# Patient Record
Sex: FEMALE | Race: OTHER
[De-identification: names, ages, dates, MRNs, and addresses within clinical notes are randomized per-mention and may not be internally consistent; named-entity substitution may affect disease eponyms.]

---

## 2023-06-06 PROBLEM — Z00.00 ENCOUNTER FOR PREVENTIVE HEALTH EXAMINATION: Status: ACTIVE | Noted: 2023-06-06

## 2023-06-07 ENCOUNTER — APPOINTMENT (OUTPATIENT)
Dept: ORTHOPEDIC SURGERY | Facility: CLINIC | Age: 63
End: 2023-06-07

## 2023-07-17 ENCOUNTER — INPATIENT (INPATIENT)
Facility: HOSPITAL | Age: 63
LOS: 14 days | Discharge: NURSING, REHAB OR LONG-TERM CARE | End: 2023-08-01
Attending: SURGERY | Admitting: SURGERY
Payer: MEDICAID

## 2023-07-17 ENCOUNTER — TRANSCRIPTION ENCOUNTER (OUTPATIENT)
Age: 63
End: 2023-07-17

## 2023-07-17 VITALS
TEMPERATURE: 98 F | OXYGEN SATURATION: 100 % | DIASTOLIC BLOOD PRESSURE: 111 MMHG | RESPIRATION RATE: 16 BRPM | HEART RATE: 64 BPM | SYSTOLIC BLOOD PRESSURE: 131 MMHG

## 2023-07-17 LAB
ALBUMIN SERPL ELPH-MCNC: 4.5 G/DL — SIGNIFICANT CHANGE UP (ref 3.3–5)
ALP SERPL-CCNC: 86 U/L — SIGNIFICANT CHANGE UP (ref 40–120)
ALT FLD-CCNC: 11 U/L — SIGNIFICANT CHANGE UP (ref 4–33)
ANION GAP SERPL CALC-SCNC: 35 MMOL/L — HIGH (ref 7–14)
ANISOCYTOSIS BLD QL: SLIGHT — SIGNIFICANT CHANGE UP
APTT BLD: 29.2 SEC — SIGNIFICANT CHANGE UP (ref 27–36.3)
AST SERPL-CCNC: 23 U/L — SIGNIFICANT CHANGE UP (ref 4–32)
BASE EXCESS BLDV CALC-SCNC: -17.4 MMOL/L — LOW (ref -2–3)
BASOPHILS # BLD AUTO: 0 K/UL — SIGNIFICANT CHANGE UP (ref 0–0.2)
BASOPHILS NFR BLD AUTO: 0 % — SIGNIFICANT CHANGE UP (ref 0–2)
BILIRUB SERPL-MCNC: <0.2 MG/DL — SIGNIFICANT CHANGE UP (ref 0.2–1.2)
BLD GP AB SCN SERPL QL: NEGATIVE — SIGNIFICANT CHANGE UP
BLOOD GAS VENOUS COMPREHENSIVE RESULT: SIGNIFICANT CHANGE UP
BUN SERPL-MCNC: 51 MG/DL — HIGH (ref 7–23)
CALCIUM SERPL-MCNC: 8.7 MG/DL — SIGNIFICANT CHANGE UP (ref 8.4–10.5)
CHLORIDE BLDV-SCNC: 92 MMOL/L — LOW (ref 96–108)
CHLORIDE SERPL-SCNC: 88 MMOL/L — LOW (ref 98–107)
CO2 BLDV-SCNC: 13 MMOL/L — LOW (ref 22–26)
CO2 SERPL-SCNC: 11 MMOL/L — LOW (ref 22–31)
CREAT SERPL-MCNC: 6.49 MG/DL — HIGH (ref 0.5–1.3)
EGFR: 7 ML/MIN/1.73M2 — LOW
EOSINOPHIL # BLD AUTO: 0 K/UL — SIGNIFICANT CHANGE UP (ref 0–0.5)
EOSINOPHIL NFR BLD AUTO: 0 % — SIGNIFICANT CHANGE UP (ref 0–6)
GAS PNL BLDV: 128 MMOL/L — LOW (ref 136–145)
GLUCOSE BLDV-MCNC: 118 MG/DL — HIGH (ref 70–99)
GLUCOSE SERPL-MCNC: 113 MG/DL — HIGH (ref 70–99)
HCO3 BLDV-SCNC: 12 MMOL/L — LOW (ref 22–29)
HCT VFR BLD CALC: 43.1 % — SIGNIFICANT CHANGE UP (ref 34.5–45)
HCT VFR BLDA CALC: 44 % — SIGNIFICANT CHANGE UP (ref 34.5–46.5)
HGB BLD CALC-MCNC: 14.8 G/DL — SIGNIFICANT CHANGE UP (ref 11.7–16.1)
HGB BLD-MCNC: 14.4 G/DL — SIGNIFICANT CHANGE UP (ref 11.5–15.5)
IANC: 16.36 K/UL — HIGH (ref 1.8–7.4)
INR BLD: 1 RATIO — SIGNIFICANT CHANGE UP (ref 0.88–1.16)
LACTATE BLDV-MCNC: 2.2 MMOL/L — HIGH (ref 0.5–2)
LYMPHOCYTES # BLD AUTO: 0.8 K/UL — LOW (ref 1–3.3)
LYMPHOCYTES # BLD AUTO: 4.3 % — LOW (ref 13–44)
MACROCYTES BLD QL: SLIGHT — SIGNIFICANT CHANGE UP
MCHC RBC-ENTMCNC: 33.4 GM/DL — SIGNIFICANT CHANGE UP (ref 32–36)
MCHC RBC-ENTMCNC: 34.8 PG — HIGH (ref 27–34)
MCV RBC AUTO: 104.1 FL — HIGH (ref 80–100)
METAMYELOCYTES # FLD: 0.9 % — SIGNIFICANT CHANGE UP (ref 0–1)
MONOCYTES # BLD AUTO: 0.65 K/UL — SIGNIFICANT CHANGE UP (ref 0–0.9)
MONOCYTES NFR BLD AUTO: 3.5 % — SIGNIFICANT CHANGE UP (ref 2–14)
NEUTROPHILS # BLD AUTO: 17.05 K/UL — HIGH (ref 1.8–7.4)
NEUTROPHILS NFR BLD AUTO: 81.7 % — HIGH (ref 43–77)
NEUTS BAND # BLD: 9.6 % — HIGH (ref 0–6)
OVALOCYTES BLD QL SMEAR: SLIGHT — SIGNIFICANT CHANGE UP
PCO2 BLDV: 39 MMHG — SIGNIFICANT CHANGE UP (ref 39–52)
PH BLDV: 7.09 — LOW (ref 7.32–7.43)
PLAT MORPH BLD: NORMAL — SIGNIFICANT CHANGE UP
PLATELET # BLD AUTO: 224 K/UL — SIGNIFICANT CHANGE UP (ref 150–400)
PLATELET COUNT - ESTIMATE: NORMAL — SIGNIFICANT CHANGE UP
PO2 BLDV: 21 MMHG — LOW (ref 25–45)
POIKILOCYTOSIS BLD QL AUTO: SLIGHT — SIGNIFICANT CHANGE UP
POLYCHROMASIA BLD QL SMEAR: SLIGHT — SIGNIFICANT CHANGE UP
POTASSIUM BLDV-SCNC: 3.4 MMOL/L — LOW (ref 3.5–5.1)
POTASSIUM SERPL-MCNC: 3.5 MMOL/L — SIGNIFICANT CHANGE UP (ref 3.5–5.3)
POTASSIUM SERPL-SCNC: 3.5 MMOL/L — SIGNIFICANT CHANGE UP (ref 3.5–5.3)
PROT SERPL-MCNC: 8.4 G/DL — HIGH (ref 6–8.3)
PROTHROM AB SERPL-ACNC: 11.6 SEC — SIGNIFICANT CHANGE UP (ref 10.5–13.4)
RBC # BLD: 4.14 M/UL — SIGNIFICANT CHANGE UP (ref 3.8–5.2)
RBC # FLD: 12.7 % — SIGNIFICANT CHANGE UP (ref 10.3–14.5)
RBC BLD AUTO: ABNORMAL
RH IG SCN BLD-IMP: POSITIVE — SIGNIFICANT CHANGE UP
SAO2 % BLDV: 26.5 % — LOW (ref 67–88)
SODIUM SERPL-SCNC: 134 MMOL/L — LOW (ref 135–145)
WBC # BLD: 18.67 K/UL — HIGH (ref 3.8–10.5)
WBC # FLD AUTO: 18.67 K/UL — HIGH (ref 3.8–10.5)

## 2023-07-17 PROCEDURE — 99285 EMERGENCY DEPT VISIT HI MDM: CPT

## 2023-07-17 RX ORDER — ACETAMINOPHEN 500 MG
650 TABLET ORAL EVERY 6 HOURS
Refills: 0 | Status: COMPLETED | OUTPATIENT
Start: 2023-07-17 | End: 2023-07-17

## 2023-07-17 RX ORDER — SODIUM CHLORIDE 9 MG/ML
500 INJECTION INTRAMUSCULAR; INTRAVENOUS; SUBCUTANEOUS ONCE
Refills: 0 | Status: COMPLETED | OUTPATIENT
Start: 2023-07-17 | End: 2023-07-17

## 2023-07-17 RX ADMIN — Medication 260 MILLIGRAM(S): at 22:18

## 2023-07-17 RX ADMIN — SODIUM CHLORIDE 500 MILLILITER(S): 9 INJECTION INTRAMUSCULAR; INTRAVENOUS; SUBCUTANEOUS at 22:18

## 2023-07-17 NOTE — ED ADULT NURSE NOTE - NSFALLHARMRISKINTERV_ED_ALL_ED
Initial (On Arrival) Assistance OOB with selected safe patient handling equipment if applicable/Assistance with ambulation/Communicate risk of Fall with Harm to all staff, patient, and family/Provide visual cue: red socks, yellow wristband, yellow gown, etc/Reinforce activity limits and safety measures with patient and family/Bed in lowest position, wheels locked, appropriate side rails in place/Call bell, personal items and telephone in reach/Instruct patient to call for assistance before getting out of bed/chair/stretcher/Non-slip footwear applied when patient is off stretcher/[place] to call system/Physically safe environment - no spills, clutter or unnecessary equipment/Purposeful Proactive Rounding/Room/bathroom lighting operational, light cord in reach Assistance OOB with selected safe patient handling equipment if applicable/Assistance with ambulation/Communicate risk of Fall with Harm to all staff, patient, and family/Provide visual cue: red socks, yellow wristband, yellow gown, etc/Reinforce activity limits and safety measures with patient and family/Bed in lowest position, wheels locked, appropriate side rails in place/Call bell, personal items and telephone in reach/Instruct patient to call for assistance before getting out of bed/chair/stretcher/Non-slip footwear applied when patient is off stretcher/[place] to call system/Physically safe environment - no spills, clutter or unnecessary equipment/Purposeful Proactive Rounding/Room/bathroom lighting operational, light cord in reach Assistance OOB with selected safe patient handling equipment if applicable/Assistance with ambulation/Communicate risk of Fall with Harm to all staff, patient, and family/Provide visual cue: red socks, yellow wristband, yellow gown, etc/Reinforce activity limits and safety measures with patient and family/Bed in lowest position, wheels locked, appropriate side rails in place/Call bell, personal items and telephone in reach/Instruct patient to call for assistance before getting out of bed/chair/stretcher/Non-slip footwear applied when patient is off stretcher/[place] to call system/Physically safe environment - no spills, clutter or unnecessary equipment/Purposeful Proactive Rounding/Room/bathroom lighting operational, light cord in reach

## 2023-07-17 NOTE — ED ADULT TRIAGE NOTE - CHIEF COMPLAINT QUOTE
Pt with hx of cardiac stents, HTN, on Plavix, c/o 3 days of pain to right calf. Denies swelling. Denies chest pain, denies SOB

## 2023-07-17 NOTE — ED ADULT NURSE NOTE - OBJECTIVE STATEMENT
62 y/o F arrives to E.D. rm 6 c/o right calf pain x3 days. PMHx: cardiac stents (plavix). Pt a&ox4, appears fatigued, ambulatory, neg SOB, denies CP, neg N/V/D. No swelling noted to LE. SB on tele. Providers at bedside. Awaiting orders. Frequent rounding in place. 64 y/o F arrives to E.D. rm 6 c/o right calf pain x3 days. PMHx: cardiac stents (plavix). Pt a&ox4, appears fatigued, ambulatory, neg SOB, denies CP, neg N/V/D. No swelling noted to LE. SB on tele. Providers at bedside. Awaiting orders. Frequent rounding in place.

## 2023-07-17 NOTE — ED PROVIDER NOTE - PHYSICAL EXAMINATION
Physical Exam:  Gen: uncomfortable appearing  Head: NCAT  HEENT: Scleral icterus   Lung: Bilateral rales  CV: RRR, no murmurs, rubs or gallops  Abd: Soft, NT, ND, no guarding, rigidity, rebound tenderness  MSK: Pain when squeezing the right calf, pain with passive dorsiflexion of right calf, no palpable DP on right, faint palpable DP on left  Neuro: Residual effects from stroke including memory deficits and slower gait  Skin: Cold lower extremities, no visible rashes, no leg edema  Psych: appropriate affect and mood    Doppler:  No DP pulse on right foot  Left DP pulse present

## 2023-07-17 NOTE — ED PROVIDER NOTE - OBJECTIVE STATEMENT
The patient is a 64 yo female with a PMH of PAD with multiple stent placement in right calf, stroke, and COPD who presents with right sided calf pain. The pain began gradually one week ago and has since gotten worse. She has been taking Plavix as prescribed and was prescribed another anticoagulant but has not started that medication. She took 800 mg ibuprofen once in the morning and another in the afternoon with no relief.     She also describes having melena 2 days ago that has since resolved. She now reports hematochezia. She also reports one episode of vomiting with no hematemesis. She denies a history of GI bleed.     She denies chest pain, shortness of breath, dizziness, abdominal pain, or focal weakness. The patient is a 62 yo female with a PMH of PAD with multiple stent placement in right calf, stroke, and COPD who presents with right sided calf pain. The pain began gradually one week ago and has since gotten worse. She has been taking Plavix as prescribed and was prescribed another anticoagulant but has not started that medication. She took 800 mg ibuprofen once in the morning and another in the afternoon with no relief.     She also describes having melena 2 days ago that has since resolved. She now reports hematochezia. She also reports one episode of vomiting with no hematemesis. She denies a history of GI bleed.     She denies chest pain, shortness of breath, dizziness, abdominal pain, or focal weakness. The patient is a 62 yo female with a PMH of PAD with multiple stent placement in right calf, stroke, and COPD who presents with right sided calf pain. The pain began gradually one week ago and has since gotten worse. She has been taking Plavix as prescribed and was prescribed another anticoagulant but has not started that medication. She took 800 mg ibuprofen once in the morning and another in the afternoon with no relief.     She also describes having melena 2 days ago that has since resolved. She now reports hematochezia. She also reports one episode of vomiting with no hematemesis. She denies a history of GI bleed.     She denies chest pain, shortness of breath, dizziness, fever, chills, abdominal pain, or focal weakness. The patient is a 64 yo female with a PMH of PAD with multiple stent placement in right calf, stroke, and COPD who presents with right sided calf pain. The pain began gradually one week ago and has since gotten worse. She has been taking Plavix as prescribed and was prescribed another anticoagulant but has not started that medication. She took 800 mg ibuprofen once in the morning and another in the afternoon with no relief.     She also describes having melena 2 days ago that has since resolved. She now reports hematochezia. She also reports one episode of vomiting with no hematemesis. She denies a history of GI bleed.     She denies chest pain, shortness of breath, dizziness, fever, chills, abdominal pain, or focal weakness.

## 2023-07-17 NOTE — ED PROVIDER NOTE - CLINICAL SUMMARY MEDICAL DECISION MAKING FREE TEXT BOX
The patient is a known vasculopath with multiple comorbidities who presents to the ED with severe right calf pain. High suspicion for arterial clotting due to patient history of PAD and right sided stents in right calf. Moderate suspicion of DVT, but more likely to be arterial due to The patient is a known vasculopath with multiple comorbidities who presents to the ED with severe right calf pain. High suspicion for arterial clotting due to patient history of PAD and stents in right calf. Moderate suspicion of DVT, due to positive Angel but more likely caused by arterial ischemic pain. Ordered CBC, CMP, pt/ptt/inr, and completed doppler by bedside.     Surgery was consulted awaiting recs. The patient is a known vasculopath with multiple comorbidities who presents to the ED with severe right calf pain. High suspicion for arterial clotting due to no DP pulse on doppler, patient history of PAD and stents in right calf. Moderate suspicion of DVT, due to positive Angel but more likely caused by arterial ischemic pain. Ordered CBC, CMP, pt/ptt/inr, VBG, EKG, and completed doppler by bedside.     Surgery was consulted awaiting recs.

## 2023-07-17 NOTE — ED PROVIDER NOTE - ATTENDING CONTRIBUTION TO CARE
63-year-old female with past medical history of PAD with multiple stents to right lower extremity (on Plavix), stroke, CAD, COPD, hypertension, presenting for complaints of acute lower extremity pain started for the past week and has been getting worse.  Denies associated numbness, tingling, leg swelling.  Patient also endorses melena, now with bloody stools.  Denies chest pain, palpitations, shortness of breath, dizziness.  Patient took ibuprofen today with minimal improvement.  On exam patient is well-appearing, no acute distress, heart regular rate and rhythm, lungs clear to auscultation bilaterally, no palpable pulse to right lower extremity, unable to obtain pulses with Dopplers on right lower extremity, Pap will pulse in left lower extremity.  Vascular consulted for concern for acute ischemia to right lower extremity.  Of note called into the room for episode of hypotension patient, repeat blood pressure showed MAP of 77 systolic 90s.  Patient states that she typically has blood pressure that goes" up and down" and that they have had a hard time controlling it.  Given history of recent GI bleed consider acute blood loss anemia, will obtain type and screen and monitor, transfuse if Hb <7. TBA

## 2023-07-18 ENCOUNTER — TRANSCRIPTION ENCOUNTER (OUTPATIENT)
Age: 63
End: 2023-07-18

## 2023-07-18 DIAGNOSIS — M79.606 PAIN IN LEG, UNSPECIFIED: ICD-10-CM

## 2023-07-18 DIAGNOSIS — E87.20 ACIDOSIS, UNSPECIFIED: ICD-10-CM

## 2023-07-18 DIAGNOSIS — N17.9 ACUTE KIDNEY FAILURE, UNSPECIFIED: ICD-10-CM

## 2023-07-18 LAB
ANION GAP SERPL CALC-SCNC: 21 MMOL/L — HIGH (ref 7–14)
ANION GAP SERPL CALC-SCNC: 24 MMOL/L — HIGH (ref 7–14)
ANION GAP SERPL CALC-SCNC: 29 MMOL/L — HIGH (ref 7–14)
APPEARANCE UR: CLEAR — SIGNIFICANT CHANGE UP
APTT BLD: 105.9 SEC — HIGH (ref 27–36.3)
APTT BLD: 97.2 SEC — HIGH (ref 27–36.3)
APTT BLD: >200 SEC — CRITICAL HIGH (ref 27–36.3)
BACTERIA # UR AUTO: NEGATIVE /HPF — SIGNIFICANT CHANGE UP
BASE EXCESS BLDV CALC-SCNC: -18.2 MMOL/L — LOW (ref -2–3)
BILIRUB UR-MCNC: NEGATIVE — SIGNIFICANT CHANGE UP
BLOOD GAS ARTERIAL - LYTES,HGB,ICA,LACT RESULT: SIGNIFICANT CHANGE UP
BLOOD GAS ARTERIAL COMPREHENSIVE RESULT: SIGNIFICANT CHANGE UP
BLOOD GAS VENOUS COMPREHENSIVE RESULT: SIGNIFICANT CHANGE UP
BUN SERPL-MCNC: 50 MG/DL — HIGH (ref 7–23)
BUN SERPL-MCNC: 54 MG/DL — HIGH (ref 7–23)
BUN SERPL-MCNC: 55 MG/DL — HIGH (ref 7–23)
C3 SERPL-MCNC: 125 MG/DL — SIGNIFICANT CHANGE UP (ref 90–180)
C4 SERPL-MCNC: 38 MG/DL — SIGNIFICANT CHANGE UP (ref 10–40)
CALCIUM SERPL-MCNC: 7.3 MG/DL — LOW (ref 8.4–10.5)
CALCIUM SERPL-MCNC: 7.4 MG/DL — LOW (ref 8.4–10.5)
CALCIUM SERPL-MCNC: 7.5 MG/DL — LOW (ref 8.4–10.5)
CAST: 2 /LPF — SIGNIFICANT CHANGE UP (ref 0–4)
CHLORIDE BLDV-SCNC: 95 MMOL/L — LOW (ref 96–108)
CHLORIDE SERPL-SCNC: 91 MMOL/L — LOW (ref 98–107)
CHLORIDE SERPL-SCNC: 96 MMOL/L — LOW (ref 98–107)
CHLORIDE SERPL-SCNC: 99 MMOL/L — SIGNIFICANT CHANGE UP (ref 98–107)
CO2 BLDV-SCNC: 11.2 MMOL/L — LOW (ref 22–26)
CO2 SERPL-SCNC: 12 MMOL/L — LOW (ref 22–31)
CO2 SERPL-SCNC: 13 MMOL/L — LOW (ref 22–31)
CO2 SERPL-SCNC: 15 MMOL/L — LOW (ref 22–31)
COLOR SPEC: YELLOW — SIGNIFICANT CHANGE UP
CORTIS F PM SERPL-MCNC: 25.9 UG/DL — HIGH (ref 2.7–10.5)
CREAT SERPL-MCNC: 2.73 MG/DL — HIGH (ref 0.5–1.3)
CREAT SERPL-MCNC: 3.68 MG/DL — HIGH (ref 0.5–1.3)
CREAT SERPL-MCNC: 4.48 MG/DL — HIGH (ref 0.5–1.3)
DIFF PNL FLD: ABNORMAL
EGFR: 11 ML/MIN/1.73M2 — LOW
EGFR: 13 ML/MIN/1.73M2 — LOW
EGFR: 19 ML/MIN/1.73M2 — LOW
FIBRINOGEN PPP-MCNC: 479 MG/DL — HIGH (ref 200–465)
FIBRINOGEN PPP-MCNC: 502 MG/DL — HIGH (ref 200–465)
FIBRINOGEN PPP-MCNC: 541 MG/DL — HIGH (ref 200–465)
GAS PNL BLDV: 127 MMOL/L — LOW (ref 136–145)
GLUCOSE BLDV-MCNC: 122 MG/DL — HIGH (ref 70–99)
GLUCOSE SERPL-MCNC: 122 MG/DL — HIGH (ref 70–99)
GLUCOSE SERPL-MCNC: 141 MG/DL — HIGH (ref 70–99)
GLUCOSE SERPL-MCNC: 157 MG/DL — HIGH (ref 70–99)
GLUCOSE UR QL: NEGATIVE MG/DL — SIGNIFICANT CHANGE UP
HCO3 BLDV-SCNC: 10 MMOL/L — CRITICAL LOW (ref 22–29)
HCT VFR BLD CALC: 27.1 % — LOW (ref 34.5–45)
HCT VFR BLD CALC: 31.5 % — LOW (ref 34.5–45)
HCT VFR BLD CALC: 33.3 % — LOW (ref 34.5–45)
HCT VFR BLDA CALC: 41 % — SIGNIFICANT CHANGE UP (ref 34.5–46.5)
HGB BLD CALC-MCNC: 13.6 G/DL — SIGNIFICANT CHANGE UP (ref 11.7–16.1)
HGB BLD-MCNC: 10.9 G/DL — LOW (ref 11.5–15.5)
HGB BLD-MCNC: 11.7 G/DL — SIGNIFICANT CHANGE UP (ref 11.5–15.5)
HGB BLD-MCNC: 9.5 G/DL — LOW (ref 11.5–15.5)
HIV 1+2 AB+HIV1 P24 AG SERPL QL IA: SIGNIFICANT CHANGE UP
INR BLD: 1.2 RATIO — HIGH (ref 0.88–1.16)
INR BLD: 1.26 RATIO — HIGH (ref 0.88–1.16)
KETONES UR-MCNC: NEGATIVE MG/DL — SIGNIFICANT CHANGE UP
LACTATE BLDV-MCNC: 2.1 MMOL/L — HIGH (ref 0.5–2)
LEUKOCYTE ESTERASE UR-ACNC: ABNORMAL
MAGNESIUM SERPL-MCNC: 1.7 MG/DL — SIGNIFICANT CHANGE UP (ref 1.6–2.6)
MAGNESIUM SERPL-MCNC: 2 MG/DL — SIGNIFICANT CHANGE UP (ref 1.6–2.6)
MCHC RBC-ENTMCNC: 34.2 PG — HIGH (ref 27–34)
MCHC RBC-ENTMCNC: 34.5 PG — HIGH (ref 27–34)
MCHC RBC-ENTMCNC: 34.6 GM/DL — SIGNIFICANT CHANGE UP (ref 32–36)
MCHC RBC-ENTMCNC: 35.1 GM/DL — SIGNIFICANT CHANGE UP (ref 32–36)
MCHC RBC-ENTMCNC: 35.1 PG — HIGH (ref 27–34)
MCV RBC AUTO: 100 FL — SIGNIFICANT CHANGE UP (ref 80–100)
MCV RBC AUTO: 98.2 FL — SIGNIFICANT CHANGE UP (ref 80–100)
MCV RBC AUTO: 98.7 FL — SIGNIFICANT CHANGE UP (ref 80–100)
NITRITE UR-MCNC: NEGATIVE — SIGNIFICANT CHANGE UP
NRBC # BLD: 0 /100 WBCS — SIGNIFICANT CHANGE UP (ref 0–0)
NRBC # FLD: 0 K/UL — SIGNIFICANT CHANGE UP (ref 0–0)
OSMOLALITY UR: 342 MOSM/KG — SIGNIFICANT CHANGE UP (ref 50–1200)
PCO2 BLDV: 32 MMHG — LOW (ref 39–52)
PH BLDV: 7.11 — LOW (ref 7.32–7.43)
PH UR: 6.5 — SIGNIFICANT CHANGE UP (ref 5–8)
PHOSPHATE SERPL-MCNC: 2.6 MG/DL — SIGNIFICANT CHANGE UP (ref 2.5–4.5)
PHOSPHATE SERPL-MCNC: 3.5 MG/DL — SIGNIFICANT CHANGE UP (ref 2.5–4.5)
PLATELET # BLD AUTO: 164 K/UL — SIGNIFICANT CHANGE UP (ref 150–400)
PLATELET # BLD AUTO: 179 K/UL — SIGNIFICANT CHANGE UP (ref 150–400)
PLATELET # BLD AUTO: 189 K/UL — SIGNIFICANT CHANGE UP (ref 150–400)
PO2 BLDV: 48 MMHG — HIGH (ref 25–45)
POTASSIUM BLDV-SCNC: 3.6 MMOL/L — SIGNIFICANT CHANGE UP (ref 3.5–5.1)
POTASSIUM SERPL-MCNC: 2.8 MMOL/L — CRITICAL LOW (ref 3.5–5.3)
POTASSIUM SERPL-MCNC: 3.1 MMOL/L — LOW (ref 3.5–5.3)
POTASSIUM SERPL-MCNC: 3.3 MMOL/L — LOW (ref 3.5–5.3)
POTASSIUM SERPL-SCNC: 2.8 MMOL/L — CRITICAL LOW (ref 3.5–5.3)
POTASSIUM SERPL-SCNC: 3.1 MMOL/L — LOW (ref 3.5–5.3)
POTASSIUM SERPL-SCNC: 3.3 MMOL/L — LOW (ref 3.5–5.3)
POTASSIUM UR-SCNC: 24 MMOL/L — SIGNIFICANT CHANGE UP
PROT ?TM UR-MCNC: 29 MG/DL — SIGNIFICANT CHANGE UP
PROT UR-MCNC: 100 MG/DL
PROTHROM AB SERPL-ACNC: 14 SEC — HIGH (ref 10.5–13.4)
PROTHROM AB SERPL-ACNC: 14.7 SEC — HIGH (ref 10.5–13.4)
RBC # BLD: 2.71 M/UL — LOW (ref 3.8–5.2)
RBC # BLD: 3.19 M/UL — LOW (ref 3.8–5.2)
RBC # BLD: 3.39 M/UL — LOW (ref 3.8–5.2)
RBC # FLD: 12.4 % — SIGNIFICANT CHANGE UP (ref 10.3–14.5)
RBC # FLD: 12.5 % — SIGNIFICANT CHANGE UP (ref 10.3–14.5)
RBC # FLD: 12.7 % — SIGNIFICANT CHANGE UP (ref 10.3–14.5)
RBC CASTS # UR COMP ASSIST: 104 /HPF — HIGH (ref 0–4)
REVIEW: SIGNIFICANT CHANGE UP
RH IG SCN BLD-IMP: POSITIVE — SIGNIFICANT CHANGE UP
SAO2 % BLDV: 75.8 % — SIGNIFICANT CHANGE UP (ref 67–88)
SODIUM SERPL-SCNC: 132 MMOL/L — LOW (ref 135–145)
SODIUM SERPL-SCNC: 133 MMOL/L — LOW (ref 135–145)
SODIUM SERPL-SCNC: 135 MMOL/L — SIGNIFICANT CHANGE UP (ref 135–145)
SODIUM UR-SCNC: 97 MMOL/L — SIGNIFICANT CHANGE UP
SP GR SPEC: 1.02 — SIGNIFICANT CHANGE UP (ref 1–1.03)
SQUAMOUS # UR AUTO: 2 /HPF — SIGNIFICANT CHANGE UP (ref 0–5)
UROBILINOGEN FLD QL: 0.2 MG/DL — SIGNIFICANT CHANGE UP (ref 0.2–1)
UUN UR-MCNC: 243.5 MG/DL — SIGNIFICANT CHANGE UP
WBC # BLD: 12.54 K/UL — HIGH (ref 3.8–10.5)
WBC # BLD: 14.6 K/UL — HIGH (ref 3.8–10.5)
WBC # BLD: 15.32 K/UL — HIGH (ref 3.8–10.5)
WBC # FLD AUTO: 12.54 K/UL — HIGH (ref 3.8–10.5)
WBC # FLD AUTO: 14.6 K/UL — HIGH (ref 3.8–10.5)
WBC # FLD AUTO: 15.32 K/UL — HIGH (ref 3.8–10.5)
WBC UR QL: 2 /HPF — SIGNIFICANT CHANGE UP (ref 0–5)

## 2023-07-18 PROCEDURE — 76604 US EXAM CHEST: CPT | Mod: 26

## 2023-07-18 PROCEDURE — 71045 X-RAY EXAM CHEST 1 VIEW: CPT | Mod: 26

## 2023-07-18 PROCEDURE — 99222 1ST HOSP IP/OBS MODERATE 55: CPT

## 2023-07-18 PROCEDURE — 86334 IMMUNOFIX E-PHORESIS SERUM: CPT | Mod: 26

## 2023-07-18 PROCEDURE — 76937 US GUIDE VASCULAR ACCESS: CPT | Mod: 26

## 2023-07-18 PROCEDURE — 37211 THROMBOLYTIC ART THERAPY: CPT

## 2023-07-18 PROCEDURE — 75710 ARTERY X-RAYS ARM/LEG: CPT | Mod: 26,59

## 2023-07-18 PROCEDURE — 86335 IMMUNFIX E-PHORSIS/URINE/CSF: CPT | Mod: 26

## 2023-07-18 PROCEDURE — 37220: CPT

## 2023-07-18 PROCEDURE — 93306 TTE W/DOPPLER COMPLETE: CPT | Mod: 26

## 2023-07-18 DEVICE — SHEATH INTRODUCER TERUMO PINNACLE PERIPHERAL 5FR X 10CM X 0.035" MINI WIRE: Type: IMPLANTABLE DEVICE | Site: RIGHT LOWER EXTREMITY. | Status: FUNCTIONAL

## 2023-07-18 DEVICE — CATH ANGIO GLIDECATH ANGLE 4FR X 100CM: Type: IMPLANTABLE DEVICE | Site: RIGHT LOWER EXTREMITY. | Status: FUNCTIONAL

## 2023-07-18 DEVICE — IMPLANTABLE DEVICE: Type: IMPLANTABLE DEVICE | Site: RIGHT LOWER EXTREMITY. | Status: FUNCTIONAL

## 2023-07-18 DEVICE — INTRODUCER CHK FLO 6FR X 45CM: Type: IMPLANTABLE DEVICE | Site: RIGHT LOWER EXTREMITY. | Status: FUNCTIONAL

## 2023-07-18 DEVICE — GWIRE CHOICE PT .014X300CM XSUPPORT: Type: IMPLANTABLE DEVICE | Site: RIGHT LOWER EXTREMITY. | Status: FUNCTIONAL

## 2023-07-18 DEVICE — BLLN MUSTANG 7X40MMX135CM: Type: IMPLANTABLE DEVICE | Site: RIGHT LOWER EXTREMITY. | Status: FUNCTIONAL

## 2023-07-18 DEVICE — CATH ANGIO GLIDECATH ANGLE 4FR X 65CM: Type: IMPLANTABLE DEVICE | Site: RIGHT LOWER EXTREMITY. | Status: FUNCTIONAL

## 2023-07-18 DEVICE — INTRODUCER SET MICROPUNCTURE ACCESS 21G X 7CM: Type: IMPLANTABLE DEVICE | Site: RIGHT LOWER EXTREMITY. | Status: FUNCTIONAL

## 2023-07-18 DEVICE — GLDWIRE ADVANTAGE .035X180 CM: Type: IMPLANTABLE DEVICE | Site: RIGHT LOWER EXTREMITY. | Status: FUNCTIONAL

## 2023-07-18 DEVICE — GWIRE ANGL .035X180 STD: Type: IMPLANTABLE DEVICE | Site: RIGHT LOWER EXTREMITY. | Status: FUNCTIONAL

## 2023-07-18 RX ORDER — HEPARIN SODIUM 5000 [USP'U]/ML
4500 INJECTION INTRAVENOUS; SUBCUTANEOUS ONCE
Refills: 0 | Status: COMPLETED | OUTPATIENT
Start: 2023-07-18 | End: 2023-07-18

## 2023-07-18 RX ORDER — POTASSIUM CHLORIDE 20 MEQ
10 PACKET (EA) ORAL
Refills: 0 | Status: COMPLETED | OUTPATIENT
Start: 2023-07-18 | End: 2023-07-19

## 2023-07-18 RX ORDER — SODIUM CHLORIDE 9 MG/ML
1000 INJECTION, SOLUTION INTRAVENOUS
Refills: 0 | Status: DISCONTINUED | OUTPATIENT
Start: 2023-07-18 | End: 2023-07-20

## 2023-07-18 RX ORDER — MEROPENEM 1 G/30ML
500 INJECTION INTRAVENOUS EVERY 24 HOURS
Refills: 0 | Status: DISCONTINUED | OUTPATIENT
Start: 2023-07-18 | End: 2023-07-20

## 2023-07-18 RX ORDER — HEPARIN SODIUM 5000 [USP'U]/ML
2000 INJECTION INTRAVENOUS; SUBCUTANEOUS EVERY 6 HOURS
Refills: 0 | Status: DISCONTINUED | OUTPATIENT
Start: 2023-07-18 | End: 2023-07-18

## 2023-07-18 RX ORDER — ACETAMINOPHEN 500 MG
1000 TABLET ORAL EVERY 6 HOURS
Refills: 0 | Status: COMPLETED | OUTPATIENT
Start: 2023-07-18 | End: 2023-07-19

## 2023-07-18 RX ORDER — SODIUM CHLORIDE 9 MG/ML
1000 INJECTION, SOLUTION INTRAVENOUS ONCE
Refills: 0 | Status: COMPLETED | OUTPATIENT
Start: 2023-07-18 | End: 2023-07-18

## 2023-07-18 RX ORDER — ALTEPLASE 100 MG
4 KIT INTRAVENOUS ONCE
Refills: 0 | Status: DISCONTINUED | OUTPATIENT
Start: 2023-07-18 | End: 2023-07-18

## 2023-07-18 RX ORDER — ALTEPLASE 100 MG
1 KIT INTRAVENOUS
Qty: 25 | Refills: 0 | Status: DISCONTINUED | OUTPATIENT
Start: 2023-07-18 | End: 2023-07-19

## 2023-07-18 RX ORDER — OXYCODONE HYDROCHLORIDE 5 MG/1
5 TABLET ORAL EVERY 4 HOURS
Refills: 0 | Status: DISCONTINUED | OUTPATIENT
Start: 2023-07-18 | End: 2023-07-25

## 2023-07-18 RX ORDER — HEPARIN SODIUM 5000 [USP'U]/ML
4500 INJECTION INTRAVENOUS; SUBCUTANEOUS EVERY 6 HOURS
Refills: 0 | Status: DISCONTINUED | OUTPATIENT
Start: 2023-07-18 | End: 2023-07-18

## 2023-07-18 RX ORDER — SODIUM BICARBONATE 1 MEQ/ML
0.32 SYRINGE (ML) INTRAVENOUS
Qty: 150 | Refills: 0 | Status: DISCONTINUED | OUTPATIENT
Start: 2023-07-18 | End: 2023-07-18

## 2023-07-18 RX ORDER — ATORVASTATIN CALCIUM 80 MG/1
40 TABLET, FILM COATED ORAL AT BEDTIME
Refills: 0 | Status: DISCONTINUED | OUTPATIENT
Start: 2023-07-18 | End: 2023-08-01

## 2023-07-18 RX ORDER — NOREPINEPHRINE BITARTRATE/D5W 8 MG/250ML
0.09 PLASTIC BAG, INJECTION (ML) INTRAVENOUS
Qty: 8 | Refills: 0 | Status: DISCONTINUED | OUTPATIENT
Start: 2023-07-18 | End: 2023-07-18

## 2023-07-18 RX ORDER — HEPARIN SODIUM 5000 [USP'U]/ML
600 INJECTION INTRAVENOUS; SUBCUTANEOUS
Qty: 25000 | Refills: 0 | Status: DISCONTINUED | OUTPATIENT
Start: 2023-07-18 | End: 2023-07-19

## 2023-07-18 RX ORDER — ALBUMIN HUMAN 25 %
250 VIAL (ML) INTRAVENOUS ONCE
Refills: 0 | Status: COMPLETED | OUTPATIENT
Start: 2023-07-18 | End: 2023-07-18

## 2023-07-18 RX ORDER — POTASSIUM CHLORIDE 20 MEQ
20 PACKET (EA) ORAL
Refills: 0 | Status: COMPLETED | OUTPATIENT
Start: 2023-07-18 | End: 2023-07-18

## 2023-07-18 RX ORDER — NOREPINEPHRINE BITARTRATE/D5W 8 MG/250ML
0.05 PLASTIC BAG, INJECTION (ML) INTRAVENOUS
Qty: 8 | Refills: 0 | Status: DISCONTINUED | OUTPATIENT
Start: 2023-07-18 | End: 2023-07-21

## 2023-07-18 RX ORDER — ACETAMINOPHEN 500 MG
1000 TABLET ORAL EVERY 6 HOURS
Refills: 0 | Status: DISCONTINUED | OUTPATIENT
Start: 2023-07-18 | End: 2023-07-18

## 2023-07-18 RX ORDER — BUDESONIDE AND FORMOTEROL FUMARATE DIHYDRATE 160; 4.5 UG/1; UG/1
2 AEROSOL RESPIRATORY (INHALATION)
Refills: 0 | Status: DISCONTINUED | OUTPATIENT
Start: 2023-07-18 | End: 2023-08-01

## 2023-07-18 RX ORDER — HEPARIN SODIUM 5000 [USP'U]/ML
INJECTION INTRAVENOUS; SUBCUTANEOUS
Qty: 25000 | Refills: 0 | Status: DISCONTINUED | OUTPATIENT
Start: 2023-07-18 | End: 2023-07-18

## 2023-07-18 RX ORDER — OXYCODONE HYDROCHLORIDE 5 MG/1
10 TABLET ORAL EVERY 4 HOURS
Refills: 0 | Status: DISCONTINUED | OUTPATIENT
Start: 2023-07-18 | End: 2023-07-25

## 2023-07-18 RX ADMIN — Medication 5.63 MICROGRAM(S)/KG/MIN: at 13:33

## 2023-07-18 RX ADMIN — SODIUM CHLORIDE 100 MILLILITER(S): 9 INJECTION, SOLUTION INTRAVENOUS at 19:26

## 2023-07-18 RX ADMIN — HEPARIN SODIUM 7 UNIT(S)/HR: 5000 INJECTION INTRAVENOUS; SUBCUTANEOUS at 10:34

## 2023-07-18 RX ADMIN — Medication 5.63 MICROGRAM(S)/KG/MIN: at 10:34

## 2023-07-18 RX ADMIN — Medication 5.63 MICROGRAM(S)/KG/MIN: at 17:43

## 2023-07-18 RX ADMIN — Medication 50 MILLIEQUIVALENT(S): at 09:36

## 2023-07-18 RX ADMIN — Medication 50 MILLIEQUIVALENT(S): at 13:32

## 2023-07-18 RX ADMIN — MEROPENEM 100 MILLIGRAM(S): 1 INJECTION INTRAVENOUS at 10:37

## 2023-07-18 RX ADMIN — SODIUM CHLORIDE 100 MILLILITER(S): 9 INJECTION, SOLUTION INTRAVENOUS at 17:42

## 2023-07-18 RX ADMIN — SODIUM CHLORIDE 1000 MILLILITER(S): 9 INJECTION, SOLUTION INTRAVENOUS at 16:41

## 2023-07-18 RX ADMIN — Medication 50 MILLIEQUIVALENT(S): at 12:06

## 2023-07-18 RX ADMIN — Medication 1000 MILLIGRAM(S): at 14:00

## 2023-07-18 RX ADMIN — Medication 100 MILLIEQUIVALENT(S): at 22:33

## 2023-07-18 RX ADMIN — Medication 100 MILLIEQUIVALENT(S): at 23:23

## 2023-07-18 RX ADMIN — BUDESONIDE AND FORMOTEROL FUMARATE DIHYDRATE 2 PUFF(S): 160; 4.5 AEROSOL RESPIRATORY (INHALATION) at 22:59

## 2023-07-18 RX ADMIN — ATORVASTATIN CALCIUM 40 MILLIGRAM(S): 80 TABLET, FILM COATED ORAL at 23:35

## 2023-07-18 RX ADMIN — Medication 5.63 MICROGRAM(S)/KG/MIN: at 19:26

## 2023-07-18 RX ADMIN — Medication 150 MEQ/KG/HR: at 10:33

## 2023-07-18 RX ADMIN — HEPARIN SODIUM 1100 UNIT(S)/HR: 5000 INJECTION INTRAVENOUS; SUBCUTANEOUS at 01:24

## 2023-07-18 RX ADMIN — HEPARIN SODIUM 6 UNIT(S)/HR: 5000 INJECTION INTRAVENOUS; SUBCUTANEOUS at 22:08

## 2023-07-18 RX ADMIN — Medication 125 MILLILITER(S): at 17:41

## 2023-07-18 RX ADMIN — OXYCODONE HYDROCHLORIDE 10 MILLIGRAM(S): 5 TABLET ORAL at 23:23

## 2023-07-18 RX ADMIN — HEPARIN SODIUM 4500 UNIT(S): 5000 INJECTION INTRAVENOUS; SUBCUTANEOUS at 01:26

## 2023-07-18 RX ADMIN — Medication 1000 MILLIGRAM(S): at 18:30

## 2023-07-18 RX ADMIN — Medication 400 MILLIGRAM(S): at 13:32

## 2023-07-18 RX ADMIN — ALTEPLASE 10 MG/HR: KIT at 17:43

## 2023-07-18 RX ADMIN — ALTEPLASE 10 MG/HR: KIT at 10:35

## 2023-07-18 RX ADMIN — SODIUM CHLORIDE 1000 MILLILITER(S): 9 INJECTION, SOLUTION INTRAVENOUS at 15:40

## 2023-07-18 RX ADMIN — Medication 400 MILLIGRAM(S): at 17:43

## 2023-07-18 RX ADMIN — Medication 150 MEQ/KG/HR: at 00:46

## 2023-07-18 NOTE — CONSULT NOTE ADULT - PROBLEM SELECTOR RECOMMENDATION 9
Pt with JACKI in the setting of recent NSAID use, hypotension v other etiology. Exact duration of JACKI however unknown.     Send UA, urine electrolytes, spot urine TP/CR. Send serological work-up for secondary causes of renal failure including BENNIE, P-ANCA, C-ANCA, Anti-GBM ab, HBsAg, Hep C antibody, HIV, Parvovirus, C3, C4, serum and urine immunofixation. Recommend Ayoub catheter placement. Agree with IVFs (IV bicarb, 150meq in D5W). Will need to consider HD if renal failure continues to worsen. Discussed with the patient that she may require RRT/HD, risks and benefits associated with RRT/HD explained at length. HD consent obtained and kept in patients chart. Monitor labs and urine output. Avoid NSAIDs, ACEI/ARBS, RCA and nephrotoxins. Dose medications as per eGFR. Pt with JACKI in the setting of recent NSAID use, hypotension v other etiology. Exact duration of JACKI however unknown.     Send UA, urine electrolytes, spot urine TP/CR. Send serological work-up for secondary causes of renal failure including BENNIE, P-ANCA, C-ANCA, Anti-GBM ab, HBsAg, Hep C antibody, HIV, Parvovirus, C3, C4, serum and urine immunofixation.   Recommend Ayoub catheter placement.   Recommend Renal US.   Agree with IVFs (IV bicarb, 150meq in D5W).   Will need to consider HD if renal failure continues to worsen.   Discussed with the patient that she may require RRT/HD, risks and benefits associated with RRT/HD explained at length. HD consent obtained and kept in patients chart.   Monitor labs and urine output. Avoid NSAIDs, ACEI/ARBS, RCA and nephrotoxins. Dose medications as per eGFR.

## 2023-07-18 NOTE — H&P ADULT - ASSESSMENT
63F PMH CAD s/p stents, PAD s/p b/l stents (Dr. Hart,  [facility]), CVA, COPD presenting with acute limb ischemia of RLE.    Admit to Dr. Lawrence  Heparin gtt  Appreciate Nephro input, will start bicarb gtt  Emergent OR for RLE angio and revascularization    Patient seen and examined with Jorge PGY4 and Dr. Brunson Vascular Fellow.

## 2023-07-18 NOTE — H&P ADULT - NSHPPHYSICALEXAM_GEN_ALL_CORE
Vital Signs Last 24 Hrs  T(C): 36.4 (17 Jul 2023 18:17), Max: 36.4 (17 Jul 2023 18:17)  T(F): 97.5 (17 Jul 2023 18:17), Max: 97.5 (17 Jul 2023 18:17)  HR: 55 (17 Jul 2023 20:28) (55 - 64)  BP: 91/49 (17 Jul 2023 20:28) (78/50 - 131/111)  BP(mean): --  RR: 16 (17 Jul 2023 18:17) (16 - 16)  SpO2: 100% (17 Jul 2023 18:17) (100% - 100%)    Parameters below as of 17 Jul 2023 18:17  Patient On (Oxygen Delivery Method): room air        General: well developed, well nourished, NAD  Neuro: alert and oriented, no focal deficits, moves all extremities spontaneously  HEENT: NCAT, EOMI, anicteric, mucosa moist  Respiratory: airway patent, respirations unlabored  CVS: regular rate and rhythm  Abdomen: soft, nontender, nondistended  Extremities: no edema, right leg/foot cooler than left, RLE sensation diminished, R weakness relative to L  Vascular: palpable femoral pulses b/l but R femoral pulse diminished compared to L     RLE: no palpable popliteal/DP/PT pulses, no doppler signals present     LLE: palpable popliteal pulse, DP/PT not palpable, DP/PT doppler signals present  Skin: warm, dry, appropriate color

## 2023-07-18 NOTE — PATIENT PROFILE ADULT - FUNCTIONAL ASSESSMENT - BASIC MOBILITY 6.
2-calculated by average/Not able to assess (calculate score using [facility] averaging method)  2-calculated by average/Not able to assess (calculate score using [facility] averaging method)  2-calculated by average/Not able to assess (calculate score using [facility] averaging method)

## 2023-07-18 NOTE — PATIENT PROFILE ADULT - INTERNATIONAL TRAVEL
2021       RE: Duane D Backes  : 1981   MRN: [id number]      Dear Colleague,    Thank you for referring your patient, Duane D Backes, to the [facility]PILE[facility] at [facility]. Please see a copy of my visit note below.    Duane is a 39 year old who is being evaluated via a billable video visit.      How would you like to obtain your AVS? Fax [phone]  If the video visit is dropped, the invitation should be resent by: Text to cell phone: [phone] (same)  Will anyone else be joining your video visit? Yes: Raul. How would they like to receive their invitation? Other e-mail: no      Video Start Time: 11:04 AM  Video-Visit Details    Type of service:  Video Visit    Video End Time:11:22 AM    Originating Location (pt. Location): Home    Distant Location (provider location):  [facility]     Platform used for Video Visit: [employer]         [facility] History and Physical       Patient:  Duane D Backes  :  1981   Age:  39 year old   Today's Office Visit:  2021    Referring Provider:    Wilner Carter MD  [address] [place], [place] [address]    Epilepsy history copied forward:  Duane D Backes is 39 year old right handed with history of cognitive disability and epilepsy referred to us by Dr. Carter for high depakote levels. Per medical records from  he had a generalized tonic-clonic convulsion at [place], EEG showed rare left temporal epileptiform discharges. Per medical records he moved to current shelter in .  He is has been treated for epilepsy with topiramate (low levels) and depakote with high concentration for many years. His most recent depakote 2020 at 144 and  2020 at 172.  1st depakote level in 2015 was 136. He has always had persistently high depakote. In  2020 he had two seizure in one day. Per Brain he was sitting on couch, whole body was rigidity with eye lid twitching for 1 minute.  There was no obvious shaking.  In the last 3 years he has lost 40 pounds.    Seizure type 1: Generalized tonic-clonic convulsion. Last one 2020. Prior to this .     Interval History: spoke to brain on phone. Duane's last seizure 2020.  On her last visit we reduced Depakote and increase topiramate.  Unfortunately he had Covid in the end of 2020 and was not able to complete lab testing.  He has not had any breakthrough seizures.  There have been no significant side effects to these medication changes including rash, mood changes, behavioral outburst (hits, throws, and may get mad). He gets 14 (8 oz) cups per day.   Current antiepileptic drug:   Depakote 1000 mg twice a day  Topiramate  200 mg twice a day  Medication Notes:   AED Medication Compliance:  compliant most of the time  Using a pill box:  Yes  Past AEDs notes:   Topiramate: We increase topiramate to 200 mg twice a day in winter 2020.    Depakote was decreased from 1500 mg twice a day to 1000 mg twice a day and  due to blood concentrations near 170.  Psycho-Social History: he moved to current group home . He grew up with parents, after high school he was institutionalized (may be due to behavioral) and then transferred to group home . No drinking, no drugs, and no smoking.     Previous Evaluations for Epilepsy:   EE: Left temporal epileptiform discharges   MRI brain 2009: Large multilobulated mass lesion centered in the right   cerebellopontine angle cistern with associated mass-effect, most   compatible with epidermoid tumor, not significantly changed in size   from the previous study.     Review of Systems:  Lethargy / Tiredness:  No  Nausea / Vomiting:  No  Double Vision:  No  Sleepiness:  No  Depression:  No  Slowed Cognitive Function:  yes  Memory Problems:  No  Poor Balance:  No  Dizziness:  No  Appetite Changes:  Increased   Blurred Vision:  No  Sleep Changes:  No  Behavioral Changes:  No  Skin:  negative  Respiratory: No shortness of breath, dyspnea on exertion, cough, or hemoptysis  Cardiovascular: negative  Have you experienced a traumatic fall related to your events: No  Are these falls related to your seizures: No      Exam:    There were no vitals taken for this visit.     Wt Readings from Last 5 Encounters:   12/23/19 198 lb (89.8 kg)   12/16/19 198 lb 6.4 oz (90 kg)   08/27/19 202 lb 6.4 oz (91.8 kg)   07/10/19 208 lb (94.3 kg)   03/19/19 215 lb (97.5 kg)     Cognitive delay, 1-2 words, face symmetric, extra ocular movements in tact, hard to have him follow task and assess upper extremities strength.      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  Prior EEGs have shown rare left temporal sharp waves (opposite side of tumor).  Based on seizure description in 2006 and EEG region of focal left temporal cortical irritability I suspect he may have a focal epilepsy.  Patient has been managed on Depakote and topiramate for several years with no seizures.  In June 2020 he had 2 possible generalized tonic-clonic seizures in the setting of supratherapeutic divalproex levels.  His most recent divalproex level in September 2020 was 130, prior to that 144 and 172.  He tolerated this change with no significant seizure deterioration or side effects.  I encouraged Raul to obtain labs when it is safe to do so.  I will follow-up with him in 2 months.    If he has seizure deterioration we may introduce a sodium channel blocker, in 2006 Dilantin was very efficacious for him.  If a sodium channel blockers considered I recommend we use oxcarbazepine or Vimpat.  Phenytoin will have interactions with divalproex.  Certainly when we make medication changes he is at risk of breakthrough seizures, behavioral  changes, and appetite changes.  Additionally side effects of kidney stones, appetite changes, and mood changes with increasing Topamax have been reviewed.     Plan :   Continue topiramate 200 mg am and 200 mg pm and depakote 1000 mg twice a day   We may consider lowering depakote 500 mg am and 1000 mg pm since his levels are still high at 130, certainly he can have more mood issues and seizures.   Follow up  With Felix 4-6 months   Encouraged hydration     If he has breakthrough seizure we can consider a Na+ blocking agent (phenytoin  Worked for him in the past). We may consider oxcarbazepine or vimpat.      Lauren Washington MD   Epilepsy Staff      No

## 2023-07-18 NOTE — H&P ADULT - ATTENDING COMMENTS
acute on chronic right lower extremity ischemia  poor historian  severe PAD  pt in ARF  will perform right leg angiogram possible thormbolysis  all risks including but not limited to bleeding, stroke, renal failure, resp failure, limb loss, digit loss, life threatening complications and death was discussed with the patient and her daughter . They verbalized understanding and wish ti\o proceed.

## 2023-07-18 NOTE — CONSULT NOTE ADULT - PROBLEM SELECTOR RECOMMENDATION 2
Pt with high anion gap metabolic acidosis in setting of acute renal failure. SCO2 of 11, pH of 7.11. Lactate of 2.1. Recommend IV bicarbonate as above. Per primary team, plan to take her urgent to the OR for revascularization of her RLE. Continue to evaluate for other causes of her lactic acidosis (likely Type A with notable hypotension in the ED).    Assessment and plan discussed with primary team and attending on call.

## 2023-07-18 NOTE — CONSULT NOTE ADULT - ATTENDING COMMENTS
I agree with the detailed interval history, physical, and plan, which I have reviewed and edited where appropriate'; also agree with notes/assessment with my team on service.  I have personally examined the patient.  I was physically present for the key portions of the evaluation and management (E/M) service provided.  I reviewed all the pertinent data.  The patient is a critical care patient with life threatening hemodynamic and metabolic instability in SICU.  The SICU team has a constant risk benefit analyzes discussion and coordinating care with the primary team and all consultants.   The patient is in SICU with the chief complaint and diagnosis mentioned in the note.   The plan will be specified in the note.  63F female s/p RLE angiogram with lysis catheter placement with new JACKI and metabolic acidosis.  SICU consult for post op care.   EXAM  NEUROLOGY  Exam: alert, oriented x 3,  RESPIRATORY  Exam: Lungs clear   CARDIOVASCULAR  Exam: Regular rate   GI/NUTRITION  Exam: Abdomen soft, Non-tender  VASCULAR  Exam: Motor and sensation function intact b/l  No palpable pulses in RLE  LLE with good PT and perineal signal   PLAN  NEUROLOGIC   -Tylenol prn  RESPIRATORY   - Symbicort   CARDIOVASCULAR   - Maintain MAP >65  - Continue Levo, wean as tolerated  - Q1 hour neurovascular checks  - TPA   -Heparin  GASTROINTESTINAL   - Diet: Regular diet  /RENAL   - LR @100/hr  HEMATOLOGIC  - Therapeutic heparin  -TPA   INFECTIOUS DISEASE  - Meropenem   ENDOCRINE  - Monitor glucose    DISPO: SICU
JACKI  Metabolic acidosis    Will continue IVF and bicarb  Repeat labs consistent with low K of 2.8, will need repletion  No urgent indications for RRT but will monitor labs and Is/Os

## 2023-07-18 NOTE — CHART NOTE - NSCHARTNOTEFT_GEN_A_CORE
Post Operative Note  Patient: BRE HODGE 63y (1960) Female   MRN: [id number]  Location: [facility] 09  Visit: 07-18-23 Inpatient  Date: 07-18-23 @ 11:19    Procedure: S/P RLE angiogram with lysis catheter placement     Subjective: Patient seen and examined post operatively. Reports pain as controlled. Denies nausea, vomiting, fever, chills, chest pain, SOB, cough.      Objective:  Vitals: T(F): 97 (07-18-23 @ 08:15), Max: 97.5 (07-17-23 @ 18:17)  HR: 55 (07-18-23 @ 10:15)  BP: 82/69 (07-18-23 @ 10:00) (68/41 - 133/110)  RR: 13 (07-18-23 @ 10:15)  SpO2: 100% (07-18-23 @ 10:00)  Vent Settings:     In:   07-18-23 @ 07:01  -  07-18-23 @ 11:19  --------------------------------------------------------  IN: 364 mL      IV Fluids: lactated ringers Bolus 1000 milliLiter(s) IV Bolus once  lactated ringers. 1000 milliLiter(s) (100 mL/Hr) IV Continuous <Continuous>  potassium chloride  20 mEq/100 mL IVPB 20 milliEquivalent(s) IV Intermittent every 2 hours  sodium bicarbonate  Infusion 0.321 mEq/kG/Hr (150 mL/Hr) IV Continuous <Continuous>      Out:   07-18-23 @ 07:01  -  07-18-23 @ 11:19  --------------------------------------------------------  OUT: 210 mL      EBL:     Voided Urine:   07-18-23 @ 07:01  -  07-18-23 @ 11:19  --------------------------------------------------------  OUT: 210 mL        Physical Examination:  General: NAD, resting comfortably in bed  HEENT: Normocephalic atraumatic  Respiratory: Nonlabored respirations, normal CW expansion.  Cardio: S1S2, regular rate and rhythm.  Vascular: L groin soft, L doppler peroneal and PT, no signals on RLE    Assessment:  63 year old female with PMH CAD s/p stents, PAD s/p stents, CVA, COPD presents s/p emergent RLE angiogram and lysis catheter placement for acute on chronic limb ischemia with evidence of motor/sensory deficits    Plan  - Possible RTOR tomorrow 7/18 for repeat RLE angiogram  - Diet: regular  - Meropenem  - Pain control  - Wean Levo and sodium bicarb gtt as tolerated  - Q6 labs, Q1 neurovascular checks  - Therapeutic hep gtt, monitor PTT  - Alteplase gtt 10 mL/hr through main catheter  - Appreciate SICU care  - Appreciate nephro recs and input, HD/RRT consented if warranted    Vascular surgery [id number] Post Operative Note  Patient: BRE HODGE 63y (1960) Female   MRN: [id number]  Location: [facility] 09  Visit: 07-18-23 Inpatient  Date: 07-18-23 @ 11:19    Procedure: S/P RLE angiogram with lysis catheter placement     Subjective: Patient seen and examined post operatively. Reports pain as controlled. Denies nausea, vomiting, fever, chills, chest pain, SOB, cough.      Objective:  Vitals: T(F): 97 (07-18-23 @ 08:15), Max: 97.5 (07-17-23 @ 18:17)  HR: 55 (07-18-23 @ 10:15)  BP: 82/69 (07-18-23 @ 10:00) (68/41 - 133/110)  RR: 13 (07-18-23 @ 10:15)  SpO2: 100% (07-18-23 @ 10:00)  Vent Settings:     In:   07-18-23 @ 07:01  -  07-18-23 @ 11:19  --------------------------------------------------------  IN: 364 mL      IV Fluids: lactated ringers Bolus 1000 milliLiter(s) IV Bolus once  lactated ringers. 1000 milliLiter(s) (100 mL/Hr) IV Continuous <Continuous>  potassium chloride  20 mEq/100 mL IVPB 20 milliEquivalent(s) IV Intermittent every 2 hours  sodium bicarbonate  Infusion 0.321 mEq/kG/Hr (150 mL/Hr) IV Continuous <Continuous>      Out:   07-18-23 @ 07:01  -  07-18-23 @ 11:19  --------------------------------------------------------  OUT: 210 mL      EBL:     Voided Urine:   07-18-23 @ 07:01  -  07-18-23 @ 11:19  --------------------------------------------------------  OUT: 210 mL        Physical Examination:  General: NAD, resting comfortably in bed  HEENT: Normocephalic atraumatic  Respiratory: Nonlabored respirations, normal CW expansion.  Cardio: S1S2, regular rate and rhythm.  Vascular: L groin soft, L doppler peroneal and PT, no signals on RLE    Assessment:  63 year old female with PMH CAD s/p stents, PAD s/p stents, CVA, COPD presents s/p emergent RLE angiogram and lysis catheter placement for acute on chronic limb ischemia with evidence of motor/sensory deficits    Plan  - Possible RTOR tomorrow 7/18 for repeat RLE angiogram  - Diet: regular  - Meropenem  - Pain control  - Wean Levo and sodium bicarb gtt as tolerated  - Q6 labs, Q1 neurovascular checks  - Therapeutic hep gtt, monitor PTT  - Alteplase gtt 10 mL/hr through main catheter  - Appreciate SICU care  - Appreciate nephro recs and input, HD/RRT consented if warranted    Vascular surgery [id number] Post Operative Note  Patient: BRE HODGE 63y (1960) Female   MRN: [id number]  Location: [facility] 09  Visit: 07-18-23 Inpatient  Date: 07-18-23 @ 11:19    Procedure: S/P RLE angiogram with lysis catheter placement     Subjective: Patient seen and examined post operatively. Reports pain as controlled. Denies nausea, vomiting, fever, chills, chest pain, SOB, cough.      Objective:  Vitals: T(F): 97 (07-18-23 @ 08:15), Max: 97.5 (07-17-23 @ 18:17)  HR: 55 (07-18-23 @ 10:15)  BP: 82/69 (07-18-23 @ 10:00) (68/41 - 133/110)  RR: 13 (07-18-23 @ 10:15)  SpO2: 100% (07-18-23 @ 10:00)  Vent Settings:     In:   07-18-23 @ 07:01  -  07-18-23 @ 11:19  --------------------------------------------------------  IN: 364 mL      IV Fluids: lactated ringers Bolus 1000 milliLiter(s) IV Bolus once  lactated ringers. 1000 milliLiter(s) (100 mL/Hr) IV Continuous <Continuous>  potassium chloride  20 mEq/100 mL IVPB 20 milliEquivalent(s) IV Intermittent every 2 hours  sodium bicarbonate  Infusion 0.321 mEq/kG/Hr (150 mL/Hr) IV Continuous <Continuous>      Out:   07-18-23 @ 07:01  -  07-18-23 @ 11:19  --------------------------------------------------------  OUT: 210 mL      EBL:     Voided Urine:   07-18-23 @ 07:01  -  07-18-23 @ 11:19  --------------------------------------------------------  OUT: 210 mL        Physical Examination:  General: NAD, resting comfortably in bed  HEENT: Normocephalic atraumatic  Respiratory: Nonlabored respirations, normal CW expansion.  Cardio: S1S2, regular rate and rhythm.  Vascular: L groin soft, L doppler peroneal and PT, no signals on RLE    Assessment:  63 year old female with PMH CAD s/p stents, PAD s/p stents, CVA, COPD presents s/p emergent RLE angiogram and lysis catheter placement for acute on chronic limb ischemia with evidence of motor/sensory deficits    Plan  - Possible RTOR tomorrow 7/18 for repeat RLE angiogram  - Diet: regular  - Meropenem  - Pain control  - Wean Levo and sodium bicarb gtt as tolerated  - Q6 labs, Q1 neurovascular checks  - Therapeutic hep gtt, monitor PTT  - Alteplase gtt 10 mL/hr through main catheter  - Appreciate SICU care  - Appreciate nephro recs and input, HD/RRT consented if warranted    Vascular surgery [id number]

## 2023-07-18 NOTE — CONSULT NOTE ADULT - SUBJECTIVE AND OBJECTIVE BOX
[facility] DIVISION OF KIDNEY DISEASES AND HYPERTENSION -- [phone]  -- INITIAL CONSULT NOTE  --------------------------------------------------------------------------------  HPI: 62 yo F with significant vascular history, HLD, HTN who presented to [facility] due to worsening RLE pain. SCr on admission of 6.49. Nephrology consulted for acute renal failure.    No prior labs for review per [facility]/HIMAGALIE. Pt denied hx of renal failure in the past.    Pt seen and examined at bedside. She complains of significant RLE pain. She states she has been taking multiple doses of Ibuprofen for the past few days since her pain started. She is also on Losartan at home. She denied any changes in urination or blood in her urine. She denied n/v/shortness of breath, fevers, chills, diarrhea.     PAST HISTORY  --------------------------------------------------------------------------------  PAST MEDICAL & SURGICAL HISTORY:  Stroke  PAD (peripheral artery disease)  Hypertension    FAMILY HISTORY:  No pertinent family history in first degree relatives    PAST SOCIAL HISTORY: Denied illicit drugs    ALLERGIES & MEDICATIONS  --------------------------------------------------------------------------------  Allergies  No Known Allergies    Intolerances    Standing Inpatient Medications  lactated ringers Bolus 1000 milliLiter(s) IV Bolus once  sodium bicarbonate  Infusion 0.321 mEq/kG/Hr IV Continuous <Continuous>    PRN Inpatient Medications    REVIEW OF SYSTEMS  --------------------------------------------------------------------------------  Gen: No fevers/chills  Head/Eyes/Ears: No HA  Respiratory: No dyspnea, cough  CV: No chest pain  GI: No abdominal pain, diarrhea  : No dysuria, hematuria  MSK: per HPI   Skin: No rashes  Heme: No easy bruising or bleeding    All other systems were reviewed and are negative, except as noted.    VITALS/PHYSICAL EXAM  --------------------------------------------------------------------------------  T(C): 36.4 (07-17-23 @ 18:17), Max: 36.4 (07-17-23 @ 18:17)  HR: 55 (07-17-23 @ 20:28) (55 - 64)  BP: 91/49 (07-17-23 @ 20:28) (78/50 - 131/111)  RR: 16 (07-17-23 @ 18:17) (16 - 16)  SpO2: 100% (07-17-23 @ 18:17) (100% - 100%)  Wt(kg): --    Weight (kg): 60 (07-18-23 @ 00:34)    Physical Exam:  	Gen: Sitting up in bed, in some distress due to RLE pain.   	HEENT: Anicteric  	Pulm: CTA B/L  	CV: S1S2+  	Abd: Soft, +BS    	Ext: No LE edema B/L. Cool to touch, unable to palpate pedal pulses   	Neuro: Awake  	Skin: Warm and dry    LABS/STUDIES  --------------------------------------------------------------------------------              14.4   18.67 >-----------<  224      [07-17-23 @ 22:30]              43.1     134  |  88  |  51  ----------------------------<  113      [07-17-23 @ 22:40]  3.5   |  11  |  6.49        Ca     8.7     [07-17-23 @ 22:40]    TPro  8.4  /  Alb  4.5  /  TBili  <0.2  /  DBili  x   /  AST  23  /  ALT  11  /  AlkPhos  86  [07-17-23 @ 22:40]    PT/INR: PT 11.6 , INR 1.00       [07-17-23 @ 22:30]  PTT: 29.2       [07-17-23 @ 22:30]    Creatinine Trend:  SCr 6.49 [07-17 @ 22:40] [facility] DIVISION OF KIDNEY DISEASES AND HYPERTENSION -- [phone]  -- INITIAL CONSULT NOTE  --------------------------------------------------------------------------------  HPI: 64 yo F with significant vascular history, HLD, HTN who presented to [facility] due to worsening RLE pain. SCr on admission of 6.49. Nephrology consulted for acute renal failure.    No prior labs for review per [facility]/HIMAGALIE. Pt denied hx of renal failure in the past.    Pt seen and examined at bedside. She complains of significant RLE pain. She states she has been taking multiple doses of Ibuprofen for the past few days since her pain started. She is also on Losartan at home. She denied any changes in urination or blood in her urine. She denied n/v/shortness of breath, fevers, chills, diarrhea.     PAST HISTORY  --------------------------------------------------------------------------------  PAST MEDICAL & SURGICAL HISTORY:  Stroke  PAD (peripheral artery disease)  Hypertension    FAMILY HISTORY:  No pertinent family history in first degree relatives    PAST SOCIAL HISTORY: Denied illicit drugs    ALLERGIES & MEDICATIONS  --------------------------------------------------------------------------------  Allergies  No Known Allergies    Intolerances    Standing Inpatient Medications  lactated ringers Bolus 1000 milliLiter(s) IV Bolus once  sodium bicarbonate  Infusion 0.321 mEq/kG/Hr IV Continuous <Continuous>    PRN Inpatient Medications    REVIEW OF SYSTEMS  --------------------------------------------------------------------------------  Gen: No fevers/chills  Head/Eyes/Ears: No HA  Respiratory: No dyspnea, cough  CV: No chest pain  GI: No abdominal pain, diarrhea  : No dysuria, hematuria  MSK: per HPI   Skin: No rashes  Heme: No easy bruising or bleeding    All other systems were reviewed and are negative, except as noted.    VITALS/PHYSICAL EXAM  --------------------------------------------------------------------------------  T(C): 36.4 (07-17-23 @ 18:17), Max: 36.4 (07-17-23 @ 18:17)  HR: 55 (07-17-23 @ 20:28) (55 - 64)  BP: 91/49 (07-17-23 @ 20:28) (78/50 - 131/111)  RR: 16 (07-17-23 @ 18:17) (16 - 16)  SpO2: 100% (07-17-23 @ 18:17) (100% - 100%)  Wt(kg): --    Weight (kg): 60 (07-18-23 @ 00:34)    Physical Exam:  	Gen: Sitting up in bed, in some distress due to RLE pain.   	HEENT: Anicteric  	Pulm: CTA B/L  	CV: S1S2+  	Abd: Soft, +BS    	Ext: No LE edema B/L. Cool to touch, unable to palpate pedal pulses   	Neuro: Awake  	Skin: Warm and dry    LABS/STUDIES  --------------------------------------------------------------------------------              14.4   18.67 >-----------<  224      [07-17-23 @ 22:30]              43.1     134  |  88  |  51  ----------------------------<  113      [07-17-23 @ 22:40]  3.5   |  11  |  6.49        Ca     8.7     [07-17-23 @ 22:40]    TPro  8.4  /  Alb  4.5  /  TBili  <0.2  /  DBili  x   /  AST  23  /  ALT  11  /  AlkPhos  86  [07-17-23 @ 22:40]    PT/INR: PT 11.6 , INR 1.00       [07-17-23 @ 22:30]  PTT: 29.2       [07-17-23 @ 22:30]    Creatinine Trend:  SCr 6.49 [07-17 @ 22:40] [facility] DIVISION OF KIDNEY DISEASES AND HYPERTENSION -- [phone]  -- INITIAL CONSULT NOTE  --------------------------------------------------------------------------------  HPI: 62 yo F with significant vascular history, HLD, HTN who presented to [facility] due to worsening RLE pain. SCr on admission of 6.49. Nephrology consulted for acute renal failure.    No prior labs for review per [facility]/HIMAGALIE. Pt denied hx of renal failure in the past.    Pt seen and examined at bedside. She complains of significant RLE pain. She states she has been taking multiple doses of Ibuprofen for the past few days since her pain started. She is also on Losartan at home. She denied any changes in urination or blood in her urine. She denied n/v/shortness of breath, fevers, chills, diarrhea.     PAST HISTORY  --------------------------------------------------------------------------------  PAST MEDICAL & SURGICAL HISTORY:  Stroke  PAD (peripheral artery disease)  Hypertension    FAMILY HISTORY:  No pertinent family history in first degree relatives    PAST SOCIAL HISTORY: Denied illicit drugs    ALLERGIES & MEDICATIONS  --------------------------------------------------------------------------------  Allergies  No Known Allergies    Intolerances    Standing Inpatient Medications  lactated ringers Bolus 1000 milliLiter(s) IV Bolus once  sodium bicarbonate  Infusion 0.321 mEq/kG/Hr IV Continuous <Continuous>    PRN Inpatient Medications    REVIEW OF SYSTEMS  --------------------------------------------------------------------------------  Gen: No fevers/chills  Head/Eyes/Ears: No HA  Respiratory: No dyspnea, cough  CV: No chest pain  GI: No abdominal pain, diarrhea  : No dysuria, hematuria  MSK: per HPI   Skin: No rashes  Heme: No easy bruising or bleeding    All other systems were reviewed and are negative, except as noted.    VITALS/PHYSICAL EXAM  --------------------------------------------------------------------------------  T(C): 36.4 (07-17-23 @ 18:17), Max: 36.4 (07-17-23 @ 18:17)  HR: 55 (07-17-23 @ 20:28) (55 - 64)  BP: 91/49 (07-17-23 @ 20:28) (78/50 - 131/111)  RR: 16 (07-17-23 @ 18:17) (16 - 16)  SpO2: 100% (07-17-23 @ 18:17) (100% - 100%)  Wt(kg): --    Weight (kg): 60 (07-18-23 @ 00:34)    Physical Exam:  	Gen: Sitting up in bed, in some distress due to RLE pain.   	HEENT: Anicteric  	Pulm: CTA B/L  	CV: S1S2+  	Abd: Soft, +BS    	Ext: No LE edema B/L. Cool to touch, unable to palpate pedal pulses   	Neuro: Awake  	Skin: Warm and dry    LABS/STUDIES  --------------------------------------------------------------------------------              14.4   18.67 >-----------<  224      [07-17-23 @ 22:30]              43.1     134  |  88  |  51  ----------------------------<  113      [07-17-23 @ 22:40]  3.5   |  11  |  6.49        Ca     8.7     [07-17-23 @ 22:40]    TPro  8.4  /  Alb  4.5  /  TBili  <0.2  /  DBili  x   /  AST  23  /  ALT  11  /  AlkPhos  86  [07-17-23 @ 22:40]    PT/INR: PT 11.6 , INR 1.00       [07-17-23 @ 22:30]  PTT: 29.2       [07-17-23 @ 22:30]    Creatinine Trend:  SCr 6.49 [07-17 @ 22:40]

## 2023-07-18 NOTE — CONSULT NOTE ADULT - SUBJECTIVE AND OBJECTIVE BOX
=====================================================                                                             SICU Consultation Note                                                             =====================================================  Surgery Information  ***  Case Duration:      EBL:       IV Fluids:       Blood Products:         Complications:        HISTORY  63F female w/ PMHx smoker, CAD s/p stents, CVA, COPD, PAD w/ prev LLE poss RLE stenting (Dr. Hart[facility]). Presents with 1 week hx of worsening RLE pain - abruptly increasing over last 3 days. Worse in calf and foot. Also has felt some subjective tingling of the foot. VSS. Labs showing JACKI with Cr at 6 and HCO3 at 10. No known CKD per pt and daughter. 2+ palp L fem, R fem 1+, L pop 2+. dopplerable LLE pedal signals. RLE pop and foot no palp pulses or signals. Motor R foot 4/5 with diminished sensation compared to left. Unable to obtain CTA due to JACKI. Nephro consulted -hydrated and started on bicarb infusion. Recent hx of melena but none in the past few weeks. Started on therapeutic hep gtt. Acute on chronic limb ischemia with evidence of motor and sensory deficits. Patient taken emergently to OR for ***. SICU consulted for hemodynamic monitoring and postop management.      Allergies: No Known Allergies    PAST MEDICAL & SURGICAL HISTORY:  Stroke      PAD (peripheral artery disease)      Hypertension        FAMILY HISTORY:  No pertinent family history in first degree relatives        ALLERGIES  No Known Allergies      SOCIAL HISTORY:      HOME MEDICATIONS:   alteplase    Infusion 1 mG/Hr IntraCatheter.. <Continuous>  heparin  Infusion 600 Unit(s)/Hr IV Continuous <Continuous>  lactated ringers Bolus 1000 milliLiter(s) IV Bolus once  sodium bicarbonate  Infusion 0.321 mEq/kG/Hr IV Continuous <Continuous>      CURRENT MEDICATIONS:   --------------------------------------------------------------------------------------  Neurologic Medications    Respiratory Medications    Cardiovascular Medications    Gastrointestinal Medications  lactated ringers Bolus 1000 milliLiter(s) IV Bolus once  sodium bicarbonate  Infusion 0.321 mEq/kG/Hr IV Continuous <Continuous>    Genitourinary Medications    Hematologic/Oncologic Medications  alteplase    Infusion 1 mG/Hr IntraCatheter.. <Continuous>  heparin  Infusion 600 Unit(s)/Hr IV Continuous <Continuous>    Antimicrobial/Immunologic Medications    Endocrine/Metabolic Medications    Topical/Other Medications    --------------------------------------------------------------------------------------    VITAL SIGNS, INS/OUTS (last 24 hours):    T(C): 36.4 (07-17-23 @ 18:17), Max: 36.4 (07-17-23 @ 18:17)  HR: 55 (07-17-23 @ 20:28) (55 - 64)  BP: 91/49 (07-17-23 @ 20:28) (78/50 - 131/111)  ABP: --  ABP(mean): --  RR: 16 (07-17-23 @ 18:17) (16 - 16)  SpO2: 100% (07-17-23 @ 18:17) (100% - 100%)  --------------------------------------------------------------------------------------  ((Insert SICU Vitals / Is+Os here)) ***    --------------------------------------------------------------------------------------    EXAM  NEUROLOGY  RASS:   	GCS:    Exam: Normal, NAD, alert, oriented x 3, no focal deficits.     HEENT  Exam: Normocephalic, atraumatic.  EOMI     RESPIRATORY  Exam: Lungs clear to auscultation, Normal expansion/effort.    Mechanical Ventilation:     CARDIOVASCULAR  Exam: S1, S2.  Regular rate and rhythm.     GI/NUTRITION  Exam: Abdomen soft, Non-tender, Non-distended.  Gastrostomy / Jejunostomy / Nasogastric tube in place.  Colostomy / Ileostomy.    Wound:    Current Diet:  NPO    VASCULAR  Exam: Extremities warm, pink, well-perfused.     MUSCULOSKELETAL  Exam: All extremities moving spontaneously without limitations.      SKIN:  Exam: Good skin turgor, no skin breakdown.      METABOLIC/FLUIDS/ELECTROLYTES  lactated ringers Bolus 1000 milliLiter(s) IV Bolus once  sodium bicarbonate  Infusion 0.321 mEq/kG/Hr IV Continuous <Continuous>      HEMATOLOGIC  [x] DVT Prophylaxis: alteplase    Infusion 1 mG/Hr IntraCatheter.. <Continuous>  heparin  Infusion 600 Unit(s)/Hr IV Continuous <Continuous>    Transfusions:	[] PRBC	[] Platelets		[] FFP	[] Cryoprecipitate    INFECTIOUS DISEASE  Antimicrobials/Immunologic Medications:    Day #  	of    ***    Tubes/Lines/Drains  ***  [x] Peripheral IV  [] Central Venous Line     	[] R	[] L	[] IJ	[] Fem	[] SC	Date Placed:   [] Arterial Line		[] R	[] L	[] Fem	[] Rad	[] Ax	Date Placed:   [] PICC:         	[] Midline		[] Mediport  [x] Urinary Catheter		Date Placed:  7/18    LABS  --------------------------------------------------------------------------------------  ((Insert SICU Labs here))***    --------------------------------------------------------------------------------------                                                              =====================================================                                                             SICU Consultation Note                                                             =====================================================  Surgery Information  ***  Case Duration:      EBL:       IV Fluids:       Blood Products:         Complications:        HISTORY  63F female w/ PMHx smoker, CAD s/p stents, CVA, COPD, PAD w/ prev LLE poss RLE stenting (Dr. Hart[facility]). Presents with 1 week hx of worsening RLE pain - abruptly increasing over last 3 days. Worse in calf and foot. Also has felt some subjective tingling of the foot. VSS. Labs showing JACKI with Cr at 6 and HCO3 at 10. No known CKD per pt and daughter. 2+ palp L fem, R fem 1+, L pop 2+. dopplerable LLE pedal signals. RLE pop and foot no palp pulses or signals. Motor R foot 4/5 with diminished sensation compared to left. Unable to obtain CTA due to JACKI. Nephro consulted -hydrated and started on bicarb infusion. Recent hx of melena but none in the past few weeks. Started on therapeutic hep gtt. Acute on chronic limb ischemia with evidence of motor and sensory deficits. Patient taken emergently to OR for RLE angiogram with lysis catheter placement on 7/18. SICU consulted for hemodynamic monitoring and postop management.      Allergies: No Known Allergies    PAST MEDICAL & SURGICAL HISTORY:  Stroke      PAD (peripheral artery disease)      Hypertension        FAMILY HISTORY:  No pertinent family history in first degree relatives        ALLERGIES  No Known Allergies      SOCIAL HISTORY:      HOME MEDICATIONS:   alteplase    Infusion 1 mG/Hr IntraCatheter.. <Continuous>  heparin  Infusion 600 Unit(s)/Hr IV Continuous <Continuous>  lactated ringers Bolus 1000 milliLiter(s) IV Bolus once  sodium bicarbonate  Infusion 0.321 mEq/kG/Hr IV Continuous <Continuous>      CURRENT MEDICATIONS:   --------------------------------------------------------------------------------------  Neurologic Medications    Respiratory Medications    Cardiovascular Medications    Gastrointestinal Medications  lactated ringers Bolus 1000 milliLiter(s) IV Bolus once  sodium bicarbonate  Infusion 0.321 mEq/kG/Hr IV Continuous <Continuous>    Genitourinary Medications    Hematologic/Oncologic Medications  alteplase    Infusion 1 mG/Hr IntraCatheter.. <Continuous>  heparin  Infusion 600 Unit(s)/Hr IV Continuous <Continuous>    Antimicrobial/Immunologic Medications    Endocrine/Metabolic Medications    Topical/Other Medications    --------------------------------------------------------------------------------------    VITAL SIGNS, INS/OUTS (last 24 hours):    T(C): 36.4 (07-17-23 @ 18:17), Max: 36.4 (07-17-23 @ 18:17)  HR: 55 (07-17-23 @ 20:28) (55 - 64)  BP: 91/49 (07-17-23 @ 20:28) (78/50 - 131/111)  ABP: --  ABP(mean): --  RR: 16 (07-17-23 @ 18:17) (16 - 16)  SpO2: 100% (07-17-23 @ 18:17) (100% - 100%)  --------------------------------------------------------------------------------------  ((Insert SICU Vitals / Is+Os here)) ***    --------------------------------------------------------------------------------------    EXAM  NEUROLOGY  RASS:   	GCS:    Exam: Normal, NAD, alert, oriented x 3, no focal deficits.     HEENT  Exam: Normocephalic, atraumatic.  EOMI     RESPIRATORY  Exam: Lungs clear to auscultation, Normal expansion/effort.    Mechanical Ventilation:     CARDIOVASCULAR  Exam: S1, S2.  Regular rate and rhythm.     GI/NUTRITION  Exam: Abdomen soft, Non-tender, Non-distended.  Gastrostomy / Jejunostomy / Nasogastric tube in place.  Colostomy / Ileostomy.    Wound:    Current Diet:  NPO    VASCULAR  Exam: Motor and sensation function intact b/l  No palpable pulses in RLE  LLE with good PT and perineal signal     MUSCULOSKELETAL  Exam: All extremities moving spontaneously without limitations.      SKIN:  Exam: Good skin turgor, no skin breakdown.      METABOLIC/FLUIDS/ELECTROLYTES  lactated ringers Bolus 1000 milliLiter(s) IV Bolus once  sodium bicarbonate  Infusion 0.321 mEq/kG/Hr IV Continuous <Continuous>      HEMATOLOGIC  [x] DVT Prophylaxis: alteplase    Infusion 1 mG/Hr IntraCatheter.. <Continuous>  heparin  Infusion 600 Unit(s)/Hr IV Continuous <Continuous>    Transfusions:	[] PRBC	[] Platelets		[] FFP	[] Cryoprecipitate    INFECTIOUS DISEASE  Antimicrobials/Immunologic Medications:    Day #  	of    ***    Tubes/Lines/Drains  ***  [x] Peripheral IV  [] Central Venous Line     	[] R	[] L	[] IJ	[] Fem	[] SC	Date Placed:   [] Arterial Line		[] R	[] L	[] Fem	[] Rad	[] Ax	Date Placed:   [] PICC:         	[] Midline		[] Mediport  [x] Urinary Catheter		Date Placed:  7/18    LABS  --------------------------------------------------------------------------------------  ((Insert SICU Labs here))***    --------------------------------------------------------------------------------------                                                              =====================================================                                                             SICU Consultation Note                                                             =====================================================  Surgery Information  ***  Case Duration:      EBL:       IV Fluids:       Blood Products:         Complications:        HISTORY  63F female w/ PMHx CAD s/p stents, CVA, COPD, PAD w/ prev LLE poss RLE stenting (Dr. Hart[facility]), 20 pack/year smoking history, and HTN. Presents with 1 week hx of worsening RLE pain - abruptly increasing over last 3 days. Worse in calf and foot. Also has felt some subjective tingling of the foot. VSS. Labs showing JACKI with Cr at 6 and HCO3 at 10. No known CKD per pt and daughter. 2+ palp L fem, R fem 1+, L pop 2+. dopplerable LLE pedal signals. RLE pop and foot no palp pulses or signals. Motor R foot 4/5 with diminished sensation compared to left. Unable to obtain CTA due to JACKI. Nephro consulted -hydrated and started on bicarb infusion. Recent hx of melena but none in the past few weeks. Started on therapeutic hep gtt. Acute on chronic limb ischemia with evidence of motor and sensory deficits. Patient taken emergently to OR for RLE angiogram with lysis catheter placement on 7/18. SICU consulted for hemodynamic monitoring and postop management.      Allergies: No Known Allergies    PAST MEDICAL & SURGICAL HISTORY:  Stroke      PAD (peripheral artery disease)      Hypertension        FAMILY HISTORY:  No pertinent family history in first degree relatives        ALLERGIES  No Known Allergies      SOCIAL HISTORY:      HOME MEDICATIONS:   alteplase    Infusion 1 mG/Hr IntraCatheter.. <Continuous>  heparin  Infusion 600 Unit(s)/Hr IV Continuous <Continuous>  lactated ringers Bolus 1000 milliLiter(s) IV Bolus once  sodium bicarbonate  Infusion 0.321 mEq/kG/Hr IV Continuous <Continuous>      CURRENT MEDICATIONS:   --------------------------------------------------------------------------------------  Neurologic Medications    Respiratory Medications    Cardiovascular Medications    Gastrointestinal Medications  lactated ringers Bolus 1000 milliLiter(s) IV Bolus once  sodium bicarbonate  Infusion 0.321 mEq/kG/Hr IV Continuous <Continuous>    Genitourinary Medications    Hematologic/Oncologic Medications  alteplase    Infusion 1 mG/Hr IntraCatheter.. <Continuous>  heparin  Infusion 600 Unit(s)/Hr IV Continuous <Continuous>    Antimicrobial/Immunologic Medications    Endocrine/Metabolic Medications    Topical/Other Medications    --------------------------------------------------------------------------------------    VITAL SIGNS, INS/OUTS (last 24 hours):    T(C): 36.4 (07-17-23 @ 18:17), Max: 36.4 (07-17-23 @ 18:17)  HR: 55 (07-17-23 @ 20:28) (55 - 64)  BP: 91/49 (07-17-23 @ 20:28) (78/50 - 131/111)  ABP: --  ABP(mean): --  RR: 16 (07-17-23 @ 18:17) (16 - 16)  SpO2: 100% (07-17-23 @ 18:17) (100% - 100%)  --------------------------------------------------------------------------------------  ((Insert SICU Vitals / Is+Os here)) ***    --------------------------------------------------------------------------------------    EXAM  NEUROLOGY  RASS:   	GCS:    Exam: Normal, NAD, alert, oriented x 3, no focal deficits.     HEENT  Exam: Normocephalic, atraumatic.  EOMI     RESPIRATORY  Exam: Lungs clear to auscultation, Normal expansion/effort.    Mechanical Ventilation:     CARDIOVASCULAR  Exam: S1, S2.  Regular rate and rhythm.     GI/NUTRITION  Exam: Abdomen soft, Non-tender, Non-distended.  Gastrostomy / Jejunostomy / Nasogastric tube in place.  Colostomy / Ileostomy.    Wound:    Current Diet:  NPO    VASCULAR  Exam: Motor and sensation function intact b/l  No palpable pulses in RLE  LLE with good PT and perineal signal     MUSCULOSKELETAL  Exam: All extremities moving spontaneously without limitations.      SKIN:  Exam: Good skin turgor, no skin breakdown.      METABOLIC/FLUIDS/ELECTROLYTES  lactated ringers Bolus 1000 milliLiter(s) IV Bolus once  sodium bicarbonate  Infusion 0.321 mEq/kG/Hr IV Continuous <Continuous>      HEMATOLOGIC  [x] DVT Prophylaxis: alteplase    Infusion 1 mG/Hr IntraCatheter.. <Continuous>  heparin  Infusion 600 Unit(s)/Hr IV Continuous <Continuous>    Transfusions:	[] PRBC	[] Platelets		[] FFP	[] Cryoprecipitate    INFECTIOUS DISEASE  Antimicrobials/Immunologic Medications:    Day #  	of    ***    Tubes/Lines/Drains  ***  [x] Peripheral IV  [] Central Venous Line     	[] R	[] L	[] IJ	[] Fem	[] SC	Date Placed:   [] Arterial Line		[] R	[] L	[] Fem	[] Rad	[] Ax	Date Placed:   [] PICC:         	[] Midline		[] Mediport  [x] Urinary Catheter		Date Placed:  7/18    LABS  --------------------------------------------------------------------------------------  ((Insert SICU Labs here))***    --------------------------------------------------------------------------------------                                                              =====================================================                                                             SICU Consultation Note                                                             =====================================================  Surgery Information  ***  Case Duration:      EBL:       IV Fluids:       Blood Products:         Complications:        HISTORY  63F female w/ PMHx CAD s/p stents, CVA, COPD, PAD w/ prev LLE poss RLE stenting (Dr. Hart,  [facility]), 20 pack/year smoking history, and HTN. Presents with 1 week hx of worsening RLE pain - abruptly increasing over last 3 days. Worse in calf and foot. Also has felt some subjective tingling of the foot. VSS. Labs showing JACKI with Cr at 6 and HCO3 at 10. No known CKD per pt and daughter. 2+ palp L fem, R fem 1+, L pop 2+. dopplerable LLE pedal signals. RLE pop and foot no palp pulses or signals. Motor R foot 4/5 with diminished sensation compared to left. Unable to obtain CTA due to JACKI. Nephro consulted -hydrated and started on bicarb infusion. Recent hx of melena but none in the past few weeks. Started on therapeutic hep gtt. Acute on chronic limb ischemia with evidence of motor and sensory deficits. Patient taken emergently to OR for RLE angiogram with lysis catheter placement on 7/18. SICU consulted for hemodynamic monitoring and postop management.      Allergies: No Known Allergies    PAST MEDICAL & SURGICAL HISTORY:  Stroke      PAD (peripheral artery disease)      Hypertension      CAD      Tobacco use (20 pack/year history)      EtOH use (20 beers / day)        FAMILY HISTORY:  No pertinent family history in first degree relatives        ALLERGIES  No Known Allergies      SOCIAL HISTORY:      HOME MEDICATIONS:   alteplase    Infusion 1 mG/Hr IntraCatheter.. <Continuous>  heparin  Infusion 600 Unit(s)/Hr IV Continuous <Continuous>  lactated ringers Bolus 1000 milliLiter(s) IV Bolus once  sodium bicarbonate  Infusion 0.321 mEq/kG/Hr IV Continuous <Continuous>      CURRENT MEDICATIONS:   --------------------------------------------------------------------------------------  Neurologic Medications    Respiratory Medications    Cardiovascular Medications    Gastrointestinal Medications  lactated ringers Bolus 1000 milliLiter(s) IV Bolus once  sodium bicarbonate  Infusion 0.321 mEq/kG/Hr IV Continuous <Continuous>    Genitourinary Medications    Hematologic/Oncologic Medications  alteplase    Infusion 1 mG/Hr IntraCatheter.. <Continuous>  heparin  Infusion 600 Unit(s)/Hr IV Continuous <Continuous>    Antimicrobial/Immunologic Medications    Endocrine/Metabolic Medications    Topical/Other Medications    --------------------------------------------------------------------------------------    VITAL SIGNS, INS/OUTS (last 24 hours):    T(C): 36.4 (07-17-23 @ 18:17), Max: 36.4 (07-17-23 @ 18:17)  HR: 55 (07-17-23 @ 20:28) (55 - 64)  BP: 91/49 (07-17-23 @ 20:28) (78/50 - 131/111)  ABP: --  ABP(mean): --  RR: 16 (07-17-23 @ 18:17) (16 - 16)  SpO2: 100% (07-17-23 @ 18:17) (100% - 100%)  --------------------------------------------------------------------------------------  ((Insert SICU Vitals / Is+Os here)) ***    --------------------------------------------------------------------------------------    EXAM  NEUROLOGY  RASS:   	GCS:    Exam: Normal, NAD, alert, oriented x 3, no focal deficits.     HEENT  Exam: Normocephalic, atraumatic.  EOMI     RESPIRATORY  Exam: Lungs clear to auscultation, Normal expansion/effort.    Mechanical Ventilation:     CARDIOVASCULAR  Exam: S1, S2.  Regular rate and rhythm.     GI/NUTRITION  Exam: Abdomen soft, Non-tender, Non-distended.  Gastrostomy / Jejunostomy / Nasogastric tube in place.  Colostomy / Ileostomy.    Wound:    Current Diet:  NPO    VASCULAR  Exam: Motor and sensation function intact b/l  No palpable pulses in RLE  LLE with good PT and perineal signal     MUSCULOSKELETAL  Exam: All extremities moving spontaneously without limitations.      SKIN:  Exam: Good skin turgor, no skin breakdown.      METABOLIC/FLUIDS/ELECTROLYTES  lactated ringers Bolus 1000 milliLiter(s) IV Bolus once  sodium bicarbonate  Infusion 0.321 mEq/kG/Hr IV Continuous <Continuous>      HEMATOLOGIC  [x] DVT Prophylaxis: alteplase    Infusion 1 mG/Hr IntraCatheter.. <Continuous>  heparin  Infusion 600 Unit(s)/Hr IV Continuous <Continuous>    Transfusions:	[] PRBC	[] Platelets		[] FFP	[] Cryoprecipitate    INFECTIOUS DISEASE  Antimicrobials/Immunologic Medications:    Day #  	of    ***    Tubes/Lines/Drains  ***  [x] Peripheral IV  [] Central Venous Line     	[] R	[] L	[] IJ	[] Fem	[] SC	Date Placed:   [] Arterial Line		[] R	[] L	[] Fem	[] Rad	[] Ax	Date Placed:   [] PICC:         	[] Midline		[] Mediport  [x] Urinary Catheter		Date Placed:  7/18    LABS  --------------------------------------------------------------------------------------  ((Insert RG Labs here))***    --------------------------------------------------------------------------------------

## 2023-07-18 NOTE — PATIENT PROFILE ADULT - BILL PAYMENT
Facet Steroid Injection Operative Report    Indications: This is a [de-identified] y.o. male who presents with LBP. He was positive for LS DJD. The patient was admitted for surgery as conservative measures have failed. Date of Surgery: 9/24/2019    Preoperative Diagnosis: LS DJD    Postoperative Diagnosis: LS DJD    Surgeon(s) and Role:     * Kelvin Schmidt MD - Primary     Procedure:  Procedure(s):  BILATERAL L3-4, L4-5, L5-S1 FACET INJECTION    Procedure in Detail:  After appropriate informed consent was obtained, the patient was taken to the operating suite and placed in the prone position on the operating table on appropriate padding. The LS region was prepped and draped in the usual sterile fashion. Intraoperative fluoroscopy was used to localize the LS spine. The skin was infiltrated with 2% lidocaine. An 22-g needle was advanced into the L3-4, L4-5, L5-S1 facets under fluoroscopic guidance. Next, 4ml of 0.5% marcaine and 80mg of Depo-Medrol were injected. The needle was removed from the patient. The patient was then turned back into the supine position on the stretcher and was taken to the Recovery Room in stable condition.     Estimated Blood Loss:  none     Specimens: None       Drains: None          Complications:  None    Signed By: Raza Hammond MD                        September 24, 2019 no

## 2023-07-18 NOTE — H&P ADULT - NSHPLABSRESULTS_GEN_ALL_CORE
14.4   18.67 )-----------( 224      ( 17 Jul 2023 22:30 )             43.1       07-17    134<L>  |  88<L>  |  51<H>  ----------------------------<  113<H>  3.5   |  11<L>  |  6.49<H>    Ca    8.7      17 Jul 2023 22:40    TPro  8.4<H>  /  Alb  4.5  /  TBili  <0.2  /  DBili  x   /  AST  23  /  ALT  11  /  AlkPhos  86  07-17              Urinalysis Basic - ( 17 Jul 2023 22:40 )    Color: x / Appearance: x / SG: x / pH: x  Gluc: 113 mg/dL / Ketone: x  / Bili: x / Urobili: x   Blood: x / Protein: x / Nitrite: x   Leuk Esterase: x / RBC: x / WBC x   Sq Epi: x / Non Sq Epi: x / Bacteria: x        PT/INR - ( 17 Jul 2023 22:30 )   PT: 11.6 sec;   INR: 1.00 ratio         PTT - ( 17 Jul 2023 22:30 )  PTT:29.2 sec    Lactate Trend    CAPILLARY BLOOD GLUCOSE

## 2023-07-18 NOTE — PATIENT PROFILE ADULT - FUNCTIONAL ASSESSMENT - BASIC MOBILITY 3.
Follow up to recheck weight and feedings.  Bottle Neosure 2 oz every 2-4 hrs on demand   2 = A lot of assistance

## 2023-07-18 NOTE — PATIENT PROFILE ADULT - FALL HARM RISK - HARM RISK INTERVENTIONS
Assistance with ambulation/Assistance OOB with selected safe patient handling equipment/Communicate Risk of Fall with Harm to all staff/Monitor gait and stability/Reinforce activity limits and safety measures with patient and family/Sit up slowly, dangle for a short time, stand at bedside before walking/Tailored Fall Risk Interventions/Use of alarms - bed, chair and/or voice tab/Visual Cue: Yellow wristband and red socks/Bed in lowest position, wheels locked, appropriate side rails in place/Call bell, personal items and telephone in reach/Instruct patient to call for assistance before getting out of bed or chair/Non-slip footwear when patient is out of bed/[place] to call system/Physically safe environment - no spills, clutter or unnecessary equipment/Purposeful Proactive Rounding/Room/bathroom lighting operational, light cord in reach Assistance with ambulation/Assistance OOB with selected safe patient handling equipment/Communicate Risk of Fall with Harm to all staff/Monitor gait and stability/Reinforce activity limits and safety measures with patient and family/Sit up slowly, dangle for a short time, stand at bedside before walking/Tailored Fall Risk Interventions/Use of alarms - bed, chair and/or voice tab/Visual Cue: Yellow wristband and red socks/Bed in lowest position, wheels locked, appropriate side rails in place/Call bell, personal items and telephone in reach/Instruct patient to call for assistance before getting out of bed or chair/Non-slip footwear when patient is out of bed/[place] to call system/Physically safe environment - no spills, clutter or unnecessary equipment/Purposeful Proactive Rounding/Room/bathroom lighting operational, light cord in reach Assistance with ambulation/Assistance OOB with selected safe patient handling equipment/Communicate Risk of Fall with Harm to all staff/Monitor gait and stability/Reinforce activity limits and safety measures with patient and family/Sit up slowly, dangle for a short time, stand at bedside before walking/Tailored Fall Risk Interventions/Use of alarms - bed, chair and/or voice tab/Visual Cue: Yellow wristband and red socks/Bed in lowest position, wheels locked, appropriate side rails in place/Call bell, personal items and telephone in reach/Instruct patient to call for assistance before getting out of bed or chair/Non-slip footwear when patient is out of bed/[place] to call system/Physically safe environment - no spills, clutter or unnecessary equipment/Purposeful Proactive Rounding/Room/bathroom lighting operational, light cord in reach

## 2023-07-18 NOTE — CONSULT NOTE ADULT - ASSESSMENT
ASSESSMENT:  63F female w/ PMHx smoker, CAD s/p stents, CVA, COPD, PAD w/ prev LLE poss RLE stenting (Dr. [facility]) presents with acute RLE ischemia. Patient s/p     NEUROLOGIC   - Pain control:   -     RESPIRATORY   - Monitor SpO2 goal >92%  - Incentive spirometry     CARDIOVASCULAR   - Monitor hemodynamics   -     GASTROINTESTINAL   - Diet:   -     /RENAL   - IV fluids: LR @ 125cc/hr  - Strict I/Os  - Monitor BMP qd  - Maintain ha catheter, strict Is/Os  - Monitor electrolytes, replete PRN    HEMATOLOGIC  - Monitor H/H   - DVT ppx: Lovenox/SQH & SCD's    INFECTIOUS DISEASE  - Monitor fever / WBC    ENDOCRINE  - Monitor gluc    LINES  - IJ CVC (  /  )  - A line (  /  )  - Ha (7/18)  - PIV     DISPO: SICU   ASSESSMENT:  63F female w/ PMHx smoker, CAD s/p stents, CVA, COPD, PAD w/ prev LLE poss RLE stenting (Dr. Hart[facility]) presents with acute RLE ischemia. Patient s/p RLE angiogram with lysis catheter placement on 7/18. Course c/b new JACKI (Cr 6.49 on admission) and metabolic acidosis.     NEUROLOGIC   - Pain control with Tylenol prn    RESPIRATORY   - Monitor SpO2 goal >92%    CARDIOVASCULAR   - Maintain MAP >65  - Continue Levo, wean as tolerated  - Q1 hour neurovascular checks  - TPA @10 and therapeutic Heparin ggt through lysis catheter  - Monitor CBC / CMP / Coags / fibrinogen Q6  - RTOR with vascular tomorrow (7/19)      GASTROINTESTINAL   - Diet: Regular diet with renal restrictions  - NPO at midnight    /RENAL   - JACKI with SHARIFA, nephrology consulted  - F/u repeat ABG, consider re-starting bicarb ggt  - No urgent indications for HD at this time  - LR @100/hr  - Maintain ha catheter, strict Is/Os  - Monitor electrolytes, replete PRN    HEMATOLOGIC  - Monitor H/H   - Therapeutic heparin, TPA @10 through lysis catheter     INFECTIOUS DISEASE  - Meropenem started today (7/18 - )  - Monitor fever / WBC    ENDOCRINE  - Monitor gluc    LINES  - Rt IJ CVC (  7/18  )  - A line ( 7 /18  )  - Ha (7/18)  - PIV     DISPO: SICU   ASSESSMENT:  63F female w/ PMHx CAD s/p stents, CVA, COPD, PAD w/ prev LLE poss RLE stenting (Dr. Hart[facility]), 20 pack/year smoking history, and HTN, presents with acute RLE ischemia. Patient s/p RLE angiogram with lysis catheter placement on 7/18. Course c/b new JACKI (Cr 6.49 on admission) and metabolic acidosis.     NEUROLOGIC   - Pain control with Tylenol prn    RESPIRATORY   - Monitor SpO2 goal >92%    CARDIOVASCULAR   - Maintain MAP >65  - Continue Levo, wean as tolerated  - Q1 hour neurovascular checks  - TPA @10 and therapeutic Heparin ggt through lysis catheter  - Monitor CBC / CMP / Coags / fibrinogen Q6  - RTOR with vascular tomorrow (7/19)      GASTROINTESTINAL   - Diet: Regular diet with renal restrictions  - NPO at midnight    /RENAL   - JACKI with SHARIFA, nephrology consulted  - F/u repeat ABG, consider re-starting bicarb ggt  - No urgent indications for HD at this time  - LR @100/hr  - Maintain ha catheter, strict Is/Os  - Monitor electrolytes, replete PRN    HEMATOLOGIC  - Monitor H/H   - Therapeutic heparin, TPA @10 through lysis catheter     INFECTIOUS DISEASE  - Meropenem started today (7/18 - )  - Monitor fever / WBC    ENDOCRINE  - Monitor gluc    LINES  - Rt IJ CVC (  7/18  )  - A line ( 7 /18  )  - Ha (7/18)  - PIV     DISPO: SICU   ASSESSMENT:  63F female w/ PMHx CAD s/p stents, CVA, COPD, PAD w/ prev LLE poss RLE stenting (Dr. Hart[facility]), 20 pack/year smoking history, EtOH use, and HTN, presents with acute RLE ischemia. Patient s/p RLE angiogram with lysis catheter placement on 7/18. Course c/b new JACKI (Cr 6.49 on admission) and metabolic acidosis.     NEUROLOGIC   - Pain control with Tylenol prn    RESPIRATORY   - Monitor SpO2 goal >92%  - Continue home Symbicort     CARDIOVASCULAR   - Maintain MAP >65  - Continue Levo, wean as tolerated  - Q1 hour neurovascular checks  - TPA @10 and therapeutic Heparin ggt through lysis catheter  - Monitor CBC / CMP / Coags / fibrinogen Q6  - RTOR with vascular tomorrow (7/19)  - Continue home Atorvastatin  - Hold home ASA/Plavix for now  - Hold home antihypertensives iso hypotension      GASTROINTESTINAL   - Diet: Regular diet with renal restrictions  - NPO at midnight    /RENAL   - JACKI with HAGMA, nephrology consulted  - F/u repeat ABG, consider re-starting bicarb ggt  - No urgent indications for HD at this time  - LR @100/hr  - Maintain ha catheter, strict Is/Os  - Monitor electrolytes, replete PRN    HEMATOLOGIC  - Monitor H/H   - Therapeutic heparin, TPA @10 through lysis catheter     INFECTIOUS DISEASE  - Meropenem started today (7/18 - )  - Monitor fever / WBC    ENDOCRINE  - Monitor gluc    LINES  - Rt IJ CVC (  7/18  )  - A line ( 7 /18  )  - Ha (7/18)  - PIV     DISPO: SICU   ASSESSMENT:  63F female w/ PMHx CAD s/p stents, CVA, COPD, PAD w/ prev LLE poss RLE stenting (Dr. Hart[facility]), 20 pack/year smoking history, EtOH use, and HTN presents with acute RLE ischemia. Patient s/p RLE angiogram with lysis catheter placement on 7/18. Course c/b new JACKI (Cr 6.49 on admission) and metabolic acidosis.     NEUROLOGIC   - Pain control with Tylenol prn    RESPIRATORY   - Monitor SpO2 goal >92%  - Continue home Symbicort     CARDIOVASCULAR   - Maintain MAP >65  - Continue Levo, wean as tolerated  - Q1 hour neurovascular checks  - TPA @10 and therapeutic Heparin ggt through lysis catheter  - Monitor CBC / CMP / Coags / fibrinogen Q6  - RTOR with vascular tomorrow (7/19)  - Continue home Atorvastatin  - Hold home ASA/Plavix for now  - Hold home antihypertensives iso hypotension      GASTROINTESTINAL   - Diet: Regular diet with renal restrictions  - NPO at midnight    /RENAL   - JACKI with HAGMA, nephrology consulted  - F/u repeat ABG, consider re-starting bicarb ggt  - No urgent indications for HD at this time  - LR @100/hr  - Maintain ha catheter, strict Is/Os  - Monitor electrolytes, replete PRN    HEMATOLOGIC  - Monitor H/H   - Therapeutic heparin, TPA @10 through lysis catheter     INFECTIOUS DISEASE  - Meropenem started today (7/18 - )  - Monitor fever / WBC    ENDOCRINE  - Monitor gluc    LINES  - Rt IJ CVC (  7/18  )  - A line ( 7 /18  )  - Ha (7/18)  - PIV     DISPO: SICU   ASSESSMENT:  63F female w/ PMHx CAD s/p stents, CVA, COPD, PAD w/ prev LLE poss RLE stenting (Dr. Hart[facility]), 20 pack/year smoking history, EtOH use, and HTN presents with acute RLE ischemia. Patient s/p RLE angiogram with lysis catheter placement on 7/18. Course c/b new JACKI (Cr 6.49 on admission) and metabolic acidosis.     PLAN  NEUROLOGIC   - Pain control: APAP and oxy PRN  - Flat for as long as pt can tolerate    RESPIRATORY   - Monitor SpO2 goal >92%  - Continue home Symbicort     CARDIOVASCULAR   - Maintain MAP >65  - Continue Levo, wean as tolerated  - Q1 hour neurovascular checks  - TPA @10 and therapeutic Heparin ggt through lysis catheter  - Monitor CBC / CMP / Coags / fibrinogen Q6  - RTOR with vascular tomorrow (7/19)  - Continue home Atorvastatin  - Hold home ASA/Plavix for now  - Hold home antihypertensives iso hypotension    GASTROINTESTINAL   - Diet: Regular diet with renal restrictions  - NPO at midnight    /RENAL   - JACKI with SHARIFA, nephrology consulted  - F/u repeat ABG, consider re-starting bicarb ggt  - No urgent indications for HD at this time  - LR @100/hr  - Maintain ha catheter, strict Is/Os  - Monitor electrolytes, replete PRN    HEMATOLOGIC  - Monitor H/H   - Therapeutic heparin, TPA @10 through lysis catheter     INFECTIOUS DISEASE  - Meropenem started today (7/18 - )  - Monitor fever / WBC    ENDOCRINE  - Monitor gluc    LINES  - Rt IJ CVC (  7/18  )  - A line ( 7 /18  )  - Ha (7/18)  - PIV     DISPO: SICU   ASSESSMENT:  63F female w/ PMHx CAD s/p stents, CVA, COPD, PAD w/ prev LLE poss RLE stenting (Dr. Hart[facility]), 20 pack/year smoking history, EtOH use, and HTN presents with acute RLE ischemia. Patient s/p RLE angiogram with lysis catheter placement on 7/18. Course c/b new JACKI (Cr 6.49 on admission) and metabolic acidosis.     PLAN  NEUROLOGIC   - Pain control: APAP and oxy PRN    RESPIRATORY   - Monitor SpO2 goal >92%  - Continue home Symbicort     CARDIOVASCULAR   - Maintain MAP >65  - Continue Levo, wean as tolerated  - Q1 hour neurovascular checks  - Flat for as long as pt can tolerate  - TPA @10 and therapeutic Heparin ggt through lysis catheter  - Monitor CBC / CMP / Coags / fibrinogen Q6  - RTOR with vascular tomorrow (7/19)  - Continue home Atorvastatin  - Hold home ASA/Plavix for now  - Hold home antihypertensives iso hypotension    GASTROINTESTINAL   - Diet: Regular diet with renal restrictions  - NPO at midnight    /RENAL   - JACKI with HAGMA, nephrology consulted  - F/u repeat ABG, consider re-starting bicarb ggt  - No urgent indications for HD at this time  - LR @100/hr  - Maintain ha catheter, strict Is/Os  - Monitor electrolytes, replete PRN    HEMATOLOGIC  - Monitor H/H   - Therapeutic heparin, TPA @10 through lysis catheter     INFECTIOUS DISEASE  - Meropenem started today (7/18 - )  - Monitor fever / WBC    ENDOCRINE  - Monitor gluc    LINES  - Rt IJ CVC (  7/18  )  - A line ( 7 /18  )  - Ha (7/18)  - PIV     DISPO: SICU

## 2023-07-18 NOTE — H&P ADULT - NSTOBACCOFDAPPROVEMED_GEN_NCS
Continuity of Care Document (CCD)

                             Created on: 10/20/2021



Solis Julian

External Reference #: [id number]

: 1994

Sex: Male



Demographics





                          Address                   [facility] [address] [place] [address]

 

                          Home Phone                [phone]

 

                          Preferred Language        Unknown

 

                          Marital Status            Unknown

 

                          Scientology Affiliation     Unknown

 

                          Race                      Unknown

 

                          Ethnic Group              Unknown





Author





                          Author                    Solis WEN M.D.

 

                          Organization              Unknown

 

                          Address                   [address] [place] [address]



 

                          Phone                     [phone]







Problems





                                        Description

 

                                        No Information Available







Social History





                Type            Date            Description     Comments

 

                Birth Sex                       Unknown          







Allergies and adverse reactions





             Active Allergies Criticality  Reaction | Severity Comments     Date

 

             Penicillin   Unable to assess criticality                          

 11/15/2019







Medications





                                        Description

 

                                        No Active Medications







Immunizations





                                        Description

 

                                        No Information Available







Vital Signs





                Date            Vital           Result          Comment

 

                02/10/2020  8:45am Weight          154.00 lb        

 

                2020  8:44am BP Systolic     115 mmHg         

 

                    BP Diastolic        60 mmHg              

 

                    Heart Rate          95 /min              

 

                    Respiratory Rate    18 /min              

 

                    Body Temperature    97.8 F             

 

                    Weight              145.00 lb            







Results





                                        Description

 

                                        No Information Available







Procedures





                                        Description

 

                                        No Information Available







Medical Devices





                                        Description

 

                                        No Information Available







Encounters





                                        Description

 

                                        No Information Available







Assessments





                                        Description

 

                                        No Information Available







Plan of Treatment





No Information Available



Functional Status





                                        Description

 

                                        No Information Available







Mental Status





                                        Description

 

                                        No Information Available







Referrals





                                        Description

 

                                        No Information Available
Summarization Of Episode

                             Created on: 10/31/2021



JUWAN VILLELA

External Reference #: [id number]

: 1994

Sex: Undifferentiated



Demographics





                          Address                   [address] [place]  [address]

 

                          Home Phone                [phone]

 

                          Preferred Language        English

 

                          Marital Status            Unknown

 

                          Cheondoism Affiliation     Unknown

 

                          Race                      Unknown

 

                          Ethnic Group              YES





Author





                          Author                    [facility]

 

                          Organization              [facility]

 

                          Address                   Unknown

 

                          Phone                     Unavailable







Support





                Name            Relationship    Address         Phone

 

                UE              Next Of Kin     Unknown         Unavailable

 

                    CN [employer]    Next Of Kin         UNKNOWN

[place]  [address]                    [phone]

 

                    ARTURO HARO    Next Of Kin         -

                          -, -  -                   [phone]

 

                Brenda Haro   Next Of Kin     Unknown         Unavailable

 

                    NURSE, DUTY ON      Next Of Kin         [address] [facility] [address]

[place]  [address]                   Unavailable

 

                    Danilo FNP,  Radha Next Of Kin         [address] [place]  [address]                    [phone]







Care Team Providers





                    Care Team Member Name Role                Phone

 

                    Danilo, A Radha FNP Unavailable         Unavailable

 

                    Danilo, A Radha FNP Unavailable         Unavailable

 

                    Danilo, A Radha FNP Unavailable         Unavailable

 

                    Danilo, A Radha FNP Unavailable         Unavailable

 

                    Danilo, A Radha FNP Unavailable         Unavailable

 

                    Danilo, A Radha FNP Unavailable         Unavailable

 

                    Danilo, A Radha FNP Unavailable         Unavailable

 

                    Danilo, A Radha FNP Unavailable         Unavailable

 

                    Danilo, A Radha FNP Unavailable         Unavailable

 

                    Danilo, A Radha FNP Unavailable         Unavailable

 

                    Danilo, A Radha FNP Unavailable         Unavailable

 

                    Danilo, A Radha FNP Unavailable         Unavailable

 

                    Danilo, A Radha FNP Unavailable         Unavailable

 

                    Danilo, A Radha FNP Unavailable         Unavailable

 

                    Danilo, A Radha FNP Unavailable         Unavailable

 

                    Danilo, A Radha FNP Unavailable         Unavailable

 

                    Danilo, A Radha FNP Unavailable         Unavailable

 

                    Danilo, A Radha FNP Unavailable         Unavailable

 

                    Danilo, A Radha FNP Unavailable         Unavailable

 

                    Danilo, A Radha FNP Unavailable         Unavailable

 

                    Danilo, A Radha FNP Unavailable         Unavailable

 

                    Danilo, A Radha FNP Unavailable         Unavailable

 

                    Danilo, A Radha FNP Unavailable         Unavailable

 

                    Danilo, A Radha FNP Unavailable         Unavailable

 

                    Danilo, A Radha FNP Unavailable         Unavailable

 

                    Danilo, A Radha FNP Unavailable         Unavailable

 

                    Danilo, A Radha FNP Unavailable         Unavailable

 

                    Danilo, A Radha FNP Unavailable         Unavailable

 

                    Danilo, A Radha FNP Unavailable         Unavailable

 

                    Danilo, A Radha FNP Unavailable         Unavailable

 

                    Danilo, A Radha FNP Unavailable         Unavailable

 

                    ANGELICA JAMES MD    Unavailable         Unavailable

 

                    ANGELICA JAMES MD    Unavailable         Unavailable

 

                    ANGELICA JAMES MD    Unavailable         Unavailable

 

                    ANGELICA JAMES MD    Unavailable         Unavailable

 

                    ANGELICA JAMES MD    Unavailable         Unavailable

 

                    ANGELICA JAMES MD    Unavailable         Unavailable

 

                    ANGELICA JAMES MD    Unavailable         Unavailable

 

                    Jose James MD Unavailable         Unavailable

 

                    Locke, E Sammie       Unavailable         Unavailable

 

                    Locke, E Sammie       Unavailable         Unavailable

 

                    Locke, E Sammie       Unavailable         Unavailable

 

                    Locke, E Sammie       Unavailable         Unavailable

 

                    Locke, E Sammie       Unavailable         Unavailable

 

                    Locke, E Sammie       Unavailable         Unavailable

 

                    Locke, E Sammie       Unavailable         Unavailable

 

                    Locke, E Sammie       Unavailable         Unavailable

 

                    Locke, E Sammie       Unavailable         Unavailable

 

                    Locke, E Sammie       Unavailable         Unavailable

 

                    Locke, E Sammie       Unavailable         Unavailable

 

                    Locke, E Sammie       Unavailable         Unavailable

 

                    Locke, E Sammie       Unavailable         Unavailable

 

                    Locke, E Sammie       Unavailable         Unavailable

 

                    Locke, E Sammie       Unavailable         Unavailable

 

                    Locke, E Sammie       Unavailable         Unavailable

 

                    Locke, E Sammie       Unavailable         Unavailable

 

                    KANDI Hickey   Unavailable         Unavailable

 

                    TROY Rodriguez MD Unavailable         Unavailable

 

                    TROY Rodriguez MD Unavailable         Unavailable

 

                    TROY Rodriguez MD Unavailable         Unavailable

 

                    TROY Rodriguez MD Unavailable         Unavailable

 

                    TROY Rodriguez MD Unavailable         Unavailable

 

                    TROY Rodriguez MD Unavailable         Unavailable

 

                    TROY Rodriguez MD Unavailable         Unavailable

 

                    TROY Rodriguez MD Unavailable         Unavailable

 

                    TROY Rodriguez MD Unavailable         Unavailable

 

                    TROY Rodriguez MD Unavailable         Unavailable

 

                    TROY Rodriguez MD Unavailable         Unavailable

 

                    TROY Rodriguez MD Unavailable         Unavailable

 

                    TROY Rodriguez MD Unavailable         Unavailable

 

                    TROY Rodriguez MD Unavailable         Unavailable

 

                    TROY Rodriguez MD Unavailable         Unavailable

 

                    TROY Rodriguez MD Unavailable         Unavailable

 

                    TROY Rodriguez MD Unavailable         Unavailable

 

                    TROY Rodriguez MD Unavailable         Unavailable

 

                    TROY Rodriguez MD Unavailable         Unavailable

 

                    TROY Rodriguez MD Unavailable         Unavailable

 

                    TROY Rodriguez MD Unavailable         Unavailable

 

                    TROY Rodriguez MD Unavailable         Unavailable

 

                    TROY Rodriguez MD Unavailable         Unavailable

 

                    TROY Rodriguez MD Unavailable         Unavailable

 

                    TROY Rodriguez MD Unavailable         Unavailable

 

                    TROY Rodriguez MD Unavailable         Unavailable

 

                    TROY Rodriguez MD Unavailable         Unavailable

 

                    TROY Rodriguez MD Unavailable         Unavailable

 

                    TROY Rodriguez MD Unavailable         Unavailable

 

                    TROY Rodriguez MD Unavailable         Unavailable

 

                    TROY Rodriguez MD Unavailable         Unavailable

 

                    TROY Rodriguez MD Unavailable         Unavailable

 

                    TROY Rodriguez MD Unavailable         Unavailable

 

                    TROY Rodriguez MD Unavailable         Unavailable

 

                    TROY Rodriguez MD Unavailable         Unavailable

 

                    TROY Rodriguez MD Unavailable         Unavailable

 

                    TROY Rodriguez MD Unavailable         Unavailable

 

                    TROY Rodriguez MD Unavailable         Unavailable

 

                    TROY Rodriguez MD Unavailable         Unavailable

 

                    TROY Rodriguez MD Unavailable         Unavailable

 

                    TROY Rodriguez MD Unavailable         Unavailable

 

                    TROY Rodriguez MD Unavailable         Unavailable

 

                    TROY Rodriguez MD Unavailable         Unavailable

 

                    TROY Rodriguez MD Unavailable         Unavailable

 

                    TROY Rodriguez MD Unavailable         Unavailable

 

                    TROY Rodriguez MD Unavailable         Unavailable

 

                    TROY Rodriguez MD Unavailable         Unavailable

 

                    TROY Rodriguez MD Unavailable         Unavailable

 

                    TROY Rodriguez MD Unavailable         Unavailable

 

                    TROY Rodriguez MD Unavailable         Unavailable

 

                    TROY Rodriguez MD Unavailable         Unavailable

 

                    TROY Rodriguez MD Unavailable         Unavailable

 

                    TROY Rodriguez MD Unavailable         Unavailable

 

                    TROY Rodriguez MD Unavailable         Unavailable

 

                    TROY Rodriguez MD Unavailable         Unavailable

 

                    TROY Rodriguez MD Unavailable         Unavailable

 

                    TROY Rodriguez MD Unavailable         Unavailable

 

                    TROY Rodriguez MD Unavailable         Unavailable

 

                    TROY Rodriguez MD Unavailable         Unavailable

 

                    TROY Rodriguez MD Unavailable         Unavailable

 

                    TROY Rodriguez MD Unavailable         Unavailable

 

                    TROY Rodriguez MD Unavailable         Unavailable

 

                    TROY Rodriguez MD Unavailable         Unavailable

 

                    TROY Rodriguez MD Unavailable         Unavailable

 

                    TROY Rodriguez MD Unavailable         Unavailable

 

                    TROY Rodriguez MD Unavailable         Unavailable

 

                    TROY Rodriguez MD Unavailable         Unavailable

 

                    TROY Rodriguez MD Unavailable         Unavailable

 

                    Michael, O Samah MD Unavailable         Unavailable

 

                    Michael, O Samah MD Unavailable         Unavailable

 

                    Michael, O Samah MD Unavailable         Unavailable

 

                    Michael, O Samah MD Unavailable         Unavailable

 

                    Michael, O Samah MD Unavailable         Unavailable

 

                    Michael, O Samah MD Unavailable         Unavailable

 

                    Michael, O Samah MD Unavailable         Unavailable

 

                    Michael, O Samah MD Unavailable         Unavailable

 

                    Michael, O Samah MD Unavailable         Unavailable

 

                    Michael, O Samah MD Unavailable         Unavailable

 

                    Michael, O Samah MD Unavailable         Unavailable



                                  



Re-disclosure Warning

          The records that you are about to access may contain information from 
federally-assisted alcohol or drug abuse programs. If such information is 
present, then the following federally mandated warning applies: This information
has been disclosed to you from records protected by federal confidentiality 
rules (42 CFR part 2). The federal rules prohibit you from making any further 
disclosure of this information unless further disclosure is expressly permitted 
by the written consent of the person to whom it pertains or as otherwise 
permitted by 42 CFR part 2. A general authorization for the release of medical 
or other information is NOT sufficient for this purpose. The Federal rules 
restrict any use of the information to criminally investigate or prosecute any 
alcohol or drug abuse patient.The records that you are about to access may 
contain highly sensitive health information, the redisclosure of which is 
protected by Article 27-F of the [facility] Public Health law. If you 
continue you may have access to information: Regarding HIV / AIDS; Provided by 
facilities licensed or operated by the [facility] Office of Mental Health; 
or Provided by the [facility] Office for People With Developmental 
Disabilities. If such information is present, then the following [place] 
mandated warning applies: This information has been disclosed to you from 
confidential records which are protected by state law. State law prohibits you 
from making any further disclosure of this information without the specific 
written consent of the person to whom it pertains, or as otherwise permitted by 
law. Any unauthorized further disclosure in violation of state law may result in
a fine or intermediate sentence or both. A general authorization for the release of 
medical or other information is NOT sufficient authorization for further disc
losure.                                                                         
    



Allergies and Adverse Reactions

          



           Type       Description Substance  Reaction   Status     Data Source(s

)

 

           Drug allergy Drug allergy Penicillins                       [facility]

 

           Allergy to substance Allergy to substance Penicillin antibiotic produ

ct                       

[facility] ([facility])



                                                                                
                 



Encounters

          



           Encounter  Providers  Location   Date       Indications Data Source(s

)

 

           Outpatient                       2021 12:00:00 AM EDT consult  

  [facility]

 

                                        consult 

 

                Unlisted evaluation and management service Performer: Gian tovar                 2021

09:57:00 PM EDT - 2021 05:10:00 PM EDT                           [facility] 

([facility])

 

                Unlisted evaluation and management service Performer: Gian tovar                 2021

08:36:00 PM EDT                                     [facility] ([facility])

 

                Outpatient      Attender: Scout Rodriguez MD Main office - [facility] 07/15/2021 

09:30:00 AM EDT                                     [facility] ([facility], )

 

                                        PIO Vargas[address]: [address]

[address], [place] [address], Ph. [phone]                  [facility]: [facility]       2021 12:00:00 AM EDT                     [place] ([facility])

 

                                        PIO [address]argas[address]: [address]

[address], [place] [address], Ph. [phone]                  Attender: Radha Carrasco [facility]       2021 12:00:00 AM EDT                     [place] ([facility])

 

                                        PIO [address]argas[address]: [address]

[address], [place] [address], Ph. [phone]                  Attender: Radha Carrasco [facility]       2021 12:00:00 AM EDT                     [place] ([facility])

 

                Unlisted evaluation and management service                      

           2021 04:30:00 PM EST - 

2021 10:53:00 PM EST                           [facility] ([facility])

 

           Outpatient Attender: Sammie Markham   2020 02:40:00 PM EST

            [facility] 

([facility])

 

                          Inpatient                 Attender: Angelica Zuniga

nder: ANGELICA PENALOZAdmitter: ANGELICA JAMES MD                  [facility]  2020 03:42:00 PM EST - 2021 

10:45:00 AM EST 

PSYCHOACTIVE SUBSTANCE DEPENDENCE       [facility]

 

                                        PSYCHOACTIVE SUBSTANCE DEPENDENCE 

 

                                        Patient discharged. 

 

                Unlisted evaluation and management service Performer: Gian tovar                 2020

08:54:00 PM EST - 2020 07:20:00 PM EST                           [facility] 

([facility]



                                                                                
                                                                                
                          



Medications

          



          Medication Brand Name Start Date Product Form Dose      Route     Admi

nistrative 

Instructions Pharmacy Instructions Status     Indications Reaction   Description

 Data 

Source(s)

 

                          Buprenorphine 12 MG / Naloxone 3 MG Oral Strip Bupreno

rphine 

Hydrochloride/Naloxone Hydrochloride 2020 12:00:00 AM EST                 

          SUBLINGUAL    

                      active                                      [facility] ([facility])

 

                    Clonidine Hydrochloride 0.1 MG Oral Tablet Clonidine HCL    

   2020 12:00:00 AM 

EST                                       active                      [facility] ([facility])

 

                    Hydroxyzine Hydrochloride 25 MG Oral Tablet Hydroxyzine HCL 

    2020 12:00:00 

AM EST                                    active                      [facility] ([facility])

 

                                        Buprenorphine 2 MG / Naloxone 0.5 MG Ora

l Strip buprenorphine 2 mg-naloxone 0.5 

mg sublingual film PLACE ONE FILM UNDER THE TONGUE TWICE A DAY  MAXIMUM DAILY 
DOSE   2 FILMS                          buprenorphine 2 mg-naloxone 0.5 mg subli

ngual film PLACE ONE FILM

UNDER THE TONGUE TWICE A DAY  MAXIMUM DAILY DOSE   2 FILMS                      

                                       completed

                                                buprenorphine 2 MG / naloxone 0.

5 MG Sublingual Film [place] ([facility])

 

                                        Nicotine 4 MG Chewing Gum nicotine (eladio

crilex) 4 mg gum CHEW 1 PIECE OF GUM 

EVERY HOUR AS NEEDED FOR NICOTINE CRAVINGS nicotine (polacrilex) 4 mg gum CHEW 1

PIECE OF GUM EVERY HOUR AS NEEDED FOR NICOTINE CRAVINGS                         

                        completed         

                          nicotine 4 MG Chewing Gum [facility] ([facility])

 

                                        Buprenorphine 2 MG / Naloxone 0.5 MG Ora

l Strip buprenorphine 2 mg-naloxone 0.5 

mg sublingual film PLACE ONE FILM UNDER THE TONGUE TWICE A DAY  MAXIMUM DAILY 
DOSE   2 FILMS                          buprenorphine 2 mg-naloxone 0.5 mg subli

ngual film PLACE ONE FILM

UNDER THE TONGUE TWICE A DAY  MAXIMUM DAILY DOSE   2 FILMS                      

                                       completed

                                                buprenorphine 2 MG / naloxone 0.

5 MG Sublingual Film [place] ([facility])

 

                                        quetiapine 50 MG Oral Tablet quetiapine 

50 mg tablet TAKE ONE TABLET BY MOUTH AT

BEDTIME quetiapine 50 mg tablet TAKE ONE TABLET BY MOUTH AT BEDTIME             

                                    

completed                                       quetiapine 50 MG Oral Tablet ATH

TOYIN ([facility])

 

                                        Ibuprofen 800 MG Oral Tablet ibuprofen 8

00 mg tablet TAKE ONE TABLET BY MOUTH 

EVERY 6 HOURS AS NEEDED FOR PAIN        ibuprofen 800 mg tablet TAKE ONE TABLET 

BY 

MOUTH EVERY 6 HOURS AS NEEDED FOR PAIN                                          

 completed               ibuprofen 800 

MG Oral Tablet                          JOHNNIE ([facility])

 

                                        Levofloxacin 250 MG Oral Tablet levoflox

acin 250 mg tablet TAKE ONE TABLET BY 

MOUTH DAILY levofloxacin 250 mg tablet TAKE ONE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  

 

          baso %    0.7 %     0.0-1.0             Baso %    JOHNNIE ([facility])  

 

           lymph #    1.3 10     1.5-5.0    Below low normal Lymph #    JOHNNIE (

[facility])                           

 

           nucleated red blood cell % 0.0 %      0-0                   Nucleated

 Red Blood Cell % JOHNNIE ([facility])            

 

           neutrophils # 5.5 10     1.5-8.5               Neutrophils # JOHNNIE (

[facility])                                 

 

          mono #    0.7 10    0.0-0.8             Braxton #    JOHNNIE ([facility])  

 

          eos #     0.1 10    0.0-0.5             Eos #     JOHNNIE ([facility])  

 

          baso #    0.1 10    0.0-0.2             Baso #    JOHNNIE ([facility])  









                    ID                  Date                Data Source

 

                    [id number] 2020 05:21:00 PM EST [facility]









          Name      Value     Range     Interpretation Code Description Data [facility]

rce(s) Supporting 

Document(s)

 

             HIV 1/2 Ab p24 Ag Screen              Nonreactive  Normal (applies 

to non-numeric results)  

                          [facility]    









                    ID                  Date                Data Source

 

                    [id number] 2020 10:54:00 AM [facility]      Value     Range     Interpretation Code Description Data [facility]

rce(s) Supporting 

Document(s)

 

           Hep C Ab-T Test            Nonreactive Normal (applies to non-numeric

 results)            [facility]                         









                    ID                  Date                Data Source

 

                    [id number] 2020 10:54:00 AM [facility]      Value     Range     Interpretation Code Description Data [facility]

rce(s) Supporting 

Document(s)

 

           Hep Bs Ag Result T-Test            Nonreactive Normal (applies to non

-numeric results)            

[facility]                  









                    ID                  Date                Data Source

 

                    [id number] 2020 10:54:00 AM [facility]      Value     Range     Interpretation Code Description Data [facility]

rce(s) Supporting 

Document(s)

 

           HAVM                  Nonreactive Normal (applies to non-numeric resu

lts)            [facility]                                 









                    ID                  Date                Data Source

 

                    [id number] 2020 10:54:00 AM [facility]      Value     Range     Interpretation Code Description Data [facility]

rce(s) Supporting 

Document(s)

 

          Vitamin D,Total (25OH)           30.0-100.0 Below low normal          

 [facility]  

 

                                        Reference Range:  <10 ng/mL:    Deficien

t  10-30 ng/mL:  Insufficient   

ng/mL: Sufficient  >100 ng/mL:   Toxicity possible 









                    ID                  Date                Data Source

 

                    [id number] 2020 10:54:00 AM [facility]      Value     Range     Interpretation Code Description Data [facility]

rce(s) Supporting 

Document(s)

 

           Syphilis Serology            Nonreactive Normal (applies to non-numer

ic results)            [facility]                        









                    ID                  Date                Data Source

 

                    [id number] 2020 10:01:00 AM [facility]      Value     Range     Interpretation Code Description Data [facility]

rce(s) Supporting 

Document(s)

 

           Sodium     139 mmol/L 137-145    Normal (applies to non-numeric resul

ts)            [facility]                         

 

           Potassium             3.5-5.1    Normal (applies to non-numeric resul

ts)            [facility]                                 

 

           Chloride   106 mmol/L      Normal (applies to non-numeric resul

ts)            [facility]                         

 

           Carbon Dioxide CO2            22.0-33.0  Normal (applies to non-numer

ic results)            [facility]                         

 

           Anion Gap             4.0-11.0   Normal (applies to non-numeric resul

ts)            [facility]                                 

 

           BUN        17 mg/dL   9-20       Normal (applies to non-numeric resul

ts)            [facility]                                 

 

           Creatinine            0.80-1.50  Normal (applies to non-numeric resul

ts)            [facility]                                 

 

           GFR        88 mL/min  >60        Normal (applies to non-numeric resul

ts)            [facility]                                 

 

                                        Result based on MDRD formula. 

 

          Glucose Level 110 mg/dL 74-99     Above high normal           [facility]  

 

                                        The reference range is only applicable w

hen fasting. 

 

           Calcium-Uncorrected            8.4-10.2   Normal (applies to non-nume

nikki results)            [facility]                         

 

           Corrected Calcium            8.4-10.2   Normal (applies to non-numeri

c results)            [facility]                         

 

           Bilirubin,Total            0.2-1.3    Normal (applies to non-numeric 

results)            [facility]                         

 

           Bilirubin,Direct            0.0-0.3    Normal (applies to non-numeric

 results)            [facility]                         

 

          SGOT(AST) 79 U/L    17-59     Above high normal           [facility]  

 

           SGPT(ALT)  51 U/L     21-72      Normal (applies to non-numeric resul

ts)            [facility]                                 

 

           Alkaline Phosphatase 71 U/L          Normal (applies to non-num

brigette results)            

[facility]                  

 

                                        Pregnancy can increase Alkaline Phosp le

vels up to 2 times the normal adult 

value.      Normal values for children and adolescents are 2 to 3 times the 
normal adult value. 

 

          CPK       695 U/L       Above high normal           [facility]  

 

           Total Protein            6.3-8.2    Normal (applies to non-numeric re

sults)            [facility]                                

 

           Albumin               3.5-5.0    Normal (applies to non-numeric resul

ts)            [facility]                                 

 

                Thyroid Stimulate Hormone TSH                 0.358-3.740     No

rmal (applies to non-numeric 

results)                                [facility]  









                    ID                  Date                Data Source

 

                    [id number] 2020 10:01:00 AM EST [facility]









          Name      Value     Range     Interpretation Code Description Data [facility]

rce(s) Supporting 

Document(s)

 

           Magnesium             1.80-2.40  Normal (applies to non-numeric resul

ts)            [facility]                                 









                    ID                  Date                Data Source

 

                    [id number] 2020 10:01:00 AM EST [facility]









          Name      Value     Range     Interpretation Code Description Data [facility]

rce(s) Supporting 

Document(s)

 

           C-Reactive Protein,Wide Range            <3.00      Above high normal

            [facility]                                 









                    ID                  Date                Data Source

 

                    [id number] 2020 09:31:00 AM EST [facility]









          Name      Value     Range     Interpretation Code Description Data [facility]

rce(s) Supporting 

Document(s)

 

           White Blood Count            4.8-10.8   Normal (applies to non-numeri

c results)            [facility]                         

 

           Red Blood Count            4.35-6.08  Normal (applies to non-numeric 

results)            [facility]                         

 

           Hemoglobin            13.0-17.5  Normal (applies to non-numeric resul

ts)            [facility]                                 

 

           Hematocrit            37.7-51.0  Normal (applies to non-numeric resul

ts)            [facility]                                 

 

           Mean Corpuscular Volume            80-94      Normal (applies to non-

numeric results)            [facility]                        

 

             Mean Corpuscular Hemoglobin              27.0-33.0    Normal (appli

es to non-numeric results) 

                          [facility]    

 

           Mean Corpuscular HGB Conc            32.0-36.0  Normal (applies to no

n-numeric results)            

[facility]                  

 

             Red Cell Distribution Width              11.5-14.5    Normal (appli

es to non-numeric results) 

                          [facility]    

 

           Platelet Count 207 X10 3/uL 130-450    Normal (applies to non-numeric

 results)            

[facility]                  

 

           Mean Platelet Volume            9.6-13.1   Normal (applies to non-num

brigette results)            [facility]                        

 

           Imm Grans% (AUTO) 0 %        0-2        Normal (applies to non-numeri

c results)            [facility]                         

 

           Neutrophils % (AUTO) 52 %       40-75      Normal (applies to non-num

brigette results)            [facility]                        

 

           Lymphocytes % (AUTO) 29 %       21-46      Normal (applies to non-num

brigette results)            [facility]                        

 

           Monocytes % (AUTO) 11 %       5-12       Normal (applies to non-numer

ic results)            [facility]                         

 

          Eosinophils % (AUTO) 7 %       1-5       Above high normal           [facility]  

 

           Basophils % (AUTO) 1 %        0-1        Normal (applies to non-numer

ic results)            [facility]                         

 

           Imm Grans# (AUTO)            0.0-0.5    Normal (applies to non-numeri

c results)            [facility]                         

 

           Neutrophils # (AUTO)            1.5-8.1    Normal (applies to non-num

brigette results)            [facility]                         

 

           Lymphocytes # (AUTO)            1.0-3.1    Normal (applies to non-num

brigette results)            [facility]                         

 

           Monocytes # (AUTO)            0.2-1.3    Normal (applies to non-numer

ic results)            [facility]                         

 

           Eosinophils# (AUTO)            0.0-0.5    Normal (applies to non-nume

nikki results)            [facility]                         

 

           Basophils # (AUTO)            0.0-0.1    Normal (applies to non-numer

ic results)            [facility]                         









                    ID                  Date                Data Source

 

                    [id number] 2020 02:28:00 PM EST [facility]









          Name      Value     Range     Interpretation Code Description Data [facility]

rce(s) Supporting 

Document(s)

 

             Cannabinoids Confirm,Ur result              .            Very abnor

mal (applies to non-numeric units  

                          [facility]    

 

                                        Carboxy THC GC/MS Conf      92          

       ng/mL Cutoff=10        01  

Performed at:  [address]  [id number]  
: Araceli Reyes MD, Phone:  [phone] 









                    ID                  Date                Data Source

 

                    [id number] 2020 09:06:00 PM EST [facility]









          Name      Value     Range     Interpretation Code Description Data [facility]

rce(s) Supporting 

Document(s)

 

           Opiate Screen,Urine            Negative   Normal (applies to non-nume

nikki results)            [facility]                         

 

          Amphetamine Screen,Urine           Negative  Velazquez                  [facility]  

 

                Benzodiazepines Scrn,Ur result                 Negative        N

ormal (applies to non-numeric 

results)                                [facility]  

 

           Cocaine Screen,Urine            Negative   Normal (applies to non-num

brigette results)            [facility]                        

 

           Methadone Screen,Urine            Negative   Normal (applies to non-n

umeric results)            

[facility]                  

 

          Cannabinoid Screen, Ur           Negative  Ha                  [facility]  

 

                                        Therapeutic Drug Ranges for Emergency an

d Rehabilitation                        

      Threshold Levels (ng/mL)  Cocaine                           300  Opiates  
                         300  Cannabinoids                       50  
Barbiturates                      200  Benzodiazepine                    200  
Methadone                         300  Amphetamines                     1000    
All positive findings are presumptive and unconfirmed.    Confirmation of 
positive results are performed only at request of provider.  Unconfirmed results
 must not be used for non-medical purposes (i.e. pre-employment and legal 
purposes) 









                    ID                  Date                Data Source

 

                    [id number]   2020 06:32:00 PM EST [facility]









          Name      Value     Range     Interpretation Code Description Data [facility]

rce(s) Supporting 

Document(s)

 

                                        Respiratory specimen severe acute respir

atory syndrome coronavirus 2 

(SARS-CoV-2) RNA                                             [facility]      

 

                                        This lab was ordered by [facility]

oj and reported by [facility]. 









                    ID                  Date                Data Source

 

                    [id number] 2020 06:18:00 PM EST [facility]









          Name      Value     Range     Interpretation Code Description Data [facility]

rce(s) Supporting 

Document(s)

 

           SARS-CoV-2 RNA            Negative   Normal (applies to non-numeric r

esults)            [facility]                         

 

                                        Negative results should be treated as pr

esumptive and, if inconsistent with 

clinical signs and symptoms or necessary for patient management, should be 
tested with different authorized or cleared molecular tests. Negative results do
 not preclude SARS-CoV-2 infection and should not be used as the sole basis for 
patient management decisions. Negative results should be considered in the 
context of a patients recent exposures, history and the presence of clinical 
signs and symptoms consistent with COVID-19.     This test has not been FDA 
cleared or approved; this test has been authorized by FDA under an Emergency Use
 Authorization for use by laboratories certified under the Clinical Laboratory 
Improvement Amendments of 1988 (CLIA), 42 U.S.C. 263a, to perform moderate 
complexity/high complexity tests and at the Point of Care (POC), i.e., in 
patient care settings operating under a CLIA Certificate of Waiver, Certificate 
of Compliance, or Certificate of Accreditation.    Factsheets for healthcare 
providers:  https://www.fda.gov/media/394560/download  Factsheets for patients: 
 https://www.fda.gov/media/908239/download    The ID NOW Instrument is a rapid 
molecular in vitro diagnostic test utilizing an isothermal nucleic acid 
amplification technology intended for the qualitative detection of nucleic acid 
from the SARS-CoV-2 viral RNA.    THIS IS A STATE REPORTABLE COMMUNICABLE 
DISEASE.    Manual entry verified  by Cherie Hood 20 1817 









                    ID                  Date                Data Source

 

                    [id number] 2020 11:55:00 PM EST [facility]









          Name      Value     Range     Interpretation Code Description Data [facility]

rce(s) Supporting 

Document(s)

 

           Color,Urine            Yellow     Normal (applies to non-numeric resu

lts)            [facility]                                 

 

           Clarity,Urine            Clear      Normal (applies to non-numeric re

sults)            [facility]                                 

 

           Specific Gravity,Urine            1.001-1.030 Normal (applies to non-

numeric results)            

[facility]                  

 

           PH,Urine              5.0-8.0    Normal (applies to non-numeric resul

ts)            [facility]                                 

 

           Protein,Urine            Negative   Normal (applies to non-numeric re

sults)            [facility]                         

 

           Glucose,Urine (UA)            Negative   Normal (applies to non-numer

ic results)            [facility]                         

 

          Ketones,Urine           Negative  Ha                  [facility]

ospital  

 

           Blood,Urine            Negative   Normal (applies to non-numeric resu

lts)            [facility]                                 

 

           Bilirubin,Urine            Negative   Normal (applies to non-numeric 

results)            [facility]                         

 

           Urobilinogen,Urine            Norm 0.2-1 Normal (applies to non-numer

ic results)            [facility]                        

 

           Leukocyte Esterase,Urine            Negative   Normal (applies to non

-numeric results)            

[facility]                  

 

           Nitrite,Urine            Negative   Normal (applies to non-numeric re

sults)            [facility]                         







                                        Procedure

 

                                          



                                                                                
                                                                                
                                                                                
                                                                    



Social History

          No Information                                                        
                                



Vital Signs

          



                    ID                  Date                Data Source

 

                    UNK                                      









           Name       Value      Range      Interpretation Code Description Data

 Source(s)

 

           Systolic blood pressure 104.0 MM[HG]                       104.0 MM[H

G] NETSMART ([facility])

 

           Diastolic blood pressure 61.0 MM[HG]                       61.0 MM[HG

] NETSMART ([facility])

 

           Heart rate 94.0 /MIN                        94.0 /MIN  NETSMART ([facility])

 

           Respiratory rate 16.0 /MIN                        16.0 /MIN  NETSMART

 ([facility])

 

           Body temperature 97.7 [DEGF]                       97.7 [DEGF] NETSMA

RT ([facility])

 

           Systolic blood pressure 117.0 MM[HG]                       117.0 MM[H

G] NETSMART ([facility])

 

           Body temperature 36.5 LACEY                         36.5 LACEY   NETSMART

 ([facility])

 

           Diastolic blood pressure 62.0 MM[HG]                       62.0 MM[HG

] NETSMART ([facility])

 

           Heart rate 64.0 /MIN                        64.0 /MIN  [facility]SM[facility] ([facility])

 

           Body temperature 97.8 [DEGF]                       97.8 [DEGF] NETSMA

RT ([facility])

 

           Body temperature 36.6 LACEY                         36.6 LACEY   NETSMART

 ([facility])

 

           Heart rate 90.0 /MIN                        90.0 /MIN  [facility]SM[facility] ([facility])

 

           Respiratory rate 16.0 /MIN                        16.0 /MIN  NETSMART

 ([facility])

 

           Systolic blood pressure 110.0 MM[HG]                       110.0 MM[H

G] NETSMART ([facility])

 

           Diastolic blood pressure 66.0 MM[HG]                       66.0 MM[HG

] NETSMART ([facility])

 

           Body temperature 36.3 LACEY                         36.3 LACEY   NETSMART

 ([facility])

 

           Heart rate 64.0 /MIN                        64.0 /MIN  [facility] ([facility]

 

           Body temperature 97.3 [DEGF]                       97.3 [DEGF] NETSMA

RT ([facility])

 

           Systolic blood pressure 118.0 MM[HG]                       118.0 MM[H

G] NETSMART ([facility])

 

           Respiratory rate 16.0 /MIN                        16.0 /MIN  NETSMART

 ([facility])

 

           Diastolic blood pressure 69.0 MM[HG]                       69.0 MM[HG

] NETSMART ([facility])

 

           Heart rate 86.0 /MIN                        86.0 /MIN  [facility] ([facility]

 

           Systolic blood pressure 110.0 MM[HG]                       110.0 MM[H

G] NETSMART ([facility])

 

           Diastolic blood pressure 69.0 MM[HG]                       69.0 MM[HG

] NETSMART ([facility])

 

           Respiratory rate 17.0 /MIN                        17.0 /MIN  NETSMART

 ([facility])

 

           Respiratory rate 16.0 /MIN                        16.0 /MIN  NETSMART

 ([facility])

 

           Systolic blood pressure 120.0 MM[HG]                       120.0 MM[H

G] NETSMART ([facility])

 

           Diastolic blood pressure 64.0 MM[HG]                       64.0 MM[HG

] NETSMART ([facility])

 

           Heart rate 71.0 /MIN                        71.0 /MIN  [facility] ([facility]

 

           Body temperature 97.5 [DEGF]                       97.5 [DEGF] NETSMA

RT ([facility])

 

           Body temperature 36.4 LACEY                         36.4 LACEY   NETSMART

 ([facility])

 

           Body temperature 37.1 LACEY                         37.1 LACEY   NETSMART

 ([facility])

 

           Heart rate 74.0 /MIN                        74.0 /MIN  [facility] ([facility]

 

           Body temperature 98.7 [DEGF]                       98.7 [DEGF] NETSMA

RT ([facility])

 

           Body weight 63.6 KG                          63.6 KG    [facility] ([facility]

 

           Diastolic blood pressure 71.0 MM[HG]                       71.0 MM[HG

] NETSMART ([facility])

 

           Body mass index (BMI) [Ratio] 19.0                             19.0  

     NETSMART ([facility])

 

           Body height 182.9 cm                         182.9 cm   [facility] ([facility]

 

           Systolic blood pressure 131.0 MM[HG]                       131.0 MM[H

G] NETSMART ([facility])

 

           Respiratory rate 16.0 /MIN                        16.0 /MIN  NETSMART

 ([facility])

 

           Oxygen saturation in Arterial blood by Pulse oximetry 100.0 %        

                  100.0 %    

NETSMART ([facility])

 

           Respiratory rate 12 /min                          12 /min    [facility] (

[facility])

 

           Ideal body weight 178 [lb_av]                       178 [facility]] [facility]

 ([facility], 

)

 

           Body height 72 [in_i]                        72 [in_i]  [facility] ([facility])

 

                                        6'0" 

 

           Body weight 145.00 [lb_av]                       145.00 [lb_av] [facility]

T ([facility], 

)

 

           Body mass index (BMI) [Ratio] 19.7 kg/m2                       19.7 k

g/m2 [facility] ([facility])

 

           Body height 72 [in_i]                        72 [in_i]  [facility] ([facility])

 

           Diastolic blood pressure 62 mm[Hg]                        62 mm[Hg]  

[facility] ([facility])

 

           Body height 72 [in_i]                        72 [in_i]  [facility] ([facility])

 

           Body mass index (BMI) [Ratio] 19.4 kg/m2                       19.4 k

g/m2 [facility] ([facility])

 

           Systolic blood pressure 98 mm[Hg]                        98 mm[Hg]  A

TEDDY ([facility])

 

           Body weight 2290 [oz_av]                       2290 [oz_av] [facility] ([facility])

 

           Diastolic blood pressure 62 mm[Hg]                        62 mm[Hg]  

[facility] ([facility])

 

           Body height 72 [in_i]                        72 [in_i]  [facility] ([facility])

 

           Body mass index (BMI) [Ratio] 19.4 kg/m2                       19.4 k

g/m2 [facility] ([facility])

 

           Systolic blood pressure 98 mm[Hg]                        98 mm[Hg]  A

TEDDY ([facility])

 

           Body weight 2290 [oz_av]                       2290 [oz_av] [facility] ([facility])

 

           Heart rate 72.0 /MIN                        72.0 /MIN  [facility] ([facility]

 

           Body temperature 36.4 LACEY                         36.4 LACEY   NETSMART

 ([facility])

 

           Respiratory rate 14.0 /MIN                        14.0 /MIN  NETSMART

 ([facility])

 

           Systolic blood pressure 122.0 MM[HG]                       122.0 MM[H

G] NETSMART ([facility])

 

           Diastolic blood pressure 68.0 MM[HG]                       68.0 MM[HG

] NETSMART ([facility])

 

           Body temperature 97.6 [DEGF]                       97.6 [DEGF] NETSMA

RT ([facility])

 

           Body temperature 36.6 LACEY                         36.6 LACEY   NETSMART

 ([facility])

 

           Heart rate 92.0 /MIN                        92.0 /MIN  [facility] ([facility]

 

           Respiratory rate 17.0 /MIN                        17.0 /MIN  NETSMART

 ([facility])

 

           Systolic blood pressure 114.0 MM[HG]                       114.0 MM[H

G] NETSMART ([facility])

 

           Diastolic blood pressure 74.0 MM[HG]                       74.0 MM[HG

] NETSMART ([facility])

 

           Body temperature 97.8 [DEGF]                       97.8 [DEGF] NETSMA

RT ([facility])

 

           Body temperature 98.2 [DEGF]                       98.2 [DEGF] NETSMA

RT ([facility])

 

           Body temperature 36.8 LACEY                         36.8 LACEY   NETSMART

 ([facility])

 

           Respiratory rate 16.0 /MIN                        16.0 /MIN  NETSMART

 ([facility])

 

           Systolic blood pressure 119.0 MM[HG]                       119.0 MM[H

G] NETSMART ([facility])

 

           Diastolic blood pressure 68.0 MM[HG]                       68.0 MM[HG

] NETSMART ([facility])

 

           Heart rate 79.0 /MIN                        79.0 /MIN  [facility] ([facility]

 

           Body temperature 97.5 [DEGF]                       97.5 [DEGF] NETSMA

RT ([facility])

 

           Body temperature 36.4 LACEY                         36.4 LACEY   NETSMART

 ([facility])

 

           Heart rate 64.0 /MIN                        64.0 /MIN  [facility] ([facility]

 

           Respiratory rate 16.0 /MIN                        16.0 /MIN  NETSMART

 ([facility])

 

           Systolic blood pressure 107.0 MM[HG]                       107.0 MM[H

G] NETSMART ([facility])

 

           Diastolic blood pressure 60.0 MM[HG]                       60.0 MM[HG

] NETSMART ([facility])

 

           Systolic blood pressure 113.0 MM[HG]                       113.0 MM[H

G] NETSMART ([facility])

 

           Respiratory rate 16.0 /MIN                        16.0 /MIN  NETSMART

 ([facility])

 

           Diastolic blood pressure 63.0 MM[HG]                       63.0 MM[HG

] NETSMART ([facility])

 

           Heart rate 53.0 /MIN                        53.0 /MIN  [facility] ([facility]

 

           Body temperature 37.1 LACEY                         37.1 LACEY   NETSMART

 ([facility])

 

           Heart rate 74.0 /MIN                        74.0 /MIN  [facility] ([facility]

 

           Body temperature 98.7 [DEGF]                       98.7 [DEGF] NETSMA

RT ([facility])

 

           Systolic blood pressure 103.0 MM[HG]                       103.0 MM[H

G] NETSMART ([facility])

 

           Diastolic blood pressure 62.0 MM[HG]                       62.0 MM[HG

] NETSMART ([facility])

 

           Respiratory rate 17.0 /MIN                        17.0 /MIN  NETSMART

 ([facility])

 

           Body temperature 98.7 [DEGF]                       98.7 [DEGF] NETSMA

RT ([facility])

 

           Heart rate 74.0 /MIN                        74.0 /MIN  [facility] ([facility]

 

           Systolic blood pressure 103.0 MM[HG]                       103.0 MM[H

G] NETSMART ([facility])

 

           Body temperature 37.1 LACEY                         37.1 LACEY   NETSMART

 ([facility])

 

           Respiratory rate 17.0 /MIN                        17.0 /MIN  NETSMART

 ([facility])

 

           Diastolic blood pressure 62.0 MM[HG]                       62.0 MM[HG

] NETSMART ([facility])

 

           Heart rate 73.0 /MIN                        73.0 /MIN  [facility] ([facility]

 

           Respiratory rate 16.0 /MIN                        16.0 /MIN  NETSMART

 ([facility])

 

           Systolic blood pressure 107.0 MM[HG]                       107.0 MM[H

G] NETSMART ([facility])

 

           Body temperature 97.7 [DEGF]                       97.7 [DEGF] NETSMA

RT ([facility])

 

           Diastolic blood pressure 61.0 MM[HG]                       61.0 MM[HG

] NETSMART ([facility])

 

           Body temperature 36.5 LACEY                         36.5 LACEY   NETSMART

 ([facility])

 

           Heart rate 16.0 /MIN                        16.0 /MIN  [facility] ([facility]

 

           Body temperature 98.7 [DEGF]                       98.7 [DEGF] NETSMA

RT ([facility])

 

           Respiratory rate 16.0 /MIN                        16.0 /MIN  NETSMART

 ([facility])

 

           Systolic blood pressure 136.0 MM[HG]                       136.0 MM[H

G] NETSMART ([facility])

 

           Diastolic blood pressure 76.0 MM[HG]                       76.0 MM[HG

] NETSMART ([facility])

 

           Body temperature 37.1 LACEY                         37.1 LACEY   NETSMART

 ([facility])

 

           Respiratory rate 16.0 /MIN                        16.0 /MIN  NETSMART

 ([facility])

 

           Systolic blood pressure 112.0 MM[HG]                       112.0 MM[H

G] NETSMART ([facility])

 

           Body temperature 97.8 [DEGF]                       97.8 [DEGF] NETSMA

RT ([facility])

 

           Body temperature 36.6 LACEY                         36.6 LACEY   NETSMART

 ([facility])

 

           Heart rate 82.0 /MIN                        82.0 /MIN  [facility] ([facility]

 

           Diastolic blood pressure 70.0 MM[HG]                       70.0 MM[HG

] NETSMART ([facility])

 

           Body temperature 36.8 LACEY                         36.8 LACEY   NETSMART

 ([facility])

 

           Heart rate 87.0 /MIN                        87.0 /MIN  [facility] ([facility]

 

           Respiratory rate 18.0 /MIN                        18.0 /MIN  NETSMART

 ([facility])

 

           Systolic blood pressure 115.0 MM[HG]                       115.0 MM[H

G] NETSMART ([facility])

 

           Diastolic blood pressure 75.0 MM[HG]                       75.0 MM[HG

] NETSMART ([facility])

 

           Body temperature 98.2 [DEGF]                       98.2 [DEGF] NETSMA

RT ([facility])

 

           Heart rate 91.0 /MIN                        91.0 /MIN  [facility] ([facility]

 

           Body temperature 36.6 LACEY                         36.6 LACEY   NETSMART

 ([facility])

 

           Respiratory rate 16.0 /MIN                        16.0 /MIN  NETSMART

 ([facility])

 

           Systolic blood pressure 107.0 MM[HG]                       107.0 MM[H

G] NETSMART ([facility])

 

           Body temperature 97.8 [DEGF]                       97.8 [DEGF] NETSMA

RT ([facility])

 

           Diastolic blood pressure 62.0 MM[HG]                       62.0 MM[HG

] NETSMART ([facility])

 

           Oxygen saturation in Arterial blood by Pulse oximetry 99.0 %         

                  99.0 %     NETSMART

 ([facility])









                    ID                  Date                Data Source

 

                    [id number]           2021 09:35:00 AM EST [facility]









           Name       Value      Range      Interpretation Code Description Data

 Source(s)

 

           Weight Measurement Method 1                                1         

 [facility]

 

           Weight (Calculated Kilograms) 59.42                            59.42 

     [facility]

 

           Weight     20966       [facility]

 

           Temperature Source 7                                7          [facility]

 

           Temperature 97.6                             97.6       [facility]

 

           Respiratory Effort 1                                1          [facility]

 

           Respiratory Rate 16                               16         [facility]

 

           Pulse Assessment Method 4                                4          [facility]

 

           Pulse Rate 66                               66         [facility]

 

           Height (Calculated Centimeters) 180.34                           180.

34     [facility]

 

           Height     71                               71         [facility]

 

           Blood Pressure 130/55                           130/55     [facility]

 

           Body Mass Index (BMI) 18.2                             18.2       [facility]

 

           Weight Measurement Method 1                                1         

 [facility]

 

           Weight (Calculated Kilograms) 59.42                            59.42 

     [facility]

 

           Weight     2096       [facility]

 

           Temperature Source 7                                7          [facility]

 

           Temperature 97.6                             97.6       [facility]

 

           Respiratory Effort 1                                1          [facility]

 

           Respiratory Rate 16                               16         [facility]

 

           Pulse Assessment Method 4                                4          [facility]

 

           Pulse Rate 66                               66         [facility]

 

           Height (Calculated Centimeters) 180.34                           180.

34     [facility]

 

           Height     71                               71         [facility]

 

           Blood Pressure 130/55                           130/55     [facility]

 

           Body Mass Index (BMI) 18.2                             18.2       [facility]

 

           Weight Measurement Method 1                                1         

 [facility]

 

           Weight (Calculated Kilograms) 59.42                            59.42 

     [facility]

 

           Weight     2096       [facility]

 

           Temperature Source 7                                7          [facility]

 

           Temperature 97.6                             97.6       [facility]

 

           Respiratory Effort 1                                1          [facility]

 

           Respiratory Rate 16                               16         [facility]

 

           Pulse Assessment Method 4                                4          [facility]

 

           Pulse Rate 66                               66         [facility]

 

           Height (Calculated Centimeters) 180.34                           180.

34     [facility]

 

           Height     71                               71         [facility]

 

           Blood Pressure 130/55                           130/55     [facility]

 

           Body Mass Index (BMI) 18.2                             18.2       [facility]

 

           Weight Measurement Method 1                                1         

 [facility]

 

           Weight (Calculated Kilograms) 59.42                            59.42 

     [facility]

 

           Weight     2096       [facility]

 

           Temperature Source 7                                7          [facility]

 

           Temperature 96.7                             96.7       [facility]

 

           Respiratory Effort 1                                1          [facility]

 

           Respiratory Rate 16                               16         [facility]

 

           Pulse Assessment Method 4                                4          [facility]

 

           Pulse Rate 49                               49         [facility]

 

           Height (Calculated Centimeters) 180.34                           180.

34     [facility]

 

           Height     71                               71         [facility]

 

           Blood Pressure 96/56                            96/56      [facility]

 

           Body Mass Index (BMI) 18.2                             18.2       [facility]

 

           Weight Measurement Method 1                                1         

 [facility]

 

           Weight (Calculated Kilograms) 59.42                            59.42 

     [facility]

 

           Weight     2096       [facility]

 

           Temperature Source 7                                7          [facility]

 

           Temperature 96.7                             96.7       [facility]

 

           Respiratory Effort 1                                1          [facility]

 

           Respiratory Rate 15                               15         [facility]

 

           Pulse Assessment Method 3                                3          C

[facility]

 

           Pulse Rate 72                               72         [facility]

 

           Height (Calculated Centimeters) 180.34                           180.

34     [facility]

 

           Height     71                               71         [facility]

 

           Blood Pressure 110/68                           110/68     [facility]

 

           Body Mass Index (BMI) 18.2                             18.2       [facility]

 

           Weight Measurement Method 1                                1         

 [facility]

 

           Weight (Calculated Kilograms) 59.42                            59.42 

     [facility]

 

           Weight     2096                             2096       [facility]

 

           Temperature Source 7                                7          [facility]

 

           Temperature 98.0                             98.0       [facility]

 

           Respiratory Effort 1                                1          [facility]

 

           Respiratory Rate 16                               16         [facility]

 

           Pulse Assessment Method 4                                4          C

[facility]

 

           Pulse Rate 76                               76         [facility]

 

           Height (Calculated Centimeters) 180.34                           180.

34     [facility]

 

           Height     71                               71         [facility]

 

           Blood Pressure 128/60                           128/60     [facility]

 

           Body Mass Index (BMI) 18.2                             18.2       [facility]



                                                                                
                            



Patient Treatment Plan of Care

          



             Planned Activity Planned Date Details      Description  Data Source

(s)

 

             Tab-A-Violeta 400 mcg tablet TAKE ONE TABLET BY MOUTH EVERY DAY       

                                 [facility] ([facility])

 

             Sulfamethoxazole 800 MG / Trimethoprim 160 MG Oral Tablet          

                              [facility] ([facility])

 

             quetiapine 50 MG Oral Tablet                                       

 [facility] ([facility])

 

             Nicotine 4 MG Chewing Gum                                        AT

[facility] ([facility])

 

                                        Narcan 4 mg/actuation nasal spray SPRAY 

2 SPRAYS  4MG  IN EACH NOSTRIL AS 

DIRECTED FOR SUSPECTED OPOID OVERDOSE AND CALL 911                              

                   [facility]IE ([facility])

 

             methylprednisolone 4 mg tablets in a dose pack USE AS DIRECTED     

                                   [facility] 

([facility])

 

             Levofloxacin 250 MG Oral Tablet                                    

    [facility] ([facility])

 

             Ibuprofen 800 MG Oral Tablet                                       

 [facility] ([facility])

 

             Hydroxyzine Hydrochloride 25 MG Oral Tablet                        

                [facility] ([facility])

 

             Doxycycline Monohydrate 100 MG Oral Capsule                        

                [facility] ([facility])

 

             Clotrimazole 10 MG/ML Topical Cream                                

        [facility] ([facility])

 

             Cholecalciferol 1000 UNT Oral Tablet                               

         [facility] ([facility])

 

             Cephalexin 500 MG Oral Capsule                                     

   [facility]IE ([facility])

 

             buspirone hydrochloride 5 MG Oral Tablet                           

             [facility]IE ([facility])

 

             Buprenorphine 8 MG / Naloxone 2 MG Oral Strip                      

                  [facility]IE ([facility])

 

             Buprenorphine 4 MG / Naloxone 1 MG Oral Strip                      

                  [facility] ([facility])

 

             Buprenorphine 2 MG / Naloxone 0.5 MG Oral Strip                    

                    [facility]IE ([facility])

 

             Tab-A-Violeta 400 mcg tablet TAKE ONE TABLET BY MOUTH EVERY DAY       

                                 [facility]IE ([facility])

 

             quetiapine 50 MG Oral Tablet                                       

 [facility]IE ([facility])

 

             Nicotine 4 MG Chewing Gum                                        AT

[facility] ([facility])

 

                                        Narcan 4 mg/actuation nasal spray SPRAY 

2 SPRAYS  4MG  IN EACH NOSTRIL AS 

DIRECTED FOR SUSPECTED OPOID OVERDOSE AND CALL 911                              

                   [place] ([facility])

 

             methylprednisolone 4 mg tablets in a dose pack USE AS DIRECTED     

                                   [place] 

([facility])

 

             Ibuprofen 800 MG Oral Tablet                                       

 [facility] ([facility])

 

             Hydroxyzine Hydrochloride 25 MG Oral Tablet                        

                [place] ([facility])

 

             Doxycycline Monohydrate 100 MG Oral Capsule                        

                [facility] ([facility])

 

             Clotrimazole 10 MG/ML Topical Cream                                

        [facility] ([facility])

 

             Cholecalciferol 1000 UNT Oral Tablet                               

         [place] ([facility])

 

             Cephalexin 500 MG Oral Capsule                                     

   [facility] ([facility])

 

             buspirone hydrochloride 5 MG Oral Tablet                           

             [facility] ([facility])

 

             Buprenorphine 8 MG / Naloxone 2 MG Oral Strip                      

                  [facility] ([facility])

 

             Buprenorphine 4 MG / Naloxone 1 MG Oral Strip                      

                  [facility]NN[facility] ([facility])

 

             Buprenorphine 2 MG / Naloxone 0.5 MG Oral Strip                    

                    [place] ([facility])
Summary of Patient Chart

                             Created on: 10/06/2021



JUWAN DURÁN

External Reference #: [id number]

: 1994

Sex: Male



Demographics





                          Address                   [address] [place] [address]  [address]

 

                          Home Phone                [phone]

 

                          Preferred Language        English

 

                          Marital Status            Single

 

                          Confucianism Affiliation     Unknown

 

                          Race                      Unknown

 

                          Ethnic Group               or 





Author





                          JUWAN Gimenez

nerated

 

                          Organization              [facility]

 

                          Address                   Unknown

 

                          Phone                     Unavailable







Care Team Providers





                    Care Team Member Name Role                Phone

 

                    Jennifer Guzmán     Practitioner        [phone]

 

                    Hallie Holden    Admphys             [phone]

 

                    Sayra Fuller  Attphys             [phone]

 

                    Gian Hickey      Admphys             [phone]

 

                    Betzy Roth AttendingPractitioner1 [phone]



                           



Functional Status

     No Results                                  



Allergies

           



                Name            Onset Date          Reaction          Severity  

      

 

                                    PCN (penicillin) (Allergy)                  

           Wed Dec 16 

07:00:00 EST 2020                                 Hives                 



                                                 



Encounters

     



                Program Name          Primary Diagnosis          Admission Date/

Time          

Discharge Date/Time        

 

                              [facility]                   

                     Fri Dec 

13 10:40:00 EST 2019                              Wed Ryan 01 15:41:00 EST 2020  

      

 

                                    [facility]               

     Opioid dependence, 

uncomplicated                       Sat Aug 28 16:36:00 EDT                 

   

 

                                    [facility]                    Heroin use dis

order, severe, dependence 

                                    Sat Aug 28 17:57:00 EDT 2021                

    Tue Aug 31 

13:10:00 EDT         

 

                                    [facility]               

     Opioid dependence, 

uncomplicated                       Wed Dec 16 15:54:00 EST 2020                

   

Sun Dec 20 14:20:00 EST 2020        

 

                              [place] [facility] Waiting                                

         10:30:00 

EST 2021 17:53:00 EST   

      



                                                                



Immunizations

     No Known Immunizations                                  



Lab Results

     No Known Laboratory Results                                            



Medications

     



                Medication          Directions          Start Date          End 

Date        

 

                                    Suboxone 0.0  NOEL          Place one (1) noel

m under the tongue daily  

                          Wed Sep 01 00:00:00 EDT 2021          Sat Sep 11 00:00

:00 EDT        



 

                                    SUBOXONE (BUPRENORPHINE-NALOXONE)  8MG-2MG F

ILM          1 Film Daily 

SUBLINGUAL                Mon Aug 30 08:30:00 EDT 2021          Tue Aug 31 13:42

:00 

EDT         

 

                                                  VENTOLIN HFA (ALBUTEROL SULFAT

E) 0.09 MG/1 ACTUATION SUSPENSION       

                          2 Puff Q4HPRN: Every 4 Hours As Needed INHALATION     

     Mon Aug 30 08:00:00

EDT 2021                                Tue Aug 31 13:42:00 EDT         

 

                                    SUBOXONE (BUPRENORPHINE-NALOXONE)  8MG-2MG F

ILM          1 Film Daily 

SUBLINGUAL                Mon Aug 30 10:37:00 EDT 2021          Fri Sep 03 10:36

:00 

EDT         

 

                                    SUBOXONE (BUPRENORPHINE-NALOXONE)  8MG-2MG F

ILM          1 Film Stat 

SUBLINGUAL ([facility])          Sun Aug 29 10:00:00 EDT 2021          Sun Aug 29 

10:03:00 EDT         

 

                                    SUBOXONE (BUPRENORPHINE-NALOXONE)  4MG-1MG F

ILM          1 Film Once 

SUBLINGUAL ([facility])          Sat Aug 28 21:00:00 EDT 2021          Sat Aug 28 

20:25:00 EDT         

 

                                    SUBOXONE (BUPRENORPHINE-NALOXONE)  4MG-1MG F

ILM          1 Film Stat 

SUBLINGUAL ([facility])          Sat Aug 28 18:52:00 EDT 2021          Sat Aug 28 

18:57:00 EDT         

 

                                    ACETAMINOPHEN 500 MG CAPSULE          2 caps

ule Q6HPRN: Every 6 Hours 

As Needed ORAL (MDD 6 Tabs)          Sat Aug 28 18:47:00 EDT 2021          Tue 

Aug 31 13:42:00 EDT         

 

                                    DIPHENHYDRAMINE HCL 25 MG CAPSULE          1

 capsule TIDPRN: Three 

Times A Day As Needed ORAL          Sat Aug 28 18:47:00 EDT 2021          Tue 

Aug 31 13:42:00 EDT         

 

                                    DOCUSATE SODIUM 100 MG CAPSULE, LIQUID FILLE

D          1 capsule Daily

As Needed ORAL            Sat Aug 28 18:47:00 EDT 2021          Tue Aug 31 

13:42:00 EDT         

 

                                    HYDROXYZINE HCL 25 MG TABLET          1 tabl

et TIDPRN: Three Times A 

Day As Needed ORAL (Do not give within two (2) hours of Diphenhydramine or 
within one (1) hour of clonidine administration)          Sat Aug 28 18:47:00 

EDT 2021                                Tue Aug 31 13:42:00 EDT         

 

                                    FIBER- MG TABLET          2 tablet WI

N: As Needed ORAL (MDD 4 

Tabs)                     Sat Aug 28 18:47:00 EDT 2021          Tue Aug 31 13:42

:00 EDT 

        

 

                                    IBUPROFEN 200 MG TABLET          3 tablet TI

DPRN: Three Times A Day As

Needed ORAL               Sat Aug 28 18:47:00 EDT 2021          Tue Aug 31 13:42

:00 

EDT         

 

                                    MELATONIN 5 MG CAPSULE          1 capsule HS

PRN: At Bedtime As Needed 

ORAL (MDD 5mg)            Sat Aug 28 18:47:00 EDT 2021          Tue Aug 31 

13:42:00 EDT         

 

                              MULTIVITAMIN   TABLET          1 tablet Daily ORAL

          Sat Aug 28

18:47:00 EDT 2021                       Tue Aug 31 13:42:00 EDT         

 

                                    CALCIUM CARBONATE 500 MG TABLET, CHEWABLE   

       3 tablet TIDPRN: 

Three Times A Day As Needed ORAL          Sat Aug 28 18:47:00 EDT 2021          

Tue Aug 31 13:42:00 EDT         

 

                                    ONDANSETRON 4 MG TABLET          1 tablet TI

DPRN: Three Times A Day As

Needed ORAL               Sat Aug 28 18:47:00 EDT 2021          Tue Aug 31 13:42

:00 

EDT         

 

                                    CLONIDINE HCL 0.1 MG TABLET          1 table

t TIDPRN: Three Times A 

Day As Needed ORAL (SBP >110 HR>60Do not give within one (1) hour of hydroxyzine
administration)           Sat Aug 28 18:47:00 EDT 2021          Tue Aug 31 

13:42:00 EDT         

 

                                    NICOTINE POLACRILEX 2 MG LOZENGE/MELISSA     

     3 Each TID: Three 

Times a Day ORAL          Sat Aug 28 18:48:00 EDT 2021          Tue Aug 31 

13:42:00 EDT         

 

                                    NICODERM CQ (NICOTINE) 21 MG/24 HR PATCH, EX

TENDED RELEASE          1 

patch Daily As Needed TRANSDERMAL (Remove at HS. MDD 1 Patch)          Sat Aug 

28 18:47:00 EDT 2021                    Mon Aug 30 18:46:00 EDT         

 

                                    NARCAN (NALOXONE HCL) 4 MG/0.1 ML SPRAY     

     1 spray(s)  As 

Directed NASAL            Sat Aug 28 18:47:00 EDT 2021          Sun Aug 29 

18:46:00 EDT         

 

                                                  PROAIR HFA (ALBUTEROL SULFATE)

 0.09 MG/1 ACTUATION SUSPENSION         

                          2 Puff TIDPRN: Three Times A Day As Needed INHALATION 

         Sat Dec 19 

08:00:00 EST 2020                       Sun Dec 20 14:43:00 EST 2020        

 

                                    NICOTINE 14 MG/24 HR PATCH, EXTENDED RELEASE

          1 Patch Daily 

TRANSDERMAL               Fri Dec 18 06:00:00 EST 2020          Sun Dec 20 14:43

:00 

EST 2020        

 

                                                  EUCERIN DAILY PROTECTION (LOTI

ON, MULTI INGREDIENT)  

2%-7.5%-4.5%-2.4%-4.8% LOTION           1 Application TIDPRN: Three Times A Day 

As Needed TOPICAL APPLICATION          Fri Dec 18 07:42:00 EST 2020          Sun

Dec 20 14:43:00 EST 2020        

 

                                                  PROAIR HFA (ALBUTEROL SULFATE)

 0.09 MG/1 ACTUATION SUSPENSION         

                          2 Puff TIDPRN: Three Times A Day As Needed INHALATION 

         Fri Dec 18 

06:00:00 EST 2020                       Sat Dec 19 10:01:00 EST 2020        

 

                                    XOPENEX (LEVALBUTEROL HYDROCHLORIDE) 1.25 MG

/3 ML SOLUTION          1 

mL Stat INHALATION          Thu Dec 17 23:39:00 EST 2020          Thu Ryan 14 

23:38:00 EST 2021        

 

                                                  BACTRIM DS (SULFAMETHOXAZOLE-T

RIMETHOPRIM)  800MG-160MG TABLET        

                    1 Tablet BID: Twice a Day ORAL          Thu Dec 17 06:00:00 

EST 2020          

Sun Dec 20 14:43:00 EST 2020        

 

                                    SUBOXONE (BUPRENORPHINE-NALOXONE)  8MG-2MG F

ILM          1 Film Daily 

SUBLINGUAL                Fri Dec 18 06:00:00 EST 2020          Sun Dec 20 14:43

:00 

EST 2020        

 

                                    NARCAN (NALOXONE HCL) 4 MG/0.1 ML SPRAY     

     1 spray(s)  Once 

NASAL                     Thu Dec 17 06:00:00 EST 2020          Sun Dec 20 14:43

:00 EST 

2020        

 

                                    SUBOXONE (BUPRENORPHINE-NALOXONE)  4MG-1MG F

ILM          1 Film Stat 

SUBLINGUAL (Take from [facility])          Thu Dec 17 06:00:00 EST 2020          Thu 

Dec 17 12:21:00 EST 2020        

 

                                    SUBOXONE (BUPRENORPHINE-NALOXONE)  4MG-1MG F

ILM          1 Film Daily 

SUBLINGUAL                Thu Dec 17 06:00:00 EST 2020          Fri Dec 18 05:59

:00 

EST 2020        

 

                                    NARCAN (NALOXONE HCL) 4 MG/0.1 ML SPRAY     

     1 spray(s)  As 

Directed NASAL            Wed Dec 16 17:02:00 EST 2020          Sun Dec 20 

14:43:00 EST 2020        

 

                                    ACETAMINOPHEN 500 MG CAPSULE          2 caps

ule Q4-6HPRN: Every 4-6 

Hours As Needed ORAL (MDD 6 Tabs)          Wed Dec 16 17:01:00 EST 2020         

                                        Sun Dec 20 14:43:00 EST 2020        

 

                                                  BENADRYL ALLERGY (DIPHENHYDRAM

INE HYDROCHLORIDE) 25 MG TABLET         

                          1 tablet TIDPRN: Three Times A Day As Needed ORAL     

     Wed Dec 16 17:01:00 

EST 2020                                Sun Dec 20 14:43:00 EST 2020        

 

                                    CATAPRES (CLONIDINE HYDROCHLORIDE) 0.1 MG TA

BLET          1 tablet 

TIDPRN: Three Times A Day As Needed ORAL (Systolic BP >110, HR>60)          Wed 

Dec 16 17:01:00 EST 2020                Sun Dec 20 14:43:00 EST 2020        

 

                                    COLACE (DOCUSATE SODIUM) 100 MG CAPSULE, LIQ

UID FILLED          1 

capsule Daily As Needed ORAL          Wed Dec 16 17:01:00 EST 2020          Sun 

Dec 20 14:43:00 EST 2020        

 

                                    HYDROXYZINE HCL 25 MG TABLET          1 tabl

et TIDPRN: Three Times A 

Day As Needed ORAL (Do not give within 2 hours of Diphenhydramine)          Wed 

Dec 16 17:01:00 EST 2020                Sun Dec 20 14:43:00 EST 2020        

 

                                    FIBER- MG TABLET          2 tablet WI

N: As Needed ORAL (MDD 4 

Tabs)                     Wed Dec 16 17:01:00 EST 2020          Sun Dec 20 14:43

:00 EST 

2020        

 

                                    IBUPROFEN 200 MG TABLET          3 tablet Da

eldon As Needed ORAL (MDD 

3200mg)                   Wed Dec 16 17:01:00 EST 2020          Sun Dec 20 14:43

:00 EST 

2020        

 

                                    MELATONIN 5 MG CAPSULE          1 capsule HS

PRN: At Bedtime As Needed 

ORAL (MDD 5mg)            Wed Dec 16 17:01:00 EST 2020          Sun Dec 20 

14:43:00 EST 2020        

 

                              MULTIVITAMIN   TABLET          1 tablet Daily ORAL

          Wed Dec 16

17:01:00 EST 2020                       Sun Dec 20 14:43:00 EST 2020        

 

                                    TUMS (CALCIUM CARBONATE) 500 MG TABLET, CHEW

ABLE          3 tablet 

PRN: As Needed ORAL          Wed Dec 16 17:01:00 EST 2020          Sun Dec 20 

14:43:00 EST 2020        

 

                                    ZOFRAN (ONDANSETRON HYDROCHLORIDE) 4 MG TABL

ET          1 tablet 

TIDPRN: Three Times A Day As Needed ORAL          Wed Dec 16 17:01:00 EST 2020  

                                        Sun Dec 20 14:43:00 EST 2020        



                                                                                
                                                                                
                                                                                
      



Problems

     No Known Problems                                            



Procedures

     No Known Procedures                                  



Social History

     



                    Social History Observation          Description          Abrahan

e        

 

                              Smoking Status                    Current Every Da

y Smoker          

Fri Dec 13 07::00 EST 2019        

 

                              Smoking Status                    Current Every Da

y Smoker          

Fri Dec 13 07:00:00 EST 2019        

 

                              Smoking Status                    Current Every Da

y Smoker          

Fri Dec 13 07:00:00 EST 2019        

 

                              Smoking Status                    Current Every Da

y Smoker          

Fri Dec 13 07:00:00 EST         

 

                              Smoking Status                    Current Every Da

y Smoker          

Fri Dec 13 07:00:00 EST         

 

                              Smoking Status                    Current Every Da

y Smoker          

Fri Dec 13 07:00:00 EST         

 

                              Smoking Status                    Current Every Da

y Smoker          

Fri Dec 13 07:00:00 EST         

 

                              Smoking Status                    Current Every Da

y Smoker          

Fri Dec 13 07:00:00 EST         

 

                              Smoking Status                    Current Every Da

y Smoker          

Fri Dec 13 07:00:00 EST         

 

                              Smoking Status                    Current Every Da

y Smoker          

Fri Dec 13 07:00:00 EST         

 

                              Smoking Status                    Current Every Da

y Smoker          

Fri Dec 13 07:00:00 EST         

 

                              Smoking Status                    Current Every Da

y Smoker          

Fri Dec 13 07:00:00 EST         

 

                              Smoking Status                    Current Every Da

y Smoker          

Fri Dec 13 07:00:00 EST 2019        

 

                              Smoking Status                    Current Every Da

y Smoker          

Fri Dec 13 07:00:00 EST 2019        

 

                    Birth Sex           Male                Sat [birth year] 07:00:00 

EST         



                                                                                
                            



Vital Signs

     



                    Vital Sign          Measurement          Date        

 

                              Heart Rate          94 /MIN             Tue Aug 31

 13:17:00 EDT      

  

 

                              Systolic          104 MM[HG]          Tue Aug 31 1

3:17:00 EDT     

    

 

                              Diastolic          61 MM[HG]           Tue Aug 31 

13:17:00 EDT     

    

 

                              Temperature          97.7 [DEGF]          Tue Aug 

31 05:43:00 EDT 

        

 

                              Temperature          36.5 LACEY            Tue Aug 3

1 05:43:00 EDT    

     

 

                              Heart Rate          64 /MIN             Tue Aug 31

 05:43:00 EDT      

   

 

                              Respiration          16 /MIN             Tue Aug 3

1 05:43:00 EDT     

    

 

                              Systolic          117 MM[HG]          Tue Aug 31 0

5:43:00 EDT     

    

 

                              Diastolic          62 MM[HG]           Tue Aug 31 

05:43:00 EDT     

    

 

                              BP Position          1 Position          Tue Aug 3

1 05:43:00 EDT  

       

 

                              Temperature          97.8 [DEGF]          Mon Aug 

30 19:01:00 EDT 

        

 

                              Temperature          36.6 LACEY            Mon Aug 3

0 19:01:00 EDT    

     

 

                              Heart Rate          90 /MIN             Mon Aug 30

 19:01:00 EDT      

   

 

                              Respiration          16 /MIN             Mon Aug 3

0 19:01:00 EDT     

    

 

                              Systolic          110 MM[HG]          Mon Aug 30 1

9:01:00 EDT     

    

 

                              Diastolic          66 MM[HG]           Mon Aug 30 

19:01:00 EDT     

    

 

                              Temperature          97.3 [DEGF]          Mon Aug 

30 05:31:00 EDT 

        

 

                              Temperature          36.3 LACEY            Mon Aug 3

0 05:31:00 EDT    

     

 

                              Heart Rate          64 /MIN             Mon Aug 30

 05:31:00 EDT      

   

 

                              Respiration          16 /MIN             Mon Aug 3

0 05:31:00 EDT     

    

 

                              Systolic          118 MM[HG]          Mon Aug 30 0

5:31:00 EDT     

    

 

                              Diastolic          69 MM[HG]           Mon Aug 30 

05:31:00 EDT     

    

 

                              BP Position          2 Position          Mon Aug 3

0 05:31:00 EDT  

       

 

                              Heart Rate          86 /MIN             Sun Aug 29

 20:15:00 EDT      

   

 

                              Respiration          17 /MIN             Sun Aug 2

9 20:15:00 EDT     

    

 

                              Systolic          110 MM[HG]          Sun Aug 29 2

0:15:00 EDT     

    

 

                              Diastolic          69 MM[HG]           Sun Aug 29 

20:15:00 EDT     

    

 

                              BP Position          3 Position          Sun Aug 2

9 20:15:00 EDT  

       

 

                              Temperature          97.5 [DEGF]          Sun Aug 

29 06:16:00 EDT 

        

 

                              Temperature          36.4 LACEY            Sun Aug 2

9 06:16:00 EDT    

     

 

                              Heart Rate          71 /MIN             Sun Aug 29

 06:16:00 EDT      

   

 

                              Respiration          16 /MIN             Sun Aug 2

9 06:16:00 EDT     

    

 

                              Systolic          120 MM[HG]          Sun Aug 29 0

6:16:00 EDT     

    

 

                              Diastolic          64 MM[HG]           Sun Aug 29 

06:16:00 EDT     

    

 

                              BP Position          2 Position          Sun Aug 2

9 06:16:00 EDT  

       

 

                              Temperature          98.7 [DEGF]          Sat Aug 

28 20:06:00 EDT 

        

 

                              Temperature          37.1 LACEY            Sat Aug 2

8 20:06:00 EDT    

     

 

                              Heart Rate          74 /MIN             Sat Aug 28

 20:06:00 EDT      

   

 

                              Respiration          16 /MIN             Sat Aug 2

8 20:06:00 EDT     

    

 

                              SpO2          100 %               Sat Aug 28 20:06

:00 EDT         

 

                              Systolic          131 MM[HG]          Sat Aug 28 2

0:06:00 EDT     

    

 

                              Diastolic          71 MM[HG]           Sat Aug 28 

20:06:00 EDT     

    

 

                              BP Position          2 Position          Sat Aug 2

8 20:06:00 EDT  

       

 

                              Height          6 0.0 ft            Sat Aug 28 20:

06:00 EDT         

 

                              Height          72 in               Sat Aug 28 20:

06:00 ED2021        

 

                              Height          182.9 cm            Sat Aug 28 20:

06:00 ED2021        

 

                              Weight Lbs          140 lbs             Sat Aug 28

 20:06:00 ED2021     

   

 

                              Weight Kgs          63.6 KG             Sat Aug 28

 20:06:00 ED2021     

   

 

                              BMI          19                  Sat Aug 28 20:06:

00 ED2021        

 

                              Temperature          97.6 [DEGF]          Sun Dec 

20 10:00:00 EST 2020

        

 

                              Temperature          36.4 LACEY            Sun Dec 2

0 10:00:00 EST 2020   

     

 

                              Heart Rate          72 /MIN             Sun Dec 20

 10:00:00 EST 2020     

   

 

                              Respiration          14 /MIN             Sun Dec 2

0 10:00:00 EST 2020    

    

 

                              Systolic          122 MM[HG]          Sun Dec 20 1

0:00:00 EST 2020    

    

 

                              Diastolic          68 MM[HG]           Sun Dec 20 

10:00:00 EST 2020    

    

 

                              BP Position          3 Position          Sun Dec 2

0 10:00:00 EST 2020 

       

 

                              Temperature          97.8 [DEGF]          Sat Dec 

19 18:19:00 EST 2020

        

 

                              Temperature          36.6 LACEY            Sat Dec 1

9 18:19:00 EST 2020   

     

 

                              Heart Rate          92 /MIN             Sat Dec 19

 18:19:00 EST 2020     

   

 

                              Respiration          17 /MIN             Sat Dec 1

9 18:19:00 EST 2020    

    

 

                              Systolic          114 MM[HG]          Sat Dec 19 1

8:19:00 EST 2020    

    

 

                              Diastolic          74 MM[HG]           Sat Dec 19 

18:19:00 EST 2020    

    

 

                              Temperature          98.2 [DEGF]          Sat Dec 

19 11:35:00 EST 2020

        

 

                              Temperature          36.8 LACEY            Sat Dec 1

9 11:35:00 EST 2020   

     

 

                              Heart Rate          79 /MIN             Sat Dec 19

 11:35:00 EST 2020     

   

 

                              Respiration          16 /MIN             Sat Dec 1

9 11:35:00 EST 2020    

    

 

                              Systolic          119 MM[HG]          Sat Dec 19 1

1:35:00 EST 2020    

    

 

                              Diastolic          68 MM[HG]           Sat Dec 19 

11:35:00 EST 2020    

    

 

                              BP Position          3 Position          Sat Dec 1

9 11:35:00 EST 2020 

       

 

                              Temperature          97.5 [DEGF]          Sat Dec 

19 05:37:00 EST 2020

        

 

                              Temperature          36.4 LACEY            Sat Dec 1

9 05:37:00 EST 2020   

     

 

                              Heart Rate          64 /MIN             Sat Dec 19

 05:37:00 EST 2020     

   

 

                              Respiration          16 /MIN             Sat Dec 1

9 05:37:00 EST 2020    

    

 

                              Systolic          107 MM[HG]          Sat Dec 19 0

5:37:00 EST 2020    

    

 

                              Diastolic          60 MM[HG]           Sat Dec 19 

05:37:00 EST 2020    

    

 

                              BP Position          2 Position          Sat Dec 1

9 05:37:00 EST 2020 

       

 

                              Heart Rate          53 /MIN             Fri Dec 18

 18:19:00 EST 2020     

   

 

                              Respiration          16 /MIN             Fri Dec 1

8 18:19:00 EST 2020    

    

 

                              Systolic          113 MM[HG]          Fri Dec 18 1

8:19:00 EST 2020    

    

 

                              Diastolic          63 MM[HG]           Fri Dec 18 

18:19:00 EST 2020    

    

 

                              BP Position          3 Position          Fri Dec 1

8 18:19:00 EST 2020 

       

 

                              Temperature          98.7 [DEGF]          Fri Dec 

18 14:07:00 EST 2020

        

 

                              Temperature          37.1 LACEY            Fri Dec 1

8 14:07:00 EST 2020   

     

 

                              Heart Rate          74 /MIN             Fri Dec 18

 14:07:00 EST 2020     

   

 

                              Respiration          17 /MIN             Fri Dec 1

8 14:07:00 EST 2020    

    

 

                              Systolic          103 MM[HG]          Fri Dec 18 1

4:07:00 EST 2020    

    

 

                              Diastolic          62 MM[HG]           Fri Dec 18 

14:07:00 EST 2020    

    

 

                              Temperature          98.7 [DEGF]          Fri Dec 

18 13:56:00 EST 2020

        

 

                              Temperature          37.1 LACEY            Fri Dec 1

8 13:56:00 EST 2020   

     

 

                              Heart Rate          74 /MIN             Fri Dec 18

 13:56:00 EST 2020     

   

 

                              Respiration          17 /MIN             Fri Dec 1

8 13:56:00 EST 2020    

    

 

                              Systolic          103 MM[HG]          Fri Dec 18 1

3:56:00 EST 2020    

    

 

                              Diastolic          62 MM[HG]           Fri Dec 18 

13:56:00 EST 2020    

    

 

                              BP Position          3 Position          Fri Dec 1

8 13:56:00 EST 2020 

       

 

                              Temperature          97.7 [DEGF]          Fri Dec 

18 06:02:00 EST 2020

        

 

                              Temperature          36.5 LACEY            Fri Dec 1

8 06:02:00 EST 2020   

     

 

                              Heart Rate          73 /MIN             Fri Dec 18

 06:02:00 EST 2020     

   

 

                              Respiration          16 /MIN             Fri Dec 1

8 06:02:00 EST 2020    

    

 

                              Systolic          107 MM[HG]          Fri Dec 18 0

6:02:00 EST 2020    

    

 

                              Diastolic          61 MM[HG]           Fri Dec 18 

06:02:00 EST 2020    

    

 

                              BP Position          2 Position          Fri Dec 1

8 06:02:00 EST 2020 

       

 

                              Temperature          98.7 [DEGF]          u Dec 

17 21:48:00 EST 2020

        

 

                              Temperature          37.1 ALCEY            u Dec 1

7 21:48:00 EST 2020   

     

 

                              Heart Rate          16 /MIN             u Dec 17

 21:48:00 EST 2020     

   

 

                              Respiration          16 /MIN             u Dec 1

7 21:48:00 EST 2020    

    

 

                              Systolic          136 MM[HG]          u Dec 17 2

1:48:00 EST 2020    

    

 

                              Diastolic          76 MM[HG]           Thu Dec 17 

21:48:00 EST 2020    

    

 

                              BP Position          3 Position          Thu Dec 1

7 21:48:00 EST 2020 

       

 

                              Temperature          97.8 [DEGF]          u Dec 

17 07:21:00 EST 2020

        

 

                              Temperature          36.6 LACEY            Thu Dec 1

7 07:21:00 EST 2020   

     

 

                              Heart Rate          82 /MIN             u Dec 17

 07:21:00 EST 2020     

   

 

                              Respiration          16 /MIN             u Dec 1

7 07:21:00 EST 2020    

    

 

                              Systolic          112 MM[HG]          Thu Dec 17 0

7:21:00 EST 2020    

    

 

                              Diastolic          70 MM[HG]           Thu Dec 17 

07:21:00 EST 2020    

    

 

                              BP Position          3 Position          Thu Dec 1

7 07:21:00 EST 2020 

       

 

                              Temperature          98.2 [DEGF]          Wed Dec 

16 19:12:00 EST 2020

        

 

                              Temperature          36.8 LACEY            Wed Dec 1

6 19:12:00 EST 2020   

     

 

                              Heart Rate          87 /MIN             Wed Dec 16

 19:12:00 EST 2020     

   

 

                              Respiration          18 /MIN             Wed Dec 1

6 19:12:00 EST 2020    

    

 

                              Systolic          115 MM[HG]          Wed Dec 16 1

9:12:00 EST 2020    

    

 

                              Diastolic          75 MM[HG]           Wed Dec 16 

19:12:00 EST 2020    

    

 

                              Temperature          97.8 [DEGF]          Wed Dec 

16 17:27:00 EST 2020

        

 

                              Temperature          36.6 LACEY            Wed Dec 1

6 17:27:00 EST 2020   

     

 

                              Heart Rate          91 /MIN             Wed Dec 16

 17:27:00 EST 2020     

   

 

                              Respiration          16 /MIN             Wed Dec 1

6 17:27:00 EST 2020    

    

 

                              SpO2          99 %                Wed Dec 16 17:27

:00 EST 2020        

 

                              Systolic          107 MM[HG]          Wed Dec 16 1

7:27:00 EST 2020    

    

 

                              Diastolic          62 MM[HG]           Wed Dec 16 

17:27:00 EST 2020    

    

 

                              BP Position          2 Position          Wed Dec 1

6 17:27:00 EST 2020
Summary of Patient Chart

                             Created on: 2021



JUWAN DURÁN

External Reference #: [id number]

: [id number]

Sex: Male



Demographics





                          Address                   [address] [place] [address]  [id number]

 

                          Home Phone                [phone]

 

                          Preferred Language        English

 

                          Marital Status            Single

 

                          Taoism Affiliation     Unknown

 

                          Race                      Unknown

 

                          Ethnic Group               or 





Author





                          JUWAN Gimenez

nerated

 

                          Organization              [facility]

 

                          Address                   Unknown

 

                          Phone                     Unavailable







Care Team Providers





                    Care Team Member Name Role                Phone

 

                    Jennifer Guzmán     Practitioner        [phone]

 

                    Hallie Holden    Admphys             [phone]

 

                    Sayra Fuller  Attphys             [phone]

 

                    Gian Hickey      Admphys             [phone]

 

                    Betzy Roth AttendingPractitioner1 [phone]



                           



Functional Status

     No Results                                  



Allergies

           



                Name            Onset Date          Reaction          Severity  

      

 

                                    PCN (penicillin) (Allergy)                  

           Wed Dec 16 

07:00:00 EST 2020                                 Hives                 



                                                 



Encounters

     



                Program Name          Primary Diagnosis          Admission Date/

Time          

Discharge Date/Time        

 

                                    [facility]               

     Opioid dependence, 

uncomplicated                       Sat Aug 28 16:36:00 EDT                 

   

 

                                    [facility]                    Heroin use dis

order, severe, dependence 

                                    Sat Aug 28 17:57:00 EDT 2021                

    Tue Aug 31 

13:10:00 EDT         

 

                              [facility]                                

         10:30:00 

EST 2021 17:53:00 EST   

      

 

                              [facility]                   

                     Fri Dec 

13 10:40:00 EST 2019                              Wed Ryan 01 15:41:00 EST 2020  

      

 

                                    [facility]               

     Opioid dependence, 

uncomplicated                       Wed Dec 16 15:54:00 EST 2020                

   

Sun Dec 20 14:20:00 EST 2020        



                                                                



Immunizations

     No Known Immunizations                                  



Lab Results

     No Known Laboratory Results                                            



Medications

     



                Medication          Directions          Start Date          End 

Date        

 

                                    Suboxone 0.0  NOEL          Place one (1) noel

m under the tongue daily  

                          Wed Sep 01 00:00:00 EDT 2021          Sat Sep 11 00:00

:00 EDT        



 

                                    SUBOXONE (BUPRENORPHINE-NALOXONE)  8MG-2MG F

ILM          1 Film Daily 

SUBLINGUAL                Mon Aug 30 08:30:00 EDT 2021          Tue Aug 31 13:42

:00 

EDT         

 

                                                  VENTOLIN HFA (ALBUTEROL SULFAT

E) 0.09 MG/1 ACTUATION SUSPENSION       

                          2 Puff Q4HPRN: Every 4 Hours As Needed INHALATION     

     Mon Aug 30 08:00:00

EDT 2021                                Tue Aug 31 13:42:00 EDT         

 

                                    SUBOXONE (BUPRENORPHINE-NALOXONE)  8MG-2MG F

ILM          1 Film Daily 

SUBLINGUAL                Mon Aug 30 10:37:00 EDT 2021          Fri Sep 03 10:36

:00 

EDT         

 

                                    SUBOXONE (BUPRENORPHINE-NALOXONE)  8MG-2MG F

ILM          1 Film Stat 

SUBLINGUAL ([facility])          Sun Aug 29 10:00:00 EDT 2021          Sun Aug 29 

10:03:00 EDT         

 

                                    SUBOXONE (BUPRENORPHINE-NALOXONE)  4MG-1MG F

ILM          1 Film Once 

SUBLINGUAL (E[facility])          Sat Aug 28 21:00:00 EDT 2021          Sat Aug 28 

20:25:00 EDT         

 

                                    SUBOXONE (BUPRENORPHINE-NALOXONE)  4MG-1MG F

ILM          1 Film Stat 

SUBLINGUAL (EKIT)          Sat Aug 28 18:52:00 EDT 2021          Sat Aug 28 

18:57:00 EDT         

 

                                    NICODERM CQ (NICOTINE) 21 MG/24 HR PATCH, EX

TENDED RELEASE          1 

patch Daily As Needed TRANSDERMAL (Remove at HS. MDD 1 Patch)          Sat Aug 

28 18:47:00 EDT 2021                    Mon Aug 30 18:46:00 EDT         

 

                                    NARCAN (NALOXONE HCL) 4 MG/0.1 ML SPRAY     

     1 spray(s)  As 

Directed NASAL            Sat Aug 28 18:47:00 EDT 2021          Sun Aug 29 

18:46:00 EDT         

 

                                    ACETAMINOPHEN 500 MG CAPSULE          2 caps

ule Q6HPRN: Every 6 Hours 

As Needed ORAL (MDD 6 Tabs)          Sat Aug 28 18:47:00 EDT 2021          Tue 

Aug 31 13:42:00 EDT         

 

                                    DIPHENHYDRAMINE HCL 25 MG CAPSULE          1

 capsule TIDPRN: Three 

Times A Day As Needed ORAL          Sat Aug 28 18:47:00 EDT 2021          Tue 

Aug 31 13:42:00 EDT         

 

                                    DOCUSATE SODIUM 100 MG CAPSULE, LIQUID FILLE

D          1 capsule Daily

As Needed ORAL            Sat Aug 28 18:47:00 EDT 2021          Tue Aug 31 

13:42:00 EDT         

 

                                    HYDROXYZINE HCL 25 MG TABLET          1 tabl

et TIDPRN: Three Times A 

Day As Needed ORAL (Do not give within two (2) hours of Diphenhydramine or 
within one (1) hour of clonidine administration)          Sat Aug 28 18:47:00 

EDT 2021                                Tue Aug 31 13:42:00 EDT         

 

                                    FIBER- MG TABLET          2 tablet ME

N: As Needed ORAL (MDD 4 

Tabs)                     Sat Aug 28 18:47:00 EDT 2021          Tue Aug 31 13:42

:00 EDT 

        

 

                                    IBUPROFEN 200 MG TABLET          3 tablet TI

DPRN: Three Times A Day As

Needed ORAL               Sat Aug 28 18:47:00 EDT 2021          Tue Aug 31 13:42

:00 

EDT         

 

                                    MELATONIN 5 MG CAPSULE          1 capsule HS

PRN: At Bedtime As Needed 

ORAL (MDD 5mg)            Sat Aug 28 18:47:00 EDT 2021          Tue Aug 31 

13:42:00 EDT         

 

                              MULTIVITAMIN   TABLET          1 tablet Daily ORAL

          Sat Aug 28

18:47:00 EDT 2021                       Tue Aug 31 13:42:00 EDT         

 

                                    CALCIUM CARBONATE 500 MG TABLET, CHEWABLE   

       3 tablet TIDPRN: 

Three Times A Day As Needed ORAL          Sat Aug 28 18:47:00 EDT 2021          

Tue Aug 31 13:42:00 EDT         

 

                                    ONDANSETRON 4 MG TABLET          1 tablet TI

DPRN: Three Times A Day As

Needed ORAL               Sat Aug 28 18:47:00 EDT 2021          Tue Aug 31 13:42

:00 

EDT         

 

                                    CLONIDINE HCL 0.1 MG TABLET          1 table

t TIDPRN: Three Times A 

Day As Needed ORAL (SBP >110 HR>60Do not give within one (1) hour of hydroxyzine
administration)           Sat Aug 28 18:47:00 EDT 2021          Tue Aug 31 

13:42:00 EDT         

 

                                    NICOTINE POLACRILEX 2 MG LOZENGE/MELISSA     

     3 Each TID: Three 

Times a Day ORAL          Sat Aug 28 18:48:00 EDT 2021          Tue Aug 31 

13:42:00 EDT         

 

                                                  PROAIR HFA (ALBUTEROL SULFATE)

 0.09 MG/1 ACTUATION SUSPENSION         

                          2 Puff TIDPRN: Three Times A Day As Needed INHALATION 

         Sat Dec 19 

08:00:00 EST 2020                       Sun Dec 20 14:43:00 EST 2020        

 

                                    NICOTINE 14 MG/24 HR PATCH, EXTENDED RELEASE

          1 Patch Daily 

TRANSDERMAL               Fri Dec 18 06:00:00 EST 2020          Sun Dec 20 14:43

:00 

EST 2020        

 

                                                  EUCERIN DAILY PROTECTION (LOTI

ON, MULTI INGREDIENT)  

2%-7.5%-4.5%-2.4%-4.8% LOTION           1 Application TIDPRN: Three Times A Day 

As Needed TOPICAL APPLICATION          Fri Dec 18 07:42:00 EST 2020          Sun

Dec 20 14:43:00 EST 2020        

 

                                                  PROAIR HFA (ALBUTEROL SULFATE)

 0.09 MG/1 ACTUATION SUSPENSION         

                          2 Puff TIDPRN: Three Times A Day As Needed INHALATION 

         Fri Dec 18 

06:00:00 EST 2020                       Sat Dec 19 10:01:00 EST 2020        

 

                                    XOPENEX (LEVALBUTEROL HYDROCHLORIDE) 1.25 MG

/3 ML SOLUTION          1 

mL Stat INHALATION          Thu Dec 17 23:39:00 EST 2020          Thu Ryan 14 

23:38:00 EST 2021        

 

                                                  BACTRIM DS (SULFAMETHOXAZOLE-T

RIMETHOPRIM)  800MG-160MG TABLET        

                    1 Tablet BID: Twice a Day ORAL          Thu Dec 17 06:00:00 

EST 2020          

Sun Dec 20 14:43:00 EST 2020        

 

                                    SUBOXONE (BUPRENORPHINE-NALOXONE)  8MG-2MG F

ILM          1 Film Daily 

SUBLINGUAL                Fri Dec 18 06:00:00 EST 2020          Sun Dec 20 14:43

:00 

EST 2020        

 

                                    NARCAN (NALOXONE HCL) 4 MG/0.1 ML SPRAY     

     1 spray(s)  Once 

NASAL                     Thu Dec 17 06:00:00 EST 2020          Sun Dec 20 14:43

:00 EST 

2020        

 

                                    SUBOXONE (BUPRENORPHINE-NALOXONE)  4MG-1MG F

ILM          1 Film Stat 

SUBLINGUAL (Take from [facility])          Thu Dec 17 06:00:00 EST 2020          Thu 

Dec 17 12:21:00 EST 2020        

 

                                    SUBOXONE (BUPRENORPHINE-NALOXONE)  4MG-1MG F

ILM          1 Film Daily 

SUBLINGUAL                Thu Dec 17 06:00:00 EST 2020          Fri Dec 18 05:59

:00 

EST 2020        

 

                                    NARCAN (NALOXONE HCL) 4 MG/0.1 ML SPRAY     

     1 spray(s)  As 

Directed NASAL            Wed Dec 16 17:02:00 EST 2020          Sun Dec 20 

14:43:00 EST 2020        

 

                                    ACETAMINOPHEN 500 MG CAPSULE          2 caps

ule Q4-6HPRN: Every 4-6 

Hours As Needed ORAL (MDD 6 Tabs)          Wed Dec 16 17:01:00 EST 2020         

                                        Sun Dec 20 14:43:00 EST 2020        

 

                                                  BENADRYL ALLERGY (DIPHENHYDRAM

INE HYDROCHLORIDE) 25 MG TABLET         

                          1 tablet TIDPRN: Three Times A Day As Needed ORAL     

     Wed Dec 16 17:01:00 

EST 2020                                Sun Dec 20 14:43:00 EST 2020        

 

                                    CATAPRES (CLONIDINE HYDROCHLORIDE) 0.1 MG TA

BLET          1 tablet 

TIDPRN: Three Times A Day As Needed ORAL (Systolic BP >110, HR>60)          Wed 

Dec 16 17:01:00 EST 2020                Sun Dec 20 14:43:00 EST 2020        

 

                                    COLACE (DOCUSATE SODIUM) 100 MG CAPSULE, LIQ

UID FILLED          1 

capsule Daily As Needed ORAL          Wed Dec 16 17:01:00 EST 2020          Sun 

Dec 20 14:43:00 EST 2020        

 

                                    HYDROXYZINE HCL 25 MG TABLET          1 tabl

et TIDPRN: Three Times A 

Day As Needed ORAL (Do not give within 2 hours of Diphenhydramine)          Wed 

Dec 16 17:01:00 EST 2020                Sun Dec 20 14:43:00 EST 2020        

 

                                    FIBER- MG TABLET          2 tablet ME

N: As Needed ORAL (MDD 4 

Tabs)                     Wed Dec 16 17:01:00 EST 2020          Sun Dec 20 14:43

:00 EST 

2020        

 

                                    IBUPROFEN 200 MG TABLET          3 tablet Da

eldon As Needed ORAL (MDD 

3200mg)                   Wed Dec 16 17:01:00 EST 2020          Sun Dec 20 14:43

:00 EST 

2020        

 

                                    MELATONIN 5 MG CAPSULE          1 capsule HS

PRN: At Bedtime As Needed 

ORAL (MDD 5mg)            Wed Dec 16 17:01:00 EST 2020          Sun Dec 20 

14:43:00 EST 2020        

 

                              MULTIVITAMIN   TABLET          1 tablet Daily ORAL

          Wed Dec 16

17:01:00 EST 2020                       Sun Dec 20 14:43:00 EST 2020        

 

                                    TUMS (CALCIUM CARBONATE) 500 MG TABLET, CHEW

ABLE          3 tablet 

PRN: As Needed ORAL          Wed Dec 16 17:01:00 EST 2020          Sun Dec 20 

14:43:00 EST 2020        

 

                                    ZOFRAN (ONDANSETRON HYDROCHLORIDE) 4 MG TABL

ET          1 tablet 

TIDPRN: Three Times A Day As Needed ORAL          Wed Dec 16 17:01:00 EST 2020  

                                        Sun Dec 20 14:43:00 EST 2020        



                                                                                
                                                                                
                                                                                
      



Problems

     No Known Problems                                            



Procedures

     No Known Procedures                                  



Social History

     



                    Social History Observation          Description          Abrahan

e        

 

                              Smoking Status                    Current Every Da

y Smoker          

Fri Dec 13 07::00 EST 2019        

 

                              Smoking Status                    Current Every Da

y Smoker          

Fri Dec 13 07:00:00 EST 2019        

 

                              Smoking Status                    Current Every Da

y Smoker          

Fri Dec 13 07:00:00 EST 2019        

 

                              Smoking Status                    Current Every Da

y Smoker          

Fri Dec 13 07:00:00 EST         

 

                              Smoking Status                    Current Every Da

y Smoker          

Fri Dec 13 07:00:00 EST         

 

                              Smoking Status                    Current Every Da

y Smoker          

Fri Dec 13 07:00:00 EST         

 

                              Smoking Status                    Current Every Da

y Smoker          

Fri Dec 13 07:00:00 EST         

 

                              Smoking Status                    Current Every Da

y Smoker          

Fri Dec 13 07:00:00 EST         

 

                              Smoking Status                    Current Every Da

y Smoker          

Fri Dec 13 07:00:00 EST         

 

                              Smoking Status                    Current Every Da

y Smoker          

Fri Dec 13 07:00:00 EST         

 

                              Smoking Status                    Current Every Da

y Smoker          

Fri Dec 13 07:00:00 EST         

 

                              Smoking Status                    Current Every Da

y Smoker          

Fri Dec 13 07:00:00 EST         

 

                              Smoking Status                    Current Every Da

y Smoker          

Fri Dec 13 07:00:00 EST         

 

                    Birth Sex           Male                Sat Nov 11 07:00:00 

EST         



                                                                                
                       



Vital Signs

     



                    Vital Sign          Measurement          Date        

 

                              Heart Rate          94 /MIN             Tue Aug 31

 13:17:00 EDT      

  

 

                              Systolic          104 MM[HG]          Tue Aug 31 1

3:17:00 EDT     

    

 

                              Diastolic          61 MM[HG]           Tue Aug 31 

13:17:00 EDT     

    

 

                              Temperature          97.7 [DEGF]          Tue Aug 

31 05:43:00 EDT 

        

 

                              Temperature          36.5 LACEY            Tue Aug 3

1 05:43:00 EDT    

     

 

                              Heart Rate          64 /MIN             Tue Aug 31

 05:43:00 EDT      

   

 

                              Respiration          16 /MIN             Tue Aug 3

1 05:43:00 EDT     

    

 

                              Systolic          117 MM[HG]          Tue Aug 31 0

5:43:00 EDT     

    

 

                              Diastolic          62 MM[HG]           Tue Aug 31 

05:43:00 EDT     

    

 

                              BP Position          1 Position          Tue Aug 3

1 05:43:00 EDT  

       

 

                              Temperature          97.8 [DEGF]          Mon Aug 

30 19:01:00 EDT 

        

 

                              Temperature          36.6 LACEY            Mon Aug 3

0 19:01:00 EDT    

     

 

                              Heart Rate          90 /MIN             Mon Aug 30

 19:01:00 EDT      

   

 

                              Respiration          16 /MIN             Mon Aug 3

0 19:01:00 EDT     

    

 

                              Systolic          110 MM[HG]          Mon Aug 30 1

9:01:00 EDT     

    

 

                              Diastolic          66 MM[HG]           Mon Aug 30 

19:01:00 EDT     

    

 

                              Temperature          97.3 [DEGF]          Mon Aug 

30 05:31:00 EDT 

        

 

                              Temperature          36.3 LACEY            Mon Aug 3

0 05:31:00 EDT    

     

 

                              Heart Rate          64 /MIN             Mon Aug 30

 05:31:00 EDT      

   

 

                              Respiration          16 /MIN             Mon Aug 3

0 05:31:00 EDT     

    

 

                              Systolic          118 MM[HG]          Mon Aug 30 0

5:31:00 EDT     

    

 

                              Diastolic          69 MM[HG]           Mon Aug 30 

05:31:00 EDT     

    

 

                              BP Position          2 Position          Mon Aug 3

0 05:31:00 EDT  

       

 

                              Heart Rate          86 /MIN             Sun Aug 29

 20:15:00 EDT      

   

 

                              Respiration          17 /MIN             Sun Aug 2

9 20:15:00 EDT     

    

 

                              Systolic          110 MM[HG]          Sun Aug 29 2

0:15:00 EDT     

    

 

                              Diastolic          69 MM[HG]           Sun Aug 29 

20:15:00 EDT     

    

 

                              BP Position          3 Position          Sun Aug 2

9 20:15:00 EDT  

       

 

                              Temperature          97.5 [DEGF]          Sun Aug 

29 06:16:00 EDT 

        

 

                              Temperature          36.4 LACEY            Sun Aug 2

9 06:16:00 EDT    

     

 

                              Heart Rate          71 /MIN             Sun Aug 29

 06:16:00 EDT      

   

 

                              Respiration          16 /MIN             Sun Aug 2

9 06:16:00 EDT     

    

 

                              Systolic          120 MM[HG]          Sun Aug 29 0

6:16:00 EDT     

    

 

                              Diastolic          64 MM[HG]           Sun Aug 29 

06:16:00 EDT     

    

 

                              BP Position          2 Position          Sun Aug 2

9 06:16:00 EDT  

       

 

                              Temperature          98.7 [DEGF]          Sat Aug 

28 20:06:00 EDT 

        

 

                              Temperature          37.1 LACEY            Sat Aug 2

8 20:06:00 EDT    

     

 

                              Heart Rate          74 /MIN             Sat Aug 28

 20:06:00 EDT      

   

 

                              Respiration          16 /MIN             Sat Aug 2

8 20:06:00 EDT     

    

 

                              SpO2          100 %               Sat Aug 28 20:06

:00 EDT         

 

                              Systolic          131 MM[HG]          Sat Aug 28 2

0:06:00 EDT     

    

 

                              Diastolic          71 MM[HG]           Sat Aug 28 

20:06:00 EDT     

    

 

                              BP Position          2 Position          Sat Aug 2

8 20:06:00 EDT  

       

 

                              Height          6 0.0 ft            Sat Aug 28 20:

06:00 EDT         

 

                              Height          72 in               Sat Aug 28 20:

06:00 EDT         

 

                              Height          182.9 cm            Sat Aug 28 20:

06:00 2021        

 

                              Weight Lbs          140 lbs             Sat Aug 28

 20:06:00 2021     

   

 

                              Weight Kgs          63.6 KG             Sat Aug 28

 20:06:00 2021     

   

 

                              BMI          19                  Sat Aug 28 20:06:

00 2021        

 

                              Temperature          97.6 [DEGF]          Sun Dec 

20 10:00:00 EST 2020

        

 

                              Temperature          36.4 LACEY            Sun Dec 2

0 10:00:00 EST 2020   

     

 

                              Heart Rate          72 /MIN             Sun Dec 20

 10:00:00 EST 2020     

   

 

                              Respiration          14 /MIN             Sun Dec 2

0 10:00:00 EST 2020    

    

 

                              Systolic          122 MM[HG]          Sun Dec 20 1

0:00:00 EST 2020    

    

 

                              Diastolic          68 MM[HG]           Sun Dec 20 

10:00:00 EST 2020    

    

 

                              BP Position          3 Position          Sun Dec 2

0 10:00:00 EST 2020 

       

 

                              Temperature          97.8 [DEGF]          Sat Dec 

19 18:19:00 EST 2020

        

 

                              Temperature          36.6 LACEY            Sat Dec 1

9 18:19:00 EST 2020   

     

 

                              Heart Rate          92 /MIN             Sat Dec 19

 18:19:00 EST 2020     

   

 

                              Respiration          17 /MIN             Sat Dec 1

9 18:19:00 EST 2020    

    

 

                              Systolic          114 MM[HG]          Sat Dec 19 1

8:19:00 EST 2020    

    

 

                              Diastolic          74 MM[HG]           Sat Dec 19 

18:19:00 EST 2020    

    

 

                              Temperature          98.2 [DEGF]          Sat Dec 

19 11:35:00 EST 2020

        

 

                              Temperature          36.8 LACEY            Sat Dec 1

9 11:35:00 EST 2020   

     

 

                              Heart Rate          79 /MIN             Sat Dec 19

 11:35:00 EST 2020     

   

 

                              Respiration          16 /MIN             Sat Dec 1

9 11:35:00 EST 2020    

    

 

                              Systolic          119 MM[HG]          Sat Dec 19 1

1:35:00 EST 2020    

    

 

                              Diastolic          68 MM[HG]           Sat Dec 19 

11:35:00 EST 2020    

    

 

                              BP Position          3 Position          Sat Dec 1

9 11:35:00 EST 2020 

       

 

                              Temperature          97.5 [DEGF]          Sat Dec 

19 05:37:00 EST 2020

        

 

                              Temperature          36.4 LACEY            Sat Dec 1

9 05:37:00 EST 2020   

     

 

                              Heart Rate          64 /MIN             Sat Dec 19

 05:37:00 EST 2020     

   

 

                              Respiration          16 /MIN             Sat Dec 1

9 05:37:00 EST 2020    

    

 

                              Systolic          107 MM[HG]          Sat Dec 19 0

5:37:00 EST 2020    

    

 

                              Diastolic          60 MM[HG]           Sat Dec 19 

05:37:00 EST 2020    

    

 

                              BP Position          2 Position          Sat Dec 1

9 05:37:00 EST 2020 

       

 

                              Heart Rate          53 /MIN             Fri Dec 18

 18:19:00 EST 2020     

   

 

                              Respiration          16 /MIN             Fri Dec 1

8 18:19:00 EST 2020    

    

 

                              Systolic          113 MM[HG]          Fri Dec 18 1

8:19:00 EST 2020    

    

 

                              Diastolic          63 MM[HG]           Fri Dec 18 

18:19:00 EST 2020    

    

 

                              BP Position          3 Position          Fri Dec 1

8 18:19:00 EST 2020 

       

 

                              Temperature          98.7 [DEGF]          Fri Dec 

18 14:07:00 EST 2020

        

 

                              Temperature          37.1 LACEY            Fri Dec 1

8 14:07:00 EST 2020   

     

 

                              Heart Rate          74 /MIN             Fri Dec 18

 14:07:00 EST 2020     

   

 

                              Respiration          17 /MIN             Fri Dec 1

8 14:07:00 EST 2020    

    

 

                              Systolic          103 MM[HG]          Fri Dec 18 1

4:07:00 EST 2020    

    

 

                              Diastolic          62 MM[HG]           Fri Dec 18 

14:07:00 EST 2020    

    

 

                              Temperature          98.7 [DEGF]          Fri Dec 

18 13:56:00 EST 2020

        

 

                              Temperature          37.1 LACEY            Fri Dec 1

8 13:56:00 EST 2020   

     

 

                              Heart Rate          74 /MIN             Fri Dec 18

 13:56:00 EST 2020     

   

 

                              Respiration          17 /MIN             Fri Dec 1

8 13:56:00 EST 2020    

    

 

                              Systolic          103 MM[HG]          Fri Dec 18 1

3:56:00 EST 2020    

    

 

                              Diastolic          62 MM[HG]           Fri Dec 18 

13:56:00 EST 2020    

    

 

                              BP Position          3 Position          Fri Dec 1

8 13:56:00 EST 2020 

       

 

                              Temperature          97.7 [DEGF]          Fri Dec 

18 06:02:00 EST 2020

        

 

                              Temperature          36.5 LACEY            Fri Dec 1

8 06:02:00 EST 2020   

     

 

                              Heart Rate          73 /MIN             Fri Dec 18

 06:02:00 EST 2020     

   

 

                              Respiration          16 /MIN             Fri Dec 1

8 06:02:00 EST 2020    

    

 

                              Systolic          107 MM[HG]          Fri Dec 18 0

6:02:00 EST 2020    

    

 

                              Diastolic          61 MM[HG]           Fri Dec 18 

06:02:00 EST 2020    

    

 

                              BP Position          2 Position          Fri Dec 1

8 06:02:00 EST 2020 

       

 

                              Temperature          98.7 [DEGF]          u Dec 

17 21:48:00 EST 2020

        

 

                              Temperature          37.1 LACEY            u Dec 1

7 21:48:00 EST 2020   

     

 

                              Heart Rate          16 /MIN             u Dec 17

 21:48:00 EST 2020     

   

 

                              Respiration          16 /MIN             u Dec 1

7 21:48:00 EST 2020    

    

 

                              Systolic          136 MM[HG]          u Dec 17 2

1:48:00 EST 2020    

    

 

                              Diastolic          76 MM[HG]           u Dec 17 

21:48:00 EST 2020    

    

 

                              BP Position          3 Position          Thu Dec 1

7 21:48:00 EST 2020 

       

 

                              Temperature          97.8 [DEGF]          Thu Dec 

17 07:21:00 EST 2020

        

 

                              Temperature          36.6 LACEY            Thu Dec 1

7 07:21:00 EST 2020   

     

 

                              Heart Rate          82 /MIN             u Dec 17

 07:21:00 EST 2020     

   

 

                              Respiration          16 /MIN             u Dec 1

7 07:21:00 EST 2020    

    

 

                              Systolic          112 MM[HG]          Thu Dec 17 0

7:21:00 EST 2020    

    

 

                              Diastolic          70 MM[HG]           Thu Dec 17 

07:21:00 EST 2020    

    

 

                              BP Position          3 Position          u Dec 1

7 07:21:00 EST 2020 

       

 

                              Temperature          98.2 [DEGF]          Wed Dec 

16 19:12:00 EST 2020

        

 

                              Temperature          36.8 LACEY            Wed Dec 1

6 19:12:00 EST 2020   

     

 

                              Heart Rate          87 /MIN             Wed Dec 16

 19:12:00 EST 2020     

   

 

                              Respiration          18 /MIN             Wed Dec 1

6 19:12:00 EST 2020    

    

 

                              Systolic          115 MM[HG]          Wed Dec 16 1

9:12:00 EST 2020    

    

 

                              Diastolic          75 MM[HG]           Wed Dec 16 

19:12:00 EST 2020    

    

 

                              Temperature          97.8 [DEGF]          Wed Dec 

16 17:27:00 EST 2020

        

 

                              Temperature          36.6 LACEY            Wed Dec 1

6 17:27:00 EST 2020   

     

 

                              Heart Rate          91 /MIN             Wed Dec 16

 17:27:00 EST 2020     

   

 

                              Respiration          16 /MIN             Wed Dec 1

6 17:27:00 EST 2020    

    

 

                              SpO2          99 %                Wed Dec 16 17:27

:00 EST 2020        

 

                              Systolic          107 MM[HG]          Wed Dec 16 1

7:27:00 EST 2020    

    

 

                              Diastolic          62 MM[HG]           Wed Dec 16 

17:27:00 EST 2020    

    

 

                              BP Position          2 Position          Wed Dec 1

6 17:27:00 EST 2020
Summary of Patient Chart

                             Created on: 2021



JUWAN DURÁN

External Reference #: [id number]

: 1994

Sex: Male



Demographics





                          Address                   [address] [place]  [address]

 

                          Home Phone                [phone]

 

                          Preferred Language        English

 

                          Marital Status            Single

 

                          [employer] Affiliation     Unknown

 

                          Race                      

 

                          Ethnic Group               or 





Author





                          JUWAN Macedo

 

                          Organization [facility]

 

                          Address                   [address] [place] [address]  [id number]



 

                          Phone                     [phone]







Care Team Providers





                    Care Team Member Name Role                Phone

 

                    Jennifer Guzmán     Practitioner        [phone]

 

                    Hallie Holden    Admphys             [phone]

 

                    Sayra Fuller  Attphys             [phone]

 

                    Gian Hickey      Admphys             [phone]

 

                    Betzy Roth AttendingPractitioner1 [phone]



                           



Functional Status

     No Results                                  



Allergies

           



                Name            Onset Date          Reaction          Severity  

      

 

                                    PCN (penicillin) (Allergy)                  

           Wed Dec 16 

07:00:00 EST 2020                                 Hives                 



                                                 



Encounters

     



                Program Name          Primary Diagnosis          Admission Date/

Time          

Discharge Date/Time        

 

                              [facility]                   

                     Fri Dec 

13 10:40:00 EST 2019 15:41:00 EST   

      

 

                                    [facility]               

     Opioid dependence, 

uncomplicated                       Wed Dec 16 15:54:00 EST 2020                

   

Sun Dec 20 14:20:00 EST 2020        

 

                                    [facility]               

     Opioid dependence, 

uncomplicated                       Sat Aug 28 16:36:00 EDT                 

   

 

                                    [facility]                    Heroin use dis

order, severe, dependence 

                                    Sat Aug 28 17:57:00 EDT                 

   

 

                              [facility] Waiting                                

         10:30:00 

EST 2021 17:53:00 EST   

      



                                                                



Immunizations

     No Known Immunizations                                  



Lab Results

     No Known Laboratory Results                                            



Medications

     



                Medication          Directions          Start Date          End 

Date        

 

                                    SUBOXONE (BUPRENORPHINE-NALOXONE)  8MG-2MG F

ILM          1 Film Daily 

SUBLINGUAL                Mon Aug 30 10:37:00 EDT 2021          Fri Sep 03 10:36

:00 

EDT         

 

                                    SUBOXONE (BUPRENORPHINE-NALOXONE)  8MG-2MG F

ILM          1 Film Stat 

SUBLINGUAL (EKIT)          Sun Aug 29 10:00:00 EDT 2021          Sun Aug 29 

10:03:00 EDT         

 

                                    SUBOXONE (BUPRENORPHINE-NALOXONE)  4MG-1MG F

ILM          1 Film Once 

SUBLINGUAL (EKIT)          Sat Aug 28 21:00:00 EDT 2021          Sat Aug 28 

20:25:00 EDT         

 

                                    SUBOXONE (BUPRENORPHINE-NALOXONE)  4MG-1MG F

ILM          1 Film Stat 

SUBLINGUAL (EKIT)          Sat Aug 28 18:52:00 EDT 2021          Sat Aug 28 

18:57:00 EDT         

 

                                    NICOTINE POLACRILEX 2 MG LOZENGE/MELISSA     

     3 Each TID: Three 

Times a Day ORAL          Sat Aug 28 18:48:00 EDT           [facility]  

18:47:00 EDT         

 

                                    NICODERM CQ (NICOTINE) 21 MG/24 HR PATCH, EX

TENDED RELEASE          1 

patch Daily As Needed TRANSDERMAL (Remove at HS. MDD 1 Patch)          Sat Aug 

28 18:47:00 EDT                     Mon Aug 30 18:46:00 EDT         

 

                                    CLONIDINE HCL 0.1 MG TABLET          1 table

t TIDPRN: Three Times A 

Day As Needed ORAL (SBP >110 HR>60Do not give within one (1) hour of hydroxyzine
administration)           Sat Aug 28 18:47:00 EDT           [facility]  

18:46:00 EDT         

 

                                    NARCAN (NALOXONE HCL) 4 MG/0.1 ML SPRAY     

     1 spray(s)  As 

Directed NASAL            Sat Aug 28 18:47:00 EDT           [facility] Aug 29 

18:46:00 EDT         

 

                                    ONDANSETRON 4 MG TABLET          1 tablet TI

DPRN: Three Times A Day As

Needed ORAL               Sat Aug 28 18:47:00 EDT           Sat Sep  18:46

:00 

EDT         

 

                                    CALCIUM CARBONATE 500 MG TABLET, CHEWABLE   

       3 tablet TIDPRN: 

Three Times A Day As Needed ORAL          Sat Aug 28 18:47:00 EDT           

Sat Sep  18:46:00 EDT         

 

                              MULTIVITAMIN   TABLET          1 tablet Daily ORAL

          Sat Aug 28

18:47:00 EDT                        Sat Sep 25 18:46:00 EDT         

 

                                    MELATONIN 5 MG CAPSULE          1 capsule HS

PRN: At Bedtime As Needed 

ORAL (MDD 5mg)            Sat Aug 28 18:47:00 EDT           Sat Sep 25 

18:46:00 EDT         

 

                                    IBUPROFEN 200 MG TABLET          3 tablet TI

DPRN: Three Times A Day As

Needed ORAL               Sat Aug 28 18:47:00 EDT           Sat Sep 25 18:46

:00 

EDT         

 

                                    FIBER- MG TABLET          2 tablet OR

N: As Needed ORAL (MDD 4 

Tabs)                     Sat Aug 28 18:47:00 EDT           Sat Sep 46

:00 EDT 

        

 

                                    HYDROXYZINE HCL 25 MG TABLET          1 tabl

et TIDPRN: Three Times A 

Day As Needed ORAL (Do not give within two (2) hours of Diphenhydramine or 
within one (1) hour of clonidine administration)          Sat Aug 28 18:47:00 

EDT                                 Sat Sep :46:00 EDT         

 

                                    DOCUSATE SODIUM 100 MG CAPSULE, LIQUID FILLE

D          1 capsule Daily

As Needed ORAL            Sat Aug 28 18:47:00 EDT           [facility] :46:00 EDT         

 

                                    DIPHENHYDRAMINE HCL 25 MG CAPSULE          1

 capsule TIDPRN: Three 

Times A Day As Needed ORAL          Sat Aug 28 18:47:00 EDT           [facility] :46:00 EDT         

 

                                    ACETAMINOPHEN 500 MG CAPSULE          2 caps

ule Q6HPRN: Every 6 Hours 

As Needed ORAL (MDD 6 Tabs)          Sat Aug 28 18:47:00 EDT           [facility] :46:00 EDT         

 

                                                  PROAIR HFA (ALBUTEROL SULFATE)

 0.09 MG/1 ACTUATION SUSPENSION         

                          2 Puff TIDPRN: Three Times A Day As Needed INHALATION 

         Sat Dec 19 

08:00:00 EST 2020                       Sun Dec 20 14:43:00 EST 2020        

 

                                    NICOTINE 14 MG/24 HR PATCH, EXTENDED RELEASE

          1 Patch Daily 

TRANSDERMAL               Fri Dec 18 06:00:00 EST 2020          Sun Dec 20 14:43

:00 

EST 2020        

 

                                                  EUCERIN DAILY PROTECTION (LOTI

ON, MULTI INGREDIENT)  

2%-7.5%-4.5%-2.4%-4.8% LOTION           1 Application TIDPRN: Three Times A Day 

As Needed TOPICAL APPLICATION          Fri Dec 18 07:42:00 EST 2020          Sun

Dec 20 14:43:00 EST 2020        

 

                                                  PROAIR HFA (ALBUTEROL SULFATE)

 0.09 MG/1 ACTUATION SUSPENSION         

                          2 Puff TIDPRN: Three Times A Day As Needed INHALATION 

         Fri Dec 18 

06:00:00 EST 2020                       Sat Dec 19 10:01:00 EST 2020        

 

                                    XOPENEX (LEVALBUTEROL HYDROCHLORIDE) 1.25 MG

/3 ML SOLUTION          1 

mL Stat INHALATION          Thu Dec 17 23:39:00 EST 2020          Thu Ryan 14 

23:38:00 EST 2021        

 

                                                  BACTRIM DS (SULFAMETHOXAZOLE-T

RIMETHOPRIM)  800MG-160MG TABLET        

                    1 Tablet BID: Twice a Day ORAL          Thu Dec 17 06:00:00 

EST 2020          

Sun Dec 20 14:43:00 EST 2020        

 

                                    SUBOXONE (BUPRENORPHINE-NALOXONE)  8MG-2MG F

ILM          1 Film Daily 

SUBLINGUAL                Fri Dec 18 06:00:00 EST 2020          Sun Dec 20 14:43

:00 

EST 2020        

 

                                    NARCAN (NALOXONE HCL) 4 MG/0.1 ML SPRAY     

     1 spray(s)  Once 

NASAL                     Thu Dec 17 06:00:00 EST 2020          Sun Dec 20 14:43

:00 EST 

2020        

 

                                    SUBOXONE (BUPRENORPHINE-NALOXONE)  4MG-1MG F

ILM          1 Film Stat 

SUBLINGUAL (Take from [facility])          Thu Dec 17 06:00:00 EST 2020          Thu 

Dec 17 12:21:00 EST 2020        

 

                                    SUBOXONE (BUPRENORPHINE-NALOXONE)  4MG-1MG F

ILM          1 Film Daily 

SUBLINGUAL                Thu Dec 17 06:00:00 EST 2020          Fri Dec 18 05:59

:00 

EST 2020        

 

                                    NARCAN (NALOXONE HCL) 4 MG/0.1 ML SPRAY     

     1 spray(s)  As 

Directed NASAL            Wed Dec 16 17:02:00 EST 2020          Sun Dec 20 

14:43:00 EST 2020        

 

                                    ACETAMINOPHEN 500 MG CAPSULE          2 caps

ule Q4-6HPRN: Every 4-6 

Hours As Needed ORAL (MDD 6 Tabs)          Wed Dec 16 17:01:00 EST 2020         

                                        Sun Dec 20 14:43:00 EST 2020        

 

                                                  BENADRYL ALLERGY (DIPHENHYDRAM

INE HYDROCHLORIDE) 25 MG TABLET         

                          1 tablet TIDPRN: Three Times A Day As Needed ORAL     

     Wed Dec 16 17:01:00 

EST 2020                                Sun Dec 20 14:43:00 EST 2020        

 

                                    CATAPRES (CLONIDINE HYDROCHLORIDE) 0.1 MG TA

BLET          1 tablet 

TIDPRN: Three Times A Day As Needed ORAL (Systolic BP >110, HR>60)          Wed 

Dec 16 17::00 EST 2020                Sun Dec 20 14:43:00 EST 2020        

 

                                    COLACE (DOCUSATE SODIUM) 100 MG CAPSULE, LIQ

UID FILLED          1 

capsule Daily As Needed ORAL          Wed Dec 16 17:01:00 EST 2020          Sun 

Dec 20 14:43:00 EST 2020        

 

                                    HYDROXYZINE HCL 25 MG TABLET          1 tabl

et TIDPRN: Three Times A 

Day As Needed ORAL (Do not give within 2 hours of Diphenhydramine)          Wed 

Dec 16 17:01:00 EST 2020                Sun Dec 20 14:43:00 EST 2020        

 

                                    FIBER- MG TABLET          2 tablet OR

N: As Needed ORAL (MDD 4 

Tabs)                     Wed Dec 16 17:01:00 EST 2020          Sun Dec 20 14:43

:00 EST 

2020        

 

                                    IBUPROFEN 200 MG TABLET          3 tablet Da

eldon As Needed ORAL (MDD 

3200mg)                   Wed Dec 16 17::00 EST 2020          Sun Dec 20 14:43

:00 EST 

2020        

 

                                    MELATONIN 5 MG CAPSULE          1 capsule HS

PRN: At Bedtime As Needed 

ORAL (MDD 5mg)            Wed Dec 16 17::00 EST 2020          Sun Dec 20 

14:43:00 EST 2020        

 

                              MULTIVITAMIN   TABLET          1 tablet Daily ORAL

          Wed Dec 16

17:01:00 EST 2020                       Sun Dec 20 14:43:00 EST 2020        

 

                                    TUMS (CALCIUM CARBONATE) 500 MG TABLET, CHEW

ABLE          3 tablet 

PRN: As Needed ORAL          Wed Dec 16 17::00 EST 2020          Sun Dec 20 

14:43:00 EST 2020        

 

                                    ZOFRAN (ONDANSETRON HYDROCHLORIDE) 4 MG TABL

ET          1 tablet 

TIDPRN: Three Times A Day As Needed ORAL          Wed Dec 16 17:01:00 EST 2020  

                                        Sun Dec 20 14:43:00 EST 2020        



                                                                                
                                                                                
                                                                        



Problems

     No Known Problems                                            



Procedures

     No Known Procedures                                  



Social History

     



                    Social History Observation          Description          Abrahan

e        

 

                              Smoking Status                    Current Every Da

y Smoker          

Fri Dec 13 07::00 EST         

 

                              Smoking Status                    Current Every Da

y Smoker          

Fri Dec 13 07::00 EST         

 

                              Smoking Status                    Current Every Da

y Smoker          

Fri Dec 13 07::00 EST 2019        

 

                              Smoking Status                    Current Every Da

y Smoker          

Fri Dec 13 07:00:00 EST 2019        

 

                              Smoking Status                    Current Every Da

y Smoker          

Fri Dec 13 07:00:00 EST 2019        

 

                              Smoking Status                    Current Every Da

y Smoker          

Fri Dec 13 07:00:00 EST 2019        

 

                              Smoking Status                    Current Every Da

y Smoker          

Fri Dec 13 07:00:00 EST 2019        

 

                              Smoking Status                    Current Every Da

y Smoker          

Fri Dec 13 07:00:00 EST 2019        

 

                              Smoking Status                    Current Every Da

y Smoker          

Fri Dec 13 07::00 EST 2019        

 

                              Smoking Status                    Current Every Da

y Smoker          

Fri Dec 13 07:00:00 EST 2019        

 

                    Birth Sex           Male                Sat  07:00:00 

EST         



                                                                                
        



Vital Signs

     



                    Vital Sign          Measurement          Date        

 

                              Temperature          97.3 [DEGF]          Mon Aug 

30 05:31:00 EDT 

        

 

                              Temperature          36.3 LACEY            Mon Aug 3

0 05:31:00 EDT    

     

 

                              Heart Rate          64 /MIN             Mon Aug 30

 05:31:00 EDT      

   

 

                              Respiration          16 /MIN             Mon Aug 3

0 05:31:00 EDT     

    

 

                              Systolic          118 MM[HG]          Mon Aug 30 0

5:31:00 EDT     

    

 

                              Diastolic          69 MM[HG]           Mon Aug 30 

05:31:00 EDT     

    

 

                              BP Position          2 Position          Mon Aug 3

0 05:31:00 EDT  

       

 

                              Heart Rate          86 /MIN             Sun Aug 29

 20:15:00 EDT      

   

 

                              Respiration          17 /MIN             Sun Aug 2

9 20:15:00 EDT     

    

 

                              Systolic          110 MM[HG]          Sun Aug 29 2

0:15:00 EDT     

    

 

                              Diastolic          69 MM[HG]           Sun Aug 29 

20:15:00 EDT     

    

 

                              BP Position          3 Position          Sun Aug 2

9 20:15:00 EDT  

       

 

                              Temperature          97.5 [DEGF]          Sun Aug 

29 06:16:00 EDT 

        

 

                              Temperature          36.4 LACEY            Sun Aug 2

9 06:16:00 EDT    

     

 

                              Heart Rate          71 /MIN             Sun Aug 29

 06:16:00 EDT      

   

 

                              Respiration          16 /MIN             Sun Aug 2

9 06:16:00 EDT     

    

 

                              Systolic          120 MM[HG]          Sun Aug 29 0

6:16:00 EDT     

    

 

                              Diastolic          64 MM[HG]           Sun Aug 29 

06:16:00 EDT     

    

 

                              BP Position          2 Position          Sun Aug 2

9 06:16:00 EDT  

       

 

                              Temperature          98.7 [DEGF]          Sat Aug 

28 20:06:00 EDT 

        

 

                              Temperature          37.1 LACEY            Sat Aug 2

8 20:06:00 EDT    

     

 

                              Heart Rate          74 /MIN             Sat Aug 28

 20:06:00 EDT      

   

 

                              Respiration          16 /MIN             Sat Aug 2

8 20:06:00 EDT     

    

 

                              SpO2          100 %               Sat Aug 28 20:06

:00 EDT         

 

                              Systolic          131 MM[HG]          Sat Aug 28 2

0:06:00 EDT     

    

 

                              Diastolic          71 MM[HG]           Sat Aug 28 

20:06:00 EDT     

    

 

                              BP Position          2 Position          Sat Aug 2

8 20:06:00 EDT  

       

 

                              Height          6 0.0 ft            Sat Aug 28 20:

06:00 EDT         

 

                              Height          72 in               Sat Aug 28 20:

06:00 EDT         

 

                              Height          182.9 cm            Sat Aug 28 20:

06:00 ED2021        

 

                              Weight Lbs          140 lbs             Sat Aug 28

 20:06:00 ED2021     

   

 

                              Weight Kgs          63.6 KG             Sat Aug 28

 20:06:00 2021     

   

 

                              BMI          19                  Sat Aug 28 20:06:

00 ED2021        

 

                              Temperature          97.6 [DEGF]          Sun Dec 

20 10:00:00 EST 2020

        

 

                              Temperature          36.4 LACEY            Sun Dec 2

0 10:00:00 EST 2020   

     

 

                              Heart Rate          72 /MIN             Sun Dec 20

 10:00:00 EST 2020     

   

 

                              Respiration          14 /MIN             Sun Dec 2

0 10:00:00 EST 2020    

    

 

                              Systolic          122 MM[HG]          Sun Dec 20 1

0:00:00 EST 2020    

    

 

                              Diastolic          68 MM[HG]           Sun Dec 20 

10:00:00 EST 2020    

    

 

                              BP Position          3 Position          Sun Dec 2

0 10:00:00 EST 2020 

       

 

                              Temperature          97.8 [DEGF]          Sat Dec 

19 18:19:00 EST 2020

        

 

                              Temperature          36.6 LACEY            Sat Dec 1

9 18:19:00 EST 2020   

     

 

                              Heart Rate          92 /MIN             Sat Dec 19

 18:19:00 EST 2020     

   

 

                              Respiration          17 /MIN             Sat Dec 1

9 18:19:00 EST 2020    

    

 

                              Systolic          114 MM[HG]          Sat Dec 19 1

8:19:00 EST 2020    

    

 

                              Diastolic          74 MM[HG]           Sat Dec 19 

18:19:00 EST 2020    

    

 

                              Temperature          98.2 [DEGF]          Sat Dec 

19 11:35:00 EST 2020

        

 

                              Temperature          36.8 LACEY            Sat Dec 1

9 11:35:00 EST 2020   

     

 

                              Heart Rate          79 /MIN             Sat Dec 19

 11:35:00 EST 2020     

   

 

                              Respiration          16 /MIN             Sat Dec 1

9 11:35:00 EST 2020    

    

 

                              Systolic          119 MM[HG]          Sat Dec 19 1

1:35:00 EST 2020    

    

 

                              Diastolic          68 MM[HG]           Sat Dec 19 

11:35:00 EST 2020    

    

 

                              BP Position          3 Position          Sat Dec 1

9 11:35:00 EST 2020 

       

 

                              Temperature          97.5 [DEGF]          Sat Dec 

19 05:37:00 EST 2020

        

 

                              Temperature          36.4 LACEY            Sat Dec 1

9 05:37:00 EST 2020   

     

 

                              Heart Rate          64 /MIN             Sat Dec 19

 05:37:00 EST 2020     

   

 

                              Respiration          16 /MIN             Sat Dec 1

9 05:37:00 EST 2020    

    

 

                              Systolic          107 MM[HG]          Sat Dec 19 0

5:37:00 EST 2020    

    

 

                              Diastolic          60 MM[HG]           Sat Dec 19 

05:37:00 EST 2020    

    

 

                              BP Position          2 Position          Sat Dec 1

9 05:37:00 EST 2020 

       

 

                              Heart Rate          53 /MIN             Fri Dec 18

 18:19:00 EST 2020     

   

 

                              Respiration          16 /MIN             Fri Dec 1

8 18:19:00 EST 2020    

    

 

                              Systolic          113 MM[HG]          Fri Dec 18 1

8:19:00 EST 2020    

    

 

                              Diastolic          63 MM[HG]           Fri Dec 18 

18:19:00 EST 2020    

    

 

                              BP Position          3 Position          Fri Dec 1

8 18:19:00 EST 2020 

       

 

                              Temperature          98.7 [DEGF]          Fri Dec 

18 14:07:00 EST 2020

        

 

                              Temperature          37.1 LACEY            Fri Dec 1

8 14:07:00 EST 2020   

     

 

                              Heart Rate          74 /MIN             Fri Dec 18

 14:07:00 EST 2020     

   

 

                              Respiration          17 /MIN             Fri Dec 1

8 14:07:00 EST 2020    

    

 

                              Systolic          103 MM[HG]          Fri Dec 18 1

4:07:00 EST 2020    

    

 

                              Diastolic          62 MM[HG]           Fri Dec 18 

14:07:00 EST 2020    

    

 

                              Temperature          98.7 [DEGF]          Fri Dec 

18 13:56:00 EST 2020

        

 

                              Temperature          37.1 LACEY            Fri Dec 1

8 13:56:00 EST 2020   

     

 

                              Heart Rate          74 /MIN             Fri Dec 18

 13:56:00 EST 2020     

   

 

                              Respiration          17 /MIN             Fri Dec 1

8 13:56:00 EST 2020    

    

 

                              Systolic          103 MM[HG]          Fri Dec 18 1

3:56:00 EST 2020    

    

 

                              Diastolic          62 MM[HG]           Fri Dec 18 

13:56:00 EST 2020    

    

 

                              BP Position          3 Position          Fri Dec 1

8 13:56:00 EST 2020 

       

 

                              Temperature          97.7 [DEGF]          Fri Dec 

18 06:02:00 EST 2020

        

 

                              Temperature          36.5 LACEY            Fri Dec 1

8 06:02:00 EST 2020   

     

 

                              Heart Rate          73 /MIN             Fri Dec 18

 06:02:00 EST 2020     

   

 

                              Respiration          16 /MIN             Fri Dec 1

8 06:02:00 EST 2020    

    

 

                              Systolic          107 MM[HG]          Fri Dec 18 0

6:02:00 EST 2020    

    

 

                              Diastolic          61 MM[HG]           Fri Dec 18 

06:02:00 EST 2020    

    

 

                              BP Position          2 Position          Fri Dec 1

8 06:02:00 EST 2020 

       

 

                              Temperature          98.7 [DEGF]          u Dec 

17 21:48:00 EST 2020

        

 

                              Temperature          37.1 LACEY            Thu Dec 1

7 21:48:00 EST 2020   

     

 

                              Heart Rate          16 /MIN             Thu Dec 17

 21:48:00 EST 2020     

   

 

                              Respiration          16 /MIN             Thu Dec 1

7 21:48:00 EST 2020    

    

 

                              Systolic          136 MM[HG]          u Dec 17 2

1:48:00 EST 2020    

    

 

                              Diastolic          76 MM[HG]           u Dec 17 

21:48:00 EST 2020    

    

 

                              BP Position          3 Position          Thu Dec 1

7 21:48:00 EST 2020 

       

 

                              Temperature          97.8 [DEGF]          Thu Dec 

17 07:21:00 EST 2020

        

 

                              Temperature          36.6 LACEY            u Dec 1

7 07:21:00 EST 2020   

     

 

                              Heart Rate          82 /MIN             Thu Dec 17

 07:21:00 EST 2020     

   

 

                              Respiration          16 /MIN             Thu Dec 1

7 07:21:00 EST 2020    

    

 

                              Systolic          112 MM[HG]          u Dec 17 0

7:21:00 EST 2020    

    

 

                              Diastolic          70 MM[HG]           u Dec 17 

07:21:00 EST 2020    

    

 

                              BP Position          3 Position          Thu Dec 1

7 07:21:00 EST 2020 

       

 

                              Temperature          98.2 [DEGF]          Wed Dec 

16 19:12:00 EST 2020

        

 

                              Temperature          36.8 LACEY            Wed Dec 1

6 19:12:00 EST 2020   

     

 

                              Heart Rate          87 /MIN             Wed Dec 16

 19:12:00 EST 2020     

   

 

                              Respiration          18 /MIN             Wed Dec 1

6 19:12:00 EST 2020    

    

 

                              Systolic          115 MM[HG]          Wed Dec 16 1

9:12:00 EST 2020    

    

 

                              Diastolic          75 MM[HG]           Wed Dec 16 

19:12:00 EST 2020    

    

 

                              Temperature          97.8 [DEGF]          Wed Dec 

16 17:27:00 EST 2020

        

 

                              Temperature          36.6 LACEY            Wed Dec 1

6 17:27:00 EST 2020   

     

 

                              Heart Rate          91 /MIN             Wed Dec 16

 17:27:00 EST 2020     

   

 

                              Respiration          16 /MIN             Wed Dec 1

6 17:27:00 EST 2020    

    

 

                              SpO2          99 %                Wed Dec 16 17:27

:00 EST 2020        

 

                              Systolic          107 MM[HG]          Wed Dec 16 1

7:27:00 EST 2020    

    

 

                              Diastolic          62 MM[HG]           Wed Dec 16 

17:27:00 EST 2020    

    

 

                              BP Position          2 Position          Wed Dec 1

6 17:27:00 EST 2020
Summary of Patient Chart

                             Created on: 2021



JUWAN DURÁN

External Reference #: [id number]

: 1994

Sex: Male



Demographics





                          Address                   [address] [place]  [address]

 

                          Home Phone                [phone]

 

                          Preferred Language        English

 

                          Marital Status            Single

 

                          Voodoo Affiliation     Unknown

 

                          Race                      White

 

                          Ethnic Group              Not  or 





Author





                          JUWAN Leach

 

                          [facility] 

 

                          Address                   [address] [place] [address]  [address]



 

                          Phone                     [phone]







Care Team Providers





                    Care Team Member Name Role                Phone

 

                    Carol AnnHallie amin    AdmittingPractitioner1 [phone]

 

                    Cj Rothannah AttendingPractitioner1 [phone]



                           



Functional Status

     No Results                                  



Allergies

           



                Name            Onset Date          Reaction          Severity  

      

 

                                    PCN (penicillin) (Allergy)                  

           Wed Dec 16 

07:00:00 EST 2020                                 Hives                 



                                                 



Encounters

     



                Program Name          Primary Diagnosis          Admission Date/

Time          

Discharge Date/Time        

 

                              [facility]                   

                      11:30:00 EST 2021                              Mon Ryan 11 17:53:00 EST 2021  

      

 

                              [facility]                   

                     Fri Dec 

13 10:40:00 EST 2019                              Wed Ryan 01 15:41:00 EST 2020  

      

 

                                    [facility]               

     Opioid dependence, 

uncomplicated                       Wed Dec 16 15:54:00 EST 2020                

   

Sun Dec 20 14:20:00 EST 2020        



                                                      



Immunizations

     No Known Immunizations                                  



Lab Results

     No Known Laboratory Results                                            



Medications

     



                Medication          Directions          Start Date          End 

Date        

 

                                                  PROAIR HFA (ALBUTEROL SULFATE)

 0.09 MG/1 ACTUATION SUSPENSION         

                          2 Puff TIDPRN: Three Times A Day As Needed INHALATION 

         Sat Dec 19 

08:00:00 EST 2020                       Sun Dec 20 14:43:00 EST 2020        

 

                                    NICOTINE 14 MG/24 HR PATCH, EXTENDED RELEASE

          1 Patch Daily 

TRANSDERMAL               Fri Dec 18 06:00:00 EST 2020          Sun Dec 20 14:43

:00 

EST 2020        

 

                                                  EUCERIN DAILY PROTECTION (LOTI

ON, MULTI INGREDIENT)  

2%-7.5%-4.5%-2.4%-4.8% LOTION           1 Application TIDPRN: Three Times A Day 

As Needed TOPICAL APPLICATION          Fri Dec 18 07:42:00 EST 2020          Sun

Dec 20 14:43:00 EST 2020        

 

                                                  PROAIR HFA (ALBUTEROL SULFATE)

 0.09 MG/1 ACTUATION SUSPENSION         

                          2 Puff TIDPRN: Three Times A Day As Needed INHALATION 

         Fri Dec 18 

06:00:00 EST 2020                       Sat Dec 19 10:01:00 EST 2020        

 

                                    XOPENEX (LEVALBUTEROL HYDROCHLORIDE) 1.25 MG

/3 ML SOLUTION          1 

mL Stat INHALATION          Thu Dec 17 23:39:00 EST 2020          Thu Ryan 14 

23:38:00 EST 2021        

 

                                                  BACTRIM DS (SULFAMETHOXAZOLE-T

RIMETHOPRIM)  800MG-160MG TABLET        

                    1 Tablet BID: Twice a Day ORAL          Thu Dec 17 06:00:00 

EST 2020          

Sun Dec 20 14:43:00 EST 2020        

 

                                    SUBOXONE (BUPRENORPHINE-NALOXONE)  8MG-2MG F

ILM          1 Film Daily 

SUBLINGUAL                Fri Dec 18 06:00:00 EST 2020          Sun Dec 20 14:43

:00 

EST 2020        

 

                                    NARCAN (NALOXONE HCL) 4 MG/0.1 ML SPRAY     

     1 spray(s)  Once 

NASAL                     Thu Dec 17 06:00:00 EST 2020          Sun Dec 20 14:43

:00 EST 

2020        

 

                                    SUBOXONE (BUPRENORPHINE-NALOXONE)  4MG-1MG F

ILM          1 Film Stat 

SUBLINGUAL (Take from [facility])          Thu Dec 17 06:00:00 EST 2020          Thu 

Dec 17 12:21:00 EST 2020        

 

                                    SUBOXONE (BUPRENORPHINE-NALOXONE)  4MG-1MG F

ILM          1 Film Daily 

SUBLINGUAL                Thu Dec 17 06:00:00 EST 2020          Fri Dec 18 05:59

:00 

EST 2020        

 

                                    NARCAN (NALOXONE HCL) 4 MG/0.1 ML SPRAY     

     1 spray(s)  As 

Directed NASAL            Wed Dec 16 17:02:00 EST 2020          Sun Dec 20 

14:43:00 EST 2020        

 

                                    ACETAMINOPHEN 500 MG CAPSULE          2 caps

ule Q4-6HPRN: Every 4-6 

Hours As Needed ORAL (MDD 6 Tabs)          Wed Dec 16 17:01:00 EST 2020         

                                        Sun Dec 20 14:43:00 EST 2020        

 

                                                  BENADRYL ALLERGY (DIPHENHYDRAM

INE HYDROCHLORIDE) 25 MG TABLET         

                          1 tablet TIDPRN: Three Times A Day As Needed ORAL     

     Wed Dec 16 17:01:00 

EST 2020                                Sun Dec 20 14:43:00 EST 2020        

 

                                    CATAPRES (CLONIDINE HYDROCHLORIDE) 0.1 MG TA

BLET          1 tablet 

TIDPRN: Three Times A Day As Needed ORAL (Systolic BP >110, HR>60)          Wed 

Dec 16 17:01:00 EST 2020                Sun Dec 20 14:43:00 EST 2020        

 

                                    COLACE (DOCUSATE SODIUM) 100 MG CAPSULE, LIQ

UID FILLED          1 

capsule Daily As Needed ORAL          Wed Dec 16 17::00 EST 2020          Sun 

Dec 20 14:43:00 EST 2020        

 

                                    HYDROXYZINE HCL 25 MG TABLET          1 tabl

et TIDPRN: Three Times A 

Day As Needed ORAL (Do not give within 2 hours of Diphenhydramine)          Wed 

Dec 16 17:01:00 EST 2020                Sun Dec 20 14:43:00 EST 2020        

 

                                    FIBER- MG TABLET          2 tablet MN

N: As Needed ORAL (MDD 4 

Tabs)                     Wed Dec 16 17::00 EST 2020          Sun Dec 20 14:43

:00 EST 

2020        

 

                                    IBUPROFEN 200 MG TABLET          3 tablet Da

eldon As Needed ORAL (MDD 

3200mg)                   Wed Dec 16 17:01:00 EST 2020          Sun Dec 20 14:43

:00 EST 

2020        

 

                                    MELATONIN 5 MG CAPSULE          1 capsule HS

PRN: At Bedtime As Needed 

ORAL (MDD 5mg)            Wed Dec 16 17:01:00 EST 2020          Sun Dec 20 

14:43:00 EST 2020        

 

                              MULTIVITAMIN   TABLET          1 tablet Daily ORAL

          Wed Dec 16

17:01:00 EST 2020                       Sun Dec 20 14:43:00 EST 2020        

 

                                    TUMS (CALCIUM CARBONATE) 500 MG TABLET, CHEW

ABLE          3 tablet 

PRN: As Needed ORAL          Wed Dec 16 17:01:00 EST 2020          Sun Dec 20 

14:43:00 EST 2020        

 

                                    ZOFRAN (ONDANSETRON HYDROCHLORIDE) 4 MG TABL

ET          1 tablet 

TIDPRN: Three Times A Day As Needed ORAL          Wed Dec 16 17::00 EST 2020  

                                        Sun Dec 20 14:43:00 EST 2020        



                                                                                
                                                               



Problems

     No Known Problems                                            



Procedures

     No Known Procedures                                  



Social History

     



                    Social History Observation          Description          Abrahan

e        

 

                              Smoking Status                    Current Every Da

y Smoker          

Fri Dec 13 07:00:00 EST 2019        

 

                              Smoking Status                    Current Every Da

y Smoker          

Fri Dec 13 07:00:00 EST 2019        

 

                              Smoking Status                    Current Every Da

y Smoker          

Fri Dec 13 07:00:00 EST 2019        

 

                              Smoking Status                    Current Every Da

y Smoker          

Fri Dec 13 07:00:00 EST 2019        

 

                              Smoking Status                    Current Every Da

y Smoker          

Fri Dec 13 07:00:00 EST 2019        

 

                              Smoking Status                    Current Every Da

y Smoker          

Fri Dec 13 07:00:00 EST 2019        

 

                              Smoking Status                    Current Every Da

y Smoker          

Fri Dec 13 07:00:00 EST 2019        

 

                    Birth Sex           Male                Sat  07:00:00 

EST         



                                                                          



Vital Signs

     



                    Vital Sign          Measurement          Date        

 

                              Temperature          97.6 [DEGF]          Sun Dec 

20 10::00 EST 2020

        

 

                              Temperature          36.4 LACEY            Sun Dec 2

0 10:00:00 EST 2020   

     

 

                              Heart Rate          72 /MIN             Sun Dec 20

 10:00:00 EST 2020     

   

 

                              Respiration          14 /MIN             Sun Dec 2

0 10:00:00 EST 2020    

    

 

                              Systolic          122 MM[HG]          Sun Dec 20 1

0:00:00 EST 2020    

    

 

                              Diastolic          68 MM[HG]           Sun Dec 20 

10:00:00 EST 2020    

    

 

                              BP Position          3 Position          Sun Dec 2

0 10:00:00 EST 2020 

       

 

                              Temperature          97.8 [DEGF]          Sat Dec 

19 18:19:00 EST 2020

        

 

                              Temperature          36.6 LACEY            Sat Dec 1

9 18:19:00 EST 2020   

     

 

                              Heart Rate          92 /MIN             Sat Dec 19

 18:19:00 EST 2020     

   

 

                              Respiration          17 /MIN             Sat Dec 1

9 18:19:00 EST 2020    

    

 

                              Systolic          114 MM[HG]          Sat Dec 19 1

8:19:00 EST 2020    

    

 

                              Diastolic          74 MM[HG]           Sat Dec 19 

18:19:00 EST 2020    

    

 

                              Temperature          98.2 [DEGF]          Sat Dec 

19 11:35:00 EST 2020

        

 

                              Temperature          36.8 LACEY            Sat Dec 1

9 11:35:00 EST 2020   

     

 

                              Heart Rate          79 /MIN             Sat Dec 19

 11:35:00 EST 2020     

   

 

                              Respiration          16 /MIN             Sat Dec 1

9 11:35:00 EST 2020    

    

 

                              Systolic          119 MM[HG]          Sat Dec 19 1

1:35:00 EST 2020    

    

 

                              Diastolic          68 MM[HG]           Sat Dec 19 

11:35:00 EST 2020    

    

 

                              BP Position          3 Position          Sat Dec 1

9 11:35:00 EST 2020 

       

 

                              Temperature          97.5 [DEGF]          Sat Dec 

19 05:37:00 EST 2020

        

 

                              Temperature          36.4 LACEY            Sat Dec 1

9 05:37:00 EST 2020   

     

 

                              Heart Rate          64 /MIN             Sat Dec 19

 05:37:00 EST 2020     

   

 

                              Respiration          16 /MIN             Sat Dec 1

9 05:37:00 EST 2020    

    

 

                              Systolic          107 MM[HG]          Sat Dec 19 0

5:37:00 EST 2020    

    

 

                              Diastolic          60 MM[HG]           Sat Dec 19 

05:37:00 EST 2020    

    

 

                              BP Position          2 Position          Sat Dec 1

9 05:37:00 EST 2020 

       

 

                              Heart Rate          53 /MIN             Fri Dec 18

 18:19:00 EST 2020     

   

 

                              Respiration          16 /MIN             Fri Dec 1

8 18:19:00 EST 2020    

    

 

                              Systolic          113 MM[HG]          Fri Dec 18 1

8:19:00 EST 2020    

    

 

                              Diastolic          63 MM[HG]           Fri Dec 18 

18:19:00 EST 2020    

    

 

                              BP Position          3 Position          Fri Dec 1

8 18:19:00 EST 2020 

       

 

                              Temperature          98.7 [DEGF]          Fri Dec 

18 14:07:00 EST 2020

        

 

                              Temperature          37.1 LACEY            Fri Dec 1

8 14:07:00 EST 2020   

     

 

                              Heart Rate          74 /MIN             Fri Dec 18

 14:07:00 EST 2020     

   

 

                              Respiration          17 /MIN             Fri Dec 1

8 14:07:00 EST 2020    

    

 

                              Systolic          103 MM[HG]          Fri Dec 18 1

4:07:00 EST 2020    

    

 

                              Diastolic          62 MM[HG]           Fri Dec 18 

14:07:00 EST 2020    

    

 

                              Temperature          98.7 [DEGF]          Fri Dec 

18 13:56:00 EST 2020

        

 

                              Temperature          37.1 LACEY            Fri Dec 1

8 13:56:00 EST 2020   

     

 

                              Heart Rate          74 /MIN             Fri Dec 18

 13:56:00 EST 2020     

   

 

                              Respiration          17 /MIN             Fri Dec 1

8 13:56:00 EST 2020    

    

 

                              Systolic          103 MM[HG]          Fri Dec 18 1

3:56:00 EST 2020    

    

 

                              Diastolic          62 MM[HG]           Fri Dec 18 

13:56:00 EST 2020    

    

 

                              BP Position          3 Position          Fri Dec 1

8 13:56:00 EST 2020 

       

 

                              Temperature          97.7 [DEGF]          Fri Dec 

18 06:02:00 EST 2020

        

 

                              Temperature          36.5 LACEY            Fri Dec 1

8 06:02:00 EST 2020   

     

 

                              Heart Rate          73 /MIN             Fri Dec 18

 06:02:00 EST 2020     

   

 

                              Respiration          16 /MIN             Fri Dec 1

8 06:02:00 EST 2020    

    

 

                              Systolic          107 MM[HG]          Fri Dec 18 0

6:02:00 EST 2020    

    

 

                              Diastolic          61 MM[HG]           Fri Dec 18 

06:02:00 EST 2020    

    

 

                              BP Position          2 Position          Fri Dec 1

8 06:02:00 EST 2020 

       

 

                              Temperature          98.7 [DEGF]          Thu Dec 

17 21:48:00 EST 2020

        

 

                              Temperature          37.1 LACEY            Thu Dec 1

7 21:48:00 EST 2020   

     

 

                              Heart Rate          16 /MIN             Thu Dec 17

 21:48:00 EST 2020     

   

 

                              Respiration          16 /MIN             Thu Dec 1

7 21:48:00 EST 2020    

    

 

                              Systolic          136 MM[HG]          Thu Dec 17 2

1:48:00 EST 2020    

    

 

                              Diastolic          76 MM[HG]           u Dec 17 

21:48:00 EST 2020    

    

 

                              BP Position          3 Position          Thu Dec 1

7 21:48:00 EST 2020 

       

 

                              Temperature          97.8 [DEGF]          Thu Dec 

17 07:21:00 EST 2020

        

 

                              Temperature          36.6 LACEY            Thu Dec 1

7 07:21:00 EST 2020   

     

 

                              Heart Rate          82 /MIN             Thu Dec 17

 07:21:00 EST 2020     

   

 

                              Respiration          16 /MIN             u Dec 1

7 07:21:00 EST 2020    

    

 

                              Systolic          112 MM[HG]          u Dec 17 0

7:21:00 EST 2020    

    

 

                              Diastolic          70 MM[HG]           Thu Dec 17 

07:21:00 EST 2020    

    

 

                              BP Position          3 Position          Thu Dec 1

7 07:21:00 EST 2020 

       

 

                              Temperature          98.2 [DEGF]          Wed Dec 

16 19:12:00 EST 2020

        

 

                              Temperature          36.8 LACEY            Wed Dec 1

6 19:12:00 EST 2020   

     

 

                              Heart Rate          87 /MIN             Wed Dec 16

 19:12:00 EST 2020     

   

 

                              Respiration          18 /MIN             Wed Dec 1

6 19:12:00 EST 2020    

    

 

                              Systolic          115 MM[HG]          Wed Dec 16 1

9:12:00 EST 2020    

    

 

                              Diastolic          75 MM[HG]           Wed Dec 16 

19:12:00 EST 2020    

    

 

                              Temperature          97.8 [DEGF]          Wed Dec 

16 17:27:00 EST 2020

        

 

                              Temperature          36.6 LACEY            Wed Dec 1

6 17:27:00 EST 2020   

     

 

                              Heart Rate          91 /MIN             Wed Dec 16

 17:27:00 EST 2020     

   

 

                              Respiration          16 /MIN             Wed Dec 1

6 17:27:00 EST 2020    

    

 

                              SpO2          99 %                Wed Dec 16 17:27

:00 EST 2020        

 

                              Systolic          107 MM[HG]          Wed Dec 16 1

7:27:00 EST 2020    

    

 

                              Diastolic          62 MM[HG]           Wed Dec 16 

17:27:00 EST 2020    

    

 

                              BP Position          2 Position          Wed Dec 1

6 17:27:00 EST 2020
Summary of Patient Chart

                             Created on: 2021



JUWAN DURÁN

External Reference #: [id number]

: 1994

Sex: Male



Demographics





                          Address                   [address] [place] [address]  [address]

 

                          Home Phone                [phone]

 

                          Preferred Language        English

 

                          Marital Status            Single

 

                          Jew Affiliation     Unknown

 

                          Race                      

 

                          Ethnic Group               or 





Author





                          JUWAN Mason

 

                          Organization [facility]

 

                          Address                   [address] [place] [address]  [address]



 

                          Phone                     [phone]







Care Team Providers





                    Care Team Member Name Role                Phone

 

                    Jennifer Guzmán     Practitioner        [phone]

 

                    Hallie Holden    AdmittingPractitioner1 [phone]

 

                    Sayra Fuller  Attphys             [phone]

 

                    Gian Hickey      Admphys             [phone]

 

                    Betzy Roth AttendingPractitioner1 [phone]



                           



Functional Status

     No Results                                  



Allergies

           



                Name            Onset Date          Reaction          Severity  

      

 

                                    PCN (penicillin) (Allergy)                  

           Wed Dec 16 

07:00:00 EST                                  Hives                 



                                                 



Encounters

     



                Program Name          Primary Diagnosis          Admission Date/

Time          

Discharge Date/Time        

 

                              [facility]                                

         10:30:00 

EST 2021 17:53:00 EST   

      

 

                                    [facility]               

     Opioid dependence, 

uncomplicated                       Wed Dec 16 15:54:00 EST 2020                

   

Sun Dec 20 14:20:00 EST         

 

                                    [facility]                    Heroin use dis

order, severe, dependence 

                                    Sat Aug 28 17:57:00 EDT                 

   

 

                              [facility]                   

                     Fri Dec 

13 10:40:00 EST 2019 15:41:00 EST   

      

 

                                    [facility]               

     Opioid dependence, 

uncomplicated                       Sat Aug 28 16:36:00 EDT                 

   



                                                                



Immunizations

     No Known Immunizations                                  



Lab Results

     No Known Laboratory Results                                            



Medications

     



                Medication          Directions          Start Date          End 

Date        

 

                                    SUBOXONE (BUPRENORPHINE-NALOXONE)  8MG-2MG F

ILM          1 Film Daily 

SUBLINGUAL                Mon Aug 30 08:30:00 EDT 2021          Fri Sep 03 08:29

:00 

EDT         

 

                                                  VENTOLIN HFA (ALBUTEROL SULFAT

E) 0.09 MG/1 ACTUATION SUSPENSION       

                          2 Puff Q4HPRN: Every 4 Hours As Needed INHALATION     

     Mon Aug 30 08:00:00

EDT 2021                                Mon Sep 27 07:59:00 EDT         

 

                                    SUBOXONE (BUPRENORPHINE-NALOXONE)  8MG-2MG F

ILM          1 Film Daily 

SUBLINGUAL                Mon Aug 30 10:37:00 EDT 2021          Fri Sep 03 10:36

:00 

EDT         

 

                                    SUBOXONE (BUPRENORPHINE-NALOXONE)  8MG-2MG F

ILM          1 Film Stat 

SUBLINGUAL ([facility])          Sun Aug 29 10:00:00 EDT 2021          Sun Aug 29 

10:03:00 EDT         

 

                                    SUBOXONE (BUPRENORPHINE-NALOXONE)  4MG-1MG F

ILM          1 Film Once 

SUBLINGUAL ([facility])          Sat Aug 28 21:00:00 EDT 2021          Sat Aug 28 

20:25:00 EDT         

 

                                    SUBOXONE (BUPRENORPHINE-NALOXONE)  4MG-1MG F

ILM          1 Film Stat 

SUBLINGUAL ([facility])          Sat Aug 28 18:52:00 EDT           Sat Aug 28 

18:57:00 EDT         

 

                                    NICOTINE POLACRILEX 2 MG LOZENGE/MELISSA     

     3 Each TID: Three 

Times a Day ORAL          Sat Aug 28 18:48:00 EDT           Sat Sep 25 

18:47:00 EDT         

 

                                    NICODERM CQ (NICOTINE) 21 MG/24 HR PATCH, EX

TENDED RELEASE          1 

patch Daily As Needed TRANSDERMAL (Remove at HS. MDD 1 Patch)          Sat Aug 

28 18:47:00 EDT 2021                    Mon Aug 30 18:46:00 EDT         

 

                                    CLONIDINE HCL 0.1 MG TABLET          1 table

t TIDPRN: Three Times A 

Day As Needed ORAL (SBP >110 HR>60Do not give within one (1) hour of hydroxyzine
administration)           Sat Aug 28 18:47:00 EDT           Sat Sep 04 

18:46:00 EDT         

 

                                    NARCAN (NALOXONE HCL) 4 MG/0.1 ML SPRAY     

     1 spray(s)  As 

Directed NASAL            Sat Aug 28 18:47:00 EDT           [place] Aug 29 

18:46:00 EDT         

 

                                    ONDANSETRON 4 MG TABLET          1 tablet TI

DPRN: Three Times A Day As

Needed ORAL               Sat Aug 28 18:47:00 EDT           Sat Sep 04 18:46

:00 

EDT         

 

                                    CALCIUM CARBONATE 500 MG TABLET, CHEWABLE   

       3 tablet TIDPRN: 

Three Times A Day As Needed ORAL          Sat Aug 28 18:47:00 EDT           

Sat Sep  18:46:00 EDT         

 

                              MULTIVITAMIN   TABLET          1 tablet Daily ORAL

          Sat Aug 28

18:47:00 EDT                        Sat Sep :46:00 EDT         

 

                                    MELATONIN 5 MG CAPSULE          1 capsule HS

PRN: At Bedtime As Needed 

ORAL (MDD 5mg)            Sat Aug 28 18:47:00 EDT           [place] [id number] 

:46:00 EDT         

 

                                    IBUPROFEN 200 MG TABLET          3 tablet TI

DPRN: Three Times A Day As

Needed ORAL               Sat Aug 28 18:47:00 EDT           Sat Sep 25 :46

:00 

EDT         

 

                                    FIBER- MG TABLET          2 tablet DC

N: As Needed ORAL (MDD 4 

Tabs)                     Sat Aug 28 18:47:00 EDT           Sat Sep 25 :46

:00 EDT 

        

 

                                    HYDROXYZINE HCL 25 MG TABLET          1 tabl

et TIDPRN: Three Times A 

Day As Needed ORAL (Do not give within two (2) hours of Diphenhydramine or 
within one (1) hour of clonidine administration)          Sat Aug 28 18:47:00 

EDT                                 Sat Sep 04 :46:00 EDT         

 

                                    DOCUSATE SODIUM 100 MG CAPSULE, LIQUID FILLE

D          1 capsule Daily

As Needed ORAL            Sat Aug 28 18:47:00 EDT           [place] [id number] 

:46:00 EDT         

 

                                    DIPHENHYDRAMINE HCL 25 MG CAPSULE          1

 capsule TIDPRN: Three 

Times A Day As Needed ORAL          Sat Aug 28 18:47:00 EDT           Sat 

Sep 25 :46:00 EDT         

 

                                    ACETAMINOPHEN 500 MG CAPSULE          2 caps

ule Q6HPRN: Every 6 Hours 

As Needed ORAL (MDD 6 Tabs)          Sat Aug 28 18:47:00 EDT           Sat 

Sep 25 :46:00 EDT         

 

                                                  PROAIR HFA (ALBUTEROL SULFATE)

 0.09 MG/1 ACTUATION SUSPENSION         

                          2 Puff TIDPRN: Three Times A Day As Needed INHALATION 

         Sat Dec 19 

08:00:00 EST 2020                       Sun Dec 20 14:43:00 EST 2020        

 

                                    NICOTINE 14 MG/24 HR PATCH, EXTENDED RELEASE

          1 Patch Daily 

TRANSDERMAL               Fri Dec 18 06:00:00 EST 2020          Sun Dec 20 14:43

:00 

EST 2020        

 

                                                  EUCERIN DAILY PROTECTION (LOTI

ON, MULTI INGREDIENT)  

2%-7.5%-4.5%-2.4%-4.8% LOTION           1 Application TIDPRN: Three Times A Day 

As Needed TOPICAL APPLICATION          Fri Dec 18 07:42:00 EST 2020          Sun

Dec 20 14:43:00 EST 2020        

 

                                                  PROAIR HFA (ALBUTEROL SULFATE)

 0.09 MG/1 ACTUATION SUSPENSION         

                          2 Puff TIDPRN: Three Times A Day As Needed INHALATION 

         Fri Dec 18 

06:00:00 EST 2020                       Sat Dec 19 10:01:00 EST 2020        

 

                                    XOPENEX (LEVALBUTEROL HYDROCHLORIDE) 1.25 MG

/3 ML SOLUTION          1 

mL Stat INHALATION          Thu Dec 17 23:39:00 EST 2020          Thu Ryan 14 

23:38:00 EST 2021        

 

                                                  BACTRIM DS (SULFAMETHOXAZOLE-T

RIMETHOPRIM)  800MG-160MG TABLET        

                    1 Tablet BID: Twice a Day ORAL          Thu Dec 17 06:00:00 

EST 2020          

Sun Dec 20 14:43:00 EST 2020        

 

                                    SUBOXONE (BUPRENORPHINE-NALOXONE)  8MG-2MG F

ILM          1 Film Daily 

SUBLINGUAL                Fri Dec 18 06:00:00 EST 2020          Sun Dec 20 14:43

:00 

EST 2020        

 

                                    NARCAN (NALOXONE HCL) 4 MG/0.1 ML SPRAY     

     1 spray(s)  Once 

NASAL                     Thu Dec 17 06:00:00 EST 2020          Sun Dec 20 14:43

:00 EST 

2020        

 

                                    SUBOXONE (BUPRENORPHINE-NALOXONE)  4MG-1MG F

ILM          1 Film Stat 

SUBLINGUAL (Take from [facility])          Thu Dec 17 06:00:00 EST 2020          Thu 

Dec 17 12:21:00 EST 2020        

 

                                    SUBOXONE (BUPRENORPHINE-NALOXONE)  4MG-1MG F

ILM          1 Film Daily 

SUBLINGUAL                Thu Dec 17 06:00:00 EST 2020          Fri Dec 18 05:59

:00 

EST 2020        

 

                                    NARCAN (NALOXONE HCL) 4 MG/0.1 ML SPRAY     

     1 spray(s)  As 

Directed NASAL            Wed Dec 16 17:02:00 EST 2020          Sun Dec 20 

14:43:00 EST 2020        

 

                                    ACETAMINOPHEN 500 MG CAPSULE          2 caps

ule Q4-6HPRN: Every 4-6 

Hours As Needed ORAL (MDD 6 Tabs)          Wed Dec 16 17::00 EST 2020         

                                        Sun Dec 20 14:43:00 EST 2020        

 

                                                  BENADRYL ALLERGY (DIPHENHYDRAM

INE HYDROCHLORIDE) 25 MG TABLET         

                          1 tablet TIDPRN: Three Times A Day As Needed ORAL     

     Wed Dec 16 17:01:00 

EST 2020                                Sun Dec 20 14:43:00 EST 2020        

 

                                    CATAPRES (CLONIDINE HYDROCHLORIDE) 0.1 MG TA

BLET          1 tablet 

TIDPRN: Three Times A Day As Needed ORAL (Systolic BP >110, HR>60)          Wed 

Dec 16 17::00 EST 2020                Sun Dec 20 14:43:00 EST 2020        

 

                                    COLACE (DOCUSATE SODIUM) 100 MG CAPSULE, LIQ

UID FILLED          1 

capsule Daily As Needed ORAL          Wed Dec 16 17::00 EST 2020          Sun 

Dec 20 14:43:00 EST 2020        

 

                                    HYDROXYZINE HCL 25 MG TABLET          1 tabl

et TIDPRN: Three Times A 

Day As Needed ORAL (Do not give within 2 hours of Diphenhydramine)          Wed 

Dec 16 17::00 EST 2020                Sun Dec 20 14:43:00 EST 2020        

 

                                    FIBER- MG TABLET          2 tablet DC

N: As Needed ORAL (MDD 4 

Tabs)                     Wed Dec 16 17:01:00 EST 2020          Sun Dec 20 14:43

:00 EST 

2020        

 

                                    IBUPROFEN 200 MG TABLET          3 tablet Da

eldon As Needed ORAL (MDD 

3200mg)                   Wed Dec 16 17::00 EST 2020          Sun Dec 20 14:43

:00 EST 

2020        

 

                                    MELATONIN 5 MG CAPSULE          1 capsule HS

PRN: At Bedtime As Needed 

ORAL (MDD 5mg)            Wed Dec 16 17:01:00 EST 2020          Sun Dec 20 

14:43:00 EST 2020        

 

                              MULTIVITAMIN   TABLET          1 tablet Daily ORAL

          Wed Dec 16

17:01:00 EST 2020                       Sun Dec 20 14:43:00 EST 2020        

 

                                    TUMS (CALCIUM CARBONATE) 500 MG TABLET, CHEW

ABLE          3 tablet 

PRN: As Needed ORAL          Wed Dec 16 17::00 EST 2020          Sun Dec 20 

14:43:00 EST 2020        

 

                                    ZOFRAN (ONDANSETRON HYDROCHLORIDE) 4 MG TABL

ET          1 tablet 

TIDPRN: Three Times A Day As Needed ORAL          Wed Dec 16 17:01:00 EST 2020  

                                        Sun Dec 20 14:43:00 EST 2020        



                                                                                
                                                                                
                                                                                
 



Problems

     No Known Problems                                            



Procedures

     No Known Procedures                                  



Social History

     



                    Social History Observation          Description          Abrahan

e        

 

                              Smoking Status                    Current Every Da

y Smoker          

Fri Dec 13 07:00 EST 2019        

 

                              Smoking Status                    Current Every Da

y Smoker          

Fri Dec 13 07:00:00 EST 2019        

 

                              Smoking Status                    Current Every Da

y Smoker          

Fri Dec 13 07:00:00 EST 2019        

 

                              Smoking Status                    Current Every Da

y Smoker          

Fri Dec 13 07:00:00 EST 2019        

 

                              Smoking Status                    Current Every Da

y Smoker          

Fri Dec 13 07:00:00 EST 2019        

 

                              Smoking Status                    Current Every Da

y Smoker          

Fri Dec 13 07:00:00 EST 2019        

 

                              Smoking Status                    Current Every Da

y Smoker          

Fri Dec 13 07:00:00 EST         

 

                              Smoking Status                    Current Every Da

y Smoker          

Fri Dec 13 07:00:00 EST         

 

                              Smoking Status                    Current Every Da

y Smoker          

Fri Dec 13 07:00:00 EST         

 

                              Smoking Status                    Current Every Da

y Smoker          

Fri Dec 13 07:00:00 EST         

 

                              Smoking Status                    Current Every Da

y Smoker          

Fri Dec 13 07:00:00 EST         

 

                    Birth Sex           Male                Sat Nov 11 07:00:00 

EST         



                                                                                
             



Vital Signs

     



                    Vital Sign          Measurement          Date        

 

                              Temperature          97.8 [DEGF]          Mon Aug 

30 19:01:00 EDT 

        

 

                              Temperature          36.6 LACEY            Mon Aug 3

0 19:01:00 EDT    

     

 

                              Heart Rate          90 /MIN             Mon Aug 30

 19:01:00 EDT      

   

 

                              Respiration          16 /MIN             Mon Aug 3

0 19:01:00 EDT     

    

 

                              Systolic          110 MM[HG]          Mon Aug 30 1

9:01:00 EDT     

    

 

                              Diastolic          66 MM[HG]           Mon Aug 30 

19:01:00 EDT     

    

 

                              Temperature          97.3 [DEGF]          Mon Aug 

30 05:31:00 EDT 

        

 

                              Temperature          36.3 LACEY            Mon Aug 3

0 05:31:00 EDT    

     

 

                              Heart Rate          64 /MIN             Mon Aug 30

 05:31:00 EDT      

   

 

                              Respiration          16 /MIN             Mon Aug 3

0 05:31:00 EDT     

    

 

                              Systolic          118 MM[HG]          Mon Aug 30 0

5:31:00 EDT     

    

 

                              Diastolic          69 MM[HG]           Mon Aug 30 

05:31:00 EDT     

    

 

                              BP Position          2 Position          Mon Aug 3

0 05:31:00 EDT  

       

 

                              Heart Rate          86 /MIN             Sun Aug 29

 20:15:00 EDT      

   

 

                              Respiration          17 /MIN             Sun Aug 2

9 20:15:00 EDT     

    

 

                              Systolic          110 MM[HG]          Sun Aug 29 2

0:15:00 EDT     

    

 

                              Diastolic          69 MM[HG]           Sun Aug 29 

20:15:00 EDT     

    

 

                              BP Position          3 Position          Sun Aug 2

9 20:15:00 EDT  

       

 

                              Temperature          97.5 [DEGF]          Sun Aug 

29 06:16:00 EDT 

        

 

                              Temperature          36.4 LACEY            Sun Aug 2

9 06:16:00 EDT    

     

 

                              Heart Rate          71 /MIN             Sun Aug 29

 06:16:00 EDT      

   

 

                              Respiration          16 /MIN             Sun Aug 2

9 06:16:00 EDT     

    

 

                              Systolic          120 MM[HG]          Sun Aug 29 0

6:16:00 EDT     

    

 

                              Diastolic          64 MM[HG]           Sun Aug 29 

06:16:00 EDT     

    

 

                              BP Position          2 Position          Sun Aug 2

9 06:16:00 EDT  

       

 

                              Temperature          98.7 [DEGF]          Sat Aug 

28 20:06:00 EDT 

        

 

                              Temperature          37.1 LACEY            Sat Aug 2

8 20:06:00 EDT    

     

 

                              Heart Rate          74 /MIN             Sat Aug 28

 20:06:00 EDT      

   

 

                              Respiration          16 /MIN             Sat Aug 2

8 20:06:00 EDT     

    

 

                              SpO2          100 %               Sat Aug 28 20:06

:00 EDT         

 

                              Systolic          131 MM[HG]          Sat Aug 28 2

0:06:00 EDT     

    

 

                              Diastolic          71 MM[HG]           Sat Aug 28 

20:06:00 EDT     

    

 

                              BP Position          2 Position          Sat Aug 2

8 20:06:00 EDT  

       

 

                              Height          6 0.0 ft            Sat Aug 28 20:

06:00 EDT         

 

                              Height          72 in               Sat Aug 28 20:

06:00 EDT         

 

                              Height          182.9 cm            Sat Aug 28 20:

06:00 EDT         

 

                              Weight Lbs          140 lbs             Sat Aug 28

 20:06:00 EDT      

   

 

                              Weight Kgs          63.6 KG             Sat Aug 28

 20:06:00 EDT      

   

 

                              BMI          19                  Sat Aug 28 20:06:

00 EDT         

 

                              Temperature          97.6 [DEGF]          Sun Dec 

20 10:00:00 EST 

        

 

                              Temperature          36.4 LACEY            Sun Dec 2

0 10:00:00 EST    

     

 

                              Heart Rate          72 /MIN             Sun Dec 20

 10:00:00 EST      

   

 

                              Respiration          14 /MIN             Sun Dec 2

0 10:00:00 EST     

    

 

                              Systolic          122 MM[HG]          Sun Dec 20 1

0:00:00 EST     

    

 

                              Diastolic          68 MM[HG]           Sun Dec 20 

10:00:00 EST     

    

 

                              BP Position          3 Position          Sun Dec 2

0 10:00:00 EST  

       

 

                              Temperature          97.8 [DEGF]          Sat Dec 

19 18:19:00 EST 

        

 

                              Temperature          36.6 LACEY            Sat Dec 1

9 18:19:00 EST    

     

 

                              Heart Rate          92 /MIN             Sat Dec 19

 18:19:00 EST      

   

 

                              Respiration          17 /MIN             Sat Dec 1

9 18:19:00 EST     

    

 

                              Systolic          114 MM[HG]          Sat Dec 19 1

8:19:00 EST 2020    

    

 

                              Diastolic          74 MM[HG]           Sat Dec 19 

18:19:00 EST 2020    

    

 

                              Temperature          98.2 [DEGF]          Sat Dec 

19 11:35:00 EST 2020

        

 

                              Temperature          36.8 LACEY            Sat Dec 1

9 11:35:00 EST 2020   

     

 

                              Heart Rate          79 /MIN             Sat Dec 19

 11:35:00 EST 2020     

   

 

                              Respiration          16 /MIN             Sat Dec 1

9 11:35:00 EST 2020    

    

 

                              Systolic          119 MM[HG]          Sat Dec 19 1

1:35:00 EST 2020    

    

 

                              Diastolic          68 MM[HG]           Sat Dec 19 

11:35:00 EST 2020    

    

 

                              BP Position          3 Position          Sat Dec 1

9 11:35:00 EST 2020 

       

 

                              Temperature          97.5 [DEGF]          Sat Dec 

19 05:37:00 EST 2020

        

 

                              Temperature          36.4 LACEY            Sat Dec 1

9 05:37:00 EST 2020   

     

 

                              Heart Rate          64 /MIN             Sat Dec 19

 05:37:00 EST 2020     

   

 

                              Respiration          16 /MIN             Sat Dec 1

9 05:37:00 EST 2020    

    

 

                              Systolic          107 MM[HG]          Sat Dec 19 0

5:37:00 EST 2020    

    

 

                              Diastolic          60 MM[HG]           Sat Dec 19 

05:37:00 EST 2020    

    

 

                              BP Position          2 Position          Sat Dec 1

9 05:37:00 EST 2020 

       

 

                              Heart Rate          53 /MIN             Fri Dec 18

 18:19:00 EST 2020     

   

 

                              Respiration          16 /MIN             Fri Dec 1

8 18:19:00 EST 2020    

    

 

                              Systolic          113 MM[HG]          Fri Dec 18 1

8:19:00 EST 2020    

    

 

                              Diastolic          63 MM[HG]           Fri Dec 18 

18:19:00 EST 2020    

    

 

                              BP Position          3 Position          Fri Dec 1

8 18:19:00 EST 2020 

       

 

                              Temperature          98.7 [DEGF]          Fri Dec 

18 14:07:00 EST 2020

        

 

                              Temperature          37.1 LACEY            Fri Dec 1

8 14:07:00 EST 2020   

     

 

                              Heart Rate          74 /MIN             Fri Dec 18

 14:07:00 EST 2020     

   

 

                              Respiration          17 /MIN             Fri Dec 1

8 14:07:00 EST 2020    

    

 

                              Systolic          103 MM[HG]          Fri Dec 18 1

4:07:00 EST 2020    

    

 

                              Diastolic          62 MM[HG]           Fri Dec 18 

14:07:00 EST 2020    

    

 

                              Temperature          98.7 [DEGF]          Fri Dec 

18 13:56:00 EST 2020

        

 

                              Temperature          37.1 LACEY            Fri Dec 1

8 13:56:00 EST 2020   

     

 

                              Heart Rate          74 /MIN             Fri Dec 18

 13:56:00 EST 2020     

   

 

                              Respiration          17 /MIN             Fri Dec 1

8 13:56:00 EST 2020    

    

 

                              Systolic          103 MM[HG]          Fri Dec 18 1

3:56:00 EST 2020    

    

 

                              Diastolic          62 MM[HG]           Fri Dec 18 

13:56:00 EST 2020    

    

 

                              BP Position          3 Position          Fri Dec 1

8 13:56:00 EST 2020 

       

 

                              Temperature          97.7 [DEGF]          Fri Dec 

18 06:02:00 EST 2020

        

 

                              Temperature          36.5 LACEY            Fri Dec 1

8 06:02:00 EST 2020   

     

 

                              Heart Rate          73 /MIN             Fri Dec 18

 06:02:00 EST 2020     

   

 

                              Respiration          16 /MIN             Fri Dec 1

8 06:02:00 EST 2020    

    

 

                              Systolic          107 MM[HG]          Fri Dec 18 0

6:02:00 EST 2020    

    

 

                              Diastolic          61 MM[HG]           Fri Dec 18 

06:02:00 EST 2020    

    

 

                              BP Position          2 Position          Fri Dec 1

8 06:02:00 EST 2020 

       

 

                              Temperature          98.7 [DEGF]          Thu Dec 

17 21:48:00 EST 2020

        

 

                              Temperature          37.1 LACEY            u Dec 1

7 21:48:00 EST 2020   

     

 

                              Heart Rate          16 /MIN             Thu Dec 17

 21:48:00 EST 2020     

   

 

                              Respiration          16 /MIN             Thu Dec 1

7 21:48:00 EST 2020    

    

 

                              Systolic          136 MM[HG]          u Dec 17 2

1:48:00 EST 2020    

    

 

                              Diastolic          76 MM[HG]           u Dec 17 

21:48:00 EST 2020    

    

 

                              BP Position          3 Position          Thu Dec 1

7 21:48:00 EST 2020 

       

 

                              Temperature          97.8 [DEGF]          Thu Dec 

17 07:21:00 EST 2020

        

 

                              Temperature          36.6 LACEY            u Dec 1

7 07:21:00 EST 2020   

     

 

                              Heart Rate          82 /MIN             Thu Dec 17

 07:21:00 EST 2020     

   

 

                              Respiration          16 /MIN             Thu Dec 1

7 07:21:00 EST 2020    

    

 

                              Systolic          112 MM[HG]          u Dec 17 0

7:21:00 EST 2020    

    

 

                              Diastolic          70 MM[HG]           u Dec 17 

07:21:00 EST 2020    

    

 

                              BP Position          3 Position          Thu Dec 1

7 07:21:00 EST 2020 

       

 

                              Temperature          98.2 [DEGF]          Wed Dec 

16 19:12:00 EST 2020

        

 

                              Temperature          36.8 LACEY            Wed Dec 1

6 19:12:00 EST 2020   

     

 

                              Heart Rate          87 /MIN             Wed Dec 16

 19:12:00 EST 2020     

   

 

                              Respiration          18 /MIN             Wed Dec 1

6 19:12:00 EST 2020    

    

 

                              Systolic          115 MM[HG]          Wed Dec 16 1

9:12:00 EST 2020    

    

 

                              Diastolic          75 MM[HG]           Wed Dec 16 

19:12:00 EST 2020    

    

 

                              Temperature          97.8 [DEGF]          Wed Dec 

16 17:27:00 EST 2020

        

 

                              Temperature          36.6 LACEY            Wed Dec 1

6 17:27:00 EST 2020   

     

 

                              Heart Rate          91 /MIN             Wed Dec 16

 17:27:00 EST 2020     

   

 

                              Respiration          16 /MIN             Wed Dec 1

6 17:27:00 EST 2020    

    

 

                              SpO2          99 %                Wed Dec 16 17:27

:00 EST 2020        

 

                              Systolic          107 MM[HG]          Wed Dec 16 1

7:27:00 EST 2020    

    

 

                              Diastolic          62 MM[HG]           Wed Dec 16 

17:27:00 EST 2020    

    

 

                              BP Position          2 Position          Wed Dec 1

6 17:27:00 EST 2020
Summary of Patient Chart

                             Created on: 2021



JUWAN DURÁN

External Reference #: [id number]

: 1994

Sex: Male



Demographics





                          Address                   [address] [place] [address]  [id number]

 

                          Home Phone                [phone]

 

                          Preferred Language        English

 

                          Marital Status            Single

 

                          Advent Affiliation     Unknown

 

                          Race                      

 

                          Ethnic Group               or 





Author





                          Author                    JUWAN Eli

nerated

 

                          Organization              [facility]

 

                          Address                   Unknown

 

                          Phone                     Unavailable







Care Team Providers





                    Care Team Member Name Role                Phone

 

                    Jennifer Guzmán     Practitioner        [phone]

 

                    Hallie Holden    AdmittingPractitioner1 [phone]

 

                    Sayra Fuller  Attphys             [phone]

 

                    Gian Hickey      Admphys             [phone]

 

                    Betzy Roth AttendingPractitioner1 [phone]



                           



Functional Status

     No Results                                  



Allergies

           



                Name            Onset Date          Reaction          Severity  

      

 

                                    PCN (penicillin) (Allergy)                  

           Wed Dec 16 

07:00:00 EST 2020                                 Hives                 



                                                 



Encounters

     



                Program Name          Primary Diagnosis          Admission Date/

Time          

Discharge Date/Time        

 

                                    [facility]               

     Opioid dependence, 

uncomplicated                       Wed Dec 16 15:54:00 EST 2020                

   

Sun Dec 20 14:20:00 EST 2020        

 

                              [place] [facility]                                

         10:30:00 

EST 2021 17:53:00 EST   

      

 

                                    [place] [facility]                    Heroin use dis

order, severe, dependence 

                                    Sat Aug 28 17:57:00 EDT 2021                

    Tue Aug 31 

13:10:00 EDT         

 

                                    [facility]               

     Opioid dependence, 

uncomplicated                       Sat Aug 28 16:36:00 EDT                 

   

 

                              [facility]                   

                     Fri Dec 

13 10:40:00 EST 2019 15:41:00 EST 2020  

      



                                                                



Immunizations

     No Known Immunizations                                  



Lab Results

     No Known Laboratory Results                                            



Medications

     



                Medication          Directions          Start Date          End 

Date        

 

                                    SUBOXONE (BUPRENORPHINE-NALOXONE)  8MG-2MG F

ILM          1 Film Daily 

SUBLINGUAL                Mon Aug 30 08:30:00 EDT 2021          Tue Aug 31 13:42

:00 

EDT         

 

                                                  VENTOLIN HFA (ALBUTEROL SULFAT

E) 0.09 MG/1 ACTUATION SUSPENSION       

                          2 Puff Q4HPRN: Every 4 Hours As Needed INHALATION     

     Mon Aug 30 08:00:00

EDT 2021                                Tue Aug 31 13:42:00 EDT         

 

                                    SUBOXONE (BUPRENORPHINE-NALOXONE)  8MG-2MG F

ILM          1 Film Daily 

SUBLINGUAL                Mon Aug 30 10:37:00 EDT 2021          Fri Sep 03 10:36

:00 

EDT         

 

                                    SUBOXONE (BUPRENORPHINE-NALOXONE)  8MG-2MG F

ILM          1 Film Stat 

SUBLINGUAL ([facility])          Sun Aug 29 10:00:00 EDT 2021          Sun Aug 29 

10:03:00 EDT         

 

                                    SUBOXONE (BUPRENORPHINE-NALOXONE)  4MG-1MG F

ILM          1 Film Once 

SUBLINGUAL ([facility])          Sat Aug 28 21:00:00 EDT 2021          Sat Aug 28 

20:25:00 EDT         

 

                                    SUBOXONE (BUPRENORPHINE-NALOXONE)  4MG-1MG F

ILM          1 Film Stat 

SUBLINGUAL ([facility])          Sat Aug 28 18:52:00 EDT 2021          Sat Aug 28 

18:57:00 EDT         

 

                                    NICODERM CQ (NICOTINE) 21 MG/24 HR PATCH, EX

TENDED RELEASE          1 

patch Daily As Needed TRANSDERMAL (Remove at HS. MDD 1 Patch)          Sat Aug 

28 18:47:00 EDT 2021                    Mon Aug 30 18:46:00 EDT         

 

                                    NARCAN (NALOXONE HCL) 4 MG/0.1 ML SPRAY     

     1 spray(s)  As 

Directed NASAL            Sat Aug 28 18:47:00 EDT 2021          Sun Aug 29 

18:46:00 EDT         

 

                                    ACETAMINOPHEN 500 MG CAPSULE          2 caps

ule Q6HPRN: Every 6 Hours 

As Needed ORAL (MDD 6 Tabs)          Sat Aug 28 18:47:00 EDT 2021          Tue 

Aug 31 13:42:00 EDT         

 

                                    DIPHENHYDRAMINE HCL 25 MG CAPSULE          1

 capsule TIDPRN: Three 

Times A Day As Needed ORAL          Sat Aug 28 18:47:00 EDT 2021          Tue 

Aug 31 13:42:00 EDT         

 

                                    DOCUSATE SODIUM 100 MG CAPSULE, LIQUID FILLE

D          1 capsule Daily

As Needed ORAL            Sat Aug 28 18:47:00 EDT 2021          Tue Aug 31 

13:42:00 EDT         

 

                                    HYDROXYZINE HCL 25 MG TABLET          1 tabl

et TIDPRN: Three Times A 

Day As Needed ORAL (Do not give within two (2) hours of Diphenhydramine or 
within one (1) hour of clonidine administration)          Sat Aug 28 18:47:00 

EDT 2021                                Tue Aug 31 13:42:00 EDT         

 

                                    FIBER- MG TABLET          2 tablet NM

N: As Needed ORAL (MDD 4 

Tabs)                     Sat Aug 28 18:47:00 EDT 2021          Tue Aug 31 13:42

:00 EDT 

        

 

                                    IBUPROFEN 200 MG TABLET          3 tablet TI

DPRN: Three Times A Day As

Needed ORAL               Sat Aug 28 18:47:00 EDT 2021          Tue Aug 31 13:42

:00 

EDT         

 

                                    MELATONIN 5 MG CAPSULE          1 capsule HS

PRN: At Bedtime As Needed 

ORAL (MDD 5mg)            Sat Aug 28 18:47:00 EDT 2021          Tue Aug 31 

13:42:00 EDT         

 

                              MULTIVITAMIN   TABLET          1 tablet Daily ORAL

          Sat Aug 28

18:47:00 EDT 2021                       Tue Aug 31 13:42:00 EDT         

 

                                    CALCIUM CARBONATE 500 MG TABLET, CHEWABLE   

       3 tablet TIDPRN: 

Three Times A Day As Needed ORAL          Sat Aug 28 18:47:00 EDT 2021          

Tue Aug 31 13:42:00 EDT         

 

                                    ONDANSETRON 4 MG TABLET          1 tablet TI

DPRN: Three Times A Day As

Needed ORAL               Sat Aug 28 18:47:00 EDT 2021          Tue Aug 31 13:42

:00 

EDT         

 

                                    CLONIDINE HCL 0.1 MG TABLET          1 table

t TIDPRN: Three Times A 

Day As Needed ORAL (SBP >110 HR>60Do not give within one (1) hour of hydroxyzine
administration)           Sat Aug 28 18:47:00 EDT 2021          Tue Aug 31 

13:42:00 EDT         

 

                                    NICOTINE POLACRILEX 2 MG LOZENGE/MELISSA     

     3 Each TID: Three 

Times a Day ORAL          Sat Aug 28 18:48:00 EDT 2021          Tue Aug 31 

13:42:00 EDT         

 

                                                  PROAIR HFA (ALBUTEROL SULFATE)

 0.09 MG/1 ACTUATION SUSPENSION         

                          2 Puff TIDPRN: Three Times A Day As Needed INHALATION 

         Sat Dec 19 

08:00:00 EST 2020                       Sun Dec 20 14:43:00 EST 2020        

 

                                    NICOTINE 14 MG/24 HR PATCH, EXTENDED RELEASE

          1 Patch Daily 

TRANSDERMAL               Fri Dec 18 06:00:00 EST 2020          Sun Dec 20 14:43

:00 

EST 2020        

 

                                                  EUCERIN DAILY PROTECTION (LOTI

ON, MULTI INGREDIENT)  

2%-7.5%-4.5%-2.4%-4.8% LOTION           1 Application TIDPRN: Three Times A Day 

As Needed TOPICAL APPLICATION          Fri Dec 18 07:42:00 EST 2020          Sun

Dec 20 14:43:00 EST 2020        

 

                                                  PROAIR HFA (ALBUTEROL SULFATE)

 0.09 MG/1 ACTUATION SUSPENSION         

                          2 Puff TIDPRN: Three Times A Day As Needed INHALATION 

         Fri Dec 18 

06:00:00 EST 2020                       Sat Dec 19 10:01:00 EST 2020        

 

                                    XOPENEX (LEVALBUTEROL HYDROCHLORIDE) 1.25 MG

/3 ML SOLUTION          1 

mL Stat INHALATION          Thu Dec 17 23:39:00 EST 2020          Thu Ryan 14 

23:38:00 EST 2021        

 

                                                  BACTRIM DS (SULFAMETHOXAZOLE-T

RIMETHOPRIM)  800MG-160MG TABLET        

                    1 Tablet BID: Twice a Day ORAL          Thu Dec 17 06:00:00 

EST 2020          

Sun Dec 20 14:43:00 EST 2020        

 

                                    SUBOXONE (BUPRENORPHINE-NALOXONE)  8MG-2MG F

ILM          1 Film Daily 

SUBLINGUAL                Fri Dec 18 06:00:00 EST 2020          Sun Dec 20 14:43

:00 

EST 2020        

 

                                    NARCAN (NALOXONE HCL) 4 MG/0.1 ML SPRAY     

     1 spray(s)  Once 

NASAL                     Thu Dec 17 06:00:00 EST 2020          Sun Dec 20 14:43

:00 EST 

2020        

 

                                    SUBOXONE (BUPRENORPHINE-NALOXONE)  4MG-1MG F

ILM          1 Film Stat 

SUBLINGUAL (Take from [facility])          Thu Dec 17 06:00:00 EST 2020          Thu 

Dec 17 12:21:00 EST 2020        

 

                                    SUBOXONE (BUPRENORPHINE-NALOXONE)  4MG-1MG F

ILM          1 Film Daily 

SUBLINGUAL                Thu Dec 17 06:00:00 EST 2020          Fri Dec 18 05:59

:00 

EST 2020        

 

                                    NARCAN (NALOXONE HCL) 4 MG/0.1 ML SPRAY     

     1 spray(s)  As 

Directed NASAL            Wed Dec 16 17:02:00 EST 2020          Sun Dec 20 

14:43:00 EST 2020        

 

                                    ACETAMINOPHEN 500 MG CAPSULE          2 caps

ule Q4-6HPRN: Every 4-6 

Hours As Needed ORAL (MDD 6 Tabs)          Wed Dec 16 17:01:00 EST 2020         

                                        Sun Dec 20 14:43:00 EST 2020        

 

                                                  BENADRYL ALLERGY (DIPHENHYDRAM

INE HYDROCHLORIDE) 25 MG TABLET         

                          1 tablet TIDPRN: Three Times A Day As Needed ORAL     

     Wed Dec 16 17:01:00 

EST 2020                                Sun Dec 20 14:43:00 EST 2020        

 

                                    CATAPRES (CLONIDINE HYDROCHLORIDE) 0.1 MG TA

BLET          1 tablet 

TIDPRN: Three Times A Day As Needed ORAL (Systolic BP >110, HR>60)          Wed 

Dec 16 17::00 EST 2020                Sun Dec 20 14:43:00 EST 2020        

 

                                    COLACE (DOCUSATE SODIUM) 100 MG CAPSULE, LIQ

UID FILLED          1 

capsule Daily As Needed ORAL          Wed Dec 16 17:01:00 EST 2020          Sun 

Dec 20 14:43:00 EST 2020        

 

                                    HYDROXYZINE HCL 25 MG TABLET          1 tabl

et TIDPRN: Three Times A 

Day As Needed ORAL (Do not give within 2 hours of Diphenhydramine)          Wed 

Dec 16 17::00 EST 2020                Sun Dec 20 14:43:00 EST 2020        

 

                                    FIBER- MG TABLET          2 tablet NM

N: As Needed ORAL (MDD 4 

Tabs)                     Wed Dec 16 17:01:00 EST 2020          Sun Dec 20 14:43

:00 EST 

2020        

 

                                    IBUPROFEN 200 MG TABLET          3 tablet Da

eldon As Needed ORAL (MDD 

3200mg)                   Wed Dec 16 17:01:00 EST 2020          Sun Dec 20 14:43

:00 EST 

2020        

 

                                    MELATONIN 5 MG CAPSULE          1 capsule HS

PRN: At Bedtime As Needed 

ORAL (MDD 5mg)            Wed Dec 16 17:01:00 EST 2020          Sun Dec 20 

14:43:00 EST 2020        

 

                              MULTIVITAMIN   TABLET          1 tablet Daily ORAL

          Wed Dec 16

17:01:00 EST 2020                       Sun Dec 20 14:43:00 EST 2020        

 

                                    TUMS (CALCIUM CARBONATE) 500 MG TABLET, CHEW

ABLE          3 tablet 

PRN: As Needed ORAL          Wed Dec 16 17:01:00 EST 2020          Sun Dec 20 

14:43:00 EST 2020        

 

                                    ZOFRAN (ONDANSETRON HYDROCHLORIDE) 4 MG TABL

ET          1 tablet 

TIDPRN: Three Times A Day As Needed ORAL          Wed Dec 16 17:01:00 EST 2020  

                                        Sun Dec 20 14:43:00 EST 2020        



                                                                                
                                                                                
                                                                                
 



Problems

     No Known Problems                                            



Procedures

     No Known Procedures                                  



Social History

     



                    Social History Observation          Description          Abrahan

e        

 

                              Smoking Status                    Current Every Da

y Smoker          

Fri Dec 13 07:00:00 EST 2019        

 

                              Smoking Status                    Current Every Da

y Smoker          

Fri Dec 13 07:00:00 EST 2019        

 

                              Smoking Status                    Current Every Da

y Smoker          

Fri Dec 13 07:00:00 EST 2019        

 

                              Smoking Status                    Current Every Da

y Smoker          

Fri Dec 13 07:00:00 EST 2019        

 

                              Smoking Status                    Current Every Da

y Smoker          

Fri Dec 13 07:00:00 EST 2019        

 

                              Smoking Status                    Current Every Da

y Smoker          

Fri Dec 13 07:00:00 EST         

 

                              Smoking Status                    Current Every Da

y Smoker          

Fri Dec 13 07:00:00 EST         

 

                              Smoking Status                    Current Every Da

y Smoker          

Fri Dec 13 07:00:00 EST         

 

                              Smoking Status                    Current Every Da

y Smoker          

Fri Dec 13 07:00:00 EST         

 

                              Smoking Status                    Current Every Da

y Smoker          

Fri Dec 13 07:00:00 EST         

 

                              Smoking Status                    Current Every Da

y Smoker          

Fri Dec 13 07:00:00 EST         

 

                              Smoking Status                    Current Every Da

y Smoker          

Fri Dec 13 07:00:00 EST         

 

                    Birth Sex           Male                Sat [birth year] 07:00:00 

EST         



                                                                                
                  



Vital Signs

     



                    Vital Sign          Measurement          Date        

 

                              Heart Rate          94 /MIN             Tue Aug 31

 13:17:00 EDT      

  

 

                              Systolic          104 MM[HG]          Tue Aug 31 1

3:17:00 EDT     

    

 

                              Diastolic          61 MM[HG]           Tue Aug 31 

13:17:00 EDT     

    

 

                              Temperature          97.7 [DEGF]          Tue Aug 

31 05:43:00 EDT 

        

 

                              Temperature          36.5 LACEY            Tue Aug 3

1 05:43:00 EDT    

     

 

                              Heart Rate          64 /MIN             Tue Aug 31

 05:43:00 EDT      

   

 

                              Respiration          16 /MIN             Tue Aug 3

1 05:43:00 EDT     

    

 

                              Systolic          117 MM[HG]          Tue Aug 31 0

5:43:00 EDT     

    

 

                              Diastolic          62 MM[HG]           Tue Aug 31 

05:43:00 EDT     

    

 

                              BP Position          1 Position          Tue Aug 3

1 05:43:00 EDT  

       

 

                              Temperature          97.8 [DEGF]          Mon Aug 

30 19:01:00 EDT 

        

 

                              Temperature          36.6 LACEY            Mon Aug 3

0 19:01:00 EDT    

     

 

                              Heart Rate          90 /MIN             Mon Aug 30

 19:01:00 EDT      

   

 

                              Respiration          16 /MIN             Mon Aug 3

0 19:01:00 EDT     

    

 

                              Systolic          110 MM[HG]          Mon Aug 30 1

9:01:00 EDT     

    

 

                              Diastolic          66 MM[HG]           Mon Aug 30 

19:01:00 EDT     

    

 

                              Temperature          97.3 [DEGF]          Mon Aug 

30 05:31:00 EDT 

        

 

                              Temperature          36.3 LACEY            Mon Aug 3

0 05:31:00 EDT    

     

 

                              Heart Rate          64 /MIN             Mon Aug 30

 05:31:00 EDT      

   

 

                              Respiration          16 /MIN             Mon Aug 3

0 05:31:00 EDT     

    

 

                              Systolic          118 MM[HG]          Mon Aug 30 0

5:31:00 EDT     

    

 

                              Diastolic          69 MM[HG]           Mon Aug 30 

05:31:00 EDT     

    

 

                              BP Position          2 Position          Mon Aug 3

0 05:31:00 EDT  

       

 

                              Heart Rate          86 /MIN             Sun Aug 29

 20:15:00 EDT      

   

 

                              Respiration          17 /MIN             Sun Aug 2

9 20:15:00 EDT     

    

 

                              Systolic          110 MM[HG]          Sun Aug 29 2

0:15:00 EDT     

    

 

                              Diastolic          69 MM[HG]           Sun Aug 29 

20:15:00 EDT     

    

 

                              BP Position          3 Position          Sun Aug 2

9 20:15:00 EDT  

       

 

                              Temperature          97.5 [DEGF]          Sun Aug 

29 06:16:00 EDT 

        

 

                              Temperature          36.4 LACEY            Sun Aug 2

9 06:16:00 EDT    

     

 

                              Heart Rate          71 /MIN             Sun Aug 29

 06:16:00 EDT      

   

 

                              Respiration          16 /MIN             Sun Aug 2

9 06:16:00 EDT     

    

 

                              Systolic          120 MM[HG]          Sun Aug 29 0

6:16:00 EDT     

    

 

                              Diastolic          64 MM[HG]           Sun Aug 29 

06:16:00 EDT     

    

 

                              BP Position          2 Position          Sun Aug 2

9 06:16:00 EDT  

       

 

                              Temperature          98.7 [DEGF]          Sat Aug 

28 20:06:00 EDT 

        

 

                              Temperature          37.1 LACEY            Sat Aug 2

8 20:06:00 EDT    

     

 

                              Heart Rate          74 /MIN             Sat Aug 28

 20:06:00 EDT      

   

 

                              Respiration          16 /MIN             Sat Aug 2

8 20:06:00 EDT     

    

 

                              SpO2          100 %               Sat Aug 28 20:06

:00 EDT         

 

                              Systolic          131 MM[HG]          Sat Aug 28 2

0:06:00 EDT     

    

 

                              Diastolic          71 MM[HG]           Sat Aug 28 

20:06:00 EDT     

    

 

                              BP Position          2 Position          Sat Aug 2

8 20:06:00 EDT  

       

 

                              Height          6 0.0 ft            Sat Aug 28 20:

06:00 EDT         

 

                              Height          72 in               Sat Aug 28 20:

06:00 EDT         

 

                              Height          182.9 cm            Sat Aug 28 20:

06:00 EDT         

 

                              Weight Lbs          140 lbs             Sat Aug 28

 20:06:00 EDT      

   

 

                              Weight Kgs          63.6 KG             Sat Aug 28

 20:06:00 EDT      

   

 

                              BMI          19                  Sat Aug  20:06:

00 ED2021        

 

                              Temperature          97.6 [DEGF]          Sun Dec 

20 10:00:00 EST 2020

        

 

                              Temperature          36.4 LACEY            Sun Dec 2

0 10:00:00 EST 2020   

     

 

                              Heart Rate          72 /MIN             Sun Dec 20

 10:00:00 EST 2020     

   

 

                              Respiration          14 /MIN             Sun Dec 2

0 10:00:00 EST 2020    

    

 

                              Systolic          122 MM[HG]          Sun Dec 20 1

0:00:00 EST 2020    

    

 

                              Diastolic          68 MM[HG]           Sun Dec 20 

10:00:00 EST 2020    

    

 

                              BP Position          3 Position          Sun Dec 2

0 10:00:00 EST 2020 

       

 

                              Temperature          97.8 [DEGF]          Sat Dec 

19 18:19:00 EST 2020

        

 

                              Temperature          36.6 LACEY            Sat Dec 1

9 18:19:00 EST 2020   

     

 

                              Heart Rate          92 /MIN             Sat Dec 19

 18:19:00 EST 2020     

   

 

                              Respiration          17 /MIN             Sat Dec 1

9 18:19:00 EST 2020    

    

 

                              Systolic          114 MM[HG]          Sat Dec 19 1

8:19:00 EST 2020    

    

 

                              Diastolic          74 MM[HG]           Sat Dec 19 

18:19:00 EST 2020    

    

 

                              Temperature          98.2 [DEGF]          Sat Dec 

19 11:35:00 EST 2020

        

 

                              Temperature          36.8 LACEY            Sat Dec 1

9 11:35:00 EST 2020   

     

 

                              Heart Rate          79 /MIN             Sat Dec 19

 11:35:00 EST 2020     

   

 

                              Respiration          16 /MIN             Sat Dec 1

9 11:35:00 EST 2020    

    

 

                              Systolic          119 MM[HG]          Sat Dec 19 1

1:35:00 EST 2020    

    

 

                              Diastolic          68 MM[HG]           Sat Dec 19 

11:35:00 EST 2020    

    

 

                              BP Position          3 Position          Sat Dec 1

9 11:35:00 EST 2020 

       

 

                              Temperature          97.5 [DEGF]          Sat Dec 

19 05:37:00 EST 2020

        

 

                              Temperature          36.4 LACEY            Sat Dec 1

9 05:37:00 EST 2020   

     

 

                              Heart Rate          64 /MIN             Sat Dec 19

 05:37:00 EST 2020     

   

 

                              Respiration          16 /MIN             Sat Dec 1

9 05:37:00 EST 2020    

    

 

                              Systolic          107 MM[HG]          Sat Dec 19 0

5:37:00 EST 2020    

    

 

                              Diastolic          60 MM[HG]           Sat Dec 19 

05:37:00 EST 2020    

    

 

                              BP Position          2 Position          Sat Dec 1

9 05:37:00 EST 2020 

       

 

                              Heart Rate          53 /MIN             Fri Dec 18

 18:19:00 EST 2020     

   

 

                              Respiration          16 /MIN             Fri Dec 1

8 18:19:00 EST 2020    

    

 

                              Systolic          113 MM[HG]          Fri Dec 18 1

8:19:00 EST 2020    

    

 

                              Diastolic          63 MM[HG]           Fri Dec 18 

18:19:00 EST 2020    

    

 

                              BP Position          3 Position          Fri Dec 1

8 18:19:00 EST 2020 

       

 

                              Temperature          98.7 [DEGF]          Fri Dec 

18 14:07:00 EST 2020

        

 

                              Temperature          37.1 LACEY            Fri Dec 1

8 14:07:00 EST 2020   

     

 

                              Heart Rate          74 /MIN             Fri Dec 18

 14:07:00 EST 2020     

   

 

                              Respiration          17 /MIN             Fri Dec 1

8 14:07:00 EST 2020    

    

 

                              Systolic          103 MM[HG]          Fri Dec 18 1

4:07:00 EST 2020    

    

 

                              Diastolic          62 MM[HG]           Fri Dec 18 

14:07:00 EST 2020    

    

 

                              Temperature          98.7 [DEGF]          Fri Dec 

18 13:56:00 EST 2020

        

 

                              Temperature          37.1 LACEY            Fri Dec 1

8 13:56:00 EST 2020   

     

 

                              Heart Rate          74 /MIN             Fri Dec 18

 13:56:00 EST 2020     

   

 

                              Respiration          17 /MIN             Fri Dec 1

8 13:56:00 EST 2020    

    

 

                              Systolic          103 MM[HG]          Fri Dec 18 1

3:56:00 EST 2020    

    

 

                              Diastolic          62 MM[HG]           Fri Dec 18 

13:56:00 EST 2020    

    

 

                              BP Position          3 Position          Fri Dec 1

8 13:56:00 EST 2020 

       

 

                              Temperature          97.7 [DEGF]          Fri Dec 

18 06:02:00 EST 2020

        

 

                              Temperature          36.5 LACEY            Fri Dec 1

8 06:02:00 EST 2020   

     

 

                              Heart Rate          73 /MIN             Fri Dec 18

 06:02:00 EST 2020     

   

 

                              Respiration          16 /MIN             Fri Dec 1

8 06:02:00 EST 2020    

    

 

                              Systolic          107 MM[HG]          Fri Dec 18 0

6:02:00 EST 2020    

    

 

                              Diastolic          61 MM[HG]           Fri Dec 18 

06:02:00 EST 2020    

    

 

                              BP Position          2 Position          Fri Dec 1

8 06:02:00 EST 2020 

       

 

                              Temperature          98.7 [DEGF]          Thu Dec 

17 21:48:00 EST 2020

        

 

                              Temperature          37.1 LACEY            Thu Dec 1

7 21:48:00 EST 2020   

     

 

                              Heart Rate          16 /MIN             Thu Dec 17

 21:48:00 EST 2020     

   

 

                              Respiration          16 /MIN             Thu Dec 1

7 21:48:00 EST 2020    

    

 

                              Systolic          136 MM[HG]          Thu Dec 17 2

1:48:00 EST 2020    

    

 

                              Diastolic          76 MM[HG]           Thu Dec 17 

21:48:00 EST 2020    

    

 

                              BP Position          3 Position          Thu Dec 1

7 21:48:00 EST 2020 

       

 

                              Temperature          97.8 [DEGF]          u Dec 

17 07:21:00 EST 2020

        

 

                              Temperature          36.6 LACEY            Thu Dec 1

7 07:21:00 EST 2020   

     

 

                              Heart Rate          82 /MIN             Thu Dec 17

 07:21:00 EST 2020     

   

 

                              Respiration          16 /MIN             Thu Dec 1

7 07:21:00 EST 2020    

    

 

                              Systolic          112 MM[HG]          Thu Dec 17 0

7:21:00 EST 2020    

    

 

                              Diastolic          70 MM[HG]           Thu Dec 17 

07:21:00 EST 2020    

    

 

                              BP Position          3 Position          u Dec 1

7 07:21:00 EST 2020 

       

 

                              Temperature          98.2 [DEGF]          Wed Dec 

16 19:12:00 EST 2020

        

 

                              Temperature          36.8 LACEY            Wed Dec 1

6 19:12:00 EST 2020   

     

 

                              Heart Rate          87 /MIN             Wed Dec 16

 19:12:00 EST 2020     

   

 

                              Respiration          18 /MIN             Wed Dec 1

6 19:12:00 EST 2020    

    

 

                              Systolic          115 MM[HG]          Wed Dec 16 1

9:12:00 EST 2020    

    

 

                              Diastolic          75 MM[HG]           Wed Dec 16 

19:12:00 EST 2020    

    

 

                              Temperature          97.8 [DEGF]          Wed Dec 

16 17:27:00 EST 2020

        

 

                              Temperature          36.6 LACEY            Wed Dec 1

6 17:27:00 EST 2020   

     

 

                              Heart Rate          91 /MIN             Wed Dec 16

 17:27:00 EST 2020     

   

 

                              Respiration          16 /MIN             Wed Dec 1

6 17:27:00 EST 2020    

    

 

                              SpO2          99 %                Wed Dec 16 17:27

:00 EST 2020        

 

                              Systolic          107 MM[HG]          Wed Dec 16 1

7:27:00 EST 2020    

    

 

                              Diastolic          62 MM[HG]           Wed Dec 16 

17:27:00 EST 2020    

    

 

                              BP Position          2 Position          Wed Dec 1

6 17:27:00 EST 2020
Summary of Patient Chart

                             Created on: 2021



JUWAN DURÁN

External Reference #: [id number]

: 1994

Sex: Male



Demographics





                          Address                   [address] [place] [address]

 

                          Home Phone                [phone]

 

                          Preferred Language        English

 

                          Marital Status            Single

 

                          Rastafarian Affiliation     Unknown

 

                          Race                      

 

                          Ethnic Group               or 





Author





                          JUWAN Gimenez

nerated

 

                          Organization              [facility]

 

                          Address                   Unknown

 

                          Phone                     Unavailable







Care Team Providers





                    Care Team Member Name Role                Phone

 

                    Jennifer Guzmán     Practitioner        [phone]

 

                    Hallie Holden    Admphys             [phone]

 

                    Sayra Fuller  Attphys             [phone]

 

                    Gian Hickey      Admphys             [phone]

 

                    Betzy Roth AttendingPractitioner1 [phone]



                           



Functional Status

     No Results                                  



Allergies

           



                Name            Onset Date          Reaction          Severity  

      

 

                                    PCN (penicillin) (Allergy)                  

           Wed Dec 16 

07:00:00 EST                                  Hives                 



                                                 



Encounters

     



                Program Name          Primary Diagnosis          Admission Date/

Time          

Discharge Date/Time        

 

                                    [facility]                    Heroin use dis

order, severe, dependence 

                                    Sat Aug 28 17:57:00 EDT                 

   

 

                                    [facility]               

     Opioid dependence, 

uncomplicated                       Wed Dec 16 15:54:00 EST 2020                

   

Sun Dec 20 14:20:00 EST 2020        

 

                                    [facility]               

     Opioid dependence, 

uncomplicated                       Sat Aug 28 16:36:00 EDT                 

   

 

                              [facility]                   

                     Fri Dec 

13 10:40:00 EST 2019 15:41:00 EST 2020  

      

 

                              [facility]                                

         10:30:00 

EST 2021 17:53:00 EST   

      



                                                                



Immunizations

     No Known Immunizations                                  



Lab Results

     No Known Laboratory Results                                            



Medications

     



                Medication          Directions          Start Date          End 

Date        

 

                                    SUBOXONE (BUPRENORPHINE-NALOXONE)  4MG-1MG F

ILM          1 Film Once 

SUBLINGUAL ([facility])          Sat Aug 28 21:00:00 EDT 2021          Sat Aug 28 

20:25:00 EDT         

 

                                    SUBOXONE (BUPRENORPHINE-NALOXONE)  4MG-1MG F

ILM          1 Film Stat 

SUBLINGUAL (EKIT)          Sat Aug 28 18:52:00 EDT           Sat Aug 28 

18:57:00 EDT         

 

                                    NICOTINE POLACRILEX 2 MG LOZENGE/MELISSA     

     3 Each TID: Three 

Times a Day ORAL          Sat Aug 28 18:48:00 EDT           [place] [address] 

:47:00 EDT         

 

                                    NICODERM CQ (NICOTINE) 21 MG/24 HR PATCH, EX

TENDED RELEASE          1 

patch Daily As Needed TRANSDERMAL (Remove at HS. MDD 1 Patch)          Sat Aug 

28 18:47:00 EDT                     Mon Aug 30 18:46:00 EDT         

 

                                    CLONIDINE HCL 0.1 MG TABLET          1 table

t TIDPRN: Three Times A 

Day As Needed ORAL (SBP >110 HR>60Do not give within one (1) hour of hydroxyzine
administration)           Sat Aug 28 18:47:00 EDT           [place] [id number] 

:46:00 EDT         

 

                                    NARCAN (NALOXONE HCL) 4 MG/0.1 ML SPRAY     

     1 spray(s)  As 

Directed NASAL            Sat Aug 28 18:47:00 EDT           [place] Aug 29 

18:46:00 EDT         

 

                                    ONDANSETRON 4 MG TABLET          1 tablet TI

DPRN: Three Times A Day As

Needed ORAL               Sat Aug 28 18:47:00 EDT           Sat Sep 04 :46

:00 

EDT         

 

                                    CALCIUM CARBONATE 500 MG TABLET, CHEWABLE   

       3 tablet TIDPRN: 

Three Times A Day As Needed ORAL          Sat Aug 28 18:47:00 EDT           

Sat Sep 25 :46:00 EDT         

 

                              MULTIVITAMIN   TABLET          1 tablet Daily ORAL

          Sat Aug 28

18:47:00 EDT                        Sat Sep 25 :46:00 EDT         

 

                                    MELATONIN 5 MG CAPSULE          1 capsule HS

PRN: At Bedtime As Needed 

ORAL (MDD 5mg)            Sat Aug 28 18:47:00 EDT           [place] [address] 

:46:00 EDT         

 

                                    IBUPROFEN 200 MG TABLET          3 tablet TI

DPRN: Three Times A Day As

Needed ORAL               Sat Aug 28 18:47:00 EDT           Sat Sep 25 :46

:00 

EDT         

 

                                    FIBER- MG TABLET          2 tablet AK

N: As Needed ORAL (MDD 4 

Tabs)                     Sat Aug 28 18:47:00 EDT           Sat Sep 25 :46

:00 EDT 

        

 

                                    HYDROXYZINE HCL 25 MG TABLET          1 tabl

et TIDPRN: Three Times A 

Day As Needed ORAL (Do not give within two (2) hours of Diphenhydramine or 
within one (1) hour of clonidine administration)          Sat Aug 28 18:47:00 

EDT                                 Sat Sep 04 18:46:00 EDT         

 

                                    DOCUSATE SODIUM 100 MG CAPSULE, LIQUID FILLE

D          1 capsule Daily

As Needed ORAL            Sat Aug 28 18:47:00 ED2021          Sat Sep :46:00 ED2021        

 

                                    DIPHENHYDRAMINE HCL 25 MG CAPSULE          1

 capsule TIDPRN: Three 

Times A Day As Needed ORAL          Sat Aug 28 18:47:00 ED2021          Sat 

Sep  18:46:00 EDT         

 

                                    ACETAMINOPHEN 500 MG CAPSULE          2 caps

ule Q6HPRN: Every 6 Hours 

As Needed ORAL (MDD 6 Tabs)          Sat Aug 28 18:47:00 EDT           [facility]  18:46:00 EDT         

 

                                                  PROAIR HFA (ALBUTEROL SULFATE)

 0.09 MG/1 ACTUATION SUSPENSION         

                          2 Puff TIDPRN: Three Times A Day As Needed INHALATION 

         Sat Dec 19 

08:00:00 EST 2020                       Sun Dec 20 14:43:00 EST 2020        

 

                                    NICOTINE 14 MG/24 HR PATCH, EXTENDED RELEASE

          1 Patch Daily 

TRANSDERMAL               Fri Dec 18 06:00:00 EST 2020          Sun Dec 20 14:43

:00 

EST 2020        

 

                                                  EUCERIN DAILY PROTECTION (LOTI

ON, MULTI INGREDIENT)  

2%-7.5%-4.5%-2.4%-4.8% LOTION           1 Application TIDPRN: Three Times A Day 

As Needed TOPICAL APPLICATION          Fri Dec 18 07:42:00 EST 2020          Sun

Dec 20 14:43:00 EST 2020        

 

                                                  PROAIR HFA (ALBUTEROL SULFATE)

 0.09 MG/1 ACTUATION SUSPENSION         

                          2 Puff TIDPRN: Three Times A Day As Needed INHALATION 

         Fri Dec 18 

06:00:00 EST 2020                       Sat Dec 19 10:01:00 EST 2020        

 

                                    XOPENEX (LEVALBUTEROL HYDROCHLORIDE) 1.25 MG

/3 ML SOLUTION          1 

mL Stat INHALATION          Thu Dec 17 23:39:00 EST 2020          Thu Ryan 14 

23:38:00 EST 2021        

 

                                                  BACTRIM DS (SULFAMETHOXAZOLE-T

RIMETHOPRIM)  800MG-160MG TABLET        

                    1 Tablet BID: Twice a Day ORAL          Thu Dec 17 06:00:00 

EST 2020          

Sun Dec 20 14:43:00 EST 2020        

 

                                    SUBOXONE (BUPRENORPHINE-NALOXONE)  8MG-2MG F

ILM          1 Film Daily 

SUBLINGUAL                Fri Dec 18 06:00:00 EST 2020          Sun Dec 20 14:43

:00 

EST 2020        

 

                                    NARCAN (NALOXONE HCL) 4 MG/0.1 ML SPRAY     

     1 spray(s)  Once 

NASAL                     Thu Dec 17 06:00:00 EST 2020          Sun Dec 20 14:43

:00 EST 

2020        

 

                                    SUBOXONE (BUPRENORPHINE-NALOXONE)  4MG-1MG F

ILM          1 Film Stat 

SUBLINGUAL (Take from EKIT)          Thu Dec 17 06:00:00 EST 2020          Thu 

Dec 17 12:21:00 EST 2020        

 

                                    SUBOXONE (BUPRENORPHINE-NALOXONE)  4MG-1MG F

ILM          1 Film Daily 

SUBLINGUAL                Thu Dec 17 06:00:00 EST 2020          Fri Dec 18 05:59

:00 

EST 2020        

 

                                    NARCAN (NALOXONE HCL) 4 MG/0.1 ML SPRAY     

     1 spray(s)  As 

Directed NASAL            Wed Dec 16 17:02:00 EST 2020          Sun Dec 20 

14:43:00 EST 2020        

 

                                    ACETAMINOPHEN 500 MG CAPSULE          2 caps

ule Q4-6HPRN: Every 4-6 

Hours As Needed ORAL (MDD 6 Tabs)          Wed Dec 16 17:01:00 EST 2020         

                                        Sun Dec 20 14:43:00 EST 2020        

 

                                                  BENADRYL ALLERGY (DIPHENHYDRAM

INE HYDROCHLORIDE) 25 MG TABLET         

                          1 tablet TIDPRN: Three Times A Day As Needed ORAL     

     Wed Dec 16 17::00 

EST 2020                                Sun Dec 20 14:43:00 EST 2020        

 

                                    CATAPRES (CLONIDINE HYDROCHLORIDE) 0.1 MG TA

BLET          1 tablet 

TIDPRN: Three Times A Day As Needed ORAL (Systolic BP >110, HR>60)          Wed 

Dec 16 17::00 EST 2020                Sun Dec 20 14:43:00 EST 2020        

 

                                    COLACE (DOCUSATE SODIUM) 100 MG CAPSULE, LIQ

UID FILLED          1 

capsule Daily As Needed ORAL          Wed Dec 16 17::00 EST 2020          Sun 

Dec 20 14:43:00 EST 2020        

 

                                    HYDROXYZINE HCL 25 MG TABLET          1 tabl

et TIDPRN: Three Times A 

Day As Needed ORAL (Do not give within 2 hours of Diphenhydramine)          Wed 

Dec 16 17:01:00 EST 2020                Sun Dec 20 14:43:00 EST 2020        

 

                                    FIBER- MG TABLET          2 tablet AK

N: As Needed ORAL (MDD 4 

Tabs)                     Wed Dec 16 17::00 EST 2020          Sun Dec 20 14:43

:00 EST 

2020        

 

                                    IBUPROFEN 200 MG TABLET          3 tablet Da

eldon As Needed ORAL (MDD 

3200mg)                   Wed Dec 16 17:01:00 EST 2020          Sun Dec 20 14:43

:00 EST 

2020        

 

                                    MELATONIN 5 MG CAPSULE          1 capsule HS

PRN: At Bedtime As Needed 

ORAL (MDD 5mg)            Wed Dec 16 17:01:00 EST 2020          Sun Dec 20 

14:43:00 EST 2020        

 

                              MULTIVITAMIN   TABLET          1 tablet Daily ORAL

          Wed Dec 16

17::00 EST 2020                       Sun Dec 20 14:43:00 EST 2020        

 

                                    TUMS (CALCIUM CARBONATE) 500 MG TABLET, CHEW

ABLE          3 tablet 

PRN: As Needed ORAL          Wed Dec 16 17::00 EST 2020          Sun Dec 20 

14:43:00 EST 2020        

 

                                    ZOFRAN (ONDANSETRON HYDROCHLORIDE) 4 MG TABL

ET          1 tablet 

TIDPRN: Three Times A Day As Needed ORAL          Wed Dec 16 17::00 EST 2020  

                                        Sun Dec 20 14:43:00 EST 2020        



                                                                                
                                                                                
                                                              



Problems

     No Known Problems                                            



Procedures

     No Known Procedures                                  



Social History

     



                    Social History Observation          Description          Abrahan

e        

 

                              Smoking Status                    Current Every Da

y Smoker          

Fri Dec 13 07:00:00 EST         

 

                              Smoking Status                    Current Every Da

y Smoker          

Fri Dec 13 07::00 EST         

 

                              Smoking Status                    Current Every Da

y Smoker          

Fri Dec 13 07::00 EST         

 

                              Smoking Status                    Current Every Da

y Smoker          

Fri Dec 13 07::00 EST         

 

                              Smoking Status                    Current Every Da

y Smoker          

Fri Dec 13 07::00 EST         

 

                              Smoking Status                    Current Every Da

y Smoker          

Fri Dec 13 07::00 EST         

 

                              Smoking Status                    Current Every Da

y Smoker          

Fri Dec 13 07::00 EST         

 

                              Smoking Status                    Current Every Da

y Smoker          

Fri Dec 13 07::00 EST         

 

                              Smoking Status                    Current Every Da

y Smoker          

Fri Dec 13 07::00 EST         

 

                              Smoking Status                    Current Every Da

y Smoker          

Fri Dec 13 07:00:00 EST         

 

                    Birth Sex           Male                Sat Nov 11 07:00:00 

EST         



                                                                                
        



Vital Signs

     



                    Vital Sign          Measurement          Date        

 

                              Temperature          98.7 [DEGF]          Sat Aug 

28 20:06:00 EDT 

        

 

                              Temperature          37.1 LACEY            Sat Aug 2

8 20:06:00 EDT    

     

 

                              Heart Rate          74 /MIN             Sat Aug 28

 20:06:00 EDT      

   

 

                              Respiration          16 /MIN             Sat Aug 2

8 20:06:00 EDT     

    

 

                              SpO2          100 %               Sat Aug 28 20:06

:00 EDT         

 

                              Systolic          131 MM[HG]          Sat Aug 28 2

0:06:00 EDT     

    

 

                              Diastolic          71 MM[HG]           Sat Aug 28 

20:06:00 EDT     

    

 

                              BP Position          2 Position          Sat Aug 2

8 20:06:00 ED2021 

       

 

                              Height          6 0.0 ft            Sat Aug 28 20:

06:00 ED2021        

 

                              Height          72 in               Sat Aug 28 20:

06:00 ED2021        

 

                              Height          182.9 cm            Sat Aug 28 20:

06:00 ED2021        

 

                              Weight Lbs          140 lbs             Sat Aug 28

 20:06:00 ED2021     

   

 

                              Weight Kgs          63.6 KG             Sat Aug 28

 20:06:00 ED2021     

   

 

                              BMI          19                  Sat Aug 28 20:06:

00 EDT         

 

                              Temperature          97.6 [DEGF]          Sun Dec 

20 10:00:00 EST 2020

        

 

                              Temperature          36.4 LACEY            Sun Dec 2

0 10:00:00 EST 2020   

     

 

                              Heart Rate          72 /MIN             Sun Dec 20

 10:00:00 EST 2020     

   

 

                              Respiration          14 /MIN             Sun Dec 2

0 10:00:00 EST 2020    

    

 

                              Systolic          122 MM[HG]          Sun Dec 20 1

0:00:00 EST 2020    

    

 

                              Diastolic          68 MM[HG]           Sun Dec 20 

10:00:00 EST 2020    

    

 

                              BP Position          3 Position          Sun Dec 2

0 10:00:00 EST 2020 

       

 

                              Temperature          97.8 [DEGF]          Sat Dec 

19 18:19:00 EST 2020

        

 

                              Temperature          36.6 LACEY            Sat Dec 1

9 18:19:00 EST 2020   

     

 

                              Heart Rate          92 /MIN             Sat Dec 19

 18:19:00 EST 2020     

   

 

                              Respiration          17 /MIN             Sat Dec 1

9 18:19:00 EST 2020    

    

 

                              Systolic          114 MM[HG]          Sat Dec 19 1

8:19:00 EST 2020    

    

 

                              Diastolic          74 MM[HG]           Sat Dec 19 

18:19:00 EST 2020    

    

 

                              Temperature          98.2 [DEGF]          Sat Dec 

19 11:35:00 EST 2020

        

 

                              Temperature          36.8 LACEY            Sat Dec 1

9 11:35:00 EST 2020   

     

 

                              Heart Rate          79 /MIN             Sat Dec 19

 11:35:00 EST 2020     

   

 

                              Respiration          16 /MIN             Sat Dec 1

9 11:35:00 EST 2020    

    

 

                              Systolic          119 MM[HG]          Sat Dec 19 1

1:35:00 EST 2020    

    

 

                              Diastolic          68 MM[HG]           Sat Dec 19 

11:35:00 EST 2020    

    

 

                              BP Position          3 Position          Sat Dec 1

9 11:35:00 EST 2020 

       

 

                              Temperature          97.5 [DEGF]          Sat Dec 

19 05:37:00 EST 2020

        

 

                              Temperature          36.4 LACEY            Sat Dec 1

9 05:37:00 EST 2020   

     

 

                              Heart Rate          64 /MIN             Sat Dec 19

 05:37:00 EST 2020     

   

 

                              Respiration          16 /MIN             Sat Dec 1

9 05:37:00 EST 2020    

    

 

                              Systolic          107 MM[HG]          Sat Dec 19 0

5:37:00 EST 2020    

    

 

                              Diastolic          60 MM[HG]           Sat Dec 19 

05:37:00 EST 2020    

    

 

                              BP Position          2 Position          Sat Dec 1

9 05:37:00 EST 2020 

       

 

                              Heart Rate          53 /MIN             Fri Dec 18

 18:19:00 EST 2020     

   

 

                              Respiration          16 /MIN             Fri Dec 1

8 18:19:00 EST 2020    

    

 

                              Systolic          113 MM[HG]          Fri Dec 18 1

8:19:00 EST 2020    

    

 

                              Diastolic          63 MM[HG]           Fri Dec 18 

18:19:00 EST 2020    

    

 

                              BP Position          3 Position          Fri Dec 1

8 18:19:00 EST 2020 

       

 

                              Temperature          98.7 [DEGF]          Fri Dec 

18 14:07:00 EST 2020

        

 

                              Temperature          37.1 LACEY            Fri Dec 1

8 14:07:00 EST 2020   

     

 

                              Heart Rate          74 /MIN             Fri Dec 18

 14:07:00 EST 2020     

   

 

                              Respiration          17 /MIN             Fri Dec 1

8 14:07:00 EST 2020    

    

 

                              Systolic          103 MM[HG]          Fri Dec 18 1

4:07:00 EST 2020    

    

 

                              Diastolic          62 MM[HG]           Fri Dec 18 

14:07:00 EST 2020    

    

 

                              Temperature          98.7 [DEGF]          Fri Dec 

18 13:56:00 EST 2020

        

 

                              Temperature          37.1 LACEY            Fri Dec 1

8 13:56:00 EST 2020   

     

 

                              Heart Rate          74 /MIN             Fri Dec 18

 13:56:00 EST 2020     

   

 

                              Respiration          17 /MIN             Fri Dec 1

8 13:56:00 EST 2020    

    

 

                              Systolic          103 MM[HG]          Fri Dec 18 1

3:56:00 EST 2020    

    

 

                              Diastolic          62 MM[HG]           Fri Dec 18 

13:56:00 EST 2020    

    

 

                              BP Position          3 Position          Fri Dec 1

8 13:56:00 EST 2020 

       

 

                              Temperature          97.7 [DEGF]          Fri Dec 

18 06:02:00 EST 2020

        

 

                              Temperature          36.5 LACEY            Fri Dec 1

8 06:02:00 EST 2020   

     

 

                              Heart Rate          73 /MIN             Fri Dec 18

 06:02:00 EST 2020     

   

 

                              Respiration          16 /MIN             Fri Dec 1

8 06:02:00 EST 2020    

    

 

                              Systolic          107 MM[HG]          Fri Dec 18 0

6:02:00 EST 2020    

    

 

                              Diastolic          61 MM[HG]           Fri Dec 18 

06:02:00 EST 2020    

    

 

                              BP Position          2 Position          Fri Dec 1

8 06:02:00 EST 2020 

       

 

                              Temperature          98.7 [DEGF]          Thu Dec 

17 21:48:00 EST 2020

        

 

                              Temperature          37.1 LACEY            Thu Dec 1

7 21:48:00 EST 2020   

     

 

                              Heart Rate          16 /MIN             Thu Dec 17

 21:48:00 EST 2020     

   

 

                              Respiration          16 /MIN             u Dec 1

7 21:48:00 EST 2020    

    

 

                              Systolic          136 MM[HG]          u Dec 17 2

1:48:00 EST 2020    

    

 

                              Diastolic          76 MM[HG]           Thu Dec 17 

21:48:00 EST 2020    

    

 

                              BP Position          3 Position          Thu Dec 1

7 21:48:00 EST 2020 

       

 

                              Temperature          97.8 [DEGF]          u Dec 

17 07:21:00 EST 2020

        

 

                              Temperature          36.6 LACEY            Thu Dec 1

7 07:21:00 EST 2020   

     

 

                              Heart Rate          82 /MIN             u Dec 17

 07:21:00 EST 2020     

   

 

                              Respiration          16 /MIN             u Dec 1

7 07:21:00 EST 2020    

    

 

                              Systolic          112 MM[HG]          Thu Dec 17 0

7:21:00 EST 2020    

    

 

                              Diastolic          70 MM[HG]           Thu Dec 17 

07:21:00 EST 2020    

    

 

                              BP Position          3 Position          u Dec 1

7 07:21:00 EST 2020 

       

 

                              Temperature          98.2 [DEGF]          Wed Dec 

16 19:12:00 EST 2020

        

 

                              Temperature          36.8 LACEY            Wed Dec 1

6 19:12:00 EST 2020   

     

 

                              Heart Rate          87 /MIN             Wed Dec 16

 19:12:00 EST 2020     

   

 

                              Respiration          18 /MIN             Wed Dec 1

6 19:12:00 EST 2020    

    

 

                              Systolic          115 MM[HG]          Wed Dec 16 1

9:12:00 EST 2020    

    

 

                              Diastolic          75 MM[HG]           Wed Dec 16 

19:12:00 EST 2020    

    

 

                              Temperature          97.8 [DEGF]          Wed Dec 

16 17:27:00 EST 2020

        

 

                              Temperature          36.6 LACEY            Wed Dec 1

6 17:27:00 EST 2020   

     

 

                              Heart Rate          91 /MIN             Wed Dec 16

 17:27:00 EST 2020     

   

 

                              Respiration          16 /MIN             Wed Dec 1

6 17:27:00 EST 2020    

    

 

                              SpO2          99 %                Wed Dec 16 17:27

:00 EST 2020        

 

                              Systolic          107 MM[HG]          Wed Dec 16 1

7:27:00 EST 2020    

    

 

                              Diastolic          62 MM[HG]           Wed Dec 16 

17:27:00 EST 2020    

    

 

                              BP Position          2 Position          Wed Dec 1

6 17:27:00 EST 2020
Summary of Patient Chart

                             Created on: 2021



JUWAN DURÁN

External Reference #: [id number]

: 1994

Sex: Male



Demographics





                          Address                   [address] [place] [address]  [address]

 

                          Home Phone                [phone]

 

                          Preferred Language        English

 

                          Marital Status            Single

 

                          [employer] Affiliation     Unknown

 

                          Race                      Unknown

 

                          Ethnic Group               or 





Author





                          JUWAN Gimenez

nerated

 

                          Organization              [facility]

 

                          Address                   Unknown

 

                          Phone                     Unavailable







Care Team Providers





                    Care Team Member Name Role                Phone

 

                    Jennifer Guzmán     Practitioner        [phone]

 

                    Hallie Holden    Admphys             [phone]

 

                    Sayra Fuller  Attphys             [phone]

 

                    Gian Hickey      Admphys             [phone]

 

                    Betzy Roth AttendingPractitioner1 [phone]



                           



Functional Status

     No Results                                  



Allergies

           



                Name            Onset Date          Reaction          Severity  

      

 

                                    PCN (penicillin) (Allergy)                  

           Wed Dec 16 

07:00:00 EST 2020                                 Hives                 



                                                 



Encounters

     



                Program Name          Primary Diagnosis          Admission Date/

Time          

Discharge Date/Time        

 

                                    [facility]               

     Opioid dependence, 

uncomplicated                       Wed Dec 16 15:54:00 EST 2020                

   

Sun Dec 20 14:20:00 EST 2020        

 

                                    [place] [facility]                    Heroin use dis

order, severe, dependence 

                                    Sat Aug 28 17:57:00 EDT 2021                

    Tue Aug 31 

13:10:00 EDT         

 

                              [place] [facility] Waiting                                

         10:30:00 

EST 2021 17:53:00 EST   

      

 

                              [place] [facility]                   

                     Fri Dec 

13 10:40:00 EST 2019                              Wed Ryan 01 15:41:00 EST 2020  

      

 

                                    [facility]               

     Opioid dependence, 

uncomplicated                       Sat Aug 28 16:36:00 EDT                 

   



                                                                



Immunizations

     No Known Immunizations                                  



Lab Results

     No Known Laboratory Results                                            



Medications

     



                Medication          Directions          Start Date          End 

Date        

 

                                    SUBOXONE (BUPRENORPHINE-NALOXONE)  8MG-2MG F

ILM          1 Film Daily 

SUBLINGUAL                Mon Aug 30 08:30:00 EDT 2021          Tue Aug 31 13:42

:00 

EDT         

 

                                                  VENTOLIN HFA (ALBUTEROL SULFAT

E) 0.09 MG/1 ACTUATION SUSPENSION       

                          2 Puff Q4HPRN: Every 4 Hours As Needed INHALATION     

     Mon Aug 30 08:00:00

EDT 2021                                Tue Aug 31 13:42:00 EDT         

 

                                    SUBOXONE (BUPRENORPHINE-NALOXONE)  8MG-2MG F

ILM          1 Film Daily 

SUBLINGUAL                Mon Aug 30 10:37:00 EDT 2021          Fri Sep 03 10:36

:00 

EDT         

 

                                    SUBOXONE (BUPRENORPHINE-NALOXONE)  8MG-2MG F

ILM          1 Film Stat 

SUBLINGUAL (EKIT)          Sun Aug 29 10:00:00 EDT 2021          Sun Aug 29 

10:03:00 EDT         

 

                                    SUBOXONE (BUPRENORPHINE-NALOXONE)  4MG-1MG F

ILM          1 Film Once 

SUBLINGUAL (EKIT)          Sat Aug 28 21:00:00 EDT 2021          Sat Aug 28 

20:25:00 EDT         

 

                                    SUBOXONE (BUPRENORPHINE-NALOXONE)  4MG-1MG F

ILM          1 Film Stat 

SUBLINGUAL (EKIT)          Sat Aug 28 18:52:00 EDT 2021          Sat Aug 28 

18:57:00 EDT         

 

                                    NICODERM CQ (NICOTINE) 21 MG/24 HR PATCH, EX

TENDED RELEASE          1 

patch Daily As Needed TRANSDERMAL (Remove at HS. MDD 1 Patch)          Sat Aug 

28 18:47:00 EDT 2021                    Mon Aug 30 18:46:00 EDT         

 

                                    NARCAN (NALOXONE HCL) 4 MG/0.1 ML SPRAY     

     1 spray(s)  As 

Directed NASAL            Sat Aug 28 18:47:00 EDT 2021          Sun Aug 29 

18:46:00 EDT         

 

                                    ACETAMINOPHEN 500 MG CAPSULE          2 caps

ule Q6HPRN: Every 6 Hours 

As Needed ORAL (MDD 6 Tabs)          Sat Aug 28 18:47:00 EDT 2021          Tue 

Aug 31 13:42:00 EDT         

 

                                    DIPHENHYDRAMINE HCL 25 MG CAPSULE          1

 capsule TIDPRN: Three 

Times A Day As Needed ORAL          Sat Aug 28 18:47:00 EDT 2021          Tue 

Aug 31 13:42:00 EDT         

 

                                    DOCUSATE SODIUM 100 MG CAPSULE, LIQUID FILLE

D          1 capsule Daily

As Needed ORAL            Sat Aug 28 18:47:00 EDT 2021          Tue Aug 31 

13:42:00 EDT         

 

                                    HYDROXYZINE HCL 25 MG TABLET          1 tabl

et TIDPRN: Three Times A 

Day As Needed ORAL (Do not give within two (2) hours of Diphenhydramine or 
within one (1) hour of clonidine administration)          Sat Aug 28 18:47:00 

EDT 2021                                Tue Aug 31 13:42:00 EDT         

 

                                    FIBER- MG TABLET          2 tablet IA

N: As Needed ORAL (MDD 4 

Tabs)                     Sat Aug 28 18:47:00 EDT 2021          Tue Aug 31 13:42

:00 EDT 

        

 

                                    IBUPROFEN 200 MG TABLET          3 tablet TI

DPRN: Three Times A Day As

Needed ORAL               Sat Aug 28 18:47:00 EDT 2021          Tue Aug 31 13:42

:00 

EDT         

 

                                    MELATONIN 5 MG CAPSULE          1 capsule HS

PRN: At Bedtime As Needed 

ORAL (MDD 5mg)            Sat Aug 28 18:47:00 EDT 2021          Tue Aug 31 

13:42:00 EDT         

 

                              MULTIVITAMIN   TABLET          1 tablet Daily ORAL

          Sat Aug 28

18:47:00 EDT 2021                       Tue Aug 31 13:42:00 EDT         

 

                                    CALCIUM CARBONATE 500 MG TABLET, CHEWABLE   

       3 tablet TIDPRN: 

Three Times A Day As Needed ORAL          Sat Aug 28 18:47:00 EDT 2021          

Tue Aug 31 13:42:00 EDT         

 

                                    ONDANSETRON 4 MG TABLET          1 tablet TI

DPRN: Three Times A Day As

Needed ORAL               Sat Aug 28 18:47:00 EDT 2021          Tue Aug 31 13:42

:00 

EDT         

 

                                    CLONIDINE HCL 0.1 MG TABLET          1 table

t TIDPRN: Three Times A 

Day As Needed ORAL (SBP >110 HR>60Do not give within one (1) hour of hydroxyzine
administration)           Sat Aug 28 18:47:00 EDT 2021          Tue Aug 31 

13:42:00 EDT         

 

                                    NICOTINE POLACRILEX 2 MG LOZENGE/MELISSA     

     3 Each TID: Three 

Times a Day ORAL          Sat Aug 28 18:48:00 EDT 2021          Tue Aug 31 

13:42:00 EDT         

 

                                                  PROAIR HFA (ALBUTEROL SULFATE)

 0.09 MG/1 ACTUATION SUSPENSION         

                          2 Puff TIDPRN: Three Times A Day As Needed INHALATION 

         Sat Dec 19 

08:00:00 EST 2020                       Sun Dec 20 14:43:00 EST 2020        

 

                                    NICOTINE 14 MG/24 HR PATCH, EXTENDED RELEASE

          1 Patch Daily 

TRANSDERMAL               Fri Dec 18 06:00:00 EST 2020          Sun Dec 20 14:43

:00 

EST 2020        

 

                                                  EUCERIN DAILY PROTECTION (LOTI

ON, MULTI INGREDIENT)  

2%-7.5%-4.5%-2.4%-4.8% LOTION           1 Application TIDPRN: Three Times A Day 

As Needed TOPICAL APPLICATION          Fri Dec 18 07:42:00 EST 2020          Sun

Dec 20 14:43:00 EST 2020        

 

                                                  PROAIR HFA (ALBUTEROL SULFATE)

 0.09 MG/1 ACTUATION SUSPENSION         

                          2 Puff TIDPRN: Three Times A Day As Needed INHALATION 

         Fri Dec 18 

06:00:00 EST 2020                       Sat Dec 19 10:01:00 EST 2020        

 

                                    XOPENEX (LEVALBUTEROL HYDROCHLORIDE) 1.25 MG

/3 ML SOLUTION          1 

mL Stat INHALATION          Thu Dec 17 23:39:00 EST 2020          Thu Ryan 14 

23:38:00 EST 2021        

 

                                                  BACTRIM DS (SULFAMETHOXAZOLE-T

RIMETHOPRIM)  800MG-160MG TABLET        

                    1 Tablet BID: Twice a Day ORAL          Thu Dec 17 06:00:00 

EST 2020          

Sun Dec 20 14:43:00 EST 2020        

 

                                    SUBOXONE (BUPRENORPHINE-NALOXONE)  8MG-2MG F

ILM          1 Film Daily 

SUBLINGUAL                Fri Dec 18 06:00:00 EST 2020          Sun Dec 20 14:43

:00 

EST 2020        

 

                                    NARCAN (NALOXONE HCL) 4 MG/0.1 ML SPRAY     

     1 spray(s)  Once 

NASAL                     Thu Dec 17 06:00:00 EST 2020          Sun Dec 20 14:43

:00 EST 

2020        

 

                                    SUBOXONE (BUPRENORPHINE-NALOXONE)  4MG-1MG F

ILM          1 Film Stat 

SUBLINGUAL (Take from EKIT)          Thu Dec 17 06:00:00 EST 2020          Thu 

Dec 17 12:21:00 EST 2020        

 

                                    SUBOXONE (BUPRENORPHINE-NALOXONE)  4MG-1MG F

ILM          1 Film Daily 

SUBLINGUAL                Thu Dec 17 06:00:00 EST 2020          Fri Dec 18 05:59

:00 

EST 2020        

 

                                    NARCAN (NALOXONE HCL) 4 MG/0.1 ML SPRAY     

     1 spray(s)  As 

Directed NASAL            Wed Dec 16 17:02:00 EST 2020          Sun Dec 20 

14:43:00 EST 2020        

 

                                    ACETAMINOPHEN 500 MG CAPSULE          2 caps

ule Q4-6HPRN: Every 4-6 

Hours As Needed ORAL (MDD 6 Tabs)          Wed Dec 16 17:01:00 EST 2020         

                                        Sun Dec 20 14:43:00 EST 2020        

 

                                                  BENADRYL ALLERGY (DIPHENHYDRAM

INE HYDROCHLORIDE) 25 MG TABLET         

                          1 tablet TIDPRN: Three Times A Day As Needed ORAL     

     Wed Dec 16 17:01:00 

EST 2020                                Sun Dec 20 14:43:00 EST 2020        

 

                                    CATAPRES (CLONIDINE HYDROCHLORIDE) 0.1 MG TA

BLET          1 tablet 

TIDPRN: Three Times A Day As Needed ORAL (Systolic BP >110, HR>60)          Wed 

Dec 16 17::00 EST 2020                Sun Dec 20 14:43:00 EST 2020        

 

                                    COLACE (DOCUSATE SODIUM) 100 MG CAPSULE, LIQ

UID FILLED          1 

capsule Daily As Needed ORAL          Wed Dec 16 17:01:00 EST 2020          Sun 

Dec 20 14:43:00 EST 2020        

 

                                    HYDROXYZINE HCL 25 MG TABLET          1 tabl

et TIDPRN: Three Times A 

Day As Needed ORAL (Do not give within 2 hours of Diphenhydramine)          Wed 

Dec 16 17::00 EST 2020                Sun Dec 20 14:43:00 EST 2020        

 

                                    FIBER- MG TABLET          2 tablet IA

N: As Needed ORAL (MDD 4 

Tabs)                     Wed Dec 16 17:01:00 EST 2020          Sun Dec 20 14:43

:00 EST 

2020        

 

                                    IBUPROFEN 200 MG TABLET          3 tablet Da

eldon As Needed ORAL (MDD 

3200mg)                   Wed Dec 16 17:01:00 EST 2020          Sun Dec 20 14:43

:00 EST 

2020        

 

                                    MELATONIN 5 MG CAPSULE          1 capsule HS

PRN: At Bedtime As Needed 

ORAL (MDD 5mg)            Wed Dec 16 17::00 EST 2020          Sun Dec 20 

14:43:00 EST 2020        

 

                              MULTIVITAMIN   TABLET          1 tablet Daily ORAL

          Wed Dec 16

17:01:00 EST 2020                       Sun Dec 20 14:43:00 EST 2020        

 

                                    TUMS (CALCIUM CARBONATE) 500 MG TABLET, CHEW

ABLE          3 tablet 

PRN: As Needed ORAL          Wed Dec 16 17:01:00 EST 2020          Sun Dec 20 

14:43:00 EST 2020        

 

                                    ZOFRAN (ONDANSETRON HYDROCHLORIDE) 4 MG TABL

ET          1 tablet 

TIDPRN: Three Times A Day As Needed ORAL          Wed Dec 16 17:01:00 EST 2020  

                                        Sun Dec 20 14:43:00 EST 2020        



                                                                                
                                                                                
                                                                                
 



Problems

     No Known Problems                                            



Procedures

     No Known Procedures                                  



Social History

     



                    Social History Observation          Description          Abrahan

e        

 

                              Smoking Status                    Current Every Da

y Smoker          

Fri Dec 13 07:00:00 EST 2019        

 

                              Smoking Status                    Current Every Da

y Smoker          

Fri Dec 13 07:00:00 EST 2019        

 

                              Smoking Status                    Current Every Da

y Smoker          

Fri Dec 13 07:00:00 EST 2019        

 

                              Smoking Status                    Current Every Da

y Smoker          

Fri Dec 13 07:00:00 EST 2019        

 

                              Smoking Status                    Current Every Da

y Smoker          

Fri Dec 13 07:00:00 EST 2019        

 

                              Smoking Status                    Current Every Da

y Smoker          

Fri Dec 13 07:00:00 EST         

 

                              Smoking Status                    Current Every Da

y Smoker          

Fri Dec 13 07:00:00 EST         

 

                              Smoking Status                    Current Every Da

y Smoker          

Fri Dec 13 07:00:00 EST         

 

                              Smoking Status                    Current Every Da

y Smoker          

Fri Dec 13 07:00:00 EST         

 

                              Smoking Status                    Current Every Da

y Smoker          

Fri Dec 13 07:00:00 EST         

 

                              Smoking Status                    Current Every Da

y Smoker          

Fri Dec 13 07:00:00 EST         

 

                              Smoking Status                    Current Every Da

y Smoker          

Fri Dec 13 07:00:00 EST         

 

                              Smoking Status                    Current Every Da

y Smoker          

Fri Dec 13 07:00:00 EST         

 

                    Birth Sex           Male                Sat [birth year] 11 07:00:00 

EST         



                                                                                
                       



Vital Signs

     



                    Vital Sign          Measurement          Date        

 

                              Heart Rate          94 /MIN             Tue Aug 31

 13:17:00 EDT      

  

 

                              Systolic          104 MM[HG]          Tue Aug 31 1

3:17:00 EDT     

    

 

                              Diastolic          61 MM[HG]           Tue Aug 31 

13:17:00 EDT     

    

 

                              Temperature          97.7 [DEGF]          Tue Aug 

31 05:43:00 EDT 

        

 

                              Temperature          36.5 LACEY            Tue Aug 3

1 05:43:00 EDT    

     

 

                              Heart Rate          64 /MIN             Tue Aug 31

 05:43:00 EDT      

   

 

                              Respiration          16 /MIN             Tue Aug 3

1 05:43:00 EDT     

    

 

                              Systolic          117 MM[HG]          Tue Aug 31 0

5:43:00 EDT     

    

 

                              Diastolic          62 MM[HG]           Tue Aug 31 

05:43:00 EDT     

    

 

                              BP Position          1 Position          Tue Aug 3

1 05:43:00 EDT  

       

 

                              Temperature          97.8 [DEGF]          Mon Aug 

30 19:01:00 EDT 

        

 

                              Temperature          36.6 LACEY            Mon Aug 3

0 19:01:00 EDT    

     

 

                              Heart Rate          90 /MIN             Mon Aug 30

 19:01:00 EDT      

   

 

                              Respiration          16 /MIN             Mon Aug 3

0 19:01:00 EDT     

    

 

                              Systolic          110 MM[HG]          Mon Aug 30 1

9:01:00 EDT     

    

 

                              Diastolic          66 MM[HG]           Mon Aug 30 

19:01:00 EDT     

    

 

                              Temperature          97.3 [DEGF]          Mon Aug 

30 05:31:00 EDT 

        

 

                              Temperature          36.3 LACEY            Mon Aug 3

0 05:31:00 EDT    

     

 

                              Heart Rate          64 /MIN             Mon Aug 30

 05:31:00 EDT      

   

 

                              Respiration          16 /MIN             Mon Aug 3

0 05:31:00 EDT     

    

 

                              Systolic          118 MM[HG]          Mon Aug 30 0

5:31:00 EDT     

    

 

                              Diastolic          69 MM[HG]           Mon Aug 30 

05:31:00 EDT     

    

 

                              BP Position          2 Position          Mon Aug 3

0 05:31:00 EDT  

       

 

                              Heart Rate          86 /MIN             Sun Aug 29

 20:15:00 EDT      

   

 

                              Respiration          17 /MIN             Sun Aug 2

9 20:15:00 EDT     

    

 

                              Systolic          110 MM[HG]          Sun Aug 29 2

0:15:00 EDT     

    

 

                              Diastolic          69 MM[HG]           Sun Aug 29 

20:15:00 EDT     

    

 

                              BP Position          3 Position          Sun Aug 2

9 20:15:00 EDT  

       

 

                              Temperature          97.5 [DEGF]          Sun Aug 

29 06:16:00 EDT 

        

 

                              Temperature          36.4 LACEY            Sun Aug 2

9 06:16:00 EDT    

     

 

                              Heart Rate          71 /MIN             Sun Aug 29

 06:16:00 EDT      

   

 

                              Respiration          16 /MIN             Sun Aug 2

9 06:16:00 EDT     

    

 

                              Systolic          120 MM[HG]          Sun Aug 29 0

6:16:00 EDT     

    

 

                              Diastolic          64 MM[HG]           Sun Aug 29 

06:16:00 EDT     

    

 

                              BP Position          2 Position          Sun Aug 2

9 06:16:00 EDT  

       

 

                              Temperature          98.7 [DEGF]          Sat Aug 

28 20:06:00 EDT 

        

 

                              Temperature          37.1 LACEY            Sat Aug 2

8 20:06:00 EDT    

     

 

                              Heart Rate          74 /MIN             Sat Aug 28

 20:06:00 EDT      

   

 

                              Respiration          16 /MIN             Sat Aug 2

8 20:06:00 EDT     

    

 

                              SpO2          100 %               Sat Aug 28 20:06

:00 EDT         

 

                              Systolic          131 MM[HG]          Sat Aug 28 2

0:06:00 EDT     

    

 

                              Diastolic          71 MM[HG]           Sat Aug 28 

20:06:00 EDT     

    

 

                              BP Position          2 Position          Sat Aug 2

8 20:06:00 EDT  

       

 

                              Height          6 0.0 ft            Sat Aug 28 20:

06:00 EDT         

 

                              Height          72 in               Sat Aug 28 20:

06:00 EDT         

 

                              Height          182.9 cm            Sat Aug 28 20:

06:00 EDT         

 

                              Weight Lbs          140 lbs             Sat Aug 28

 20:06:00 EDT      

   

 

                              Weight Kgs          63.6 KG             Sat Aug 28

 20:06:00 EDT      

   

 

                              BMI          19                  Sat Aug 28 20:06:

00 EDT         

 

                              Temperature          97.6 [DEGF]          Sun Dec 

20 10:00:00 EST 2020

        

 

                              Temperature          36.4 LACEY            Sun Dec 2

0 10:00:00 EST 2020   

     

 

                              Heart Rate          72 /MIN             Sun Dec 20

 10:00:00 EST 2020     

   

 

                              Respiration          14 /MIN             Sun Dec 2

0 10:00:00 EST 2020    

    

 

                              Systolic          122 MM[HG]          Sun Dec 20 1

0:00:00 EST 2020    

    

 

                              Diastolic          68 MM[HG]           Sun Dec 20 

10:00:00 EST 2020    

    

 

                              BP Position          3 Position          Sun Dec 2

0 10:00:00 EST 2020 

       

 

                              Temperature          97.8 [DEGF]          Sat Dec 

19 18:19:00 EST 2020

        

 

                              Temperature          36.6 LACEY            Sat Dec 1

9 18:19:00 EST 2020   

     

 

                              Heart Rate          92 /MIN             Sat Dec 19

 18:19:00 EST 2020     

   

 

                              Respiration          17 /MIN             Sat Dec 1

9 18:19:00 EST 2020    

    

 

                              Systolic          114 MM[HG]          Sat Dec 19 1

8:19:00 EST 2020    

    

 

                              Diastolic          74 MM[HG]           Sat Dec 19 

18:19:00 EST 2020    

    

 

                              Temperature          98.2 [DEGF]          Sat Dec 

19 11:35:00 EST 2020

        

 

                              Temperature          36.8 LACEY            Sat Dec 1

9 11:35:00 EST 2020   

     

 

                              Heart Rate          79 /MIN             Sat Dec 19

 11:35:00 EST 2020     

   

 

                              Respiration          16 /MIN             Sat Dec 1

9 11:35:00 EST 2020    

    

 

                              Systolic          119 MM[HG]          Sat Dec 19 1

1:35:00 EST 2020    

    

 

                              Diastolic          68 MM[HG]           Sat Dec 19 

11:35:00 EST 2020    

    

 

                              BP Position          3 Position          Sat Dec 1

9 11:35:00 EST 2020 

       

 

                              Temperature          97.5 [DEGF]          Sat Dec 

19 05:37:00 EST 2020

        

 

                              Temperature          36.4 LACEY            Sat Dec 1

9 05:37:00 EST 2020   

     

 

                              Heart Rate          64 /MIN             Sat Dec 19

 05:37:00 EST 2020     

   

 

                              Respiration          16 /MIN             Sat Dec 1

9 05:37:00 EST 2020    

    

 

                              Systolic          107 MM[HG]          Sat Dec 19 0

5:37:00 EST 2020    

    

 

                              Diastolic          60 MM[HG]           Sat Dec 19 

05:37:00 EST 2020    

    

 

                              BP Position          2 Position          Sat Dec 1

9 05:37:00 EST 2020 

       

 

                              Heart Rate          53 /MIN             Fri Dec 18

 18:19:00 EST 2020     

   

 

                              Respiration          16 /MIN             Fri Dec 1

8 18:19:00 EST 2020    

    

 

                              Systolic          113 MM[HG]          Fri Dec 18 1

8:19:00 EST 2020    

    

 

                              Diastolic          63 MM[HG]           Fri Dec 18 

18:19:00 EST 2020    

    

 

                              BP Position          3 Position          Fri Dec 1

8 18:19:00 EST 2020 

       

 

                              Temperature          98.7 [DEGF]          Fri Dec 

18 14:07:00 EST 2020

        

 

                              Temperature          37.1 LACEY            Fri Dec 1

8 14:07:00 EST 2020   

     

 

                              Heart Rate          74 /MIN             Fri Dec 18

 14:07:00 EST 2020     

   

 

                              Respiration          17 /MIN             Fri Dec 1

8 14:07:00 EST 2020    

    

 

                              Systolic          103 MM[HG]          Fri Dec 18 1

4:07:00 EST 2020    

    

 

                              Diastolic          62 MM[HG]           Fri Dec 18 

14:07:00 EST 2020    

    

 

                              Temperature          98.7 [DEGF]          Fri Dec 

18 13:56:00 EST 2020

        

 

                              Temperature          37.1 LACEY            Fri Dec 1

8 13:56:00 EST 2020   

     

 

                              Heart Rate          74 /MIN             Fri Dec 18

 13:56:00 EST 2020     

   

 

                              Respiration          17 /MIN             Fri Dec 1

8 13:56:00 EST 2020    

    

 

                              Systolic          103 MM[HG]          Fri Dec 18 1

3:56:00 EST 2020    

    

 

                              Diastolic          62 MM[HG]           Fri Dec 18 

13:56:00 EST 2020    

    

 

                              BP Position          3 Position          Fri Dec 1

8 13:56:00 EST 2020 

       

 

                              Temperature          97.7 [DEGF]          Fri Dec 

18 06:02:00 EST 2020

        

 

                              Temperature          36.5 LACEY            Fri Dec 1

8 06:02:00 EST 2020   

     

 

                              Heart Rate          73 /MIN             Fri Dec 18

 06:02:00 EST 2020     

   

 

                              Respiration          16 /MIN             Fri Dec 1

8 06:02:00 EST 2020    

    

 

                              Systolic          107 MM[HG]          Fri Dec 18 0

6:02:00 EST 2020    

    

 

                              Diastolic          61 MM[HG]           Fri Dec 18 

06:02:00 EST 2020    

    

 

                              BP Position          2 Position          Fri Dec 1

8 06:02:00 EST 2020 

       

 

                              Temperature          98.7 [DEGF]          Thu Dec 

17 21:48:00 EST 2020

        

 

                              Temperature          37.1 LACEY            u Dec 1

7 21:48:00 EST 2020   

     

 

                              Heart Rate          16 /MIN             u Dec 17

 21:48:00 EST 2020     

   

 

                              Respiration          16 /MIN             Thu Dec 1

7 21:48:00 EST 2020    

    

 

                              Systolic          136 MM[HG]          u Dec 17 2

1:48:00 EST 2020    

    

 

                              Diastolic          76 MM[HG]           Thu Dec 17 

21:48:00 EST 2020    

    

 

                              BP Position          3 Position          u Dec 1

7 21:48:00 EST 2020 

       

 

                              Temperature          97.8 [DEGF]          u Dec 

17 07:21:00 EST 2020

        

 

                              Temperature          36.6 LACEY            u Dec 1

7 07:21:00 EST 2020   

     

 

                              Heart Rate          82 /MIN             Thu Dec 17

 07:21:00 EST 2020     

   

 

                              Respiration          16 /MIN             Thu Dec 1

7 07:21:00 EST 2020    

    

 

                              Systolic          112 MM[HG]          u Dec 17 0

7:21:00 EST 2020    

    

 

                              Diastolic          70 MM[HG]           u Dec 17 

07:21:00 EST 2020    

    

 

                              BP Position          3 Position          u Dec 1

7 07:21:00 EST 2020 

       

 

                              Temperature          98.2 [DEGF]          Wed Dec 

16 19:12:00 EST 2020

        

 

                              Temperature          36.8 LACEY            Wed Dec 1

6 19:12:00 EST 2020   

     

 

                              Heart Rate          87 /MIN             Wed Dec 16

 19:12:00 EST 2020     

   

 

                              Respiration          18 /MIN             Wed Dec 1

6 19:12:00 EST 2020    

    

 

                              Systolic          115 MM[HG]          Wed Dec 16 1

9:12:00 EST 2020    

    

 

                              Diastolic          75 MM[HG]           Wed Dec 16 

19:12:00 EST 2020    

    

 

                              Temperature          97.8 [DEGF]          Wed Dec 

16 17:27:00 EST 2020

        

 

                              Temperature          36.6 LACEY            Wed Dec 1

6 17:27:00 EST 2020   

     

 

                              Heart Rate          91 /MIN             Wed Dec 16

 17:27:00 EST 2020     

   

 

                              Respiration          16 /MIN             Wed Dec 1

6 17:27:00 EST 2020    

    

 

                              SpO2          99 %                Wed Dec 16 17:27

:00 EST 2020        

 

                              Systolic          107 MM[HG]          Wed Dec 16 1

7:27:00 EST 2020    

    

 

                              Diastolic          62 MM[HG]           Wed Dec 16 

17:27:00 EST 2020    

    

 

                              BP Position          2 Position          Wed Dec 1

6 17:27:00 EST 2020
Summary of Patient Chart

                             Created on: 2021



JUWAN DURÁN

External Reference #: [id number]

: 1994

Sex: Male



Demographics





                          Address                   [address] [place] [address]  [address]

 

                          Home Phone                [phone]

 

                          Preferred Language        English

 

                          Marital Status            Single

 

                          Cheondoism Affiliation     Unknown

 

                          Race                      Unknown

 

                          Ethnic Group               or 





Author





                          JUWAN Gimeenz

nerated

 

                          Organization              [facility]

 

                          Address                   Unknown

 

                          Phone                     Unavailable







Care Team Providers





                    Care Team Member Name Role                Phone

 

                    Jennifer Guzámn     Practitioner        [phone]

 

                    Hallie Holden    AdmittingPractitioner1 [phone]

 

                    Sayra Fuller  Attphys             [phone]

 

                    Gian Hickey      Admphys             [phone]

 

                    Betzy Roth AttendingPractitioner1 [phone]



                           



Functional Status

     No Results                                  



Allergies

           



                Name            Onset Date          Reaction          Severity  

      

 

                                    PCN (penicillin) (Allergy)                  

           Wed Dec 16 

07:00:00 EST 2020                                 Hives                 



                                                 



Encounters

     



                Program Name          Primary Diagnosis          Admission Date/

Time          

Discharge Date/Time        

 

                              [facility]                   

                     Fri Dec 

13 10:40:00 EST 2019 15:41:00 EST 2020  

      

 

                                    [facility]               

     Opioid dependence, 

uncomplicated                       Sat Aug 28 16:36:00 EDT                 

   

 

                              [facility]                                

         10:30:00 

EST 2021 17:53:00 EST   

      

 

                                    [facility]                    Heroin use dis

order, severe, dependence 

                                    Sat Aug 28 17:57:00 EDT 2021                

    Tue Aug 31 

13:10:00 EDT         

 

                                    [facility]               

     Opioid dependence, 

uncomplicated                       Wed Dec 16 15:54:00 EST 2020                

   

Sun Dec 20 14:20:00 EST 2020        



                                                                



Immunizations

     No Known Immunizations                                  



Lab Results

     No Known Laboratory Results                                            



Medications

     



                Medication          Directions          Start Date          End 

Date        

 

                                    Suboxone 0.0  NOEL          Place one (1) noel

m under the tongue daily  

                          Wed Sep 01 00:00:00 EDT 2021          Sat Sep 11 00:00

:00 EDT        



 

                                    SUBOXONE (BUPRENORPHINE-NALOXONE)  8MG-2MG F

ILM          1 Film Daily 

SUBLINGUAL                Mon Aug 30 08:30:00 EDT 2021          Tue Aug 31 13:42

:00 

EDT         

 

                                                  VENTOLIN HFA (ALBUTEROL SULFAT

E) 0.09 MG/1 ACTUATION SUSPENSION       

                          2 Puff Q4HPRN: Every 4 Hours As Needed INHALATION     

     Mon Aug 30 08:00:00

EDT 2021                                Tue Aug 31 13:42:00 EDT         

 

                                    SUBOXONE (BUPRENORPHINE-NALOXONE)  8MG-2MG F

ILM          1 Film Daily 

SUBLINGUAL                Mon Aug 30 10:37:00 EDT 2021          Fri Sep 03 10:36

:00 

EDT         

 

                                    SUBOXONE (BUPRENORPHINE-NALOXONE)  8MG-2MG F

ILM          1 Film Stat 

SUBLINGUAL (EKIT)          Sun Aug 29 10:00:00 EDT 2021          Sun Aug 29 

10:03:00 EDT         

 

                                    SUBOXONE (BUPRENORPHINE-NALOXONE)  4MG-1MG F

ILM          1 Film Once 

SUBLINGUAL (EKIT)          Sat Aug 28 21:00:00 EDT 2021          Sat Aug 28 

20:25:00 EDT         

 

                                    SUBOXONE (BUPRENORPHINE-NALOXONE)  4MG-1MG F

ILM          1 Film Stat 

SUBLINGUAL (EKIT)          Sat Aug 28 18:52:00 EDT 2021          Sat Aug 28 

18:57:00 EDT         

 

                                    ACETAMINOPHEN 500 MG CAPSULE          2 caps

ule Q6HPRN: Every 6 Hours 

As Needed ORAL (MDD 6 Tabs)          Sat Aug 28 18:47:00 EDT 2021          Tue 

Aug 31 13:42:00 EDT         

 

                                    DIPHENHYDRAMINE HCL 25 MG CAPSULE          1

 capsule TIDPRN: Three 

Times A Day As Needed ORAL          Sat Aug 28 18:47:00 EDT 2021          Tue 

Aug 31 13:42:00 EDT         

 

                                    DOCUSATE SODIUM 100 MG CAPSULE, LIQUID FILLE

D          1 capsule Daily

As Needed ORAL            Sat Aug 28 18:47:00 EDT 2021          Tue Aug 31 

13:42:00 EDT         

 

                                    HYDROXYZINE HCL 25 MG TABLET          1 tabl

et TIDPRN: Three Times A 

Day As Needed ORAL (Do not give within two (2) hours of Diphenhydramine or 
within one (1) hour of clonidine administration)          Sat Aug 28 18:47:00 

EDT 2021                                Tue Aug 31 13:42:00 EDT         

 

                                    FIBER- MG TABLET          2 tablet NC

N: As Needed ORAL (MDD 4 

Tabs)                     Sat Aug 28 18:47:00 EDT 2021          Tue Aug 31 13:42

:00 EDT 

        

 

                                    IBUPROFEN 200 MG TABLET          3 tablet TI

DPRN: Three Times A Day As

Needed ORAL               Sat Aug 28 18:47:00 EDT 2021          Tue Aug 31 13:42

:00 

EDT         

 

                                    MELATONIN 5 MG CAPSULE          1 capsule HS

PRN: At Bedtime As Needed 

ORAL (MDD 5mg)            Sat Aug 28 18:47:00 EDT 2021          Tue Aug 31 

13:42:00 EDT         

 

                              MULTIVITAMIN   TABLET          1 tablet Daily ORAL

          Sat Aug 28

18:47:00 EDT 2021                       Tue Aug 31 13:42:00 EDT         

 

                                    CALCIUM CARBONATE 500 MG TABLET, CHEWABLE   

       3 tablet TIDPRN: 

Three Times A Day As Needed ORAL          Sat Aug 28 18:47:00 EDT 2021          

Tue Aug 31 13:42:00 EDT         

 

                                    ONDANSETRON 4 MG TABLET          1 tablet TI

DPRN: Three Times A Day As

Needed ORAL               Sat Aug 28 18:47:00 EDT 2021          Tue Aug 31 13:42

:00 

EDT         

 

                                    CLONIDINE HCL 0.1 MG TABLET          1 table

t TIDPRN: Three Times A 

Day As Needed ORAL (SBP >110 HR>60Do not give within one (1) hour of hydroxyzine
administration)           Sat Aug 28 18:47:00 EDT 2021          Tue Aug 31 

13:42:00 EDT         

 

                                    NICOTINE POLACRILEX 2 MG LOZENGE/MELISSA     

     3 Each TID: Three 

Times a Day ORAL          Sat Aug 28 18:48:00 EDT 2021          Tue Aug 31 

13:42:00 EDT         

 

                                    NICODERM CQ (NICOTINE) 21 MG/24 HR PATCH, EX

TENDED RELEASE          1 

patch Daily As Needed TRANSDERMAL (Remove at HS. MDD 1 Patch)          Sat Aug 

28 18:47:00 EDT 2021                    Mon Aug 30 18:46:00 EDT         

 

                                    NARCAN (NALOXONE HCL) 4 MG/0.1 ML SPRAY     

     1 spray(s)  As 

Directed NASAL            Sat Aug 28 18:47:00 EDT 2021          Sun Aug 29 

18:46:00 EDT         

 

                                                  PROAIR HFA (ALBUTEROL SULFATE)

 0.09 MG/1 ACTUATION SUSPENSION         

                          2 Puff TIDPRN: Three Times A Day As Needed INHALATION 

         Sat Dec 19 

08:00:00 EST 2020                       Sun Dec 20 14:43:00 EST 2020        

 

                                    NICOTINE 14 MG/24 HR PATCH, EXTENDED RELEASE

          1 Patch Daily 

TRANSDERMAL               Fri Dec 18 06:00:00 EST 2020          Sun Dec 20 14:43

:00 

EST 2020        

 

                                                  EUCERIN DAILY PROTECTION (LOTI

ON, MULTI INGREDIENT)  

2%-7.5%-4.5%-2.4%-4.8% LOTION           1 Application TIDPRN: Three Times A Day 

As Needed TOPICAL APPLICATION          Fri Dec 18 07:42:00 EST 2020          Sun

Dec 20 14:43:00 EST 2020        

 

                                                  PROAIR HFA (ALBUTEROL SULFATE)

 0.09 MG/1 ACTUATION SUSPENSION         

                          2 Puff TIDPRN: Three Times A Day As Needed INHALATION 

         Fri Dec 18 

06:00:00 EST 2020                       Sat Dec 19 10:01:00 EST 2020        

 

                                    XOPENEX (LEVALBUTEROL HYDROCHLORIDE) 1.25 MG

/3 ML SOLUTION          1 

mL Stat INHALATION          Thu Dec 17 23:39:00 EST 2020          Thu Ryan 14 

23:38:00 EST 2021        

 

                                                  BACTRIM DS (SULFAMETHOXAZOLE-T

RIMETHOPRIM)  800MG-160MG TABLET        

                    1 Tablet BID: Twice a Day ORAL          Thu Dec 17 06:00:00 

EST 2020          

Sun Dec 20 14:43:00 EST 2020        

 

                                    SUBOXONE (BUPRENORPHINE-NALOXONE)  8MG-2MG F

ILM          1 Film Daily 

SUBLINGUAL                Fri Dec 18 06:00:00 EST 2020          Sun Dec 20 14:43

:00 

EST 2020        

 

                                    NARCAN (NALOXONE HCL) 4 MG/0.1 ML SPRAY     

     1 spray(s)  Once 

NASAL                     Thu Dec 17 06:00:00 EST 2020          Sun Dec 20 14:43

:00 EST 

2020        

 

                                    SUBOXONE (BUPRENORPHINE-NALOXONE)  4MG-1MG F

ILM          1 Film Stat 

SUBLINGUAL (Take from EKIT)          Thu Dec 17 06:00:00 EST 2020          Thu 

Dec 17 12:21:00 EST 2020        

 

                                    SUBOXONE (BUPRENORPHINE-NALOXONE)  4MG-1MG F

ILM          1 Film Daily 

SUBLINGUAL                Thu Dec 17 06:00:00 EST 2020          Fri Dec 18 05:59

:00 

EST 2020        

 

                                    NARCAN (NALOXONE HCL) 4 MG/0.1 ML SPRAY     

     1 spray(s)  As 

Directed NASAL            Wed Dec 16 17:02:00 EST 2020          Sun Dec 20 

14:43:00 EST 2020        

 

                                    ACETAMINOPHEN 500 MG CAPSULE          2 caps

ule Q4-6HPRN: Every 4-6 

Hours As Needed ORAL (MDD 6 Tabs)          Wed Dec 16 17:01:00 EST 2020         

                                        Sun Dec 20 14:43:00 EST 2020        

 

                                                  BENADRYL ALLERGY (DIPHENHYDRAM

INE HYDROCHLORIDE) 25 MG TABLET         

                          1 tablet TIDPRN: Three Times A Day As Needed ORAL     

     Wed Dec 16 17::00 

EST 2020                                Sun Dec 20 14:43:00 EST 2020        

 

                                    CATAPRES (CLONIDINE HYDROCHLORIDE) 0.1 MG TA

BLET          1 tablet 

TIDPRN: Three Times A Day As Needed ORAL (Systolic BP >110, HR>60)          Wed 

Dec 16 17:01:00 EST 2020                Sun Dec 20 14:43:00 EST 2020        

 

                                    COLACE (DOCUSATE SODIUM) 100 MG CAPSULE, LIQ

UID FILLED          1 

capsule Daily As Needed ORAL          Wed Dec 16 17:01:00 EST 2020          Sun 

Dec 20 14:43:00 EST 2020        

 

                                    HYDROXYZINE HCL 25 MG TABLET          1 tabl

et TIDPRN: Three Times A 

Day As Needed ORAL (Do not give within 2 hours of Diphenhydramine)          Wed 

Dec 16 17:01:00 EST 2020                Sun Dec 20 14::00 EST 2020        

 

                                    FIBER- MG TABLET          2 tablet NC

N: As Needed ORAL (MDD 4 

Tabs)                     Wed Dec 16 17:01:00 EST 2020          Sun Dec 20 14:43

:00 EST 

2020        

 

                                    IBUPROFEN 200 MG TABLET          3 tablet Da

eldon As Needed ORAL (MDD 

3200mg)                   Wed Dec 16 17:01:00 EST 2020          Sun Dec 20 14:43

:00 EST 

2020        

 

                                    MELATONIN 5 MG CAPSULE          1 capsule HS

PRN: At Bedtime As Needed 

ORAL (MDD 5mg)            Wed Dec 16 17:01:00 EST 2020          Sun Dec 20 

14:43:00 EST 2020        

 

                              MULTIVITAMIN   TABLET          1 tablet Daily ORAL

          Wed Dec 16

17:01:00 EST 2020                       Sun Dec 20 14:43:00 EST 2020        

 

                                    TUMS (CALCIUM CARBONATE) 500 MG TABLET, CHEW

ABLE          3 tablet 

PRN: As Needed ORAL          Wed Dec 16 17:01:00 EST 2020          Sun Dec 20 

14:43:00 EST 2020        

 

                                    ZOFRAN (ONDANSETRON HYDROCHLORIDE) 4 MG TABL

ET          1 tablet 

TIDPRN: Three Times A Day As Needed ORAL          Wed Dec 16 17:01:00 EST 2020  

                                        Sun Dec 20 14:43:00 EST 2020        



                                                                                
                                                                                
                                                                                
      



Problems

     No Known Problems                                            



Procedures

     No Known Procedures                                  



Social History

     



                    Social History Observation          Description          Abrahan

e        

 

                              Smoking Status                    Current Every Da

y Smoker          

Fri Dec 13 07:00:00 EST 2019        

 

                              Smoking Status                    Current Every Da

y Smoker          

Fri Dec 13 07:00:00 EST 2019        

 

                              Smoking Status                    Current Every Da

y Smoker          

Fri Dec 13 07:00:00 EST 2019        

 

                              Smoking Status                    Current Every Da

y Smoker          

Fri Dec 13 07:00:00 EST         

 

                              Smoking Status                    Current Every Da

y Smoker          

Fri Dec 13 07:00:00 EST         

 

                              Smoking Status                    Current Every Da

y Smoker          

Fri Dec 13 07:00:00 EST         

 

                              Smoking Status                    Current Every Da

y Smoker          

Fri Dec 13 07:00:00 EST         

 

                              Smoking Status                    Current Every Da

y Smoker          

Fri Dec 13 07:00:00 EST         

 

                              Smoking Status                    Current Every Da

y Smoker          

Fri Dec 13 07:00:00 EST         

 

                              Smoking Status                    Current Every Da

y Smoker          

Fri Dec 13 07:00:00 EST         

 

                              Smoking Status                    Current Every Da

y Smoker          

Fri Dec 13 07:00:00 EST         

 

                              Smoking Status                    Current Every Da

y Smoker          

Fri Dec 13 07:00:00 EST         

 

                              Smoking Status                    Current Every Da

y Smoker          

Fri Dec 13 07:00:00 EST         

 

                    Birth Sex           Male                Sat [birth year] 07:00:00 

EST         



                                                                                
                       



Vital Signs

     



                    Vital Sign          Measurement          Date        

 

                              Heart Rate          94 /MIN             Tue Aug 31

 13:17:00 EDT      

  

 

                              Systolic          104 MM[HG]          Tue Aug 31 1

3:17:00 EDT     

    

 

                              Diastolic          61 MM[HG]           Tue Aug 31 

13:17:00 EDT     

    

 

                              Temperature          97.7 [DEGF]          Tue Aug 

31 05:43:00 EDT 

        

 

                              Temperature          36.5 LACEY            Tue Aug 3

1 05:43:00 EDT    

     

 

                              Heart Rate          64 /MIN             Tue Aug 31

 05:43:00 EDT      

   

 

                              Respiration          16 /MIN             Tue Aug 3

1 05:43:00 EDT     

    

 

                              Systolic          117 MM[HG]          Tue Aug 31 0

5:43:00 EDT     

    

 

                              Diastolic          62 MM[HG]           Tue Aug 31 

05:43:00 EDT     

    

 

                              BP Position          1 Position          Tue Aug 3

1 05:43:00 EDT  

       

 

                              Temperature          97.8 [DEGF]          Mon Aug 

30 19:01:00 EDT 

        

 

                              Temperature          36.6 LACEY            Mon Aug 3

0 19:01:00 EDT    

     

 

                              Heart Rate          90 /MIN             Mon Aug 30

 19:01:00 EDT      

   

 

                              Respiration          16 /MIN             Mon Aug 3

0 19:01:00 EDT     

    

 

                              Systolic          110 MM[HG]          Mon Aug 30 1

9:01:00 EDT     

    

 

                              Diastolic          66 MM[HG]           Mon Aug 30 

19:01:00 EDT     

    

 

                              Temperature          97.3 [DEGF]          Mon Aug 

30 05:31:00 EDT 

        

 

                              Temperature          36.3 LACEY            Mon Aug 3

0 05:31:00 EDT    

     

 

                              Heart Rate          64 /MIN             Mon Aug 30

 05:31:00 EDT      

   

 

                              Respiration          16 /MIN             Mon Aug 3

0 05:31:00 EDT     

    

 

                              Systolic          118 MM[HG]          Mon Aug 30 0

5:31:00 EDT     

    

 

                              Diastolic          69 MM[HG]           Mon Aug 30 

05:31:00 EDT     

    

 

                              BP Position          2 Position          Mon Aug 3

0 05:31:00 EDT  

       

 

                              Heart Rate          86 /MIN             Sun Aug 29

 20:15:00 EDT      

   

 

                              Respiration          17 /MIN             Sun Aug 2

9 20:15:00 EDT     

    

 

                              Systolic          110 MM[HG]          Sun Aug 29 2

0:15:00 EDT     

    

 

                              Diastolic          69 MM[HG]           Sun Aug 29 

20:15:00 EDT     

    

 

                              BP Position          3 Position          Sun Aug 2

9 20:15:00 EDT  

       

 

                              Temperature          97.5 [DEGF]          Sun Aug 

29 06:16:00 EDT 

        

 

                              Temperature          36.4 LACEY            Sun Aug 2

9 06:16:00 EDT    

     

 

                              Heart Rate          71 /MIN             Sun Aug 29

 06:16:00 EDT      

   

 

                              Respiration          16 /MIN             Sun Aug 2

9 06:16:00 EDT     

    

 

                              Systolic          120 MM[HG]          Sun Aug 29 0

6:16:00 EDT     

    

 

                              Diastolic          64 MM[HG]           Sun Aug 29 

06:16:00 EDT     

    

 

                              BP Position          2 Position          Sun Aug 2

9 06:16:00 EDT  

       

 

                              Temperature          98.7 [DEGF]          Sat Aug 

28 20:06:00 EDT 

        

 

                              Temperature          37.1 LACEY            Sat Aug 2

8 20:06:00 EDT    

     

 

                              Heart Rate          74 /MIN             Sat Aug 28

 20:06:00 EDT      

   

 

                              Respiration          16 /MIN             Sat Aug 2

8 20:06:00 EDT     

    

 

                              SpO2          100 %               Sat Aug 28 20:06

:00 EDT         

 

                              Systolic          131 MM[HG]          Sat Aug 28 2

0:06:00 EDT     

    

 

                              Diastolic          71 MM[HG]           Sat Aug 28 

20:06:00 EDT     

    

 

                              BP Position          2 Position          Sat Aug 2

8 20:06:00 EDT  

       

 

                              Height          6 0.0 ft            Sat Aug 28 20:

06:00 EDT         

 

                              Height          72 in               Sat Aug 28 20:

06:00 EDT         

 

                              Height          182.9 cm            Sat Aug 28 20:

06:00 2021        

 

                              Weight Lbs          140 lbs             Sat Aug 28

 20:06:00 2021     

   

 

                              Weight Kgs          63.6 KG             Sat Aug 28

 20:06:00 2021     

   

 

                              BMI          19                  Sat Aug 28 20:06:

00 2021        

 

                              Temperature          97.6 [DEGF]          Sun Dec 

20 10:00:00 EST 2020

        

 

                              Temperature          36.4 LACEY            Sun Dec 2

0 10:00:00 EST 2020   

     

 

                              Heart Rate          72 /MIN             Sun Dec 20

 10:00:00 EST 2020     

   

 

                              Respiration          14 /MIN             Sun Dec 2

0 10:00:00 EST 2020    

    

 

                              Systolic          122 MM[HG]          Sun Dec 20 1

0:00:00 EST 2020    

    

 

                              Diastolic          68 MM[HG]           Sun Dec 20 

10:00:00 EST 2020    

    

 

                              BP Position          3 Position          Sun Dec 2

0 10:00:00 EST 2020 

       

 

                              Temperature          97.8 [DEGF]          Sat Dec 

19 18:19:00 EST 2020

        

 

                              Temperature          36.6 LACEY            Sat Dec 1

9 18:19:00 EST 2020   

     

 

                              Heart Rate          92 /MIN             Sat Dec 19

 18:19:00 EST 2020     

   

 

                              Respiration          17 /MIN             Sat Dec 1

9 18:19:00 EST 2020    

    

 

                              Systolic          114 MM[HG]          Sat Dec 19 1

8:19:00 EST 2020    

    

 

                              Diastolic          74 MM[HG]           Sat Dec 19 

18:19:00 EST 2020    

    

 

                              Temperature          98.2 [DEGF]          Sat Dec 

19 11:35:00 EST 2020

        

 

                              Temperature          36.8 LACEY            Sat Dec 1

9 11:35:00 EST 2020   

     

 

                              Heart Rate          79 /MIN             Sat Dec 19

 11:35:00 EST 2020     

   

 

                              Respiration          16 /MIN             Sat Dec 1

9 11:35:00 EST 2020    

    

 

                              Systolic          119 MM[HG]          Sat Dec 19 1

1:35:00 EST 2020    

    

 

                              Diastolic          68 MM[HG]           Sat Dec 19 

11:35:00 EST 2020    

    

 

                              BP Position          3 Position          Sat Dec 1

9 11:35:00 EST 2020 

       

 

                              Temperature          97.5 [DEGF]          Sat Dec 

19 05:37:00 EST 2020

        

 

                              Temperature          36.4 LACEY            Sat Dec 1

9 05:37:00 EST 2020   

     

 

                              Heart Rate          64 /MIN             Sat Dec 19

 05:37:00 EST 2020     

   

 

                              Respiration          16 /MIN             Sat Dec 1

9 05:37:00 EST 2020    

    

 

                              Systolic          107 MM[HG]          Sat Dec 19 0

5:37:00 EST 2020    

    

 

                              Diastolic          60 MM[HG]           Sat Dec 19 

05:37:00 EST 2020    

    

 

                              BP Position          2 Position          Sat Dec 1

9 05:37:00 EST 2020 

       

 

                              Heart Rate          53 /MIN             Fri Dec 18

 18:19:00 EST 2020     

   

 

                              Respiration          16 /MIN             Fri Dec 1

8 18:19:00 EST 2020    

    

 

                              Systolic          113 MM[HG]          Fri Dec 18 1

8:19:00 EST 2020    

    

 

                              Diastolic          63 MM[HG]           Fri Dec 18 

18:19:00 EST 2020    

    

 

                              BP Position          3 Position          Fri Dec 1

8 18:19:00 EST 2020 

       

 

                              Temperature          98.7 [DEGF]          Fri Dec 

18 14:07:00 EST 2020

        

 

                              Temperature          37.1 LACEY            Fri Dec 1

8 14:07:00 EST 2020   

     

 

                              Heart Rate          74 /MIN             Fri Dec 18

 14:07:00 EST 2020     

   

 

                              Respiration          17 /MIN             Fri Dec 1

8 14:07:00 EST 2020    

    

 

                              Systolic          103 MM[HG]          Fri Dec 18 1

4:07:00 EST 2020    

    

 

                              Diastolic          62 MM[HG]           Fri Dec 18 

14:07:00 EST 2020    

    

 

                              Temperature          98.7 [DEGF]          Fri Dec 

18 13:56:00 EST 2020

        

 

                              Temperature          37.1 LACEY            Fri Dec 1

8 13:56:00 EST 2020   

     

 

                              Heart Rate          74 /MIN             Fri Dec 18

 13:56:00 EST 2020     

   

 

                              Respiration          17 /MIN             Fri Dec 1

8 13:56:00 EST 2020    

    

 

                              Systolic          103 MM[HG]          Fri Dec 18 1

3:56:00 EST 2020    

    

 

                              Diastolic          62 MM[HG]           Fri Dec 18 

13:56:00 EST 2020    

    

 

                              BP Position          3 Position          Fri Dec 1

8 13:56:00 EST 2020 

       

 

                              Temperature          97.7 [DEGF]          Fri Dec 

18 06:02:00 EST 2020

        

 

                              Temperature          36.5 LACEY            Fri Dec 1

8 06:02:00 EST 2020   

     

 

                              Heart Rate          73 /MIN             Fri Dec 18

 06:02:00 EST 2020     

   

 

                              Respiration          16 /MIN             Fri Dec 1

8 06:02:00 EST 2020    

    

 

                              Systolic          107 MM[HG]          Fri Dec 18 0

6:02:00 EST 2020    

    

 

                              Diastolic          61 MM[HG]           Fri Dec 18 

06:02:00 EST 2020    

    

 

                              BP Position          2 Position          Fri Dec 1

8 06:02:00 EST 2020 

       

 

                              Temperature          98.7 [DEGF]          u Dec 

17 21:48:00 EST 2020

        

 

                              Temperature          37.1 LACEY            u Dec 1

7 21:48:00 EST 2020   

     

 

                              Heart Rate          16 /MIN             u Dec 17

 21:48:00 EST 2020     

   

 

                              Respiration          16 /MIN             u Dec 1

7 21:48:00 EST 2020    

    

 

                              Systolic          136 MM[HG]          u Dec 17 2

1:48:00 EST 2020    

    

 

                              Diastolic          76 MM[HG]           u Dec 17 

21:48:00 EST 2020    

    

 

                              BP Position          3 Position          Thu Dec 1

7 21:48:00 EST 2020 

       

 

                              Temperature          97.8 [DEGF]          u Dec 

17 07:21:00 EST 2020

        

 

                              Temperature          36.6 LACEY            Thu Dec 1

7 07:21:00 EST 2020   

     

 

                              Heart Rate          82 /MIN             u Dec 17

 07:21:00 EST 2020     

   

 

                              Respiration          16 /MIN             u Dec 1

7 07:21:00 EST 2020    

    

 

                              Systolic          112 MM[HG]          Thu Dec 17 0

7:21:00 EST 2020    

    

 

                              Diastolic          70 MM[HG]           Thu Dec 17 

07:21:00 EST 2020    

    

 

                              BP Position          3 Position          u Dec 1

7 07:21:00 EST 2020 

       

 

                              Temperature          98.2 [DEGF]          Wed Dec 

16 19:12:00 EST 2020

        

 

                              Temperature          36.8 LACEY            Wed Dec 1

6 19:12:00 EST 2020   

     

 

                              Heart Rate          87 /MIN             Wed Dec 16

 19:12:00 EST 2020     

   

 

                              Respiration          18 /MIN             Wed Dec 1

6 19:12:00 EST 2020    

    

 

                              Systolic          115 MM[HG]          Wed Dec 16 1

9:12:00 EST 2020    

    

 

                              Diastolic          75 MM[HG]           Wed Dec 16 

19:12:00 EST 2020    

    

 

                              Temperature          97.8 [DEGF]          Wed Dec 

16 17:27:00 EST 2020

        

 

                              Temperature          36.6 LACEY            Wed Dec 1

6 17:27:00 EST 2020   

     

 

                              Heart Rate          91 /MIN             Wed Dec 16

 17:27:00 EST 2020     

   

 

                              Respiration          16 /MIN             Wed Dec 1

6 17:27:00 EST 2020    

    

 

                              SpO2          99 %                Wed Dec 16 17:27

:00 EST 2020        

 

                              Systolic          107 MM[HG]          Wed Dec 16 1

7:27:00 EST 2020    

    

 

                              Diastolic          62 MM[HG]           Wed Dec 16 

17:27:00 EST 2020    

    

 

                              BP Position          2 Position          Wed Dec 1

6 17:27:00 EST 2020
Summary of Patient Chart

                             Created on: 2021



JUWAN DURÁN

External Reference #: [id number]

: 1994

Sex: Male



Demographics





                          Address                   [address] [place]  [address]

 

                          Home Phone                [phone]

 

                          Preferred Language        English

 

                          Marital Status            Single

 

                          Church Affiliation     Unknown

 

                          Race                      

 

                          Ethnic Group               or 





Author





                          JUWAN Rodriguez [facility] 

 

                          Address                   [address] [place]  [address]



 

                          Phone                     [phone]







Care Team Providers





                    Care Team Member Name Role                Phone

 

                    Jennifer Guzmán     Practitioner        [phone]

 

                    Hallie Holden    AdmittingPractitioner1 [phone]

 

                    Sayra Fuller  Attphys             [phone]

 

                    Gian Hickey      Admphys             [phone]

 

                    Betzy Roth AttendingPractitioner1 [phone]



                           



Functional Status

     No Results                                  



Allergies

           



                Name            Onset Date          Reaction          Severity  

      

 

                                    PCN (penicillin) (Allergy)                  

           Wed Dec 16 

07:00:00 EST                                  Hives                 



                                                 



Encounters

     



                Program Name          Primary Diagnosis          Admission Date/

Time          

Discharge Date/Time        

 

                                    [facility]               

     Opioid dependence, 

uncomplicated                       Wed Dec 16 15:54:00 EST 2020                

   

Sun Dec 20 14:20:00 EST         

 

                                    [facility]                    Heroin use dis

order, severe, dependence 

                                    Sat Aug 28 17:57:00 EDT                 

   

 

                              [facility]                                

         10:30:00 

EST 2021 17:53:00 EST   

      

 

                              [facility]                   

                     Fri Dec 

13 10:40:00 EST 2019 15:41:00 EST   

      

 

                                    [facility]               

     Opioid dependence, 

uncomplicated                       Sat Aug 28 16:36:00 EDT                 

   



                                                                



Immunizations

     No Known Immunizations                                  



Lab Results

     No Known Laboratory Results                                            



Medications

     



                Medication          Directions          Start Date          End 

Date        

 

                                    SUBOXONE (BUPRENORPHINE-NALOXONE)  8MG-2MG F

ILM          1 Film Daily 

SUBLINGUAL                Mon Aug 30 10:37:00 EDT 2021          Fri Sep 03 10:36

:00 

EDT         

 

                                    SUBOXONE (BUPRENORPHINE-NALOXONE)  8MG-2MG F

ILM          1 Film Stat 

SUBLINGUAL (EKIT)          Sun Aug 29 10:00:00 EDT 2021          Sun Aug 29 

10:03:00 EDT         

 

                                    SUBOXONE (BUPRENORPHINE-NALOXONE)  4MG-1MG F

ILM          1 Film Once 

SUBLINGUAL (EKIT)          Sat Aug 28 21:00:00 EDT 2021          Sat Aug 28 

20:25:00 EDT         

 

                                    SUBOXONE (BUPRENORPHINE-NALOXONE)  4MG-1MG F

ILM          1 Film Stat 

SUBLINGUAL (EKIT)          Sat Aug 28 18:52:00 EDT 2021          Sat Aug 28 

18:57:00 EDT         

 

                                    NICOTINE POLACRILEX 2 MG LOZENGE/MELISSA     

     3 Each TID: Three 

Times a Day ORAL          Sat Aug 28 18:48:00 EDT           [facility]  

18:47:00 EDT         

 

                                    NICODERM CQ (NICOTINE) 21 MG/24 HR PATCH, EX

TENDED RELEASE          1 

patch Daily As Needed TRANSDERMAL (Remove at HS. MDD 1 Patch)          Sat Aug 

28 18:47:00 EDT                     Mon Aug 30 18:46:00 EDT         

 

                                    CLONIDINE HCL 0.1 MG TABLET          1 table

t TIDPRN: Three Times A 

Day As Needed ORAL (SBP >110 HR>60Do not give within one (1) hour of hydroxyzine
administration)           Sat Aug 28 18:47:00 EDT           [facility]  

18:46:00 EDT         

 

                                    NARCAN (NALOXONE HCL) 4 MG/0.1 ML SPRAY     

     1 spray(s)  As 

Directed NASAL            Sat Aug 28 18:47:00 EDT           [place] Aug 29 

18:46:00 EDT         

 

                                    ONDANSETRON 4 MG TABLET          1 tablet TI

DPRN: Three Times A Day As

Needed ORAL               Sat Aug 28 18:47:00 EDT           Sat Sep  18:46

:00 

EDT         

 

                                    CALCIUM CARBONATE 500 MG TABLET, CHEWABLE   

       3 tablet TIDPRN: 

Three Times A Day As Needed ORAL          Sat Aug 28 18:47:00 EDT           

Sat Sep  18:46:00 EDT         

 

                              MULTIVITAMIN   TABLET          1 tablet Daily ORAL

          Sat Aug 28

18:47:00 EDT                        Sat Sep  18:46:00 EDT         

 

                                    MELATONIN 5 MG CAPSULE          1 capsule HS

PRN: At Bedtime As Needed 

ORAL (MDD 5mg)            Sat Aug 28 18:47:00 EDT           Sat Sep 25 

18:46:00 EDT         

 

                                    IBUPROFEN 200 MG TABLET          3 tablet TI

DPRN: Three Times A Day As

Needed ORAL               Sat Aug 28 18:47:00 EDT           Sat Sep 25 18:46

:00 

EDT         

 

                                    FIBER- MG TABLET          2 tablet PA

N: As Needed ORAL (MDD 4 

Tabs)                     Sat Aug 28 18:47:00 EDT           Sat Sep 46

:00 EDT 

        

 

                                    HYDROXYZINE HCL 25 MG TABLET          1 tabl

et TIDPRN: Three Times A 

Day As Needed ORAL (Do not give within two (2) hours of Diphenhydramine or 
within one (1) hour of clonidine administration)          Sat Aug 28 18:47:00 

EDT                                 Sat Sep :46:00 EDT         

 

                                    DOCUSATE SODIUM 100 MG CAPSULE, LIQUID FILLE

D          1 capsule Daily

As Needed ORAL            Sat Aug 28 18:47:00 EDT           [facility] 46: EDT         

 

                                    DIPHENHYDRAMINE HCL 25 MG CAPSULE          1

 capsule TIDPRN: Three 

Times A Day As Needed ORAL          Sat Aug 28 18:47:00 EDT           [facility] 46:00 EDT         

 

                                    ACETAMINOPHEN 500 MG CAPSULE          2 caps

ule Q6HPRN: Every 6 Hours 

As Needed ORAL (MDD 6 Tabs)          Sat Aug 28 18:47:00 EDT           [facility] :46:00 EDT         

 

                                                  PROAIR HFA (ALBUTEROL SULFATE)

 0.09 MG/1 ACTUATION SUSPENSION         

                          2 Puff TIDPRN: Three Times A Day As Needed INHALATION 

         Sat Dec 19 

08:00:00 EST 2020                       Sun Dec 20 14:43:00 EST 2020        

 

                                    NICOTINE 14 MG/24 HR PATCH, EXTENDED RELEASE

          1 Patch Daily 

TRANSDERMAL               Fri Dec 18 06:00:00 EST 2020          Sun Dec 20 14:43

:00 

EST 2020        

 

                                                  EUCERIN DAILY PROTECTION (LOTI

ON, MULTI INGREDIENT)  

2%-7.5%-4.5%-2.4%-4.8% LOTION           1 Application TIDPRN: Three Times A Day 

As Needed TOPICAL APPLICATION          Fri Dec 18 07:42:00 EST 2020          Sun

Dec 20 14:43:00 EST 2020        

 

                                                  PROAIR HFA (ALBUTEROL SULFATE)

 0.09 MG/1 ACTUATION SUSPENSION         

                          2 Puff TIDPRN: Three Times A Day As Needed INHALATION 

         Fri Dec 18 

06:00:00 EST 2020                       Sat Dec 19 10:01:00 EST 2020        

 

                                    XOPENEX (LEVALBUTEROL HYDROCHLORIDE) 1.25 MG

/3 ML SOLUTION          1 

mL Stat INHALATION          Thu Dec 17 23:39:00 EST 2020          Thu Ryan 14 

23:38:00 EST 2021        

 

                                                  BACTRIM DS (SULFAMETHOXAZOLE-T

RIMETHOPRIM)  800MG-160MG TABLET        

                    1 Tablet BID: Twice a Day ORAL          Thu Dec 17 06:00:00 

EST 2020          

Sun Dec 20 14:43:00 EST 2020        

 

                                    SUBOXONE (BUPRENORPHINE-NALOXONE)  8MG-2MG F

ILM          1 Film Daily 

SUBLINGUAL                Fri Dec 18 06:00:00 EST 2020          Sun Dec 20 14:43

:00 

EST 2020        

 

                                    NARCAN (NALOXONE HCL) 4 MG/0.1 ML SPRAY     

     1 spray(s)  Once 

NASAL                     Thu Dec 17 06:00:00 EST 2020          Sun Dec 20 14:43

:00 EST 

2020        

 

                                    SUBOXONE (BUPRENORPHINE-NALOXONE)  4MG-1MG F

ILM          1 Film Stat 

SUBLINGUAL (Take from [facility])          Thu Dec 17 06:00:00 EST 2020          Thu 

Dec 17 12:21:00 EST 2020        

 

                                    SUBOXONE (BUPRENORPHINE-NALOXONE)  4MG-1MG F

ILM          1 Film Daily 

SUBLINGUAL                Thu Dec 17 06:00:00 EST 2020          Fri Dec 18 05:59

:00 

EST 2020        

 

                                    NARCAN (NALOXONE HCL) 4 MG/0.1 ML SPRAY     

     1 spray(s)  As 

Directed NASAL            Wed Dec 16 17:02:00 EST 2020          Sun Dec 20 

14:43:00 EST 2020        

 

                                    ACETAMINOPHEN 500 MG CAPSULE          2 caps

ule Q4-6HPRN: Every 4-6 

Hours As Needed ORAL (MDD 6 Tabs)          Wed Dec 16 17:01:00 EST 2020         

                                        Sun Dec 20 14:43:00 EST 2020        

 

                                                  BENADRYL ALLERGY (DIPHENHYDRAM

INE HYDROCHLORIDE) 25 MG TABLET         

                          1 tablet TIDPRN: Three Times A Day As Needed ORAL     

     Wed Dec 16 17:01:00 

EST 2020                                Sun Dec 20 14:43:00 EST 2020        

 

                                    CATAPRES (CLONIDINE HYDROCHLORIDE) 0.1 MG TA

BLET          1 tablet 

TIDPRN: Three Times A Day As Needed ORAL (Systolic BP >110, HR>60)          Wed 

Dec 16 17::00 EST 2020                Sun Dec 20 14:43:00 EST 2020        

 

                                    COLACE (DOCUSATE SODIUM) 100 MG CAPSULE, LIQ

UID FILLED          1 

capsule Daily As Needed ORAL          Wed Dec 16 17:01:00 EST 2020          Sun 

Dec 20 14:43:00 EST 2020        

 

                                    HYDROXYZINE HCL 25 MG TABLET          1 tabl

et TIDPRN: Three Times A 

Day As Needed ORAL (Do not give within 2 hours of Diphenhydramine)          Wed 

Dec 16 17:01:00 EST 2020                Sun Dec 20 14:43:00 EST 2020        

 

                                    FIBER- MG TABLET          2 tablet PA

N: As Needed ORAL (MDD 4 

Tabs)                     Wed Dec 16 17:01:00 EST 2020          Sun Dec 20 14:43

:00 EST 

2020        

 

                                    IBUPROFEN 200 MG TABLET          3 tablet Da

eldon As Needed ORAL (MDD 

3200mg)                   Wed Dec 16 17::00 EST 2020          Sun Dec 20 14:43

:00 EST 

2020        

 

                                    MELATONIN 5 MG CAPSULE          1 capsule HS

PRN: At Bedtime As Needed 

ORAL (MDD 5mg)            Wed Dec 16 17::00 EST 2020          Sun Dec 20 

14:43:00 EST 2020        

 

                              MULTIVITAMIN   TABLET          1 tablet Daily ORAL

          Wed Dec 16

17:01:00 EST 2020                       Sun Dec 20 14:43:00 EST 2020        

 

                                    TUMS (CALCIUM CARBONATE) 500 MG TABLET, CHEW

ABLE          3 tablet 

PRN: As Needed ORAL          Wed Dec 16 17::00 EST 2020          Sun Dec 20 

14:43:00 EST 2020        

 

                                    ZOFRAN (ONDANSETRON HYDROCHLORIDE) 4 MG TABL

ET          1 tablet 

TIDPRN: Three Times A Day As Needed ORAL          Wed Dec 16 17::00 EST 2020  

                                        Sun Dec 20 14:43:00 EST 2020        



                                                                                
                                                                                
                                                                        



Problems

     No Known Problems                                            



Procedures

     No Known Procedures                                  



Social History

     



                    Social History Observation          Description          Abrahan

e        

 

                              Smoking Status                    Current Every Da

y Smoker          

Fri Dec 13 07:: EST         

 

                              Smoking Status                    Current Every Da

y Smoker          

Fri Dec 13 07::00 EST 2019        

 

                              Smoking Status                    Current Every Da

y Smoker          

Fri Dec 13 07::00 EST 2019        

 

                              Smoking Status                    Current Every Da

y Smoker          

Fri Dec 13 07:00:00 EST 2019        

 

                              Smoking Status                    Current Every Da

y Smoker          

Fri Dec 13 07:00:00 EST         

 

                              Smoking Status                    Current Every Da

y Smoker          

Fri Dec 13 07:00:00 EST 2019        

 

                              Smoking Status                    Current Every Da

y Smoker          

Fri Dec 13 07:00:00 EST 2019        

 

                              Smoking Status                    Current Every Da

y Smoker          

Fri Dec 13 07:00:00 EST 2019        

 

                              Smoking Status                    Current Every Da

y Smoker          

Fri Dec 13 07:00 EST 2019        

 

                              Smoking Status                    Current Every Da

y Smoker          

Fri Dec 13 07:00:00 EST         

 

                    Birth Sex           Male                Sat  07:00:00 

EST         



                                                                                
        



Vital Signs

     



                    Vital Sign          Measurement          Date        

 

                              Temperature          97.5 [DEGF]          Sun Aug 

29 06:16:00 EDT 

        

 

                              Temperature          36.4 LACEY            Sun Aug 2

9 06:16:00 EDT    

     

 

                              Heart Rate          71 /MIN             Sun Aug 29

 06:16:00 EDT      

   

 

                              Respiration          16 /MIN             Sun Aug 2

9 06:16:00 EDT     

    

 

                              Systolic          120 MM[HG]          Sun Aug 29 0

6:16:00 EDT     

    

 

                              Diastolic          64 MM[HG]           Sun Aug 29 

06:16:00 EDT     

    

 

                              BP Position          2 Position          Sun Aug 2

9 06:16:00 EDT  

       

 

                              Temperature          98.7 [DEGF]          Sat Aug 

28 20:06:00 EDT 

        

 

                              Temperature          37.1 LACEY            Sat Aug 2

8 20:06:00 EDT    

     

 

                              Heart Rate          74 /MIN             Sat Aug 28

 20:06:00 EDT      

   

 

                              Respiration          16 /MIN             Sat Aug 2

8 20:06:00 EDT     

    

 

                              SpO2          100 %               Sat Aug 28 20:06

:00 EDT         

 

                              Systolic          131 MM[HG]          Sat Aug 28 2

0:06:00 EDT     

    

 

                              Diastolic          71 MM[HG]           Sat Aug 28 

20:06:00 EDT     

    

 

                              BP Position          2 Position          Sat Aug 2

8 20:06:00 EDT  

       

 

                              Height          6 0.0 ft            Sat Aug 28 20:

06:00 EDT         

 

                              Height          72 in               Sat Aug 28 20:

06:00 EDT         

 

                              Height          182.9 cm            Sat Aug 28 20:

06:00 EDT         

 

                              Weight Lbs          140 lbs             Sat Aug 28

 20:06:00 EDT      

   

 

                              Weight Kgs          63.6 KG             Sat Aug 28

 20:06:00 EDT      

   

 

                              BMI          19                  Sat Aug 28 20:06:

00 EDT         

 

                              Temperature          97.6 [DEGF]          Sun Dec 

20 10:00:00 EST 

        

 

                              Temperature          36.4 LACEY            Sun Dec 2

0 10:00:00 EST    

     

 

                              Heart Rate          72 /MIN             Sun Dec 20

 10:00:00 EST      

   

 

                              Respiration          14 /MIN             Sun Dec 2

0 10:00:00 EST     

    

 

                              Systolic          122 MM[HG]          Sun Dec 20 1

0:00:00 EST     

    

 

                              Diastolic          68 MM[HG]           Sun Dec 20 

10:00:00 EST     

    

 

                              BP Position          3 Position          Sun Dec 2

0 10:00:00 EST  

       

 

                              Temperature          97.8 [DEGF]          Sat Dec 

19 18:19:00 EST 

        

 

                              Temperature          36.6 LACEY            Sat Dec 1

9 18:19:00 EST 2020   

     

 

                              Heart Rate          92 /MIN             Sat Dec 19

 18:19:00 EST 2020     

   

 

                              Respiration          17 /MIN             Sat Dec 1

9 18:19:00 EST 2020    

    

 

                              Systolic          114 MM[HG]          Sat Dec 19 1

8:19:00 EST 2020    

    

 

                              Diastolic          74 MM[HG]           Sat Dec 19 

18:19:00 EST 2020    

    

 

                              Temperature          98.2 [DEGF]          Sat Dec 

19 11:35:00 EST 2020

        

 

                              Temperature          36.8 LACEY            Sat Dec 1

9 11:35:00 EST 2020   

     

 

                              Heart Rate          79 /MIN             Sat Dec 19

 11:35:00 EST 2020     

   

 

                              Respiration          16 /MIN             Sat Dec 1

9 11:35:00 EST 2020    

    

 

                              Systolic          119 MM[HG]          Sat Dec 19 1

1:35:00 EST 2020    

    

 

                              Diastolic          68 MM[HG]           Sat Dec 19 

11:35:00 EST 2020    

    

 

                              BP Position          3 Position          Sat Dec 1

9 11:35:00 EST 2020 

       

 

                              Temperature          97.5 [DEGF]          Sat Dec 

19 05:37:00 EST 2020

        

 

                              Temperature          36.4 LACEY            Sat Dec 1

9 05:37:00 EST 2020   

     

 

                              Heart Rate          64 /MIN             Sat Dec 19

 05:37:00 EST 2020     

   

 

                              Respiration          16 /MIN             Sat Dec 1

9 05:37:00 EST 2020    

    

 

                              Systolic          107 MM[HG]          Sat Dec 19 0

5:37:00 EST 2020    

    

 

                              Diastolic          60 MM[HG]           Sat Dec 19 

05:37:00 EST 2020    

    

 

                              BP Position          2 Position          Sat Dec 1

9 05:37:00 EST 2020 

       

 

                              Heart Rate          53 /MIN             Fri Dec 18

 18:19:00 EST 2020     

   

 

                              Respiration          16 /MIN             Fri Dec 1

8 18:19:00 EST 2020    

    

 

                              Systolic          113 MM[HG]          Fri Dec 18 1

8:19:00 EST 2020    

    

 

                              Diastolic          63 MM[HG]           Fri Dec 18 

18:19:00 EST 2020    

    

 

                              BP Position          3 Position          Fri Dec 1

8 18:19:00 EST 2020 

       

 

                              Temperature          98.7 [DEGF]          Fri Dec 

18 14:07:00 EST 2020

        

 

                              Temperature          37.1 LACEY            Fri Dec 1

8 14:07:00 EST 2020   

     

 

                              Heart Rate          74 /MIN             Fri Dec 18

 14:07:00 EST 2020     

   

 

                              Respiration          17 /MIN             Fri Dec 1

8 14:07:00 EST 2020    

    

 

                              Systolic          103 MM[HG]          Fri Dec 18 1

4:07:00 EST 2020    

    

 

                              Diastolic          62 MM[HG]           Fri Dec 18 

14:07:00 EST 2020    

    

 

                              Temperature          98.7 [DEGF]          Fri Dec 

18 13:56:00 EST 2020

        

 

                              Temperature          37.1 LACEY            Fri Dec 1

8 13:56:00 EST 2020   

     

 

                              Heart Rate          74 /MIN             Fri Dec 18

 13:56:00 EST 2020     

   

 

                              Respiration          17 /MIN             Fri Dec 1

8 13:56:00 EST 2020    

    

 

                              Systolic          103 MM[HG]          Fri Dec 18 1

3:56:00 EST 2020    

    

 

                              Diastolic          62 MM[HG]           Fri Dec 18 

13:56:00 EST 2020    

    

 

                              BP Position          3 Position          Fri Dec 1

8 13:56:00 EST 2020 

       

 

                              Temperature          97.7 [DEGF]          Fri Dec 

18 06:02:00 EST 2020

        

 

                              Temperature          36.5 LACEY            Fri Dec 1

8 06:02:00 EST 2020   

     

 

                              Heart Rate          73 /MIN             Fri Dec 18

 06:02:00 EST 2020     

   

 

                              Respiration          16 /MIN             Fri Dec 1

8 06:02:00 EST 2020    

    

 

                              Systolic          107 MM[HG]          Fri Dec 18 0

6:02:00 EST 2020    

    

 

                              Diastolic          61 MM[HG]           Fri Dec 18 

06:02:00 EST 2020    

    

 

                              BP Position          2 Position          Fri Dec 1

8 06:02:00 EST 2020 

       

 

                              Temperature          98.7 [DEGF]          Thu Dec 

17 21:48:00 EST 2020

        

 

                              Temperature          37.1 LACEY            Thu Dec 1

7 21:48:00 EST 2020   

     

 

                              Heart Rate          16 /MIN             Thu Dec 17

 21:48:00 EST 2020     

   

 

                              Respiration          16 /MIN             Thu Dec 1

7 21:48:00 EST 2020    

    

 

                              Systolic          136 MM[HG]          u Dec 17 2

1:48:00 EST 2020    

    

 

                              Diastolic          76 MM[HG]           u Dec 17 

21:48:00 EST 2020    

    

 

                              BP Position          3 Position          Thu Dec 1

7 21:48:00 EST 2020 

       

 

                              Temperature          97.8 [DEGF]          u Dec 

17 07:21:00 EST 2020

        

 

                              Temperature          36.6 LACEY            u Dec 1

7 07:21:00 EST 2020   

     

 

                              Heart Rate          82 /MIN             Thu Dec 17

 07:21:00 EST 2020     

   

 

                              Respiration          16 /MIN             Thu Dec 1

7 07:21:00 EST 2020    

    

 

                              Systolic          112 MM[HG]          u Dec 17 0

7:21:00 EST 2020    

    

 

                              Diastolic          70 MM[HG]           Thu Dec 17 

07:21:00 EST 2020    

    

 

                              BP Position          3 Position          Thu Dec 1

7 07:21:00 EST 2020 

       

 

                              Temperature          98.2 [DEGF]          Wed Dec 

16 19:12:00 EST 2020

        

 

                              Temperature          36.8 LACEY            Wed Dec 1

6 19:12:00 EST 2020   

     

 

                              Heart Rate          87 /MIN             Wed Dec 16

 19:12:00 EST 2020     

   

 

                              Respiration          18 /MIN             Wed Dec 1

6 19:12:00 EST 2020    

    

 

                              Systolic          115 MM[HG]          Wed Dec 16 1

9:12:00 EST 2020    

    

 

                              Diastolic          75 MM[HG]           Wed Dec 16 

19:12:00 EST 2020    

    

 

                              Temperature          97.8 [DEGF]          Wed Dec 

16 17:27:00 EST 2020

        

 

                              Temperature          36.6 LACEY            Wed Dec 1

6 17:27:00 EST 2020   

     

 

                              Heart Rate          91 /MIN             Wed Dec 16

 17:27:00 EST 2020     

   

 

                              Respiration          16 /MIN             Wed Dec 1

6 17:27:00 EST 2020    

    

 

                              SpO2          99 %                Wed Dec 16 17:27

:00 EST 2020        

 

                              Systolic          107 MM[HG]          Wed Dec 16 1

7:27:00 EST 2020    

    

 

                              Diastolic          62 MM[HG]           Wed Dec 16 

17:27:00 EST 2020    

    

 

                              BP Position          2 Position          Wed Dec 1

6 17:27:00 EST 2020
Summary of Patient Chart

                             Created on: 2021



JWUAN DURÁN

External Reference #: [id number]

: 1994

Sex: Male



Demographics





                          Address                   [address] [place] [address]  [address]

 

                          Home Phone                [phone]

 

                          Preferred Language        English

 

                          Marital Status            Single

 

                          Judaism Affiliation     Unknown

 

                          Race                      

 

                          Ethnic Group               or 





Author





                          Author                    JUWAN Eli

nerated

 

                          Organization              [facility]

 

                          Address                   Unknown

 

                          Phone                     Unavailable







Care Team Providers





                    Care Team Member Name Role                Phone

 

                    Jennifer Guzmán     Practitioner        [phone]

 

                    Hallie Holden    AdmittingPractitioner1 [phone]

 

                    Sayra Fuller  Attphys             [phone]

 

                    Gian Hickey      Admphys             [phone]

 

                    Betzy Roth AttendingPractitioner1 [phone]



                           



Functional Status

     No Results                                  



Allergies

           



                Name            Onset Date          Reaction          Severity  

      

 

                                    PCN (penicillin) (Allergy)                  

           Wed Dec 16 

07:00:00 EST                                  Hives                 



                                                 



Encounters

     



                Program Name          Primary Diagnosis          Admission Date/

Time          

Discharge Date/Time        

 

                                    [facility]               

     Opioid dependence, 

uncomplicated                       Wed Dec 16 15:54:00 EST 2020                

   

Sun Dec 20 14:20:00 EST 2020        

 

                              [facility]                   

                     Fri Dec 

13 10:40:00 EST 2019 15:41:00 EST   

      

 

                                    [facility]                    Heroin use dis

order, severe, dependence 

                                    Sat Aug 28 17:57:00 EDT                 

   

 

                              [facility]                                

         10:30:00 

EST 2021 17:53:00 EST   

      

 

                                    [facility]               

     Opioid dependence, 

uncomplicated                       Sat Aug 28 16:36:00 EDT                 

   



                                                                



Immunizations

     No Known Immunizations                                  



Lab Results

     No Known Laboratory Results                                            



Medications

     



                Medication          Directions          Start Date          End 

Date        

 

                                                  VENTOLIN HFA (ALBUTEROL SULFAT

E) 0.09 MG/1 ACTUATION SUSPENSION       

                          2 Puff Q4HPRN: Every 4 Hours As Needed INHALATION     

     Mon Aug 30 08:00:00

EDT 2021                                Mon Sep 27 07:59:00 EDT         

 

                                    SUBOXONE (BUPRENORPHINE-NALOXONE)  8MG-2MG F

ILM          1 Film Daily 

SUBLINGUAL                Mon Aug 30 10:37:00 EDT 2021          Fri Sep 03 10:36

:00 

EDT         

 

                                    SUBOXONE (BUPRENORPHINE-NALOXONE)  8MG-2MG F

ILM          1 Film Stat 

SUBLINGUAL ([facility])          [place] Aug 29 10:00:00 EDT           Sun Aug 29 

10:03:00 EDT         

 

                                    SUBOXONE (BUPRENORPHINE-NALOXONE)  4MG-1MG F

ILM          1 Film Once 

SUBLINGUAL ([facility])          Sat Aug 28 21:00:00 EDT           Sat Aug 28 

20:25:00 EDT         

 

                                    SUBOXONE (BUPRENORPHINE-NALOXONE)  4MG-1MG F

ILM          1 Film Stat 

SUBLINGUAL ([facility])          Sat Aug 28 18:52:00 EDT           Sat Aug 28 

18:57:00 EDT         

 

                                    NICOTINE POLACRILEX 2 MG LOZENGE/MELISSA     

     3 Each TID: Three 

Times a Day ORAL          Sat Aug 28 18:48:00 EDT           [facility]  

18:47:00 EDT         

 

                                    NICODERM CQ (NICOTINE) 21 MG/24 HR PATCH, EX

TENDED RELEASE          1 

patch Daily As Needed TRANSDERMAL (Remove at HS. MDD 1 Patch)          Sat Aug 

28 18:47:00 EDT                     Mon Aug 30 18:46:00 EDT         

 

                                    CLONIDINE HCL 0.1 MG TABLET          1 table

t TIDPRN: Three Times A 

Day As Needed ORAL (SBP >110 HR>60Do not give within one (1) hour of hydroxyzine
administration)           Sat Aug 28 18:47:00 EDT           [facility]  

18:46:00 EDT         

 

                                    NARCAN (NALOXONE HCL) 4 MG/0.1 ML SPRAY     

     1 spray(s)  As 

Directed NASAL            Sat Aug 28 18:47:00 EDT           [place] Aug 29 

18:46:00 EDT         

 

                                    ONDANSETRON 4 MG TABLET          1 tablet TI

DPRN: Three Times A Day As

Needed ORAL               Sat Aug 28 18:47:00 EDT           Sat Sep  18:46

:00 

EDT         

 

                                    CALCIUM CARBONATE 500 MG TABLET, CHEWABLE   

       3 tablet TIDPRN: 

Three Times A Day As Needed ORAL          Sat Aug 28 18:47:00 EDT           

Sat Sep  18:46:00 EDT         

 

                              MULTIVITAMIN   TABLET          1 tablet Daily ORAL

          Sat Aug 28

18:47:00 EDT                        Sat Sep  18:46:00 EDT         

 

                                    MELATONIN 5 MG CAPSULE          1 capsule HS

PRN: At Bedtime As Needed 

ORAL (MDD 5mg)            Sat Aug 28 18:47:00 EDT           [place] [id number] 

18:46:00 EDT         

 

                                    IBUPROFEN 200 MG TABLET          3 tablet TI

DPRN: Three Times A Day As

Needed ORAL               Sat Aug 28 18:47:00 EDT           Sat Sep 25 46

:00 

EDT         

 

                                    FIBER- MG TABLET          2 tablet HI

N: As Needed ORAL (MDD 4 

Tabs)                     Sat Aug 28 18:47:00 EDT           Sat Sep 25 :46

:00 EDT 

        

 

                                    HYDROXYZINE HCL 25 MG TABLET          1 tabl

et TIDPRN: Three Times A 

Day As Needed ORAL (Do not give within two (2) hours of Diphenhydramine or 
within one (1) hour of clonidine administration)          Sat Aug 28 18:47:00 

EDT                                 Sat Sep 04 :46:00 EDT         

 

                                    DOCUSATE SODIUM 100 MG CAPSULE, LIQUID FILLE

D          1 capsule Daily

As Needed ORAL            Sat Aug 28 18:47:00 EDT           [place] [id number] 

:46:00 EDT         

 

                                    DIPHENHYDRAMINE HCL 25 MG CAPSULE          1

 capsule TIDPRN: Three 

Times A Day As Needed ORAL          Sat Aug 28 18:47:00 EDT           Sat 

Sep 25 :46:00 EDT         

 

                                    ACETAMINOPHEN 500 MG CAPSULE          2 caps

ule Q6HPRN: Every 6 Hours 

As Needed ORAL (MDD 6 Tabs)          Sat Aug 28 18:47:00 EDT           Sat 

Sep 25 :46:00 EDT         

 

                                                  PROAIR HFA (ALBUTEROL SULFATE)

 0.09 MG/1 ACTUATION SUSPENSION         

                          2 Puff TIDPRN: Three Times A Day As Needed INHALATION 

         Sat Dec 19 

08:00:00 EST 2020                       Sun Dec 20 14:43:00 EST 2020        

 

                                    NICOTINE 14 MG/24 HR PATCH, EXTENDED RELEASE

          1 Patch Daily 

TRANSDERMAL               Fri Dec 18 06:00:00 EST 2020          Sun Dec 20 14:43

:00 

EST 2020        

 

                                                  EUCERIN DAILY PROTECTION (LOTI

ON, MULTI INGREDIENT)  

2%-7.5%-4.5%-2.4%-4.8% LOTION           1 Application TIDPRN: Three Times A Day 

As Needed TOPICAL APPLICATION          Fri Dec 18 07:42:00 EST 2020          Sun

Dec 20 14:43:00 EST 2020        

 

                                                  PROAIR HFA (ALBUTEROL SULFATE)

 0.09 MG/1 ACTUATION SUSPENSION         

                          2 Puff TIDPRN: Three Times A Day As Needed INHALATION 

         Fri Dec 18 

06:00:00 EST 2020                       Sat Dec 19 10:01:00 EST 2020        

 

                                    XOPENEX (LEVALBUTEROL HYDROCHLORIDE) 1.25 MG

/3 ML SOLUTION          1 

mL Stat INHALATION          Thu Dec 17 23:39:00 EST 2020          Thu Ryan 14 

23:38:00 EST 2021        

 

                                                  BACTRIM DS (SULFAMETHOXAZOLE-T

RIMETHOPRIM)  800MG-160MG TABLET        

                    1 Tablet BID: Twice a Day ORAL          Thu Dec 17 06:00:00 

EST 2020          

Sun Dec 20 14:43:00 EST 2020        

 

                                    SUBOXONE (BUPRENORPHINE-NALOXONE)  8MG-2MG F

ILM          1 Film Daily 

SUBLINGUAL                Fri Dec 18 06:00:00 EST 2020          Sun Dec 20 14:43

:00 

EST 2020        

 

                                    NARCAN (NALOXONE HCL) 4 MG/0.1 ML SPRAY     

     1 spray(s)  Once 

NASAL                     Thu Dec 17 06:00:00 EST 2020          Sun Dec 20 14:43

:00 EST 

2020        

 

                                    SUBOXONE (BUPRENORPHINE-NALOXONE)  4MG-1MG F

ILM          1 Film Stat 

SUBLINGUAL (Take from [facility])          Thu Dec 17 06:00:00 EST 2020          Thu 

Dec 17 12:21:00 EST 2020        

 

                                    SUBOXONE (BUPRENORPHINE-NALOXONE)  4MG-1MG F

ILM          1 Film Daily 

SUBLINGUAL                Thu Dec 17 06:00:00 EST 2020          Fri Dec 18 05:59

:00 

EST 2020        

 

                                    NARCAN (NALOXONE HCL) 4 MG/0.1 ML SPRAY     

     1 spray(s)  As 

Directed NASAL            Wed Dec 16 17:02:00 EST 2020          Sun Dec 20 

14:43:00 EST 2020        

 

                                    ACETAMINOPHEN 500 MG CAPSULE          2 caps

ule Q4-6HPRN: Every 4-6 

Hours As Needed ORAL (MDD 6 Tabs)          Wed Dec 16 17:01:00 EST 2020         

                                        Sun Dec 20 14:43:00 EST 2020        

 

                                                  BENADRYL ALLERGY (DIPHENHYDRAM

INE HYDROCHLORIDE) 25 MG TABLET         

                          1 tablet TIDPRN: Three Times A Day As Needed ORAL     

     Wed Dec 16 17:01:00 

EST 2020                                Sun Dec 20 14:43:00 EST 2020        

 

                                    CATAPRES (CLONIDINE HYDROCHLORIDE) 0.1 MG TA

BLET          1 tablet 

TIDPRN: Three Times A Day As Needed ORAL (Systolic BP >110, HR>60)          Wed 

Dec 16 17:01:00 EST 2020                Sun Dec 20 14:43:00 EST 2020        

 

                                    COLACE (DOCUSATE SODIUM) 100 MG CAPSULE, LIQ

UID FILLED          1 

capsule Daily As Needed ORAL          Wed Dec 16 17::00 EST 2020          Sun 

Dec 20 14:43:00 EST 2020        

 

                                    HYDROXYZINE HCL 25 MG TABLET          1 tabl

et TIDPRN: Three Times A 

Day As Needed ORAL (Do not give within 2 hours of Diphenhydramine)          Wed 

Dec 16 17:01:00 EST 2020                Sun Dec 20 14:43:00 EST 2020        

 

                                    FIBER- MG TABLET          2 tablet HI

N: As Needed ORAL (MDD 4 

Tabs)                     Wed Dec 16 17:01:00 EST 2020          Sun Dec 20 14:43

:00 EST 

2020        

 

                                    IBUPROFEN 200 MG TABLET          3 tablet Da

eldon As Needed ORAL (MDD 

3200mg)                   Wed Dec 16 17::00 EST 2020          Sun Dec 20 14:43

:00 EST 

2020        

 

                                    MELATONIN 5 MG CAPSULE          1 capsule HS

PRN: At Bedtime As Needed 

ORAL (MDD 5mg)            Wed Dec 16 17::00 EST 2020          Sun Dec 20 

14:43:00 EST 2020        

 

                              MULTIVITAMIN   TABLET          1 tablet Daily ORAL

          Wed Dec 16

17:01:00 EST 2020                       Sun Dec 20 14:43:00 EST 2020        

 

                                    TUMS (CALCIUM CARBONATE) 500 MG TABLET, CHEW

ABLE          3 tablet 

PRN: As Needed ORAL          Wed Dec 16 17::00 EST 2020          Sun Dec 20 

14:43:00 EST 2020        

 

                                    ZOFRAN (ONDANSETRON HYDROCHLORIDE) 4 MG TABL

ET          1 tablet 

TIDPRN: Three Times A Day As Needed ORAL          Wed Dec 16 17::00 EST 2020  

                                        Sun Dec 20 14:43:00 EST 2020        



                                                                                
                                                                                
                                                                             



Problems

     No Known Problems                                            



Procedures

     No Known Procedures                                  



Social History

     



                    Social History Observation          Description          Abrahan

e        

 

                              Smoking Status                    Current Every Da

y Smoker          

Fri Dec 13 07:00:00 EST 2019        

 

                              Smoking Status                    Current Every Da

y Smoker          

Fri Dec 13 07:00:00 EST 2019        

 

                              Smoking Status                    Current Every Da

y Smoker          

Fri Dec 13 07:00:00 EST 2019        

 

                              Smoking Status                    Current Every Da

y Smoker          

Fri Dec 13 07:00:00 EST 2019        

 

                              Smoking Status                    Current Every Da

y Smoker          

Fri Dec 13 07:00:00 EST 2019        

 

                              Smoking Status                    Current Every Da

y Smoker          

Fri Dec 13 07:00:00 EST 2019        

 

                              Smoking Status                    Current Every Da

y Smoker          

Fri Dec 13 07::00 EST 2019        

 

                              Smoking Status                    Current Every Da

y Smoker          

Fri Dec 13 07:00:00 EST 2019        

 

                              Smoking Status                    Current Every Da

y Smoker          

Fri Dec 13 07:00:00 EST 2019        

 

                              Smoking Status                    Current Every Da

y Smoker          

Fri Dec 13 07:00:00 EST         

 

                              Smoking Status                    Current Every Da

y Smoker          

Fri Dec 13 07:00:00 EST         

 

                    Birth Sex           Male                Sat Nov 11 07:00:00 

EST         



                                                                                
             



Vital Signs

     



                    Vital Sign          Measurement          Date        

 

                              Temperature          97.3 [DEGF]          Mon Aug 

30 05:31:00 EDT 

        

 

                              Temperature          36.3 LACEY            Mon Aug 3

0 05:31:00 EDT    

     

 

                              Heart Rate          64 /MIN             Mon Aug 30

 05:31:00 EDT      

   

 

                              Respiration          16 /MIN             Mon Aug 3

0 05:31:00 EDT     

    

 

                              Systolic          118 MM[HG]          Mon Aug 30 0

5:31:00 EDT     

    

 

                              Diastolic          69 MM[HG]           Mon Aug 30 

05:31:00 EDT     

    

 

                              BP Position          2 Position          Mon Aug 3

0 05:31:00 EDT  

       

 

                              Heart Rate          86 /MIN             Sun Aug 29

 20:15:00 EDT      

   

 

                              Respiration          17 /MIN             Sun Aug 2

9 20:15:00 EDT     

    

 

                              Systolic          110 MM[HG]          Sun Aug 29 2

0:15:00 EDT     

    

 

                              Diastolic          69 MM[HG]           Sun Aug 29 

20:15:00 EDT     

    

 

                              BP Position          3 Position          Sun Aug 2

9 20:15:00 EDT  

       

 

                              Temperature          97.5 [DEGF]          Sun Aug 

29 06:16:00 EDT 

        

 

                              Temperature          36.4 LACEY            Sun Aug 2

9 06:16:00 EDT    

     

 

                              Heart Rate          71 /MIN             Sun Aug 29

 06:16:00 EDT      

   

 

                              Respiration          16 /MIN             Sun Aug 2

9 06:16:00 EDT     

    

 

                              Systolic          120 MM[HG]          Sun Aug 29 0

6:16:00 EDT     

    

 

                              Diastolic          64 MM[HG]           Sun Aug 29 

06:16:00 EDT     

    

 

                              BP Position          2 Position          Sun Aug 2

9 06:16:00 EDT  

       

 

                              Temperature          98.7 [DEGF]          Sat Aug 

28 20:06:00 EDT 

        

 

                              Temperature          37.1 LACEY            Sat Aug 2

8 20:06:00 EDT    

     

 

                              Heart Rate          74 /MIN             Sat Aug 28

 20:06:00 EDT      

   

 

                              Respiration          16 /MIN             Sat Aug 2

8 20:06:00 EDT     

    

 

                              SpO2          100 %               Sat Aug 28 20:06

:00 EDT         

 

                              Systolic          131 MM[HG]          Sat Aug 28 2

0:06:00 EDT     

    

 

                              Diastolic          71 MM[HG]           Sat Aug 28 

20:06:00 ED2021    

    

 

                              BP Position          2 Position          Sat Aug 2

8 20:06:00 ED2021 

       

 

                              Height          6 0.0 ft            Sat Aug 28 20:

06:00 ED2021        

 

                              Height          72 in               Sat Aug 28 20:

06:00 ED2021        

 

                              Height          182.9 cm            Sat Aug 28 20:

06:00 ED2021        

 

                              Weight Lbs          140 lbs             Sat Aug 28

 20:06:00 ED2021     

   

 

                              Weight Kgs          63.6 KG             Sat Aug 28

 20:06:00 EDT      

   

 

                              BMI          19                  Sat Aug 28 20:06:

00 ED2021        

 

                              Temperature          97.6 [DEGF]          Sun Dec 

20 10:00:00 EST 2020

        

 

                              Temperature          36.4 LACEY            Sun Dec 2

0 10:00:00 EST 2020   

     

 

                              Heart Rate          72 /MIN             Sun Dec 20

 10:00:00 EST 2020     

   

 

                              Respiration          14 /MIN             Sun Dec 2

0 10:00:00 EST 2020    

    

 

                              Systolic          122 MM[HG]          Sun Dec 20 1

0:00:00 EST 2020    

    

 

                              Diastolic          68 MM[HG]           Sun Dec 20 

10:00:00 EST 2020    

    

 

                              BP Position          3 Position          Sun Dec 2

0 10:00:00 EST 2020 

       

 

                              Temperature          97.8 [DEGF]          Sat Dec 

19 18:19:00 EST 2020

        

 

                              Temperature          36.6 LACEY            Sat Dec 1

9 18:19:00 EST 2020   

     

 

                              Heart Rate          92 /MIN             Sat Dec 19

 18:19:00 EST 2020     

   

 

                              Respiration          17 /MIN             Sat Dec 1

9 18:19:00 EST 2020    

    

 

                              Systolic          114 MM[HG]          Sat Dec 19 1

8:19:00 EST 2020    

    

 

                              Diastolic          74 MM[HG]           Sat Dec 19 

18:19:00 EST 2020    

    

 

                              Temperature          98.2 [DEGF]          Sat Dec 

19 11:35:00 EST 2020

        

 

                              Temperature          36.8 LACEY            Sat Dec 1

9 11:35:00 EST 2020   

     

 

                              Heart Rate          79 /MIN             Sat Dec 19

 11:35:00 EST 2020     

   

 

                              Respiration          16 /MIN             Sat Dec 1

9 11:35:00 EST 2020    

    

 

                              Systolic          119 MM[HG]          Sat Dec 19 1

1:35:00 EST 2020    

    

 

                              Diastolic          68 MM[HG]           Sat Dec 19 

11:35:00 EST 2020    

    

 

                              BP Position          3 Position          Sat Dec 1

9 11:35:00 EST 2020 

       

 

                              Temperature          97.5 [DEGF]          Sat Dec 

19 05:37:00 EST 2020

        

 

                              Temperature          36.4 LACEY            Sat Dec 1

9 05:37:00 EST 2020   

     

 

                              Heart Rate          64 /MIN             Sat Dec 19

 05:37:00 EST 2020     

   

 

                              Respiration          16 /MIN             Sat Dec 1

9 05:37:00 EST 2020    

    

 

                              Systolic          107 MM[HG]          Sat Dec 19 0

5:37:00 EST 2020    

    

 

                              Diastolic          60 MM[HG]           Sat Dec 19 

05:37:00 EST 2020    

    

 

                              BP Position          2 Position          Sat Dec 1

9 05:37:00 EST 2020 

       

 

                              Heart Rate          53 /MIN             Fri Dec 18

 18:19:00 EST 2020     

   

 

                              Respiration          16 /MIN             Fri Dec 1

8 18:19:00 EST 2020    

    

 

                              Systolic          113 MM[HG]          Fri Dec 18 1

8:19:00 EST 2020    

    

 

                              Diastolic          63 MM[HG]           Fri Dec 18 

18:19:00 EST 2020    

    

 

                              BP Position          3 Position          Fri Dec 1

8 18:19:00 EST 2020 

       

 

                              Temperature          98.7 [DEGF]          Fri Dec 

18 14:07:00 EST 2020

        

 

                              Temperature          37.1 LACEY            Fri Dec 1

8 14:07:00 EST 2020   

     

 

                              Heart Rate          74 /MIN             Fri Dec 18

 14:07:00 EST 2020     

   

 

                              Respiration          17 /MIN             Fri Dec 1

8 14:07:00 EST 2020    

    

 

                              Systolic          103 MM[HG]          Fri Dec 18 1

4:07:00 EST 2020    

    

 

                              Diastolic          62 MM[HG]           Fri Dec 18 

14:07:00 EST 2020    

    

 

                              Temperature          98.7 [DEGF]          Fri Dec 

18 13:56:00 EST 2020

        

 

                              Temperature          37.1 LACEY            Fri Dec 1

8 13:56:00 EST 2020   

     

 

                              Heart Rate          74 /MIN             Fri Dec 18

 13:56:00 EST 2020     

   

 

                              Respiration          17 /MIN             Fri Dec 1

8 13:56:00 EST 2020    

    

 

                              Systolic          103 MM[HG]          Fri Dec 18 1

3:56:00 EST 2020    

    

 

                              Diastolic          62 MM[HG]           Fri Dec 18 

13:56:00 EST 2020    

    

 

                              BP Position          3 Position          Fri Dec 1

8 13:56:00 EST 2020 

       

 

                              Temperature          97.7 [DEGF]          Fri Dec 

18 06:02:00 EST 2020

        

 

                              Temperature          36.5 LACEY            Fri Dec 1

8 06:02:00 EST 2020   

     

 

                              Heart Rate          73 /MIN             Fri Dec 18

 06:02:00 EST 2020     

   

 

                              Respiration          16 /MIN             Fri Dec 1

8 06:02:00 EST 2020    

    

 

                              Systolic          107 MM[HG]          Fri Dec 18 0

6:02:00 EST 2020    

    

 

                              Diastolic          61 MM[HG]           Fri Dec 18 

06:02:00 EST 2020    

    

 

                              BP Position          2 Position          Fri Dec 1

8 06:02:00 EST 2020 

       

 

                              Temperature          98.7 [DEGF]          Thu Dec 

17 21:48:00 EST 2020

        

 

                              Temperature          37.1 LACEY            Thu Dec 1

7 21:48:00 EST 2020   

     

 

                              Heart Rate          16 /MIN             u Dec 17

 21:48:00 EST 2020     

   

 

                              Respiration          16 /MIN             u Dec 1

7 21:48:00 EST 2020    

    

 

                              Systolic          136 MM[HG]          u Dec 17 2

1:48:00 EST 2020    

    

 

                              Diastolic          76 MM[HG]           u Dec 17 

21:48:00 EST 2020    

    

 

                              BP Position          3 Position          u Dec 1

7 21:48:00 EST 2020 

       

 

                              Temperature          97.8 [DEGF]          u Dec 

17 07:21:00 EST 2020

        

 

                              Temperature          36.6 LACEY            u Dec 1

7 07:21:00 EST 2020   

     

 

                              Heart Rate          82 /MIN             Thu Dec 17

 07:21:00 EST 2020     

   

 

                              Respiration          16 /MIN             u Dec 1

7 07:21:00 EST 2020    

    

 

                              Systolic          112 MM[HG]          u Dec 17 0

7:21:00 EST 2020    

    

 

                              Diastolic          70 MM[HG]           Thu Dec 17 

07:21:00 EST 2020    

    

 

                              BP Position          3 Position          u Dec 1

7 07:21:00 EST 2020 

       

 

                              Temperature          98.2 [DEGF]          Wed Dec 

16 19:12:00 EST 2020

        

 

                              Temperature          36.8 LACEY            Wed Dec 1

6 19:12:00 EST 2020   

     

 

                              Heart Rate          87 /MIN             Wed Dec 16

 19:12:00 EST 2020     

   

 

                              Respiration          18 /MIN             Wed Dec 1

6 19:12:00 EST 2020    

    

 

                              Systolic          115 MM[HG]          Wed Dec 16 1

9:12:00 EST 2020    

    

 

                              Diastolic          75 MM[HG]           Wed Dec 16 

19:12:00 EST 2020    

    

 

                              Temperature          97.8 [DEGF]          Wed Dec 

16 17:27:00 EST 2020

        

 

                              Temperature          36.6 LACEY            Wed Dec 1

6 17:27:00 EST 2020   

     

 

                              Heart Rate          91 /MIN             Wed Dec 16

 17:27:00 EST 2020     

   

 

                              Respiration          16 /MIN             Wed Dec 1

6 17:27:00 EST 2020    

    

 

                              SpO2          99 %                Wed Dec 16 17:27

:00 EST 2020        

 

                              Systolic          107 MM[HG]          Wed Dec 16 1

7:27:00 EST 2020    

    

 

                              Diastolic          62 MM[HG]           Wed Dec 16 

17:27:00 EST 2020    

    

 

                              BP Position          2 Position          Wed Dec 1

6 17:27:00 EST 2020
Other

## 2023-07-18 NOTE — PATIENT PROFILE ADULT - NSTRANSFERBELONGINGSRESP_GEN_A_NUR
Baby viry Magana" has been seen and examined by me on bedside rounds. The interval history, lab findings and physical examination of the patient have been reviewed with members of the  team. The notes have been reviewed. All aspects of care have been discussed and I have agreed on the assessment and plan for the day with the care team.  Parents have been updated at bedside.    Crista Magana" is a former 29 weeks gestation baby, currently DOL 22 whose active issues include RDS, apnea of prematurity, anemia of prematurity, nasogastric tube feeds, left Grade2 IVH with stable slight ventricular dilatation.    Management plan by systems:  RESP:  Infant in delivery room, extubated by 7hrs of life. Initially on bubble cpap but due to nasal breakdown, changed to HFNC; remains stable on 4HFNC 21%. Follow clinically.     CVS: Infant hemodynamically stable. ECHO 10/26 - ASD vs. PFO, follow up in 3 months with cardiology    ID: Follow clinically for signs and symptoms of sepsis.    FENGI: Continue feeds to 30 ml every 3 hours of Prolacta 6 over 60 minutes.    Heme:  Sickle cell trait. s/p pRBC transfusion 10/18.  CBC  hct 29.8    Neuro: Clinically appropriate for gestational age; follow HC weekly. HC 27.5; follow weekly.   HUS DOL2: L grade 2 IVH. HUS 10/22: L Grade 2 IVH with slight ventricular dilatation.  HUS 10/28: stable ventricle size, evolution of Grade 2 IVH.   Next HUS     Access: UAC 10/14-10/15; UVC 10/14-10/15; noncentral PICC 10/15-10/18. yes

## 2023-07-18 NOTE — H&P ADULT - HISTORY OF PRESENT ILLNESS
63F PMH CAD s/p stents, PAD s/p b/l stents (Dr. Hart,  [facility]), CVA, COPD presenting with one week of progressive right calf pain at rest which has woken her up from sleep for the past 3 nights. She has never had pain like this before. Patient lives with her daughter and has difficulty ambulating due to hip arthritis but currently ambulates without assistance at home.

## 2023-07-19 LAB
ANA TITR SER: NEGATIVE — SIGNIFICANT CHANGE UP
ANION GAP SERPL CALC-SCNC: 12 MMOL/L — SIGNIFICANT CHANGE UP (ref 7–14)
ANION GAP SERPL CALC-SCNC: 13 MMOL/L — SIGNIFICANT CHANGE UP (ref 7–14)
ANION GAP SERPL CALC-SCNC: 14 MMOL/L — SIGNIFICANT CHANGE UP (ref 7–14)
ANION GAP SERPL CALC-SCNC: 17 MMOL/L — HIGH (ref 7–14)
APTT BLD: 100.9 SEC — HIGH (ref 27–36.3)
APTT BLD: 28.4 SEC — SIGNIFICANT CHANGE UP (ref 27–36.3)
APTT BLD: 57.5 SEC — HIGH (ref 27–36.3)
APTT BLD: 68.2 SEC — HIGH (ref 27–36.3)
AUTO DIFF PNL BLD: ABNORMAL
BLD GP AB SCN SERPL QL: NEGATIVE — SIGNIFICANT CHANGE UP
BLOOD GAS ARTERIAL COMPREHENSIVE RESULT: SIGNIFICANT CHANGE UP
BUN SERPL-MCNC: 28 MG/DL — HIGH (ref 7–23)
BUN SERPL-MCNC: 36 MG/DL — HIGH (ref 7–23)
BUN SERPL-MCNC: 46 MG/DL — HIGH (ref 7–23)
BUN SERPL-MCNC: 52 MG/DL — HIGH (ref 7–23)
C-ANCA SER-ACNC: NEGATIVE — SIGNIFICANT CHANGE UP
CALCIUM SERPL-MCNC: 7.3 MG/DL — LOW (ref 8.4–10.5)
CALCIUM SERPL-MCNC: 7.6 MG/DL — LOW (ref 8.4–10.5)
CALCIUM SERPL-MCNC: 7.7 MG/DL — LOW (ref 8.4–10.5)
CALCIUM SERPL-MCNC: 7.8 MG/DL — LOW (ref 8.4–10.5)
CHLORIDE SERPL-SCNC: 102 MMOL/L — SIGNIFICANT CHANGE UP (ref 98–107)
CHLORIDE SERPL-SCNC: 105 MMOL/L — SIGNIFICANT CHANGE UP (ref 98–107)
CHLORIDE SERPL-SCNC: 108 MMOL/L — HIGH (ref 98–107)
CO2 SERPL-SCNC: 15 MMOL/L — LOW (ref 22–31)
CO2 SERPL-SCNC: 16 MMOL/L — LOW (ref 22–31)
CO2 SERPL-SCNC: 18 MMOL/L — LOW (ref 22–31)
CO2 SERPL-SCNC: 19 MMOL/L — LOW (ref 22–31)
CREAT SERPL-MCNC: 0.92 MG/DL — SIGNIFICANT CHANGE UP (ref 0.5–1.3)
CREAT SERPL-MCNC: 1.07 MG/DL — SIGNIFICANT CHANGE UP (ref 0.5–1.3)
CREAT SERPL-MCNC: 1.58 MG/DL — HIGH (ref 0.5–1.3)
CREAT SERPL-MCNC: 2.12 MG/DL — HIGH (ref 0.5–1.3)
EGFR: 26 ML/MIN/1.73M2 — LOW
EGFR: 37 ML/MIN/1.73M2 — LOW
EGFR: 59 ML/MIN/1.73M2 — LOW
EGFR: 70 ML/MIN/1.73M2 — SIGNIFICANT CHANGE UP
FIBRINOGEN PPP-MCNC: 390 MG/DL — SIGNIFICANT CHANGE UP (ref 200–465)
FIBRINOGEN PPP-MCNC: 413 MG/DL — SIGNIFICANT CHANGE UP (ref 200–465)
FIBRINOGEN PPP-MCNC: 421 MG/DL — SIGNIFICANT CHANGE UP (ref 200–465)
FIBRINOGEN PPP-MCNC: 430 MG/DL — SIGNIFICANT CHANGE UP (ref 200–465)
GLUCOSE BLDC GLUCOMTR-MCNC: 92 MG/DL — SIGNIFICANT CHANGE UP (ref 70–99)
GLUCOSE SERPL-MCNC: 120 MG/DL — HIGH (ref 70–99)
GLUCOSE SERPL-MCNC: 123 MG/DL — HIGH (ref 70–99)
GLUCOSE SERPL-MCNC: 131 MG/DL — HIGH (ref 70–99)
GLUCOSE SERPL-MCNC: 159 MG/DL — HIGH (ref 70–99)
HBV SURFACE AG SER-ACNC: SIGNIFICANT CHANGE UP
HCT VFR BLD CALC: 21.5 % — LOW (ref 34.5–45)
HCT VFR BLD CALC: 23.5 % — LOW (ref 34.5–45)
HCT VFR BLD CALC: 24.9 % — LOW (ref 34.5–45)
HCT VFR BLD CALC: 25.5 % — LOW (ref 34.5–45)
HGB BLD-MCNC: 7.6 G/DL — LOW (ref 11.5–15.5)
HGB BLD-MCNC: 8.3 G/DL — LOW (ref 11.5–15.5)
HGB BLD-MCNC: 8.7 G/DL — LOW (ref 11.5–15.5)
HGB BLD-MCNC: 9 G/DL — LOW (ref 11.5–15.5)
INR BLD: 1.03 RATIO — SIGNIFICANT CHANGE UP (ref 0.88–1.16)
INR BLD: 1.15 RATIO — SIGNIFICANT CHANGE UP (ref 0.88–1.16)
INR BLD: 1.2 RATIO — HIGH (ref 0.88–1.16)
INR BLD: 1.26 RATIO — HIGH (ref 0.88–1.16)
MAGNESIUM SERPL-MCNC: 1.6 MG/DL — SIGNIFICANT CHANGE UP (ref 1.6–2.6)
MAGNESIUM SERPL-MCNC: 1.7 MG/DL — SIGNIFICANT CHANGE UP (ref 1.6–2.6)
MAGNESIUM SERPL-MCNC: 2.9 MG/DL — HIGH (ref 1.6–2.6)
MCHC RBC-ENTMCNC: 31.9 PG — SIGNIFICANT CHANGE UP (ref 27–34)
MCHC RBC-ENTMCNC: 32.7 PG — SIGNIFICANT CHANGE UP (ref 27–34)
MCHC RBC-ENTMCNC: 34.9 GM/DL — SIGNIFICANT CHANGE UP (ref 32–36)
MCHC RBC-ENTMCNC: 35.3 GM/DL — SIGNIFICANT CHANGE UP (ref 32–36)
MCHC RBC-ENTMCNC: 35.3 PG — HIGH (ref 27–34)
MCV RBC AUTO: 100 FL — SIGNIFICANT CHANGE UP (ref 80–100)
MCV RBC AUTO: 91.2 FL — SIGNIFICANT CHANGE UP (ref 80–100)
MCV RBC AUTO: 92.7 FL — SIGNIFICANT CHANGE UP (ref 80–100)
NRBC # BLD: 0 /100 WBCS — SIGNIFICANT CHANGE UP (ref 0–0)
NRBC # FLD: 0 K/UL — SIGNIFICANT CHANGE UP (ref 0–0)
P-ANCA SER-ACNC: NEGATIVE — SIGNIFICANT CHANGE UP
PHOSPHATE SERPL-MCNC: 1.2 MG/DL — LOW (ref 2.5–4.5)
PHOSPHATE SERPL-MCNC: 1.7 MG/DL — LOW (ref 2.5–4.5)
PHOSPHATE SERPL-MCNC: 2.2 MG/DL — LOW (ref 2.5–4.5)
PLATELET # BLD AUTO: 116 K/UL — LOW (ref 150–400)
PLATELET # BLD AUTO: 118 K/UL — LOW (ref 150–400)
PLATELET # BLD AUTO: 145 K/UL — LOW (ref 150–400)
PLATELET # BLD AUTO: 149 K/UL — LOW (ref 150–400)
POTASSIUM SERPL-MCNC: 3 MMOL/L — LOW (ref 3.5–5.3)
POTASSIUM SERPL-MCNC: 3.1 MMOL/L — LOW (ref 3.5–5.3)
POTASSIUM SERPL-MCNC: 3.9 MMOL/L — SIGNIFICANT CHANGE UP (ref 3.5–5.3)
POTASSIUM SERPL-SCNC: 3 MMOL/L — LOW (ref 3.5–5.3)
POTASSIUM SERPL-SCNC: 3.1 MMOL/L — LOW (ref 3.5–5.3)
POTASSIUM SERPL-SCNC: 3.9 MMOL/L — SIGNIFICANT CHANGE UP (ref 3.5–5.3)
PROTHROM AB SERPL-ACNC: 12 SEC — SIGNIFICANT CHANGE UP (ref 10.5–13.4)
PROTHROM AB SERPL-ACNC: 13.4 SEC — SIGNIFICANT CHANGE UP (ref 10.5–13.4)
PROTHROM AB SERPL-ACNC: 13.9 SEC — HIGH (ref 10.5–13.4)
PROTHROM AB SERPL-ACNC: 14.6 SEC — HIGH (ref 10.5–13.4)
RBC # BLD: 2.15 M/UL — LOW (ref 3.8–5.2)
RBC # BLD: 2.35 M/UL — LOW (ref 3.8–5.2)
RBC # BLD: 2.73 M/UL — LOW (ref 3.8–5.2)
RBC # BLD: 2.75 M/UL — LOW (ref 3.8–5.2)
RBC # FLD: 12.3 % — SIGNIFICANT CHANGE UP (ref 10.3–14.5)
RBC # FLD: 12.4 % — SIGNIFICANT CHANGE UP (ref 10.3–14.5)
RBC # FLD: 15.1 % — HIGH (ref 10.3–14.5)
RBC # FLD: 16.1 % — HIGH (ref 10.3–14.5)
RH IG SCN BLD-IMP: POSITIVE — SIGNIFICANT CHANGE UP
SODIUM SERPL-SCNC: 134 MMOL/L — LOW (ref 135–145)
SODIUM SERPL-SCNC: 138 MMOL/L — SIGNIFICANT CHANGE UP (ref 135–145)
UFH PPP CHRO-ACNC: <0.04 IU/ML — LOW (ref 0.3–0.7)
WBC # BLD: 11.68 K/UL — HIGH (ref 3.8–10.5)
WBC # BLD: 11.7 K/UL — HIGH (ref 3.8–10.5)
WBC # BLD: 13.67 K/UL — HIGH (ref 3.8–10.5)
WBC # BLD: 13.68 K/UL — HIGH (ref 3.8–10.5)
WBC # FLD AUTO: 11.68 K/UL — HIGH (ref 3.8–10.5)
WBC # FLD AUTO: 11.7 K/UL — HIGH (ref 3.8–10.5)
WBC # FLD AUTO: 13.67 K/UL — HIGH (ref 3.8–10.5)
WBC # FLD AUTO: 13.68 K/UL — HIGH (ref 3.8–10.5)

## 2023-07-19 PROCEDURE — 99291 CRITICAL CARE FIRST HOUR: CPT

## 2023-07-19 PROCEDURE — 99232 SBSQ HOSP IP/OBS MODERATE 35: CPT | Mod: 57

## 2023-07-19 PROCEDURE — 99292 CRITICAL CARE ADDL 30 MIN: CPT

## 2023-07-19 PROCEDURE — 37184 PRIM ART M-THRMBC 1ST VSL: CPT

## 2023-07-19 PROCEDURE — 99233 SBSQ HOSP IP/OBS HIGH 50: CPT | Mod: GC

## 2023-07-19 PROCEDURE — 37214 CESSJ THERAPY CATH REMOVAL: CPT | Mod: 59

## 2023-07-19 PROCEDURE — 75710 ARTERY X-RAYS ARM/LEG: CPT | Mod: 26,59

## 2023-07-19 DEVICE — DEVICE CLOSURE 6F/7F MYNX GRIP MUST ORDER MIN OF 10 EA: Type: IMPLANTABLE DEVICE | Site: RIGHT | Status: FUNCTIONAL

## 2023-07-19 DEVICE — GUIDEWIRE V-18 CONTROLWIRE STRAIGHT .018" X 300CM TAPER 8CM: Type: IMPLANTABLE DEVICE | Site: RIGHT | Status: FUNCTIONAL

## 2023-07-19 DEVICE — GUIDEWIRE V-14 CONTROLWIRE ANGLED .014" X 300CM: Type: IMPLANTABLE DEVICE | Site: RIGHT | Status: FUNCTIONAL

## 2023-07-19 DEVICE — CATH QUICK CROSS .014X135CM: Type: IMPLANTABLE DEVICE | Site: RIGHT | Status: FUNCTIONAL

## 2023-07-19 DEVICE — IMPLANTABLE DEVICE: Type: IMPLANTABLE DEVICE | Site: RIGHT | Status: FUNCTIONAL

## 2023-07-19 DEVICE — GWIRE VASC ENTRY MINISTICK MAX 4FRX10CM: Type: IMPLANTABLE DEVICE | Site: RIGHT | Status: FUNCTIONAL

## 2023-07-19 DEVICE — SHEATH INTRODUCER TERUMO PINNACLE CORONARY 6FR X 10CM X 0.038" MINI WIRE: Type: IMPLANTABLE DEVICE | Site: RIGHT | Status: FUNCTIONAL

## 2023-07-19 DEVICE — CATH INDIGO 140CM LRG LUMEN: Type: IMPLANTABLE DEVICE | Site: RIGHT | Status: FUNCTIONAL

## 2023-07-19 DEVICE — GWIRE ROSEN STR .035X260CM: Type: IMPLANTABLE DEVICE | Site: RIGHT | Status: FUNCTIONAL

## 2023-07-19 DEVICE — CATH QUICK CROSS .018X135CM: Type: IMPLANTABLE DEVICE | Site: RIGHT | Status: FUNCTIONAL

## 2023-07-19 DEVICE — GUIDEWIRE RADIFOCUS GLIDEWIRE STANDARD ANGLED TIP 0.035" X 260CM: Type: IMPLANTABLE DEVICE | Site: RIGHT | Status: FUNCTIONAL

## 2023-07-19 DEVICE — SHEATH INTRODUCER TERUMO PINNACLE PERIPHERAL 7FR X 10CM X 0.035" MINI WIRE: Type: IMPLANTABLE DEVICE | Site: RIGHT | Status: FUNCTIONAL

## 2023-07-19 DEVICE — GUIDEWIRE ADVANTAGE .014INX300CM: Type: IMPLANTABLE DEVICE | Site: RIGHT | Status: FUNCTIONAL

## 2023-07-19 DEVICE — CATH QUICK CROSS .035X135CM: Type: IMPLANTABLE DEVICE | Site: RIGHT | Status: FUNCTIONAL

## 2023-07-19 RX ORDER — ALBUMIN HUMAN 25 %
250 VIAL (ML) INTRAVENOUS ONCE
Refills: 0 | Status: COMPLETED | OUTPATIENT
Start: 2023-07-19 | End: 2023-07-19

## 2023-07-19 RX ORDER — DEXTROSE 50 % IN WATER 50 %
25 SYRINGE (ML) INTRAVENOUS ONCE
Refills: 0 | Status: DISCONTINUED | OUTPATIENT
Start: 2023-07-19 | End: 2023-07-19

## 2023-07-19 RX ORDER — HEPARIN SODIUM 5000 [USP'U]/ML
1100 INJECTION INTRAVENOUS; SUBCUTANEOUS
Qty: 25000 | Refills: 0 | Status: DISCONTINUED | OUTPATIENT
Start: 2023-07-19 | End: 2023-07-21

## 2023-07-19 RX ORDER — SODIUM CHLORIDE 9 MG/ML
1000 INJECTION, SOLUTION INTRAVENOUS
Refills: 0 | Status: DISCONTINUED | OUTPATIENT
Start: 2023-07-19 | End: 2023-07-19

## 2023-07-19 RX ORDER — INSULIN LISPRO 100/ML
VIAL (ML) SUBCUTANEOUS
Refills: 0 | Status: DISCONTINUED | OUTPATIENT
Start: 2023-07-19 | End: 2023-07-19

## 2023-07-19 RX ORDER — GLUCAGON INJECTION, SOLUTION 0.5 MG/.1ML
1 INJECTION, SOLUTION SUBCUTANEOUS ONCE
Refills: 0 | Status: DISCONTINUED | OUTPATIENT
Start: 2023-07-19 | End: 2023-07-19

## 2023-07-19 RX ORDER — MAGNESIUM SULFATE 500 MG/ML
2 VIAL (ML) INJECTION ONCE
Refills: 0 | Status: COMPLETED | OUTPATIENT
Start: 2023-07-19 | End: 2023-07-19

## 2023-07-19 RX ORDER — DEXTROSE 50 % IN WATER 50 %
12.5 SYRINGE (ML) INTRAVENOUS ONCE
Refills: 0 | Status: DISCONTINUED | OUTPATIENT
Start: 2023-07-19 | End: 2023-07-19

## 2023-07-19 RX ORDER — SODIUM CHLORIDE 9 MG/ML
1000 INJECTION, SOLUTION INTRAVENOUS ONCE
Refills: 0 | Status: COMPLETED | OUTPATIENT
Start: 2023-07-19 | End: 2023-07-19

## 2023-07-19 RX ORDER — ALTEPLASE 100 MG
0.5 KIT INTRAVENOUS
Qty: 10 | Refills: 0 | Status: DISCONTINUED | OUTPATIENT
Start: 2023-07-19 | End: 2023-07-19

## 2023-07-19 RX ORDER — HEPARIN SODIUM 5000 [USP'U]/ML
600 INJECTION INTRAVENOUS; SUBCUTANEOUS
Qty: 25000 | Refills: 0 | Status: DISCONTINUED | OUTPATIENT
Start: 2023-07-19 | End: 2023-07-19

## 2023-07-19 RX ORDER — DEXTROSE 50 % IN WATER 50 %
15 SYRINGE (ML) INTRAVENOUS ONCE
Refills: 0 | Status: DISCONTINUED | OUTPATIENT
Start: 2023-07-19 | End: 2023-07-19

## 2023-07-19 RX ORDER — POTASSIUM CHLORIDE 20 MEQ
10 PACKET (EA) ORAL
Refills: 0 | Status: COMPLETED | OUTPATIENT
Start: 2023-07-19 | End: 2023-07-20

## 2023-07-19 RX ORDER — SODIUM CHLORIDE 9 MG/ML
500 INJECTION, SOLUTION INTRAVENOUS ONCE
Refills: 0 | Status: COMPLETED | OUTPATIENT
Start: 2023-07-19 | End: 2023-07-19

## 2023-07-19 RX ORDER — POTASSIUM CHLORIDE 20 MEQ
20 PACKET (EA) ORAL
Refills: 0 | Status: COMPLETED | OUTPATIENT
Start: 2023-07-19 | End: 2023-07-19

## 2023-07-19 RX ADMIN — BUDESONIDE AND FORMOTEROL FUMARATE DIHYDRATE 2 PUFF(S): 160; 4.5 AEROSOL RESPIRATORY (INHALATION) at 09:55

## 2023-07-19 RX ADMIN — HEPARIN SODIUM 5 UNIT(S)/HR: 5000 INJECTION INTRAVENOUS; SUBCUTANEOUS at 20:52

## 2023-07-19 RX ADMIN — Medication 1000 MILLIGRAM(S): at 00:22

## 2023-07-19 RX ADMIN — SODIUM CHLORIDE 100 MILLILITER(S): 9 INJECTION, SOLUTION INTRAVENOUS at 19:13

## 2023-07-19 RX ADMIN — Medication 25 GRAM(S): at 09:55

## 2023-07-19 RX ADMIN — MEROPENEM 100 MILLIGRAM(S): 1 INJECTION INTRAVENOUS at 09:37

## 2023-07-19 RX ADMIN — OXYCODONE HYDROCHLORIDE 10 MILLIGRAM(S): 5 TABLET ORAL at 00:00

## 2023-07-19 RX ADMIN — ATORVASTATIN CALCIUM 40 MILLIGRAM(S): 80 TABLET, FILM COATED ORAL at 22:14

## 2023-07-19 RX ADMIN — Medication 100 MILLIEQUIVALENT(S): at 23:10

## 2023-07-19 RX ADMIN — Medication 125 MILLILITER(S): at 00:52

## 2023-07-19 RX ADMIN — Medication 100 MILLIEQUIVALENT(S): at 00:28

## 2023-07-19 RX ADMIN — Medication 100 MILLIEQUIVALENT(S): at 22:13

## 2023-07-19 RX ADMIN — SODIUM CHLORIDE 100 MILLILITER(S): 9 INJECTION, SOLUTION INTRAVENOUS at 09:41

## 2023-07-19 RX ADMIN — SODIUM CHLORIDE 2000 MILLILITER(S): 9 INJECTION, SOLUTION INTRAVENOUS at 09:55

## 2023-07-19 RX ADMIN — Medication 50 MILLIEQUIVALENT(S): at 06:32

## 2023-07-19 RX ADMIN — Medication 5.63 MICROGRAM(S)/KG/MIN: at 19:12

## 2023-07-19 RX ADMIN — Medication 400 MILLIGRAM(S): at 00:07

## 2023-07-19 RX ADMIN — SODIUM CHLORIDE 500 MILLILITER(S): 9 INJECTION, SOLUTION INTRAVENOUS at 17:15

## 2023-07-19 RX ADMIN — Medication 125 MILLILITER(S): at 14:40

## 2023-07-19 RX ADMIN — Medication 63.75 MILLIMOLE(S): at 10:07

## 2023-07-19 RX ADMIN — Medication 50 MILLIEQUIVALENT(S): at 09:37

## 2023-07-19 RX ADMIN — BUDESONIDE AND FORMOTEROL FUMARATE DIHYDRATE 2 PUFF(S): 160; 4.5 AEROSOL RESPIRATORY (INHALATION) at 22:32

## 2023-07-19 RX ADMIN — Medication 5.63 MICROGRAM(S)/KG/MIN: at 09:40

## 2023-07-19 RX ADMIN — Medication 25 GRAM(S): at 17:15

## 2023-07-19 RX ADMIN — Medication 400 MILLIGRAM(S): at 06:32

## 2023-07-19 NOTE — PROGRESS NOTE ADULT - SUBJECTIVE AND OBJECTIVE BOX
pt with severe bilateral PVD  presented with right leg pain and cold right LE  underwent angiogram, initiation of catheter thrombolysis  right SFA and POP stent were occluded  tibials were also occluded  c/o pain right LE  motor and sensation intact  BP is low and she requires pressor support ? etiology

## 2023-07-19 NOTE — PROGRESS NOTE ADULT - SUBJECTIVE AND OBJECTIVE BOX
SICU Daily Progress Note  =====================================================    Interval Events:  - NPO at midnight for RTOR with vascular on 7/19  - Watery and melanotic stools since admission    HISTORY  63F female w/ PMHx CAD s/p stents, CVA, COPD, PAD w/ prev LLE poss RLE stenting (Dr. Hart,  [facility]), 20 pack/year smoking history, and HTN. Presents with 1 week hx of worsening RLE pain - abruptly increasing over last 3 days. Worse in calf and foot. Also has felt some subjective tingling of the foot. VSS. Labs showing JACKI with Cr at 6 and HCO3 at 10. No known CKD per pt and daughter. 2+ palp L fem, R fem 1+, L pop 2+. dopplerable LLE pedal signals. RLE pop and foot no palp pulses or signals. Motor R foot 4/5 with diminished sensation compared to left. Unable to obtain CTA due to JACKI. Nephro consulted -hydrated and started on bicarb infusion. Recent hx of melena but none in the past few weeks. Started on therapeutic hep gtt. Acute on chronic limb ischemia with evidence of motor and sensory deficits. Patient taken emergently to OR for RLE angiogram with lysis catheter placement on 7/18. SICU consulted for hemodynamic monitoring and postop management.      Allergies: No Known Allergies      MEDICATIONS:   --------------------------------------------------------------------------------------  Neurologic Medications  acetaminophen   IVPB .. 1000 milliGRAM(s) IV Intermittent every 6 hours  oxyCODONE    IR 10 milliGRAM(s) Oral every 4 hours PRN Severe Pain (7 - 10)  oxyCODONE    IR 5 milliGRAM(s) Oral every 4 hours PRN Moderate Pain (4 - 6)    Respiratory Medications  budesonide 160 MICROgram(s)/formoterol 4.5 MICROgram(s) Inhaler 2 Puff(s) Inhalation two times a day    Cardiovascular Medications  norepinephrine Infusion 0.05 MICROgram(s)/kG/Min IV Continuous <Continuous>    Gastrointestinal Medications  lactated ringers. 1000 milliLiter(s) IV Continuous <Continuous>     Hematologic/Oncologic Medications  alteplase    Infusion 1 mG/Hr IntraCatheter.. <Continuous>  heparin  Infusion 600 Unit(s)/Hr IV Continuous <Continuous>    Antimicrobial/Immunologic Medications  meropenem  IVPB 500 milliGRAM(s) IV Intermittent every 24 hours    Endocrine/Metabolic Medications  atorvastatin 40 milliGRAM(s) Oral at bedtime     --------------------------------------------------------------------------------------    VITAL SIGNS, INS/OUTS (last 24 hours):  --------------------------------------------------------------------------------------  Vital Signs Last 24 Hrs  T(C): 36.1 (18 Jul 2023 20:00), Max: 36.7 (18 Jul 2023 16:00)  T(F): 96.9 (18 Jul 2023 20:00), Max: 98 (18 Jul 2023 16:00)  HR: 73 (19 Jul 2023 00:00) (53 - 75)  BP: 96/55 (18 Jul 2023 19:00) (68/41 - 133/110)  BP(mean): 67 (18 Jul 2023 19:00) (1 - 119)  RR: 22 (19 Jul 2023 00:00) (13 - 22)  SpO2: 94% (19 Jul 2023 00:00) (94% - 100%)    Parameters below as of 19 Jul 2023 00:00  Patient On (Oxygen Delivery Method): room air        18 Jul 2023 07:01  -  19 Jul 2023 01:10  --------------------------------------------------------  IN:    Alteplase: 170 mL    Heparin: 117 mL    IV PiggyBack: 550 mL    Lactated Ringers: 1500 mL    Lactated Ringers Bolus: 2250 mL    Norepinephrine: 316 mL    Oral Fluid: 720 mL    Sodium Bicarbonate: 300 mL  Total IN: 5923 mL    OUT:    Indwelling Catheter - Urethral (mL): 2035 mL  Total OUT: 2035 mL    Total NET: 3888 mL      EXAM  NEUROLOGY  RASS:   	GCS:    Exam: Normal, NAD, alert, oriented x 3, no focal deficits.     HEENT  Exam: Normocephalic, atraumatic.  EOMI     RESPIRATORY  Exam: Lungs clear to auscultation, Normal expansion/effort.    Mechanical Ventilation:     CARDIOVASCULAR  Exam: S1, S2.  Regular rate and rhythm.     GI/NUTRITION  Exam: Abdomen soft, Non-tender, Non-distended.  Gastrostomy / Jejunostomy / Nasogastric tube in place.  Colostomy / Ileostomy.    Wound:    Current Diet:  NPO    VASCULAR  Exam: Motor and sensation function intact b/l  No palpable pulses in RLE  LLE with good PT and perineal signal     MUSCULOSKELETAL  Exam: All extremities moving spontaneously without limitations.      SKIN:  Exam: Good skin turgor, no skin breakdown.        --------------------------------------------------------------------------------------          LABS:  cret                        9.5    12.54 )-----------( 164      ( 18 Jul 2023 20:16 )             27.1     07-18    135  |  99  |  50<H>  ----------------------------<  122<H>  3.1<L>   |  15<L>  |  2.73<H>    Ca    7.4<L>      18 Jul 2023 20:16  Phos  2.6     07-18  Mg     1.70     07-18    TPro  8.4<H>  /  Alb  4.5  /  TBili  <0.2  /  DBili  x   /  AST  23  /  ALT  11  /  AlkPhos  86  07-17    PT/INR - ( 18 Jul 2023 20:16 )   PT: 14.7 sec;   INR: 1.26 ratio         PTT - ( 18 Jul 2023 20:16 )  PTT:105.9 secCritical Care Diagnoses:   SICU Daily Progress Note  =====================================================    Interval Events:  - S/p RTOR for lytic catheter removal, RLE angio and percutaneous thrombectomy on 7/19 AM. S/p 2u pRBC intraoperatively.  - Remains on Levo 0.1 in PM  - Post-op PoCUS IVC 1.6 cm, MAP ~low-mid 60s    HISTORY  63F female w/ PMHx CAD s/p stents, CVA, COPD, PAD w/ prev LLE poss RLE stenting (Dr. Hart,  [facility]), 20 pack/year smoking history, and HTN. Presents with 1 week hx of worsening RLE pain - abruptly increasing over last 3 days. Worse in calf and foot. Also has felt some subjective tingling of the foot. VSS. Labs showing JACKI with Cr at 6 and HCO3 at 10. No known CKD per pt and daughter. 2+ palp L fem, R fem 1+, L pop 2+. dopplerable LLE pedal signals. RLE pop and foot no palp pulses or signals. Motor R foot 4/5 with diminished sensation compared to left. Unable to obtain CTA due to JACKI. Nephro consulted -hydrated and started on bicarb infusion. Recent hx of melena but none in the past few weeks. Started on therapeutic hep gtt. Acute on chronic limb ischemia with evidence of motor and sensory deficits. Patient taken emergently to OR for RLE angiogram with lysis catheter placement on 7/18. SICU consulted for hemodynamic monitoring and postop management.      Allergies: No Known Allergies      MEDICATIONS:   --------------------------------------------------------------------------------------  Neurologic Medications  acetaminophen   IVPB .. 1000 milliGRAM(s) IV Intermittent every 6 hours  oxyCODONE    IR 10 milliGRAM(s) Oral every 4 hours PRN Severe Pain (7 - 10)  oxyCODONE    IR 5 milliGRAM(s) Oral every 4 hours PRN Moderate Pain (4 - 6)    Respiratory Medications  budesonide 160 MICROgram(s)/formoterol 4.5 MICROgram(s) Inhaler 2 Puff(s) Inhalation two times a day    Cardiovascular Medications  norepinephrine Infusion 0.05 MICROgram(s)/kG/Min IV Continuous <Continuous>    Gastrointestinal Medications  lactated ringers. 1000 milliLiter(s) IV Continuous <Continuous>     Hematologic/Oncologic Medications  alteplase    Infusion 1 mG/Hr IntraCatheter.. <Continuous>  heparin  Infusion 600 Unit(s)/Hr IV Continuous <Continuous>    Antimicrobial/Immunologic Medications  meropenem  IVPB 500 milliGRAM(s) IV Intermittent every 24 hours    Endocrine/Metabolic Medications  atorvastatin 40 milliGRAM(s) Oral at bedtime     --------------------------------------------------------------------------------------    VITAL SIGNS, INS/OUTS (last 24 hours):  --------------------------------------------------------------------------------------  Vital Signs Last 24 Hrs  T(C): 36.1 (18 Jul 2023 20:00), Max: 36.7 (18 Jul 2023 16:00)  T(F): 96.9 (18 Jul 2023 20:00), Max: 98 (18 Jul 2023 16:00)  HR: 73 (19 Jul 2023 00:00) (53 - 75)  BP: 96/55 (18 Jul 2023 19:00) (68/41 - 133/110)  BP(mean): 67 (18 Jul 2023 19:00) (1 - 119)  RR: 22 (19 Jul 2023 00:00) (13 - 22)  SpO2: 94% (19 Jul 2023 00:00) (94% - 100%)    Parameters below as of 19 Jul 2023 00:00  Patient On (Oxygen Delivery Method): room air        18 Jul 2023 07:01  -  19 Jul 2023 01:10  --------------------------------------------------------  IN:    Alteplase: 170 mL    Heparin: 117 mL    IV PiggyBack: 550 mL    Lactated Ringers: 1500 mL    Lactated Ringers Bolus: 2250 mL    Norepinephrine: 316 mL    Oral Fluid: 720 mL    Sodium Bicarbonate: 300 mL  Total IN: 5923 mL    OUT:    Indwelling Catheter - Urethral (mL): 2035 mL  Total OUT: 2035 mL    Total NET: 3888 mL      EXAM  NEUROLOGY  RASS:   	GCS:    Exam: Normal, NAD, alert, oriented x 3, no focal deficits.     HEENT  Exam: Normocephalic, atraumatic.  EOMI     RESPIRATORY  Exam: Lungs clear to auscultation, Normal expansion/effort.    Mechanical Ventilation:     CARDIOVASCULAR  Exam: S1, S2.  Regular rate and rhythm.     GI/NUTRITION  Exam: Abdomen soft, Non-tender, Non-distended.  Gastrostomy / Jejunostomy / Nasogastric tube in place.  Colostomy / Ileostomy.    Wound:    Current Diet:  NPO    VASCULAR  Exam: Motor and sensation function intact b/l  No palpable pulses in RLE  LLE with good PT and perineal signal     MUSCULOSKELETAL  Exam: All extremities moving spontaneously without limitations.      SKIN:  Exam: Good skin turgor, no skin breakdown.        --------------------------------------------------------------------------------------          LABS:  cret                        9.5    12.54 )-----------( 164      ( 18 Jul 2023 20:16 )             27.1     07-18    135  |  99  |  50<H>  ----------------------------<  122<H>  3.1<L>   |  15<L>  |  2.73<H>    Ca    7.4<L>      18 Jul 2023 20:16  Phos  2.6     07-18  Mg     1.70     07-18    TPro  8.4<H>  /  Alb  4.5  /  TBili  <0.2  /  DBili  x   /  AST  23  /  ALT  11  /  AlkPhos  86  07-17    PT/INR - ( 18 Jul 2023 20:16 )   PT: 14.7 sec;   INR: 1.26 ratio         PTT - ( 18 Jul 2023 20:16 )  PTT:105.9 secCritical Care Diagnoses:   SICU Daily PM Progress Note  =====================================================    Interval Events:  - S/p RTOR for lytic catheter removal, RLE angio and percutaneous thrombectomy on 7/19 AM. S/p 2u pRBC intraoperatively.  - Remains on Levo 0.1 in PM  - Post-op PoCUS IVC 1.6 cm, MAP ~low-mid 60s    HISTORY  63F female w/ PMHx CAD s/p stents, CVA, COPD, PAD w/ prev LLE poss RLE stenting (Dr. Hart,  [facility]), 20 pack/year smoking history, and HTN. Presents with 1 week hx of worsening RLE pain - abruptly increasing over last 3 days. Worse in calf and foot. Also has felt some subjective tingling of the foot. VSS. Labs showing JACKI with Cr at 6 and HCO3 at 10. No known CKD per pt and daughter. 2+ palp L fem, R fem 1+, L pop 2+. dopplerable LLE pedal signals. RLE pop and foot no palp pulses or signals. Motor R foot 4/5 with diminished sensation compared to left. Unable to obtain CTA due to JACKI. Nephro consulted -hydrated and started on bicarb infusion. Recent hx of melena but none in the past few weeks. Started on therapeutic hep gtt. Acute on chronic limb ischemia with evidence of motor and sensory deficits. Patient taken emergently to OR for RLE angiogram with lysis catheter placement on 7/18. SICU consulted for hemodynamic monitoring and postop management.      Allergies: No Known Allergies      MEDICATIONS:   --------------------------------------------------------------------------------------  Neurologic Medications  acetaminophen   IVPB .. 1000 milliGRAM(s) IV Intermittent every 6 hours  oxyCODONE    IR 10 milliGRAM(s) Oral every 4 hours PRN Severe Pain (7 - 10)  oxyCODONE    IR 5 milliGRAM(s) Oral every 4 hours PRN Moderate Pain (4 - 6)    Respiratory Medications  budesonide 160 MICROgram(s)/formoterol 4.5 MICROgram(s) Inhaler 2 Puff(s) Inhalation two times a day    Cardiovascular Medications  norepinephrine Infusion 0.05 MICROgram(s)/kG/Min IV Continuous <Continuous>    Gastrointestinal Medications  lactated ringers. 1000 milliLiter(s) IV Continuous <Continuous>     Hematologic/Oncologic Medications  alteplase    Infusion 1 mG/Hr IntraCatheter.. <Continuous>  heparin  Infusion 600 Unit(s)/Hr IV Continuous <Continuous>    Antimicrobial/Immunologic Medications  meropenem  IVPB 500 milliGRAM(s) IV Intermittent every 24 hours    Endocrine/Metabolic Medications  atorvastatin 40 milliGRAM(s) Oral at bedtime     --------------------------------------------------------------------------------------    VITAL SIGNS, INS/OUTS (last 24 hours):  --------------------------------------------------------------------------------------  Vital Signs Last 24 Hrs  T(C): 36.1 (18 Jul 2023 20:00), Max: 36.7 (18 Jul 2023 16:00)  T(F): 96.9 (18 Jul 2023 20:00), Max: 98 (18 Jul 2023 16:00)  HR: 73 (19 Jul 2023 00:00) (53 - 75)  BP: 96/55 (18 Jul 2023 19:00) (68/41 - 133/110)  BP(mean): 67 (18 Jul 2023 19:00) (1 - 119)  RR: 22 (19 Jul 2023 00:00) (13 - 22)  SpO2: 94% (19 Jul 2023 00:00) (94% - 100%)    Parameters below as of 19 Jul 2023 00:00  Patient On (Oxygen Delivery Method): room air        18 Jul 2023 07:01  -  19 Jul 2023 01:10  --------------------------------------------------------  IN:    Alteplase: 170 mL    Heparin: 117 mL    IV PiggyBack: 550 mL    Lactated Ringers: 1500 mL    Lactated Ringers Bolus: 2250 mL    Norepinephrine: 316 mL    Oral Fluid: 720 mL    Sodium Bicarbonate: 300 mL  Total IN: 5923 mL    OUT:    Indwelling Catheter - Urethral (mL): 2035 mL  Total OUT: 2035 mL    Total NET: 3888 mL      EXAM  NEUROLOGY  RASS:   	GCS:    Exam: Normal, NAD, alert, oriented x 3, no focal deficits.     HEENT  Exam: Normocephalic, atraumatic.  EOMI     RESPIRATORY  Exam: Lungs clear to auscultation, Normal expansion/effort.    Mechanical Ventilation:     CARDIOVASCULAR  Exam: S1, S2.  Regular rate and rhythm.     GI/NUTRITION  Exam: Abdomen soft, Non-tender, Non-distended.  Gastrostomy / Jejunostomy / Nasogastric tube in place.  Colostomy / Ileostomy.    Wound:    Current Diet:  NPO    VASCULAR  Exam: Motor and sensation function intact b/l  No palpable pulses in RLE  LLE with good PT and perineal signal     MUSCULOSKELETAL  Exam: All extremities moving spontaneously without limitations.      SKIN:  Exam: Good skin turgor, no skin breakdown.        --------------------------------------------------------------------------------------          LABS:  cret                        9.5    12.54 )-----------( 164      ( 18 Jul 2023 20:16 )             27.1     07-18    135  |  99  |  50<H>  ----------------------------<  122<H>  3.1<L>   |  15<L>  |  2.73<H>    Ca    7.4<L>      18 Jul 2023 20:16  Phos  2.6     07-18  Mg     1.70     07-18    TPro  8.4<H>  /  Alb  4.5  /  TBili  <0.2  /  DBili  x   /  AST  23  /  ALT  11  /  AlkPhos  86  07-17    PT/INR - ( 18 Jul 2023 20:16 )   PT: 14.7 sec;   INR: 1.26 ratio         PTT - ( 18 Jul 2023 20:16 )  PTT:105.9 secCritical Care Diagnoses:

## 2023-07-19 NOTE — CHART NOTE - NSCHARTNOTEFT_GEN_A_CORE
Patient seen and examined by surgical team on post operative check. At this time Vascular Surgery is recommending resuming a Heparin gtt with a goal PTT of 58-99. Patient seen and examined by surgical team on post operative check. At this time Vascular Surgery is recommending resuming a Heparin gtt with a goal PTT of 58-99. This was conveyed to SICU team this evening. SICU team to start Heparin gtt per Vascular Surgery recommendations.

## 2023-07-19 NOTE — MEDICAL STUDENT PROGRESS NOTE(EDUCATION) - PLAN 2
BP on day of admission was 90s/40s. Receiving pressors, BP now 102/46.    -c/w pressors  -monitor HgB, was 14.4 on admission, now 8.6 with 2 days of dark BMs  -care managed per SICU

## 2023-07-19 NOTE — PROGRESS NOTE ADULT - SUBJECTIVE AND OBJECTIVE BOX
SICU Daily Progress Note  =====================================================    Interval Events:  - S/p RLE angio and lytic catheter placement 7/18  - Started regular, renal diet -- NPO at [facility] for RTOR 7/19  - Started meropenem for abx  - Watery diarrhea postop 7/18    HISTORY  63F female w/ PMHx CAD s/p stents, CVA, COPD, PAD w/ prev LLE poss RLE stenting (Dr. Hart[facility]), 20 pack/year smoking history, and HTN. Presents with 1 week hx of worsening RLE pain - abruptly increasing over last 3 days. Worse in calf and foot. Also has felt some subjective tingling of the foot. VSS. Labs showing JACKI with Cr at 6 and HCO3 at 10. No known CKD per pt and daughter. 2+ palp L fem, R fem 1+, L pop 2+. dopplerable LLE pedal signals. RLE pop and foot no palp pulses or signals. Motor R foot 4/5 with diminished sensation compared to left. Unable to obtain CTA due to JACKI. Nephro consulted -hydrated and started on bicarb infusion. Recent hx of melena but none in the past few weeks. Started on therapeutic hep gtt. Acute on chronic limb ischemia with evidence of motor and sensory deficits. Patient taken emergently to OR for RLE angiogram with lysis catheter placement on 7/18. SICU consulted for hemodynamic monitoring and postop management.      Allergies: No Known Allergies      MEDICATIONS:   --------------------------------------------------------------------------------------  Neurologic Medications  acetaminophen   IVPB .. 1000 milliGRAM(s) IV Intermittent every 6 hours  oxyCODONE    IR 10 milliGRAM(s) Oral every 4 hours PRN Severe Pain (7 - 10)  oxyCODONE    IR 5 milliGRAM(s) Oral every 4 hours PRN Moderate Pain (4 - 6)    Respiratory Medications  budesonide 160 MICROgram(s)/formoterol 4.5 MICROgram(s) Inhaler 2 Puff(s) Inhalation two times a day    Cardiovascular Medications  norepinephrine Infusion 0.05 MICROgram(s)/kG/Min IV Continuous <Continuous>    Gastrointestinal Medications  lactated ringers. 1000 milliLiter(s) IV Continuous <Continuous>     Hematologic/Oncologic Medications  alteplase    Infusion 1 mG/Hr IntraCatheter.. <Continuous>  heparin  Infusion 600 Unit(s)/Hr IV Continuous <Continuous>    Antimicrobial/Immunologic Medications  meropenem  IVPB 500 milliGRAM(s) IV Intermittent every 24 hours    Endocrine/Metabolic Medications  atorvastatin 40 milliGRAM(s) Oral at bedtime     --------------------------------------------------------------------------------------    VITAL SIGNS, INS/OUTS (last 24 hours):  --------------------------------------------------------------------------------------  Vital Signs Last 24 Hrs  T(C): 36.1 (18 Jul 2023 20:00), Max: 36.7 (18 Jul 2023 16:00)  T(F): 96.9 (18 Jul 2023 20:00), Max: 98 (18 Jul 2023 16:00)  HR: 73 (19 Jul 2023 00:00) (53 - 75)  BP: 96/55 (18 Jul 2023 19:00) (68/41 - 133/110)  BP(mean): 67 (18 Jul 2023 19:00) (1 - 119)  RR: 22 (19 Jul 2023 00:00) (13 - 22)  SpO2: 94% (19 Jul 2023 00:00) (94% - 100%)    Parameters below as of 19 Jul 2023 00:00  Patient On (Oxygen Delivery Method): room air        18 Jul 2023 07:01  -  19 Jul 2023 01:10  --------------------------------------------------------  IN:    Alteplase: 170 mL    Heparin: 117 mL    IV PiggyBack: 550 mL    Lactated Ringers: 1500 mL    Lactated Ringers Bolus: 2250 mL    Norepinephrine: 316 mL    Oral Fluid: 720 mL    Sodium Bicarbonate: 300 mL  Total IN: 5923 mL    OUT:    Indwelling Catheter - Urethral (mL): 2035 mL  Total OUT: 2035 mL    Total NET: 3888 mL      EXAM  NEUROLOGY  RASS:   	GCS:    Exam: Normal, NAD, alert, oriented x 3, no focal deficits.     HEENT  Exam: Normocephalic, atraumatic.  EOMI     RESPIRATORY  Exam: Lungs clear to auscultation, Normal expansion/effort.    Mechanical Ventilation:     CARDIOVASCULAR  Exam: S1, S2.  Regular rate and rhythm.     GI/NUTRITION  Exam: Abdomen soft, Non-tender, Non-distended.  Gastrostomy / Jejunostomy / Nasogastric tube in place.  Colostomy / Ileostomy.    Wound:    Current Diet:  NPO    VASCULAR  Exam: Motor and sensation function intact b/l  No palpable pulses in RLE  LLE with good PT and perineal signal     MUSCULOSKELETAL  Exam: All extremities moving spontaneously without limitations.      SKIN:  Exam: Good skin turgor, no skin breakdown.        --------------------------------------------------------------------------------------          LABS:  cret                        9.5    12.54 )-----------( 164      ( 18 Jul 2023 20:16 )             27.1     07-18    135  |  99  |  50<H>  ----------------------------<  122<H>  3.1<L>   |  15<L>  |  2.73<H>    Ca    7.4<L>      18 Jul 2023 20:16  Phos  2.6     07-18  Mg     1.70     07-18    TPro  8.4<H>  /  Alb  4.5  /  TBili  <0.2  /  DBili  x   /  AST  23  /  ALT  11  /  AlkPhos  86  07-17    PT/INR - ( 18 Jul 2023 20:16 )   PT: 14.7 sec;   INR: 1.26 ratio         PTT - ( 18 Jul 2023 20:16 )  PTT:105.9 secCritical Care Diagnoses: SICU Daily Progress Note  =====================================================    Interval Events:  - NPO at midnight for RTOR with vascular on 7/19  - Watery and melanotic stools since admission    HISTORY  63F female w/ PMHx CAD s/p stents, CVA, COPD, PAD w/ prev LLE poss RLE stenting (Dr. Hart,  [facility]), 20 pack/year smoking history, and HTN. Presents with 1 week hx of worsening RLE pain - abruptly increasing over last 3 days. Worse in calf and foot. Also has felt some subjective tingling of the foot. VSS. Labs showing JACKI with Cr at 6 and HCO3 at 10. No known CKD per pt and daughter. 2+ palp L fem, R fem 1+, L pop 2+. dopplerable LLE pedal signals. RLE pop and foot no palp pulses or signals. Motor R foot 4/5 with diminished sensation compared to left. Unable to obtain CTA due to JACKI. Nephro consulted -hydrated and started on bicarb infusion. Recent hx of melena but none in the past few weeks. Started on therapeutic hep gtt. Acute on chronic limb ischemia with evidence of motor and sensory deficits. Patient taken emergently to OR for RLE angiogram with lysis catheter placement on 7/18. SICU consulted for hemodynamic monitoring and postop management.      Allergies: No Known Allergies      MEDICATIONS:   --------------------------------------------------------------------------------------  Neurologic Medications  acetaminophen   IVPB .. 1000 milliGRAM(s) IV Intermittent every 6 hours  oxyCODONE    IR 10 milliGRAM(s) Oral every 4 hours PRN Severe Pain (7 - 10)  oxyCODONE    IR 5 milliGRAM(s) Oral every 4 hours PRN Moderate Pain (4 - 6)    Respiratory Medications  budesonide 160 MICROgram(s)/formoterol 4.5 MICROgram(s) Inhaler 2 Puff(s) Inhalation two times a day    Cardiovascular Medications  norepinephrine Infusion 0.05 MICROgram(s)/kG/Min IV Continuous <Continuous>    Gastrointestinal Medications  lactated ringers. 1000 milliLiter(s) IV Continuous <Continuous>     Hematologic/Oncologic Medications  alteplase    Infusion 1 mG/Hr IntraCatheter.. <Continuous>  heparin  Infusion 600 Unit(s)/Hr IV Continuous <Continuous>    Antimicrobial/Immunologic Medications  meropenem  IVPB 500 milliGRAM(s) IV Intermittent every 24 hours    Endocrine/Metabolic Medications  atorvastatin 40 milliGRAM(s) Oral at bedtime     --------------------------------------------------------------------------------------    VITAL SIGNS, INS/OUTS (last 24 hours):  --------------------------------------------------------------------------------------  Vital Signs Last 24 Hrs  T(C): 36.1 (18 Jul 2023 20:00), Max: 36.7 (18 Jul 2023 16:00)  T(F): 96.9 (18 Jul 2023 20:00), Max: 98 (18 Jul 2023 16:00)  HR: 73 (19 Jul 2023 00:00) (53 - 75)  BP: 96/55 (18 Jul 2023 19:00) (68/41 - 133/110)  BP(mean): 67 (18 Jul 2023 19:00) (1 - 119)  RR: 22 (19 Jul 2023 00:00) (13 - 22)  SpO2: 94% (19 Jul 2023 00:00) (94% - 100%)    Parameters below as of 19 Jul 2023 00:00  Patient On (Oxygen Delivery Method): room air        18 Jul 2023 07:01  -  19 Jul 2023 01:10  --------------------------------------------------------  IN:    Alteplase: 170 mL    Heparin: 117 mL    IV PiggyBack: 550 mL    Lactated Ringers: 1500 mL    Lactated Ringers Bolus: 2250 mL    Norepinephrine: 316 mL    Oral Fluid: 720 mL    Sodium Bicarbonate: 300 mL  Total IN: 5923 mL    OUT:    Indwelling Catheter - Urethral (mL): 2035 mL  Total OUT: 2035 mL    Total NET: 3888 mL      EXAM  NEUROLOGY  RASS:   	GCS:    Exam: Normal, NAD, alert, oriented x 3, no focal deficits.     HEENT  Exam: Normocephalic, atraumatic.  EOMI     RESPIRATORY  Exam: Lungs clear to auscultation, Normal expansion/effort.    Mechanical Ventilation:     CARDIOVASCULAR  Exam: S1, S2.  Regular rate and rhythm.     GI/NUTRITION  Exam: Abdomen soft, Non-tender, Non-distended.  Gastrostomy / Jejunostomy / Nasogastric tube in place.  Colostomy / Ileostomy.    Wound:    Current Diet:  NPO    VASCULAR  Exam: Motor and sensation function intact b/l  No palpable pulses in RLE  LLE with good PT and perineal signal     MUSCULOSKELETAL  Exam: All extremities moving spontaneously without limitations.      SKIN:  Exam: Good skin turgor, no skin breakdown.        --------------------------------------------------------------------------------------          LABS:  cret                        9.5    12.54 )-----------( 164      ( 18 Jul 2023 20:16 )             27.1     07-18    135  |  99  |  50<H>  ----------------------------<  122<H>  3.1<L>   |  15<L>  |  2.73<H>    Ca    7.4<L>      18 Jul 2023 20:16  Phos  2.6     07-18  Mg     1.70     07-18    TPro  8.4<H>  /  Alb  4.5  /  TBili  <0.2  /  DBili  x   /  AST  23  /  ALT  11  /  AlkPhos  86  07-17    PT/INR - ( 18 Jul 2023 20:16 )   PT: 14.7 sec;   INR: 1.26 ratio         PTT - ( 18 Jul 2023 20:16 )  PTT:105.9 secCritical Care Diagnoses:

## 2023-07-19 NOTE — PROGRESS NOTE ADULT - SUBJECTIVE AND OBJECTIVE BOX
[facility] Division of Kidney Diseases & Hypertension  FOLLOW UP NOTE  [phone]--------------------------------------------------------------------------------    HPI: 64 yo F with significant vascular history, HLD, HTN who presented to [facility] due to worsening RLE pain. SCr on admission of 6.49. Nephrology consulted for acute renal failure. No prior labs for review per [facility]/HIMAGALIE. Pt denied hx of renal failure in the past.    24 hour events/subjective: Pt seen and examined at bedside. Still complains of significant RLE pain that has mildly improved. She denies fevers/chills, headaches, chest pain, shortness of breath, or abd pain.    PAST HISTORY  --------------------------------------------------------------------------------  No significant changes to PMH, PSH, FHx, SHx, unless otherwise noted    ALLERGIES & MEDICATIONS  --------------------------------------------------------------------------------  Allergies  No Known Allergies    Intolerances    Standing Inpatient Medications  albumin human  5% IVPB 250 milliLiter(s) IV Intermittent once  alteplase    Infusion 0.5 mG/Hr IntraCatheter.. <Continuous>  atorvastatin 40 milliGRAM(s) Oral at bedtime  budesonide 160 MICROgram(s)/formoterol 4.5 MICROgram(s) Inhaler 2 Puff(s) Inhalation two times a day  dextrose 50% Injectable 25 Gram(s) IV Push once  dextrose 50% Injectable 25 Gram(s) IV Push once  dextrose 50% Injectable 12.5 Gram(s) IV Push once  glucagon  Injectable 1 milliGRAM(s) IntraMuscular once  heparin  Infusion 600 Unit(s)/Hr IV Continuous <Continuous>  insulin lispro (ADMELOG) corrective regimen sliding scale   SubCutaneous three times a day before meals  lactated ringers. 1000 milliLiter(s) IV Continuous <Continuous>  magnesium sulfate  IVPB 2 Gram(s) IV Intermittent once  meropenem  IVPB 500 milliGRAM(s) IV Intermittent every 24 hours  norepinephrine Infusion 0.05 MICROgram(s)/kG/Min IV Continuous <Continuous>    PRN Inpatient Medications  dextrose Oral Gel 15 Gram(s) Oral once PRN  oxyCODONE    IR 10 milliGRAM(s) Oral every 4 hours PRN  oxyCODONE    IR 5 milliGRAM(s) Oral every 4 hours PRN    REVIEW OF SYSTEMS  --------------------------------------------------------------------------------  Gen: No fevers/chills  Head/Eyes/Ears: No HA  Respiratory: No dyspnea, cough  CV: No chest pain  GI: No abdominal pain, diarrhea  : No dysuria, hematuria  MSK: per HPI   Skin: No rashes  Heme: No easy bruising or bleeding    All other systems were reviewed and are negative, except as noted.    VITALS/PHYSICAL EXAM  --------------------------------------------------------------------------------  T(C): 36.4 (07-19-23 @ 10:41), Max: 36.7 (07-18-23 @ 16:00)  HR: 59 (07-19-23 @ 10:30) (53 - 75)  BP: 109/46 (07-19-23 @ 10:41) (84/45 - 116/96)  RR: 19 (07-19-23 @ 10:30) (13 - 27)  SpO2: 94% (07-19-23 @ 10:41) (93% - 100%)  Wt(kg): --  Height (cm): 160 (07-19-23 @ 07:48)  Weight (kg): 60 (07-18-23 @ 00:34)  BMI (kg/m2): 23.4 (07-19-23 @ 07:48)  BSA (m2): 1.62 (07-19-23 @ 07:48)    07-18-23 @ 07:01  -  07-19-23 @ 07:00  --------------------------------------------------------  IN: 7945 mL / OUT: 3310 mL / NET: 4635 mL    07-19-23 @ 07:01  -  07-19-23 @ 13:33  --------------------------------------------------------  IN: 1004.4 mL / OUT: 550 mL / NET: 454.4 mL    Physical Exam:  Gen: NAD, mild pain   HEENT: Anicteric  Pulm: CTA B/L  CV: S1S2+  Abd: Soft, +BS    Ext: No LE edema B/L. Cool to touch, unable to palpate pedal pulses   Neuro: Awake  Skin: Warm and dry    LABS/STUDIES  --------------------------------------------------------------------------------              7.6    13.68 >-----------<  145      [07-19-23 @ 08:50]              21.5     134  |  105  |  46  ----------------------------<  123      [07-19-23 @ 08:50]  3.9   |  16  |  1.58        Ca     7.7     [07-19-23 @ 08:50]      Mg     1.60     [07-19-23 @ 08:50]      Phos  1.7     [07-19-23 @ 08:50]    TPro  8.4  /  Alb  4.5  /  TBili  <0.2  /  DBili  x   /  AST  23  /  ALT  11  /  AlkPhos  86  [07-17-23 @ 22:40]    Creatinine Trend:  SCr 1.58 [07-19 @ 08:50]  SCr 2.12 [07-19 @ 02:27]  SCr 2.73 [07-18 @ 20:16]  SCr 3.68 [07-18 @ 14:10]  SCr 4.48 [07-18 @ 08:38]    Urine Protein 29      [07-18-23 @ 14:10]  Urine Sodium 97      [07-18-23 @ 11:03]  Urine Urea Nitrogen 243.5      [07-18-23 @ 11:03]  Urine Potassium 24.0      [07-18-23 @ 11:03]  Urine Osmolality 342      [07-18-23 @ 11:03]    HBsAg Nonreact      [07-18-23 @ 14:34]  HIV Nonreact      [07-18-23 @ 14:10]    C3 Complement 125      [07-18-23 @ 14:34]  C4 Complement 38      [07-18-23 @ 14:34]  ANCA: cANCA Negative, pANCA Negative, atypical ANCA Indeterminate Method interference due to BENNIE fluorescence      [07-18-23 @ 14:34] [facility] Division of Kidney Diseases & Hypertension  FOLLOW UP NOTE  [phone]--------------------------------------------------------------------------------    HPI: 62 yo F with significant vascular history, HLD, HTN who presented to [facility] due to worsening RLE pain. SCr on admission of 6.49. Nephrology consulted for acute renal failure. No prior labs for review per [facility]/HIMAGALIE. Pt denied hx of renal failure in the past.    24 hour events/subjective: Pt seen and examined at bedside. Still complains of significant RLE pain that has mildly improved. She denies fevers/chills, headaches, chest pain, shortness of breath, or abd pain.    PAST HISTORY  --------------------------------------------------------------------------------  No significant changes to PMH, PSH, FHx, SHx, unless otherwise noted    ALLERGIES & MEDICATIONS  --------------------------------------------------------------------------------  Allergies  No Known Allergies    Intolerances    Standing Inpatient Medications  albumin human  5% IVPB 250 milliLiter(s) IV Intermittent once  alteplase    Infusion 0.5 mG/Hr IntraCatheter.. <Continuous>  atorvastatin 40 milliGRAM(s) Oral at bedtime  budesonide 160 MICROgram(s)/formoterol 4.5 MICROgram(s) Inhaler 2 Puff(s) Inhalation two times a day  dextrose 50% Injectable 25 Gram(s) IV Push once  dextrose 50% Injectable 25 Gram(s) IV Push once  dextrose 50% Injectable 12.5 Gram(s) IV Push once  glucagon  Injectable 1 milliGRAM(s) IntraMuscular once  heparin  Infusion 600 Unit(s)/Hr IV Continuous <Continuous>  insulin lispro (ADMELOG) corrective regimen sliding scale   SubCutaneous three times a day before meals  lactated ringers. 1000 milliLiter(s) IV Continuous <Continuous>  magnesium sulfate  IVPB 2 Gram(s) IV Intermittent once  meropenem  IVPB 500 milliGRAM(s) IV Intermittent every 24 hours  norepinephrine Infusion 0.05 MICROgram(s)/kG/Min IV Continuous <Continuous>    PRN Inpatient Medications  dextrose Oral Gel 15 Gram(s) Oral once PRN  oxyCODONE    IR 10 milliGRAM(s) Oral every 4 hours PRN  oxyCODONE    IR 5 milliGRAM(s) Oral every 4 hours PRN    REVIEW OF SYSTEMS  --------------------------------------------------------------------------------  Gen: No fevers/chills  Head/Eyes/Ears: No HA  Respiratory: No dyspnea, cough  CV: No chest pain  GI: No abdominal pain, diarrhea  : No dysuria, hematuria  MSK: per HPI   Skin: No rashes  Heme: No easy bruising or bleeding    All other systems were reviewed and are negative, except as noted.    VITALS/PHYSICAL EXAM  --------------------------------------------------------------------------------  T(C): 36.4 (07-19-23 @ 10:41), Max: 36.7 (07-18-23 @ 16:00)  HR: 59 (07-19-23 @ 10:30) (53 - 75)  BP: 109/46 (07-19-23 @ 10:41) (84/45 - 116/96)  RR: 19 (07-19-23 @ 10:30) (13 - 27)  SpO2: 94% (07-19-23 @ 10:41) (93% - 100%)  Wt(kg): --  Height (cm): 160 (07-19-23 @ 07:48)  Weight (kg): 60 (07-18-23 @ 00:34)  BMI (kg/m2): 23.4 (07-19-23 @ 07:48)  BSA (m2): 1.62 (07-19-23 @ 07:48)    07-18-23 @ 07:01  -  07-19-23 @ 07:00  --------------------------------------------------------  IN: 7945 mL / OUT: 3310 mL / NET: 4635 mL    07-19-23 @ 07:01  -  07-19-23 @ 13:33  --------------------------------------------------------  IN: 1004.4 mL / OUT: 550 mL / NET: 454.4 mL    Physical Exam:  Gen: NAD, mild pain   HEENT: Anicteric  Pulm: CTA B/L  CV: S1S2+  Abd: Soft, +BS    Ext: No LE edema B/L. Cool to touch, unable to palpate pedal pulses   Neuro: Awake  Skin: Warm and dry    LABS/STUDIES  --------------------------------------------------------------------------------              7.6    13.68 >-----------<  145      [07-19-23 @ 08:50]              21.5     134  |  105  |  46  ----------------------------<  123      [07-19-23 @ 08:50]  3.9   |  16  |  1.58        Ca     7.7     [07-19-23 @ 08:50]      Mg     1.60     [07-19-23 @ 08:50]      Phos  1.7     [07-19-23 @ 08:50]    TPro  8.4  /  Alb  4.5  /  TBili  <0.2  /  DBili  x   /  AST  23  /  ALT  11  /  AlkPhos  86  [07-17-23 @ 22:40]    Creatinine Trend:  SCr 1.58 [07-19 @ 08:50]  SCr 2.12 [07-19 @ 02:27]  SCr 2.73 [07-18 @ 20:16]  SCr 3.68 [07-18 @ 14:10]  SCr 4.48 [07-18 @ 08:38]    Urine Protein 29      [07-18-23 @ 14:10]  Urine Sodium 97      [07-18-23 @ 11:03]  Urine Urea Nitrogen 243.5      [07-18-23 @ 11:03]  Urine Potassium 24.0      [07-18-23 @ 11:03]  Urine Osmolality 342      [07-18-23 @ 11:03]    HBsAg Nonreact      [07-18-23 @ 14:34]  HIV Nonreact      [07-18-23 @ 14:10]    C3 Complement 125      [07-18-23 @ 14:34]  C4 Complement 38      [07-18-23 @ 14:34]  ANCA: cANCA Negative, pANCA Negative, atypical ANCA Indeterminate Method interference due to BENNIE fluorescence      [07-18-23 @ 14:34] [facility] Division of Kidney Diseases & Hypertension  FOLLOW UP NOTE  [phone]--------------------------------------------------------------------------------    HPI: 64 yo F with significant vascular history, HLD, HTN who presented to [facility] due to worsening RLE pain. SCr on admission of 6.49. Nephrology consulted for acute renal failure. No prior labs for review per [facility]/HIMAGALIE. Pt denied hx of renal failure in the past.    24 hour events/subjective: Pt seen and examined at bedside. Still complains of significant RLE pain that has mildly improved. She denies fevers/chills, headaches, chest pain, shortness of breath, or abd pain.    PAST HISTORY  --------------------------------------------------------------------------------  No significant changes to PMH, PSH, FHx, SHx, unless otherwise noted    ALLERGIES & MEDICATIONS  --------------------------------------------------------------------------------  Allergies  No Known Allergies    Intolerances    Standing Inpatient Medications  albumin human  5% IVPB 250 milliLiter(s) IV Intermittent once  alteplase    Infusion 0.5 mG/Hr IntraCatheter.. <Continuous>  atorvastatin 40 milliGRAM(s) Oral at bedtime  budesonide 160 MICROgram(s)/formoterol 4.5 MICROgram(s) Inhaler 2 Puff(s) Inhalation two times a day  dextrose 50% Injectable 25 Gram(s) IV Push once  dextrose 50% Injectable 25 Gram(s) IV Push once  dextrose 50% Injectable 12.5 Gram(s) IV Push once  glucagon  Injectable 1 milliGRAM(s) IntraMuscular once  heparin  Infusion 600 Unit(s)/Hr IV Continuous <Continuous>  insulin lispro (ADMELOG) corrective regimen sliding scale   SubCutaneous three times a day before meals  lactated ringers. 1000 milliLiter(s) IV Continuous <Continuous>  magnesium sulfate  IVPB 2 Gram(s) IV Intermittent once  meropenem  IVPB 500 milliGRAM(s) IV Intermittent every 24 hours  norepinephrine Infusion 0.05 MICROgram(s)/kG/Min IV Continuous <Continuous>    PRN Inpatient Medications  dextrose Oral Gel 15 Gram(s) Oral once PRN  oxyCODONE    IR 10 milliGRAM(s) Oral every 4 hours PRN  oxyCODONE    IR 5 milliGRAM(s) Oral every 4 hours PRN    REVIEW OF SYSTEMS  --------------------------------------------------------------------------------  Gen: No fevers/chills  Head/Eyes/Ears: No HA  Respiratory: No dyspnea, cough  CV: No chest pain  GI: No abdominal pain, diarrhea  : No dysuria, hematuria  MSK: per HPI   Skin: No rashes  Heme: No easy bruising or bleeding    All other systems were reviewed and are negative, except as noted.    VITALS/PHYSICAL EXAM  --------------------------------------------------------------------------------  T(C): 36.4 (07-19-23 @ 10:41), Max: 36.7 (07-18-23 @ 16:00)  HR: 59 (07-19-23 @ 10:30) (53 - 75)  BP: 109/46 (07-19-23 @ 10:41) (84/45 - 116/96)  RR: 19 (07-19-23 @ 10:30) (13 - 27)  SpO2: 94% (07-19-23 @ 10:41) (93% - 100%)  Wt(kg): --  Height (cm): 160 (07-19-23 @ 07:48)  Weight (kg): 60 (07-18-23 @ 00:34)  BMI (kg/m2): 23.4 (07-19-23 @ 07:48)  BSA (m2): 1.62 (07-19-23 @ 07:48)    07-18-23 @ 07:01  -  07-19-23 @ 07:00  --------------------------------------------------------  IN: 7945 mL / OUT: 3310 mL / NET: 4635 mL    07-19-23 @ 07:01  -  07-19-23 @ 13:33  --------------------------------------------------------  IN: 1004.4 mL / OUT: 550 mL / NET: 454.4 mL    Physical Exam:  Gen: NAD, mild pain   HEENT: Anicteric  Pulm: CTA B/L  CV: S1S2+  Abd: Soft, +BS    Ext: No LE edema B/L. Cool to touch, unable to palpate pedal pulses   Neuro: Awake  Skin: Warm and dry    LABS/STUDIES  --------------------------------------------------------------------------------              7.6    13.68 >-----------<  145      [07-19-23 @ 08:50]              21.5     134  |  105  |  46  ----------------------------<  123      [07-19-23 @ 08:50]  3.9   |  16  |  1.58        Ca     7.7     [07-19-23 @ 08:50]      Mg     1.60     [07-19-23 @ 08:50]      Phos  1.7     [07-19-23 @ 08:50]    TPro  8.4  /  Alb  4.5  /  TBili  <0.2  /  DBili  x   /  AST  23  /  ALT  11  /  AlkPhos  86  [07-17-23 @ 22:40]    Creatinine Trend:  SCr 1.58 [07-19 @ 08:50]  SCr 2.12 [07-19 @ 02:27]  SCr 2.73 [07-18 @ 20:16]  SCr 3.68 [07-18 @ 14:10]  SCr 4.48 [07-18 @ 08:38]    Urine Protein 29      [07-18-23 @ 14:10]  Urine Sodium 97      [07-18-23 @ 11:03]  Urine Urea Nitrogen 243.5      [07-18-23 @ 11:03]  Urine Potassium 24.0      [07-18-23 @ 11:03]  Urine Osmolality 342      [07-18-23 @ 11:03]    HBsAg Nonreact      [07-18-23 @ 14:34]  HIV Nonreact      [07-18-23 @ 14:10]    C3 Complement 125      [07-18-23 @ 14:34]  C4 Complement 38      [07-18-23 @ 14:34]  ANCA: cANCA Negative, pANCA Negative, atypical ANCA Indeterminate Method interference due to BENNIE fluorescence      [07-18-23 @ 14:34]

## 2023-07-19 NOTE — PROGRESS NOTE ADULT - SUBJECTIVE AND OBJECTIVE BOX
POST ANESTHESIA EVALUATION    63y Female POSTOP DAY 1 S/P     MENTAL STATUS: Patient participation [  x] Awake     [  ] Arousable     [  ] Sedated    AIRWAY PATENCY: [ x ] Satisfactory  [  ] Other:     Vital Signs Last 24 Hrs  T(C): 36.5 (19 Jul 2023 04:00), Max: 36.7 (18 Jul 2023 16:00)  T(F): 97.7 (19 Jul 2023 04:00), Max: 98 (18 Jul 2023 16:00)  HR: 66 (19 Jul 2023 06:15) (53 - 75)  BP: 116/96 (19 Jul 2023 04:00) (68/41 - 116/96)  BP(mean): 105 (19 Jul 2023 04:00) (1 - 105)  RR: 27 (19 Jul 2023 06:15) (13 - 27)  SpO2: 94% (19 Jul 2023 01:00) (94% - 100%)    Parameters below as of 19 Jul 2023 00:00  Patient On (Oxygen Delivery Method): room air      I&O's Summary    18 Jul 2023 07:01  -  19 Jul 2023 07:00  --------------------------------------------------------  IN: 7945 mL / OUT: 3310 mL / NET: 4635 mL          NAUSEA/ VOMITTING:  [ x ] NONE  [  ] CONTROLLED [  ] OTHER     PAIN: [ x ] CONTROLLED WITH CURRENT REGIMEN  [  ] OTHER    [ x ] NO APPARENT ANESTHESIA COMPLICATIONS      Comments:

## 2023-07-19 NOTE — PROGRESS NOTE ADULT - ASSESSMENT
plan to take the patient back for lysis check, right leg angio, possible percutaneous thrombectomy  all risks including but not limited to bleeding, stroke, renal failure, resp failure, limb loss, digit loss, life threatening complication and death were discusses. She verbalized understanding and wish to proceed/.

## 2023-07-19 NOTE — PROGRESS NOTE ADULT - ASSESSMENT
63F female w/ PMHx CAD s/p stents, CVA, COPD, PAD w/ prev LLE poss RLE stenting (Dr. Hart,  [facility]), 20 pack/year smoking history, EtOH use, and HTN presents with acute RLE ischemia. Patient s/p RLE angiogram with lysis catheter placement on 7/18. Course c/b new JACKI (Cr 6.49 on admission) and metabolic acidosis.       PLAN:  NEUROLOGIC   - Pain control: APAP and oxy PRN  - H/o EtOH use, monitor for withdrawal    RESPIRATORY   - Monitor SpO2 goal >92%  - Continue home Symbicort     CARDIOVASCULAR   - Maintain MAP >65  - On Levo gtt @ 0.1 -- wean as tolerated  - Q1 hour neurovascular checks  - Continue home Atorvastatin  - Hold home ASA/Plavix for now  - Hold home antihypertensives iso hypotension  - 1L albumin prior to OR 7/19  - S/p RTOR for lytic catheter removal, RLE angio and percutaneous thrombectomy on 7/19 AM    GASTROINTESTINAL   - Diet: Regular diet    /RENAL   - JACKI with HAGMA, nephrology consulted, improving  - No urgent indications for HD at this time  - LR @100/hr  - Maintain Ayoub catheter, strict Is/Os  - Monitor electrolytes, replete PRN    HEMATOLOGIC  - Monitor H/H  - Heparin gtt per Vascular Surgery    INFECTIOUS DISEASE  - Meropenem started 7/18 -- plan to discontinue on 7/20  - Monitor fever / WBC    ENDOCRINE  - Monitor gluc  - ISS     LINES  - Rt IJ CVC (  7/18  )  - A line ( 7 /18  )  - Ayoub (7/18)  - PIV     DISPO: SICU

## 2023-07-19 NOTE — PROGRESS NOTE ADULT - PROBLEM SELECTOR PLAN 2
Pt with high anion gap metabolic acidosis in setting of acute renal failure. SCO2 of 11 on admission (7/17) improved to 16 today (7/19). Agree with LR IVFs. Monitor SCO2.     If you have any questions, please feel free to contact me.  Juan Cedillo  Nephrology Fellow  [id number] / [phone] / [employer] Teams (Preferred)  (After 4pm or on weekends, please call the on-call Fellow) Pt with high anion gap metabolic acidosis in setting of acute renal failure. SCO2 of 11 on admission (7/17) improved to 16 today (7/19). Agree with LR IVFs. Monitor SCO2.     If you have any questions, please feel free to contact me.  Juan Cedillo  Nephrology Fellow  [id number] / [phone] / [employer] Teams (Preferred)  (After 4pm or on weekends, please call the on-call Fellow) Pt with high anion gap metabolic acidosis in setting of acute renal failure. SCO2 of 11 on admission (7/17) improved to 16 today (7/19). Agree with LR IVFs. Monitor SCO2.     If you have any questions, please feel free to contact me.  Juan Cedillo  Nephrology Fellow  [id number] / [phone] / [employer] Teams (Preferred)  (After 4pm or on weekends, please call the on-call Fellow)

## 2023-07-19 NOTE — PROGRESS NOTE ADULT - ASSESSMENT
63F female w/ PMHx CAD s/p stents, CVA, COPD, PAD w/ prev LLE poss RLE stenting (Dr. Hart,  [facility]), 20 pack/year smoking history, EtOH use, and HTN presents with acute RLE ischemia. Patient s/p RLE angiogram with lysis catheter placement on 7/18. Course c/b new JACKI (Cr 6.49 on admission) and metabolic acidosis.       PLAN:  NEUROLOGIC   - Pain control: APAP and oxy PRN    RESPIRATORY   - Monitor SpO2 goal >92%  - Continue home Symbicort     CARDIOVASCULAR   - Maintain MAP >65  - Continue Levo, wean as tolerated  - Q1 hour neurovascular checks  - Flat for as long as pt can tolerate  - TPA @10 and therapeutic Heparin ggt through lysis catheter  - Monitor CBC / CMP / Coags / fibrinogen Q6  - RTOR with vascular tomorrow (7/19)  - Continue home Atorvastatin  - Hold home ASA/Plavix for now  - Hold home antihypertensives iso hypotension    GASTROINTESTINAL   - Diet: Regular diet with renal restrictions  - NPO at midnight    /RENAL   - JACKI with SHARIFA, nephrology consulted  - F/u repeat ABG, consider re-starting bicarb ggt  - No urgent indications for HD at this time  - LR @100/hr  - Maintain ha catheter, strict Is/Os  - Monitor electrolytes, replete PRN    HEMATOLOGIC  - Monitor H/H   - Therapeutic heparin, TPA @10 through lysis catheter     INFECTIOUS DISEASE  - Meropenem started today (7/18 - )  - Monitor fever / WBC    ENDOCRINE  - Monitor gluc    LINES  - Rt IJ CVC (  7/18  )  - A line ( 7 /18  )  - Ha (7/18)  - PIV     DISPO: SICU     63F female w/ PMHx CAD s/p stents, CVA, COPD, PAD w/ prev LLE poss RLE stenting (Dr. Hart,  [facility]), 20 pack/year smoking history, EtOH use, and HTN presents with acute RLE ischemia. Patient s/p RLE angiogram with lysis catheter placement on 7/18. Course c/b new JACKI (Cr 6.49 on admission) and metabolic acidosis.       PLAN:  NEUROLOGIC   - Pain control: APAP and oxy PRN  - H/o EtOH abuse, monitor for DT    RESPIRATORY   - Monitor SpO2 goal >92%  - Continue home Symbicort     CARDIOVASCULAR   - Maintain MAP >65  - Wean Levo gtt 7/19  - Q1 hour neurovascular checks  - Flat for as long as pt can tolerate  - TPA and therapeutic Heparin ggt through lysis catheter  - Monitor CBC / CMP / Coags / fibrinogen Q6  - RTOR with vascular planned 7/19  - Continue home Atorvastatin  - Hold home ASA/Plavix for now  - Hold home antihypertensives iso hypotension  - 1L albumin prior to OR 7/19    GASTROINTESTINAL   - Diet: NPO    /RENAL   - JACKI with HAGMA, nephrology consulted  - No urgent indications for HD at this time  - LR @100/hr  - Maintain ha catheter, strict Is/Os  - Monitor electrolytes, replete PRN    HEMATOLOGIC  - Monitor H/H  - Therapeutic heparin, TPA through lysis catheter     INFECTIOUS DISEASE  - Meropenem started 7/18 -   - Monitor fever / WBC    ENDOCRINE  - Monitor gluc    LINES  - Rt IJ CVC (  7/18  )  - A line ( 7 /18  )  - Ha (7/18)  - PIV     DISPO: SICU     63F female w/ PMHx CAD s/p stents, CVA, COPD, PAD w/ prev LLE poss RLE stenting (Dr. Hart,  [facility]), 20 pack/year smoking history, EtOH use, and HTN presents with acute RLE ischemia. Patient s/p RLE angiogram with lysis catheter placement on 7/18. Course c/b new JACKI (Cr 6.49 on admission) and metabolic acidosis.       PLAN:  NEUROLOGIC   - Pain control: APAP and oxy PRN  - H/o EtOH use, monitor for withdrawal    RESPIRATORY   - Monitor SpO2 goal >92%  - Continue home Symbicort     CARDIOVASCULAR   - Maintain MAP >65  - Wean Levo gtt as tolerated  - Q1 hour neurovascular checks  - Flat for as long as pt can tolerate  - TPA and therapeutic Heparin ggt through lysis catheter  - Monitor CBC / CMP / Coags / fibrinogen Q6  - Continue home Atorvastatin  - Hold home ASA/Plavix for now  - Hold home antihypertensives iso hypotension  - 1L albumin prior to OR 7/19  - RTOR with vascular planned 7/19    GASTROINTESTINAL   - Diet: NPO    /RENAL   - JACKI with HAGMA, nephrology consulted, improving  - No urgent indications for HD at this time  - LR @100/hr  - Maintain ha catheter, strict Is/Os  - Monitor electrolytes, replete PRN    HEMATOLOGIC  - Monitor H/H  - Therapeutic heparin, TPA through lysis catheter     INFECTIOUS DISEASE  - Meropenem started 7/18 -   - Monitor fever / WBC    ENDOCRINE  - Monitor gluc  - ISS     LINES  - Rt IJ CVC (  7/18  )  - A line ( 7 /18  )  - Ha (7/18)  - PIV     DISPO: SICU

## 2023-07-19 NOTE — MEDICAL STUDENT PROGRESS NOTE(EDUCATION) - ASSESSMENT
Pt is a 64 y/o woman with a PMH of CAD s/p stents, PAD s/p b/l stents, CVA,, and COPD presenting with RLE pain at rest, now s/p lysis catheter placement on 07/18 for occluded SFA/popliteal stent.  Pt is a 62 y/o woman with a PMH of CAD s/p stents, PAD s/p b/l stents, CVA,, and COPD presenting with RLE pain at rest, now s/p lysis catheter placement on 07/18 for occluded SFA/popliteal stent.  Pt is a 64 y/o woman with a PMH of CAD s/p stents, PAD s/p b/l stents, CVA,, and COPD presenting with RLE pain at rest, now s/p lysis catheter placement on 07/18 for acute on chronic limb ischemia with evidence of motor/sensory deficits.    Plan:  -c/w meropenem, currently day 2  -c/w pressors  -monitor HgB, was 14.4 on admission, now 8.6 with 2 days of dark BMs  -care managed per SICU  -con't to trend Fibrinogen and H/H  - On Hep gtt (5mL/ hour), con't PTT monitoring  -OR today for RLE angiogram possible removal of lysis catheter with Dr. Lawrence Pt is a 62 y/o woman with a PMH of CAD s/p stents, PAD s/p b/l stents, CVA,, and COPD presenting with RLE pain at rest, now s/p lysis catheter placement on 07/18 for acute on chronic limb ischemia with evidence of motor/sensory deficits.    Plan:  -c/w meropenem, currently day 2  -c/w pressors  -monitor HgB, was 14.4 on admission, now 8.6 with 2 days of dark BMs  -care managed per SICU  -con't to trend Fibrinogen and H/H  - On Hep gtt (5mL/ hour), con't PTT monitoring  -OR today for RLE angiogram possible removal of lysis catheter with Dr. Lawrence

## 2023-07-19 NOTE — MEDICAL STUDENT PROGRESS NOTE(EDUCATION) - PLAN 1
Presented w RLE pain at rest on 07/18, found to have occluded SFA/popliteal stent. Now s/p lysis cath placement on 07/18. Today planning for RLE angiogram, possible continuation or removal of lysis cath    -c/w meropenem, currently day 2

## 2023-07-19 NOTE — CHART NOTE - NSCHARTNOTEFT_GEN_A_CORE
Surgery Post-Op Note    Pre-Op Dx:   Procedure: Right lower extremity angiogram with percutaneous thrombectomy of the superficial femoral and popliteal arteries.      Surgeon: Dr. Mohsen Bannazadeh    SUBJECTIVE:  Pt seen and examined at the bedside. Pt w/ no complaints. Denies F/C/N/V. Pain controlled with medication.     OBJECTIVE:  Vital Signs Last 24 Hrs  T(C): 36.3 (19 Jul 2023 16:00), Max: 36.5 (19 Jul 2023 04:00)  T(F): 97.4 (19 Jul 2023 16:00), Max: 97.7 (19 Jul 2023 04:00)  HR: 68 (19 Jul 2023 20:15) (51 - 74)  BP: 109/46 (19 Jul 2023 10:41) (84/45 - 116/96)  BP(mean): 58 (19 Jul 2023 08:00) (58 - 105)  RR: 15 (19 Jul 2023 20:15) (13 - 27)  SpO2: 95% (19 Jul 2023 20:15) (92% - 100%)    Parameters below as of 19 Jul 2023 20:00  Patient On (Oxygen Delivery Method): room air        Physical Exam:  General: NAD, resting comfortably in bed  Neuro: A&O x 3, no focal deficits  Pulmonary: Nonlabored breathing, no respiratory distress  Cardiovascular: NSR  Abdominal: soft, nondistended, NTTP. No rebound or guarding.   Extremities: Right PT pulse signal on Doppler. Left groin access c/d/i with femoral pulse and no signs of hematoma. Right leg comparments soft.     LABS:                        9.0    11.68 )-----------( 116      ( 19 Jul 2023 15:05 )             25.5     07-19    138  |  108<H>  |  36<H>  ----------------------------<  120<H>  3.1<L>   |  18<L>  |  1.07    Ca    7.3<L>      19 Jul 2023 15:05  Phos  2.2     07-19  Mg     1.60     07-19    TPro  8.4<H>  /  Alb  4.5  /  TBili  <0.2  /  DBili  x   /  AST  23  /  ALT  11  /  AlkPhos  86  07-17    PT/INR - ( 19 Jul 2023 15:05 )   PT: 13.4 sec;   INR: 1.15 ratio         PTT - ( 19 Jul 2023 15:05 )  PTT:57.5 sec  CAPILLARY BLOOD GLUCOSE      POCT Blood Glucose.: 92 mg/dL (19 Jul 2023 17:13)    Urinalysis Basic - ( 19 Jul 2023 15:05 )    Color: x / Appearance: x / SG: x / pH: x  Gluc: 120 mg/dL / Ketone: x  / Bili: x / Urobili: x   Blood: x / Protein: x / Nitrite: x   Leuk Esterase: x / RBC: x / WBC x   Sq Epi: x / Non Sq Epi: x / Bacteria: x      LIVER FUNCTIONS - ( 17 Jul 2023 22:40 )  Alb: 4.5 g/dL / Pro: 8.4 g/dL / ALK PHOS: 86 U/L / ALT: 11 U/L / AST: 23 U/L / GGT: x           ABO Interpretation: A (07-19 @ 10:53)      IMAGING:    ASSESSMENT:63y Female now 4hours s/p Right lower extremity angiogram with percutaneous thrombectomy of the superficial femoral and popliteal arteries.    PLAN:  - Pain control  - Restart Hep after 6hr

## 2023-07-19 NOTE — MEDICAL STUDENT PROGRESS NOTE(EDUCATION) - SUBJECTIVE AND OBJECTIVE BOX
VASCULAR SURGERY DAILY PROGRESS NOTE:     SUBJECTIVE/ROS:     Overnight: had 3 dark BMs overnight  Patient seen and evaluated on AM rounds.   Patient otherwise denies nausea, vomiting, chest pain, shortness of breath     OBJECTIVE:  Vital Signs Last 24 Hrs  T(C): 36.5 (19 Jul 2023 04:00), Max: 36.7 (18 Jul 2023 16:00)  T(F): 97.7 (19 Jul 2023 04:00), Max: 98 (18 Jul 2023 16:00)  HR: 55 (19 Jul 2023 08:30) (53 - 75)  BP: 84/45 (19 Jul 2023 08:00) (68/41 - 116/96)  BP(mean): 58 (19 Jul 2023 08:00) (1 - 105)  RR: 18 (19 Jul 2023 08:30) (13 - 27)  SpO2: 100% (19 Jul 2023 08:30) (93% - 100%)    Parameters below as of 19 Jul 2023 00:00  Patient On (Oxygen Delivery Method): room air      I&O's Detail    18 Jul 2023 07:01  -  19 Jul 2023 07:00  --------------------------------------------------------  IN:    Alteplase: 230 mL    Heparin: 149 mL    IV PiggyBack: 1400 mL    Lactated Ringers: 2100 mL    Lactated Ringers Bolus: 2250 mL    Norepinephrine: 316 mL    Oral Fluid: 1200 mL    Sodium Bicarbonate: 300 mL  Total IN: 7945 mL    OUT:    Indwelling Catheter - Urethral (mL): 3310 mL  Total OUT: 3310 mL    Total NET: 4635 mL        Daily     Daily   MEDICATIONS  (STANDING):  alteplase    Infusion 0.5 mG/Hr (5 mL/Hr) IntraCatheter.. <Continuous>  atorvastatin 40 milliGRAM(s) Oral at bedtime  budesonide 160 MICROgram(s)/formoterol 4.5 MICROgram(s) Inhaler 2 Puff(s) Inhalation two times a day  dextrose 5%. 1000 milliLiter(s) (50 mL/Hr) IV Continuous <Continuous>  dextrose 5%. 1000 milliLiter(s) (100 mL/Hr) IV Continuous <Continuous>  dextrose 50% Injectable 25 Gram(s) IV Push once  dextrose 50% Injectable 12.5 Gram(s) IV Push once  dextrose 50% Injectable 25 Gram(s) IV Push once  glucagon  Injectable 1 milliGRAM(s) IntraMuscular once  heparin  Infusion 600 Unit(s)/Hr (5 mL/Hr) IV Continuous <Continuous>  insulin lispro (ADMELOG) corrective regimen sliding scale   SubCutaneous three times a day before meals  lactated ringers. 1000 milliLiter(s) (100 mL/Hr) IV Continuous <Continuous>  magnesium sulfate  IVPB 2 Gram(s) IV Intermittent once  meropenem  IVPB 500 milliGRAM(s) IV Intermittent every 24 hours  norepinephrine Infusion 0.05 MICROgram(s)/kG/Min (5.63 mL/Hr) IV Continuous <Continuous>  potassium chloride  20 mEq/100 mL IVPB 20 milliEquivalent(s) IV Intermittent every 2 hours  sodium phosphate 15 milliMole(s)/250 mL IVPB 15 milliMole(s) IV Intermittent once    MEDICATIONS  (PRN):  dextrose Oral Gel 15 Gram(s) Oral once PRN Blood Glucose LESS THAN 70 milliGRAM(s)/deciliter  oxyCODONE    IR 5 milliGRAM(s) Oral every 4 hours PRN Moderate Pain (4 - 6)  oxyCODONE    IR 10 milliGRAM(s) Oral every 4 hours PRN Severe Pain (7 - 10)      Labs:                        8.3    13.67 )-----------( 149      ( 19 Jul 2023 02:27 )             23.5     07-19    134<L>  |  102  |  52<H>  ----------------------------<  159<H>  3.0<L>   |  15<L>  |  2.12<H>    Ca    7.6<L>      19 Jul 2023 02:27  Phos  1.2     07-19  Mg     1.70     07-19    TPro  8.4<H>  /  Alb  4.5  /  TBili  <0.2  /  DBili  x   /  AST  23  /  ALT  11  /  AlkPhos  86  07-17    PT/INR - ( 19 Jul 2023 02:27 )   PT: 14.6 sec;   INR: 1.26 ratio         PTT - ( 19 Jul 2023 02:27 )  PTT:100.9 sec  Urinalysis Basic - ( 19 Jul 2023 02:27 )    Color: x / Appearance: x / SG: x / pH: x  Gluc: 159 mg/dL / Ketone: x  / Bili: x / Urobili: x   Blood: x / Protein: x / Nitrite: x   Leuk Esterase: x / RBC: x / WBC x   Sq Epi: x / Non Sq Epi: x / Bacteria: x                Physical Exam:  General: well developed, well nourished, NAD  Cardiovascular: appears well perfused   Respiratory: respirations non labored  Gastrointestinal: soft, nontender, nondistended  Extremities: palpable femoral pulses b/l but R femoral pulse diminished compared to L     RLE: no palpable popliteal/DP/PT pulses, no doppler signals present     LLE: palpable popliteal pulse, DP/PT palpable, DP/PT doppler signals present  Neurological: A+Ox3          VASCULAR SURGERY DAILY PROGRESS NOTE:     SUBJECTIVE/ROS:     Overnight: had 3 dark BMs overnight  Patient seen and evaluated on AM rounds. In no acute distress.  Patient otherwise denies nausea, vomiting, chest pain, shortness of breath     OBJECTIVE:  Vital Signs Last 24 Hrs  T(C): 36.5 (19 Jul 2023 04:00), Max: 36.7 (18 Jul 2023 16:00)  T(F): 97.7 (19 Jul 2023 04:00), Max: 98 (18 Jul 2023 16:00)  HR: 55 (19 Jul 2023 08:30) (53 - 75)  BP: 84/45 (19 Jul 2023 08:00) (68/41 - 116/96)  BP(mean): 58 (19 Jul 2023 08:00) (1 - 105)  RR: 18 (19 Jul 2023 08:30) (13 - 27)  SpO2: 100% (19 Jul 2023 08:30) (93% - 100%)    Parameters below as of 19 Jul 2023 00:00  Patient On (Oxygen Delivery Method): room air      I&O's Detail    18 Jul 2023 07:01  -  19 Jul 2023 07:00  --------------------------------------------------------  IN:    Alteplase: 230 mL    Heparin: 149 mL    IV PiggyBack: 1400 mL    Lactated Ringers: 2100 mL    Lactated Ringers Bolus: 2250 mL    Norepinephrine: 316 mL    Oral Fluid: 1200 mL    Sodium Bicarbonate: 300 mL  Total IN: 7945 mL    OUT:    Indwelling Catheter - Urethral (mL): 3310 mL  Total OUT: 3310 mL    Total NET: 4635 mL        Daily     Daily   MEDICATIONS  (STANDING):  alteplase    Infusion 0.5 mG/Hr (5 mL/Hr) IntraCatheter.. <Continuous>  atorvastatin 40 milliGRAM(s) Oral at bedtime  budesonide 160 MICROgram(s)/formoterol 4.5 MICROgram(s) Inhaler 2 Puff(s) Inhalation two times a day  dextrose 5%. 1000 milliLiter(s) (50 mL/Hr) IV Continuous <Continuous>  dextrose 5%. 1000 milliLiter(s) (100 mL/Hr) IV Continuous <Continuous>  dextrose 50% Injectable 25 Gram(s) IV Push once  dextrose 50% Injectable 12.5 Gram(s) IV Push once  dextrose 50% Injectable 25 Gram(s) IV Push once  glucagon  Injectable 1 milliGRAM(s) IntraMuscular once  heparin  Infusion 600 Unit(s)/Hr (5 mL/Hr) IV Continuous <Continuous>  insulin lispro (ADMELOG) corrective regimen sliding scale   SubCutaneous three times a day before meals  lactated ringers. 1000 milliLiter(s) (100 mL/Hr) IV Continuous <Continuous>  magnesium sulfate  IVPB 2 Gram(s) IV Intermittent once  meropenem  IVPB 500 milliGRAM(s) IV Intermittent every 24 hours  norepinephrine Infusion 0.05 MICROgram(s)/kG/Min (5.63 mL/Hr) IV Continuous <Continuous>  potassium chloride  20 mEq/100 mL IVPB 20 milliEquivalent(s) IV Intermittent every 2 hours  sodium phosphate 15 milliMole(s)/250 mL IVPB 15 milliMole(s) IV Intermittent once    MEDICATIONS  (PRN):  dextrose Oral Gel 15 Gram(s) Oral once PRN Blood Glucose LESS THAN 70 milliGRAM(s)/deciliter  oxyCODONE    IR 5 milliGRAM(s) Oral every 4 hours PRN Moderate Pain (4 - 6)  oxyCODONE    IR 10 milliGRAM(s) Oral every 4 hours PRN Severe Pain (7 - 10)      Labs:                        8.3    13.67 )-----------( 149      ( 19 Jul 2023 02:27 )             23.5     07-19    134<L>  |  102  |  52<H>  ----------------------------<  159<H>  3.0<L>   |  15<L>  |  2.12<H>    Ca    7.6<L>      19 Jul 2023 02:27  Phos  1.2     07-19  Mg     1.70     07-19    TPro  8.4<H>  /  Alb  4.5  /  TBili  <0.2  /  DBili  x   /  AST  23  /  ALT  11  /  AlkPhos  86  07-17    PT/INR - ( 19 Jul 2023 02:27 )   PT: 14.6 sec;   INR: 1.26 ratio         PTT - ( 19 Jul 2023 02:27 )  PTT:100.9 sec  Urinalysis Basic - ( 19 Jul 2023 02:27 )    Color: x / Appearance: x / SG: x / pH: x  Gluc: 159 mg/dL / Ketone: x  / Bili: x / Urobili: x   Blood: x / Protein: x / Nitrite: x   Leuk Esterase: x / RBC: x / WBC x   Sq Epi: x / Non Sq Epi: x / Bacteria: x      Physical Exam:  General: well developed, NAD  Respiratory: respirations non labored  Gastrointestinal: soft, nontender, nondistended  Extremities:     RLE: no doppler DP/PT signals  Neurological: A+Ox3

## 2023-07-20 ENCOUNTER — TRANSCRIPTION ENCOUNTER (OUTPATIENT)
Age: 63
End: 2023-07-20

## 2023-07-20 DIAGNOSIS — E87.6 HYPOKALEMIA: ICD-10-CM

## 2023-07-20 LAB
ANION GAP SERPL CALC-SCNC: 11 MMOL/L — SIGNIFICANT CHANGE UP (ref 7–14)
APTT BLD: 34 SEC — SIGNIFICANT CHANGE UP (ref 27–36.3)
APTT BLD: 46.4 SEC — HIGH (ref 27–36.3)
APTT BLD: 51 SEC — HIGH (ref 27–36.3)
APTT BLD: 62.5 SEC — HIGH (ref 27–36.3)
B19V DNA FLD QL NAA+PROBE: ABNORMAL IU/ML
BUN SERPL-MCNC: 21 MG/DL — SIGNIFICANT CHANGE UP (ref 7–23)
CALCIUM SERPL-MCNC: 8.4 MG/DL — SIGNIFICANT CHANGE UP (ref 8.4–10.5)
CHLORIDE SERPL-SCNC: 107 MMOL/L — SIGNIFICANT CHANGE UP (ref 98–107)
CO2 SERPL-SCNC: 21 MMOL/L — LOW (ref 22–31)
CREAT SERPL-MCNC: 0.72 MG/DL — SIGNIFICANT CHANGE UP (ref 0.5–1.3)
EGFR: 94 ML/MIN/1.73M2 — SIGNIFICANT CHANGE UP
FIBRINOGEN PPP-MCNC: 368 MG/DL — SIGNIFICANT CHANGE UP (ref 200–465)
GBM IGG SER-ACNC: <0.2 — SIGNIFICANT CHANGE UP (ref 0–0.9)
GLUCOSE SERPL-MCNC: 98 MG/DL — SIGNIFICANT CHANGE UP (ref 70–99)
HCT VFR BLD CALC: 23.3 % — LOW (ref 34.5–45)
HGB BLD-MCNC: 8.3 G/DL — LOW (ref 11.5–15.5)
INR BLD: 1.03 RATIO — SIGNIFICANT CHANGE UP (ref 0.88–1.16)
MAGNESIUM SERPL-MCNC: 2.4 MG/DL — SIGNIFICANT CHANGE UP (ref 1.6–2.6)
MCHC RBC-ENTMCNC: 32.3 PG — SIGNIFICANT CHANGE UP (ref 27–34)
MCHC RBC-ENTMCNC: 35.6 GM/DL — SIGNIFICANT CHANGE UP (ref 32–36)
MCV RBC AUTO: 90.7 FL — SIGNIFICANT CHANGE UP (ref 80–100)
NRBC # BLD: 0 /100 WBCS — SIGNIFICANT CHANGE UP (ref 0–0)
NRBC # FLD: 0 K/UL — SIGNIFICANT CHANGE UP (ref 0–0)
PHOSPHATE SERPL-MCNC: 1.4 MG/DL — LOW (ref 2.5–4.5)
PLATELET # BLD AUTO: 124 K/UL — LOW (ref 150–400)
POTASSIUM SERPL-MCNC: 3.1 MMOL/L — LOW (ref 3.5–5.3)
POTASSIUM SERPL-SCNC: 3.1 MMOL/L — LOW (ref 3.5–5.3)
PROTHROM AB SERPL-ACNC: 11.9 SEC — SIGNIFICANT CHANGE UP (ref 10.5–13.4)
RBC # BLD: 2.57 M/UL — LOW (ref 3.8–5.2)
RBC # FLD: 16.5 % — HIGH (ref 10.3–14.5)
SODIUM SERPL-SCNC: 139 MMOL/L — SIGNIFICANT CHANGE UP (ref 135–145)
WBC # BLD: 11.74 K/UL — HIGH (ref 3.8–10.5)
WBC # FLD AUTO: 11.74 K/UL — HIGH (ref 3.8–10.5)

## 2023-07-20 PROCEDURE — 99232 SBSQ HOSP IP/OBS MODERATE 35: CPT | Mod: GC

## 2023-07-20 PROCEDURE — 99233 SBSQ HOSP IP/OBS HIGH 50: CPT

## 2023-07-20 PROCEDURE — 99232 SBSQ HOSP IP/OBS MODERATE 35: CPT

## 2023-07-20 RX ORDER — ACETAMINOPHEN 500 MG
1000 TABLET ORAL EVERY 6 HOURS
Refills: 0 | Status: COMPLETED | OUTPATIENT
Start: 2023-07-20 | End: 2023-07-21

## 2023-07-20 RX ORDER — POTASSIUM PHOSPHATE, MONOBASIC POTASSIUM PHOSPHATE, DIBASIC 236; 224 MG/ML; MG/ML
30 INJECTION, SOLUTION INTRAVENOUS ONCE
Refills: 0 | Status: COMPLETED | OUTPATIENT
Start: 2023-07-20 | End: 2023-07-20

## 2023-07-20 RX ORDER — POTASSIUM CHLORIDE 20 MEQ
10 PACKET (EA) ORAL
Refills: 0 | Status: COMPLETED | OUTPATIENT
Start: 2023-07-20 | End: 2023-07-20

## 2023-07-20 RX ADMIN — Medication 400 MILLIGRAM(S): at 18:15

## 2023-07-20 RX ADMIN — ATORVASTATIN CALCIUM 40 MILLIGRAM(S): 80 TABLET, FILM COATED ORAL at 23:40

## 2023-07-20 RX ADMIN — HEPARIN SODIUM 7 UNIT(S)/HR: 5000 INJECTION INTRAVENOUS; SUBCUTANEOUS at 20:47

## 2023-07-20 RX ADMIN — BUDESONIDE AND FORMOTEROL FUMARATE DIHYDRATE 2 PUFF(S): 160; 4.5 AEROSOL RESPIRATORY (INHALATION) at 21:32

## 2023-07-20 RX ADMIN — Medication 100 MILLIEQUIVALENT(S): at 05:42

## 2023-07-20 RX ADMIN — Medication 100 MILLIEQUIVALENT(S): at 00:32

## 2023-07-20 RX ADMIN — POTASSIUM PHOSPHATE, MONOBASIC POTASSIUM PHOSPHATE, DIBASIC 83.33 MILLIMOLE(S): 236; 224 INJECTION, SOLUTION INTRAVENOUS at 08:39

## 2023-07-20 RX ADMIN — BUDESONIDE AND FORMOTEROL FUMARATE DIHYDRATE 2 PUFF(S): 160; 4.5 AEROSOL RESPIRATORY (INHALATION) at 09:03

## 2023-07-20 RX ADMIN — SODIUM CHLORIDE 100 MILLILITER(S): 9 INJECTION, SOLUTION INTRAVENOUS at 07:30

## 2023-07-20 RX ADMIN — Medication 100 MILLIEQUIVALENT(S): at 06:15

## 2023-07-20 RX ADMIN — Medication 400 MILLIGRAM(S): at 23:41

## 2023-07-20 RX ADMIN — Medication 400 MILLIGRAM(S): at 12:29

## 2023-07-20 RX ADMIN — Medication 100 MILLIEQUIVALENT(S): at 07:29

## 2023-07-20 NOTE — PROGRESS NOTE ADULT - ASSESSMENT
63F female w/ PMHx CAD s/p stents, CVA, COPD, PAD w/ prev LLE poss RLE stenting (Dr. Hart[facility]), 20 pack/year smoking history, EtOH use, and HTN presents with acute RLE ischemia. Patient s/p RLE angiogram with lysis catheter placement on 7/18. Course c/b new JACKI (Cr 6.49 on admission) and metabolic acidosis.       PLAN:  NEUROLOGIC   - Pain control: APAP and oxy PRN  - H/o EtOH use, monitor for withdrawal    RESPIRATORY   - Monitor SpO2 goal >92%  - Continue home Symbicort     CARDIOVASCULAR   - Maintain MAP >65  - On Levo gtt @ 0.11 -- wean as tolerated  - Q1 hour neurovascular checks  - Continue home Atorvastatin  - Hold home ASA/Plavix for now  - Hold home antihypertensives iso hypotension  - 1L albumin prior to OR 7/19  - S/p RTOR for lytic catheter removal, RLE angio and percutaneous thrombectomy on 7/19    GASTROINTESTINAL   - Diet: Regular diet    /RENAL   - JACKI with HAGMA, nephrology consulted, improving  - No urgent indications for HD at this time  - LR @100/hr  - Maintain Ayoub catheter, strict Is/Os  - Monitor electrolytes, replete PRN    HEMATOLOGIC  - Monitor H/H  - Heparin gtt per Vascular Surgery restarted with a goal PTT of 58-99    INFECTIOUS DISEASE  - Meropenem started 7/18 -- plan to discontinue on 7/20  - Monitor fever / WBC    ENDOCRINE  - Monitor gluc  - ISS     LINES  - Rt IJ CVC (  7/18  )  - A line ( 7 /18  )  - Ayoub (7/18)  - PIV     DISPO: SICU       63F female w/ PMHx CAD s/p stents, CVA, COPD, PAD w/ prev LLE poss RLE stenting (Dr. Hart,  [facility]), 20 pack/year smoking history, EtOH use, and HTN presents with acute RLE ischemia. Patient s/p RLE angiogram with lysis catheter placement on 7/18. Course c/b new JACKI (Cr 6.49 on admission) and metabolic acidosis.       PLAN:  NEUROLOGIC   - Pain control: APAP and oxy PRN  - H/o EtOH use, monitor for withdrawal    RESPIRATORY   - Monitor SpO2 goal >92%  - Continue home Symbicort     CARDIOVASCULAR   - Maintain MAP >65  - Off Levo/pressors 7/20  - Q1 hour neurovascular checks  - Continue home Atorvastatin  - Hold home ASA/Plavix for now  - Hold home antihypertensives iso hypotension  - S/p RTOR for lytic catheter removal, RLE angio and percutaneous thrombectomy on 7/19    GASTROINTESTINAL   - Diet: Regular diet    /RENAL   - JACKI with HAGMA, nephrology consulted, improving  - No urgent indications for HD at this time  - LR @100/hr  - Maintain Ayoub catheter, strict Is/Os  - Monitor electrolytes, replete PRN    HEMATOLOGIC  - Monitor H/H  - Heparin gtt per Vascular Surgery restarted with a goal PTT of 58-99    INFECTIOUS DISEASE  - Meropenem 7/18 - 7/20  - Monitor fever / WBC    ENDOCRINE  - Monitor gluc  - ISS     LINES  - Rt IJ CVC (  7/18  )  - A line ( 7 /18  )  - Ayoub (7/18)  - PIV     DISPO: SICU

## 2023-07-20 NOTE — DIETITIAN INITIAL EVALUATION ADULT - NAME AND PHONE
Ashley Daniels, MS, RDN, CDN, CNSC on MS TEAMS or Pager #[phone]  Ashley Daniels, MS, RDN, CDN, CNSC on MS TEAMS or Pager #[phone]  Ashley Daniels, MS, RDN, CDN, CNSC on MS TEAMS or Pager #[phone]

## 2023-07-20 NOTE — DIETITIAN INITIAL EVALUATION ADULT - PERTINENT LABORATORY DATA
07-20    139  |  107  |  21  ----------------------------<  98  3.1<L>   |  21<L>  |  0.72    Ca    8.4      20 Jul 2023 03:14  Phos  1.4     07-20  Mg     2.40     07-20    POCT Blood Glucose.: 92 mg/dL (07-19-23 @ 17:13)

## 2023-07-20 NOTE — DISCHARGE NOTE PROVIDER - CARE PROVIDER_API CALL
Bannazadeh, Mohsen  Vascular Surgery  [address], [address] [address]  Phone: [phone]  Fax: [phone]  Follow Up Time: 2 weeks   Bannazadeh, Mohsen  Vascular Surgery  [address], [address] [address]  Phone: [phone]  Fax: [phone]  Follow Up Time: 2 weeks   Bannazadeh, Mohsen  Vascular Surgery  [address], [address] [address]  Phone: [phone]  Fax: [phone]  Follow Up Time: 2 weeks

## 2023-07-20 NOTE — DIETITIAN INITIAL EVALUATION ADULT - OTHER INFO
64 y/o female with hx PAD, HTN, stroke and COPD now presenting with RLE ischemia s/p RLE angiogram with percutaneous thrombectomy. Visited with pt to obtain nutrition hx. Pt said she "hates the food" and requested more of the ordered supplement. She explained that her oral intake is poor due to dislike of hospital meals "having no taste." Offered to take food preferences, however pt responded by saying, "What's the point? Even what I like will taste bad..." Offered commercial product items such as yogurt and ice cream, however pt declined. She stated, "Just bring me more of those drinks." Spoke with SICU Team and asked for more to be ordered for pt - to be honored. Pt denies food allergies, nausea/vomiting/diarrhea/constipation, or issues with chewing/swallowing; last BM 7/20. Her wt had been stable PTA. She had been followed by Nephrology earlier this admission for JACKI with elevated SCr and abnormal electrolytes which have now resolved. Attempted to educate pt on Low Sodium diet, however pt responded that she didn't need a salt restriction. Explained that she had medical conditions that warranted a Low Sodium restriction - pt said that she wanted to speak with her doctor and doesn't want a sodium restriction. Available to educate when more receptive. Encourage and monitor oral intake, especially of nutrition supplement. RDN services to remain available as needed.  62 y/o female with hx PAD, HTN, stroke and COPD now presenting with RLE ischemia s/p RLE angiogram with percutaneous thrombectomy. Visited with pt to obtain nutrition hx. Pt said she "hates the food" and requested more of the ordered supplement. She explained that her oral intake is poor due to dislike of hospital meals "having no taste." Offered to take food preferences, however pt responded by saying, "What's the point? Even what I like will taste bad..." Offered commercial product items such as yogurt and ice cream, however pt declined. She stated, "Just bring me more of those drinks." Spoke with SICU Team and asked for more to be ordered for pt - to be honored. Pt denies food allergies, nausea/vomiting/diarrhea/constipation, or issues with chewing/swallowing; last BM 7/20. Her wt had been stable PTA. She had been followed by Nephrology earlier this admission for JACKI with elevated SCr and abnormal electrolytes which have now resolved. Attempted to educate pt on Low Sodium diet, however pt responded that she didn't need a salt restriction. Explained that she had medical conditions that warranted a Low Sodium restriction - pt said that she wanted to speak with her doctor and doesn't want a sodium restriction. Available to educate when more receptive. Encourage and monitor oral intake, especially of nutrition supplement. RDN services to remain available as needed.

## 2023-07-20 NOTE — DISCHARGE NOTE PROVIDER - PROVIDER TOKENS
PROVIDER:[TOKEN:[[id number]],FOLLOWUP:[2 weeks]] PROVIDER:[TOKEN:[[id number]],FOLLOWUP:[2 weeks]] PROVIDER:[TOKEN:[[id number]],FOLLOWUP:[2 weeks]]

## 2023-07-20 NOTE — PROGRESS NOTE ADULT - SUBJECTIVE AND OBJECTIVE BOX
Vascular Surgery Daily Progress Note  =====================================================    SUBJECTIVE: Patient seen and examined at bedside on AM rounds. Patient reports that they're feeling well, complains of some pain in the heel.     --------------------------------------------------------------------------------------    MEDICATIONS:    Neurologic Medications  acetaminophen   IVPB .. 1000 milliGRAM(s) IV Intermittent every 6 hours  oxyCODONE    IR 5 milliGRAM(s) Oral every 4 hours PRN Moderate Pain (4 - 6)  oxyCODONE    IR 10 milliGRAM(s) Oral every 4 hours PRN Severe Pain (7 - 10)    Respiratory Medications  budesonide 160 MICROgram(s)/formoterol 4.5 MICROgram(s) Inhaler 2 Puff(s) Inhalation two times a day    Cardiovascular Medications  norepinephrine Infusion 0.05 MICROgram(s)/kG/Min IV Continuous <Continuous>    Gastrointestinal Medications  lactated ringers. 1000 milliLiter(s) IV Continuous <Continuous>  potassium phosphate IVPB 30 milliMole(s) IV Intermittent once    Hematologic/Oncologic Medications  heparin  Infusion 1100 Unit(s)/Hr IV Continuous <Continuous>    Endocrine/Metabolic Medications  atorvastatin 40 milliGRAM(s) Oral at bedtime    --------------------------------------------------------------------------------------    VITAL SIGNS:  T(C): 36.6 (07-20-23 @ 04:00), Max: 36.8 (07-20-23 @ 00:00)  HR: 68 (07-20-23 @ 07:00) (51 - 88)  BP: 94/56 (07-20-23 @ 07:00) (72/59 - 131/70)  RR: 24 (07-20-23 @ 07:00) (13 - 24)  SpO2: 95% (07-20-23 @ 07:00) (92% - 100%)  --------------------------------------------------------------------------------------    EXAM    General: NAD, resting in bed comfortably.  Cardiac: regular rate, warm and well perfused  Respiratory: Nonlabored respirations, normal cw expansion.  Abdomen: soft, nontender, nondistended.   Extremities: RLE AT/PT signals, warm. Left groin soft, NT    -------------------------------------------------------------------------------------- Vascular Surgery Daily Progress Note  =====================================================    SUBJECTIVE: Patient seen and examined at bedside on AM rounds. Patient reports that they're feeling well, complains of some pain in the right heel.     --------------------------------------------------------------------------------------    MEDICATIONS:    Neurologic Medications  acetaminophen   IVPB .. 1000 milliGRAM(s) IV Intermittent every 6 hours  oxyCODONE    IR 5 milliGRAM(s) Oral every 4 hours PRN Moderate Pain (4 - 6)  oxyCODONE    IR 10 milliGRAM(s) Oral every 4 hours PRN Severe Pain (7 - 10)    Respiratory Medications  budesonide 160 MICROgram(s)/formoterol 4.5 MICROgram(s) Inhaler 2 Puff(s) Inhalation two times a day    Cardiovascular Medications  norepinephrine Infusion 0.05 MICROgram(s)/kG/Min IV Continuous <Continuous>    Gastrointestinal Medications  lactated ringers. 1000 milliLiter(s) IV Continuous <Continuous>  potassium phosphate IVPB 30 milliMole(s) IV Intermittent once    Hematologic/Oncologic Medications  heparin  Infusion 1100 Unit(s)/Hr IV Continuous <Continuous>    Endocrine/Metabolic Medications  atorvastatin 40 milliGRAM(s) Oral at bedtime    --------------------------------------------------------------------------------------    VITAL SIGNS:  T(C): 36.6 (07-20-23 @ 04:00), Max: 36.8 (07-20-23 @ 00:00)  HR: 68 (07-20-23 @ 07:00) (51 - 88)  BP: 94/56 (07-20-23 @ 07:00) (72/59 - 131/70)  RR: 24 (07-20-23 @ 07:00) (13 - 24)  SpO2: 95% (07-20-23 @ 07:00) (92% - 100%)  --------------------------------------------------------------------------------------    EXAM    General: NAD, resting in bed comfortably.  Cardiac: regular rate, warm and well perfused  Respiratory: Nonlabored respirations, normal cw expansion.  Abdomen: soft, nontender, nondistended.   Extremities: RLE AT/PT signals, warm. Left groin soft, NT    --------------------------------------------------------------------------------------

## 2023-07-20 NOTE — PHYSICAL THERAPY INITIAL EVALUATION ADULT - PERTINENT HX OF CURRENT PROBLEM, REHAB EVAL
63F PMH CAD s/p stents, PAD s/p bilateral stents (Dr. Hart,  [facility]), CVA, COPD presenting with acute limb ischemia of RLE.

## 2023-07-20 NOTE — PROGRESS NOTE ADULT - SUBJECTIVE AND OBJECTIVE BOX
SICU Daily PM Progress Note  =====================================================    Interval Events:  - S/p RTOR for RLE angio and percutaneous thrombectomy, given 2u pRBC intraoperatively.  - Remains on Levo 0.11 from .14  - POCUS IVC 1.6 cm, MAP ~low 60s - 1L bolus  - Restarted hep gtt per vascular   with a goal PTT of 58-99with a goal PTT of 58-99      HISTORY  63F female w/ PMHx CAD s/p stents, CVA, COPD, PAD w/ prev LLE poss RLE stenting (Dr. Hart,  [facility]), 20 pack/year smoking history, and HTN. Presents with 1 week hx of worsening RLE pain - abruptly increasing over last 3 days. Worse in calf and foot. Also has felt some subjective tingling of the foot. VSS. Labs showing JACKI with Cr at 6 and HCO3 at 10. No known CKD per pt and daughter. 2+ palp L fem, R fem 1+, L pop 2+. dopplerable LLE pedal signals. RLE pop and foot no palp pulses or signals. Motor R foot 4/5 with diminished sensation compared to left. Unable to obtain CTA due to JACKI. Nephro consulted -hydrated and started on bicarb infusion. Recent hx of melena but none in the past few weeks. Started on therapeutic hep gtt. Acute on chronic limb ischemia with evidence of motor and sensory deficits. Patient taken emergently to OR for RLE angiogram with lysis catheter placement on 7/18. SICU consulted for hemodynamic monitoring and postop management.    MEDICATIONS  (STANDING):  atorvastatin 40 milliGRAM(s) Oral at bedtime  budesonide 160 MICROgram(s)/formoterol 4.5 MICROgram(s) Inhaler 2 Puff(s) Inhalation two times a day  heparin  Infusion 1100 Unit(s)/Hr (5 mL/Hr) IV Continuous <Continuous>  lactated ringers. 1000 milliLiter(s) (100 mL/Hr) IV Continuous <Continuous>  meropenem  IVPB 500 milliGRAM(s) IV Intermittent every 24 hours  norepinephrine Infusion 0.05 MICROgram(s)/kG/Min (5.63 mL/Hr) IV Continuous <Continuous>  potassium chloride  10 mEq/100 mL IVPB 10 milliEquivalent(s) IV Intermittent every 1 hour    MEDICATIONS  (PRN):  oxyCODONE    IR 5 milliGRAM(s) Oral every 4 hours PRN Moderate Pain (4 - 6)  oxyCODONE    IR 10 milliGRAM(s) Oral every 4 hours PRN Severe Pain (7 - 10)    ICU Vital Signs Last 24 Hrs  T(C): 36.3 (19 Jul 2023 16:00), Max: 36.5 (19 Jul 2023 04:00)  T(F): 97.4 (19 Jul 2023 16:00), Max: 97.7 (19 Jul 2023 04:00)  HR: 75 (19 Jul 2023 22:34) (51 - 75)  BP: 109/46 (19 Jul 2023 10:41) (84/45 - 116/96)  BP(mean): 58 (19 Jul 2023 08:00) (58 - 105)  ABP: 139/57 (19 Jul 2023 22:00) (85/41 - 139/57)  ABP(mean): 80 (19 Jul 2023 22:00) (55 - 80)  RR: 14 (19 Jul 2023 22:00) (13 - 27)  SpO2: 100% (19 Jul 2023 22:34) (92% - 100%)    O2 Parameters below as of 19 Jul 2023 22:34  Patient On (Oxygen Delivery Method): room air      I&O's Detail    18 Jul 2023 07:01  -  19 Jul 2023 07:00  --------------------------------------------------------  IN:    Alteplase: 230 mL    Heparin: 149 mL    IV PiggyBack: 1400 mL    Lactated Ringers: 2100 mL    Lactated Ringers Bolus: 2250 mL    Norepinephrine: 316 mL    Oral Fluid: 1200 mL    Sodium Bicarbonate: 300 mL  Total IN: 7945 mL    OUT:    Indwelling Catheter - Urethral (mL): 3310 mL  Total OUT: 3310 mL    Total NET: 4635 mL      19 Jul 2023 07:01  -  20 Jul 2023 00:30  --------------------------------------------------------  IN:    Heparin: 15 mL    Heparin: 10 mL    IV PiggyBack: 850 mL    Lactated Ringers: 1200 mL    Lactated Ringers Bolus: 1500 mL    Norepinephrine: 246.1 mL    Oral Fluid: 240 mL  Total IN: 4061.1 mL    OUT:    Indwelling Catheter - Urethral (mL): 2350 mL  Total OUT: 2350 mL    Total NET: 1711.1 mL        EXAM  NEUROLOGY  RASS:   	GCS:    Exam: Normal, NAD, alert, oriented x 3, no focal deficits.     HEENT  Exam: Normocephalic, atraumatic.  EOMI     RESPIRATORY  Exam: Lungs clear to auscultation, Normal expansion/effort.    Mechanical Ventilation:     CARDIOVASCULAR  Exam: S1, S2.  Regular rate and rhythm.     GI/NUTRITION  Exam: Abdomen soft, Non-tender, Non-distended.  Gastrostomy / Jejunostomy / Nasogastric tube in place.  Colostomy / Ileostomy.    Wound:    Current Diet:  NPO    VASCULAR  Exam: Motor and sensation function intact b/l  No palpable pulses in RLE  LLE with good PT and perineal signal     MUSCULOSKELETAL  Exam: All extremities moving spontaneously without limitations.      SKIN:  Exam: Good skin turgor, no skin breakdown.        --------------------------------------------------------------------------------------  Labs:  CBC (07-19 @ 21:20)                              8.7<L>                         11.70<H>  )----------------(  118<L>     --    % Neutrophils, --    % Lymphocytes, ANC: --                                  24.9<L>  CBC (07-19 @ 15:05)                              9.0<L>                         11.68<H>  )----------------(  116<L>     --    % Neutrophils, --    % Lymphocytes, ANC: --                                  25.5<L>    BMP (07-19 @ 21:20)             138     |  105     |  28<H> 		Ca++ --      Ca 7.8<L>             ---------------------------------( 131<H>		Mg 2.90<H>             3.0<L>  |  19<L>   |  0.92  			Ph 1.7<L>  BMP (07-19 @ 15:05)             138     |  108<H>  |  36<H> 		Ca++ --      Ca 7.3<L>             ---------------------------------( 120<H>		Mg 1.60               3.1<L>  |  18<L>   |  1.07  			Ph 2.2<L>      Coags (07-19 @ 21:20)  aPTT 28.4 / INR 1.03 / PT 12.0  Coags (07-19 @ 15:05)  aPTT 57.5<H> / INR 1.15 / PT 13.4      ABG (07-19 @ 21:20)     7.44 / 30<L> / 71<L> / 20<L> / -3.1<L> / 97.2%     Lactate:    ABG (07-19 @ 15:05)     7.39 / 32 / 85 / 19<L> / -4.9<L> / 98.1<H>%     Lactate:

## 2023-07-20 NOTE — DISCHARGE NOTE PROVIDER - NSDCCPTREATMENT_GEN_ALL_CORE_FT
PRINCIPAL PROCEDURE  Procedure: Replacement, catheter, for thrombolysis  Findings and Treatment:       SECONDARY PROCEDURE  Procedure: Creation of bypass from femoral artery to distal tibial artery  Findings and Treatment:      PRINCIPAL PROCEDURE  Procedure: Creation of bypass from femoral artery to distal tibial artery  Findings and Treatment:       SECONDARY PROCEDURE  Procedure: Replacement, catheter, for thrombolysis  Findings and Treatment:     Procedure: Creation of bypass from femoral artery to distal tibial artery  Findings and Treatment:

## 2023-07-20 NOTE — PROGRESS NOTE ADULT - SUBJECTIVE AND OBJECTIVE BOX
POST ANESTHESIA EVALUATION    63y Female POSTOP DAY 1 S/P RLE angio and embolization    Vital Signs Last 24 Hrs  T(C): 36.6 (20 Jul 2023 08:00), Max: 36.8 (20 Jul 2023 00:00)  T(F): 97.8 (20 Jul 2023 08:00), Max: 98.3 (20 Jul 2023 00:00)  HR: 64 (20 Jul 2023 11:00) (51 - 88)  BP: 116/55 (20 Jul 2023 11:00) (72/59 - 131/70)  BP(mean): 74 (20 Jul 2023 11:00) (54 - 101)  RR: 20 (20 Jul 2023 11:00) (13 - 24)  SpO2: 97% (20 Jul 2023 11:00) (92% - 100%)    Parameters below as of 20 Jul 2023 09:03  Patient On (Oxygen Delivery Method): room air      I&O's Summary    19 Jul 2023 07:01  -  20 Jul 2023 07:00  --------------------------------------------------------  IN: 5260.2 mL / OUT: 3775 mL / NET: 1485.2 mL    20 Jul 2023 07:01  -  20 Jul 2023 11:51  --------------------------------------------------------  IN: 106 mL / OUT: 100 mL / NET: 6 mL        AIRWAY PATENCY:   natural airway    MENTAL STATUS: awake    NAUSEA/ VOMITTING:  [x ] NONE  [ ] CONTROLLED [ ] OTHER     PAIN: [ ] CONTROLLED WITH CURRENT REGIMEN  [ ] OTHER    [x ] NO APPARENT ANESTHESIA COMPLICATIONS      Comments:

## 2023-07-20 NOTE — PROGRESS NOTE ADULT - PROBLEM SELECTOR PLAN 2
Pt with high anion gap metabolic acidosis in setting of acute renal failure. SCO2 of 11 on admission (7/17) improved to 21 today (7/20). Agree with LR IVFs. Monitor SCO2.    Signing off. Please reconsult as needed.  If you have any questions, please feel free to contact me.  Juan Cedillo  Nephrology Fellow  [id number] / [phone] / [employer] Teams (Preferred)  (After 4pm or on weekends, please call the on-call Fellow) Pt with high anion gap metabolic acidosis in setting of acute renal failure. SCO2 of 11 on admission (7/17) improved to 21 today (7/20). Agree with LR IVFs. Monitor SCO2.    Signing off. Please reconsult as needed.  If you have any questions, please feel free to contact me.  Juan Cedillo  Nephrology Fellow  [id number] / [phone] / [employer] Teams (Preferred)  (After 4pm or on weekends, please call the on-call Fellow) Pt with high anion gap metabolic acidosis in setting of acute renal failure. SCO2 of 11 on admission (7/17) improved to 21 today (7/20). Agree with LR IVFs. Monitor SCO2.    Signing off. Please reconsult as needed.  If you have any questions, please feel free to contact me.  Juan Cedillo  Nephrology Fellow  [id number] / [phone] / [employer] Teams (Preferred)  (After 4pm or on weekends, please call the on-call Fellow)

## 2023-07-20 NOTE — PHYSICAL THERAPY INITIAL EVALUATION ADULT - ADDITIONAL COMMENTS
Pt reports that she lives in a private h ouse Pt reports that she lives in a private house with her daughter and son in law with 8 steps to enter; (+) bilateral handrails; bedroom/bathroom is on the first floor. Prior to hospital admission pt was completely independent and used no assistive device with ambulation. She does have a script for a cane. Pt denies any recent falls.    Pt left comfortable seated in chair, NAD, all lines intact, all precautions maintained, with call bell in reach, and RN aware of PT evaluation.

## 2023-07-20 NOTE — CHART NOTE - NSCHARTNOTEFT_GEN_A_CORE
Patient transferred from SICU to floor.     Sign out given to resident/ PA. All questions and concerns addressed.     [id number] Patient transferred from SICU to floor.     Sign out given to resident/ PA. All questions and concerns addressed.     [id number] Patient transferred from SICU to floor.     Sign out given to resident/ PA. All questions and concerns addressed.     [id number]

## 2023-07-20 NOTE — PHYSICAL THERAPY INITIAL EVALUATION ADULT - DIAGNOSIS, PT EVAL
Pt s/p Right lower extremity angiogram with percutaneous thrombectomy of the superficial femoral and popliteal arteries. RLE stent from SFA to distal popliteal artery as well as peroneal artery; pt presents with decreased strength and decreased balance.

## 2023-07-20 NOTE — PHYSICAL THERAPY INITIAL EVALUATION ADULT - PATIENT PROFILE REVIEW, REHAB EVAL
No Formal Activity Order in the Computer; spoke with CARTER Ellsworth prior to PT evaluation--> Pt OK for PT consult/OOB activity./yes No Formal Activity Order in the Computer; spoke with CARTER Ellsworth prior to PT evaluation--> Pt OK for PT consult/OOB activity; vitals taken prior; /64mmHg, heart rate 71bpm/yes

## 2023-07-20 NOTE — PROGRESS NOTE ADULT - PROBLEM SELECTOR PLAN 1
Pt with JACKI in the setting of recent NSAIDs use and hypotension. Exact duration of JACKI however unknown. Scr on admission (7/17) elevated at 6.49. UA with 100 of protein. UOP noted to be 3.7L in last 24 hours. Complements, C-ANCA, P-ANCA, Hep B, HIV all negative. Agree with LR fluids. Scr now WNL at 0.72 today (7/20). Monitor labs and urine output. Avoid NSAIDs and nephrotoxins. Dose medications as per eGFR.
Pt with JACKI in the setting of recent NSAIDs use and hypotension. Exact duration of JACKI however unknown. Scr on admission (7/17) elevated at 6.49. UA with 100 of protein. UOP noted to be 3.3L in last 24 hours. Complements, C-ANCA, P-ANCA, Hep B, HIV all negative. Agree with LR fluids. Scr continues to improve to 1.58 today (7/19). Monitor labs and urine output. Avoid NSAIDs and nephrotoxins. Dose medications as per eGFR.

## 2023-07-20 NOTE — DISCHARGE NOTE PROVIDER - NSDCFUADDINST_GEN_ALL_CORE_FT
WOUND CARE:  Please keep incisions clean and dry. Please do not Scrub or rub incisions. Do not use lotion or powder on incisions.   BATHING: You may shower and/or sponge bathe. You may use warm soapy water in the shower and rinse, pat dry.  ACTIVITY: No heavy lifting or straining. Otherwise, you may return to your usual level of physical activity. If you are taking narcotic pain medication DO NOT drive a car, operate machinery or make important decisions.  DIET: Return to your usual diet.  NOTIFY YOUR SURGEON IF YOU HAVE: any bleeding that does not stop, any pus draining from your wound(s), any fever (over 100.4 F) persistent nausea/vomiting, or if your pain is not controlled on your discharge pain medications, unable to urinate.  Please follow up with your primary care physician in one week regarding your hospitalization, bring copies of your discharge paperwork.  Please follow up with your surgeon, Dr. Lawrence

## 2023-07-20 NOTE — PHYSICAL THERAPY INITIAL EVALUATION ADULT - GENERAL OBSERVATIONS, REHAB EVAL
Pt encountered in semisupine position, no distress, AxOx4, with +IV, +tele, and +pulse oximeter. Pt agreeable to participate in PT evaluation.

## 2023-07-20 NOTE — DISCHARGE NOTE PROVIDER - NSDCACTIVITY_GEN_ALL_CORE
Follow Instructions Provided by your Surgical Team Walking - Indoors allowed/Walking - Outdoors allowed/Follow Instructions Provided by your Surgical Team

## 2023-07-20 NOTE — PHYSICAL THERAPY INITIAL EVALUATION ADULT - MANUAL MUSCLE TESTING RESULTS, REHAB EVAL
surgical precautions; bilateral upper extremities and left lower extremity at least 3+/5, right lower extremity 3/5/grossly assessed due to

## 2023-07-20 NOTE — DIETITIAN INITIAL EVALUATION ADULT - FACTORS AFF FOOD INTAKE
Zoroastrian/ethnic/cultural/personal food preferences Jew/ethnic/cultural/personal food preferences Orthodoxy/ethnic/cultural/personal food preferences

## 2023-07-20 NOTE — DISCHARGE NOTE PROVIDER - NSDCMRMEDTOKEN_GEN_ALL_CORE_FT
Advair Diskus 250 mcg-50 mcg inhalation powder: 1 inhaled 2 times a day  Albuterol (Eqv-ProAir HFA) 90 mcg/inh inhalation aerosol: 2 inhaled every 6 hours as needed for  shortness of breath and/or wheezing  alendronate 70 mg oral tablet: 1 orally once a week  amLODIPine 10 mg oral tablet: 1 orally once a day  aspirin 81 mg oral tablet: 1 orally once a day  atenolol-chlorthalidone 50 mg-25 mg oral tablet: 1 orally once a day  atorvastatin 40 mg oral tablet: 1 orally once a day  clopidogrel 75 mg oral tablet: 1 orally once a day  losartan 100 mg oral tablet: 1 orally once a day  Pletal 100 mg oral tablet: 1 orally 2 times a day   Advair Diskus 250 mcg-50 mcg inhalation powder: 1 inhaled 2 times a day  Albuterol (Eqv-ProAir HFA) 90 mcg/inh inhalation aerosol: 2 inhaled every 6 hours as needed for  shortness of breath and/or wheezing  alendronate 70 mg oral tablet: 1 orally once a week  amLODIPine 10 mg oral tablet: 1 orally once a day  aspirin 81 mg oral tablet: 1 orally once a day  atenolol-chlorthalidone 50 mg-25 mg oral tablet: 1 orally once a day  atorvastatin 40 mg oral tablet: 1 orally once a day  clopidogrel 75 mg oral tablet: 1 orally once a day  Eliquis 5 mg oral tablet: 1 tab(s) orally 2 times a day  losartan 100 mg oral tablet: 1 orally once a day  Pletal 100 mg oral tablet: 1 orally 2 times a day   acetaminophen 500 mg oral tablet: 2 tab(s) orally every 6 hours  Advair Diskus 250 mcg-50 mcg inhalation powder: 1 inhaled 2 times a day  Albuterol (Eqv-ProAir HFA) 90 mcg/inh inhalation aerosol: 2 inhaled every 6 hours as needed for  shortness of breath and/or wheezing  alendronate 70 mg oral tablet: 1 orally once a week  amLODIPine 10 mg oral tablet: 1 orally once a day  aspirin 81 mg oral tablet: 1 orally once a day  atenolol-chlorthalidone 50 mg-25 mg oral tablet: 1 orally once a day  atorvastatin 40 mg oral tablet: 1 orally once a day  clopidogrel 75 mg oral tablet: 1 orally once a day  losartan 100 mg oral tablet: 1 orally once a day  Pletal 100 mg oral tablet: 1 orally 2 times a day   acetaminophen 500 mg oral tablet: 2 tab(s) orally every 6 hours  Advair Diskus 250 mcg-50 mcg inhalation powder: 1 inhaled 2 times a day  Albuterol (Eqv-ProAir HFA) 90 mcg/inh inhalation aerosol: 2 inhaled every 6 hours as needed for  shortness of breath and/or wheezing  alendronate 70 mg oral tablet: 1 orally once a week  amLODIPine 10 mg oral tablet: 1 orally once a day  apixaban 5 mg oral tablet: 2 tab(s) orally 2 times a day  apixaban 5 mg oral tablet: 1 tab(s) orally 2 times a day  aspirin 81 mg oral tablet: 1 orally once a day  atenolol-chlorthalidone 50 mg-25 mg oral tablet: 1 orally once a day  atorvastatin 40 mg oral tablet: 1 orally once a day  bisacodyl 5 mg oral delayed release tablet: 1 tab(s) orally every 12 hours As needed Constipation  losartan 100 mg oral tablet: 1 orally once a day

## 2023-07-20 NOTE — DIETITIAN INITIAL EVALUATION ADULT - NS FNS DIET ORDER
Diet, Regular:   Low Sodium  Supplement Feeding Modality:  Oral  Ensure Surgery Cans or Servings Per Day:  3       Frequency:  Three Times a day (07-20-23 @ 12:54)

## 2023-07-20 NOTE — DISCHARGE NOTE PROVIDER - NSDCCPCAREPLAN_GEN_ALL_CORE_FT
PRINCIPAL DISCHARGE DIAGNOSIS  Diagnosis: Acute lower limb ischemia  Assessment and Plan of Treatment:      PRINCIPAL DISCHARGE DIAGNOSIS  Diagnosis: Acute lower limb ischemia  Assessment and Plan of Treatment: s/p Fem - Tibial bypass

## 2023-07-20 NOTE — DIETITIAN INITIAL EVALUATION ADULT - PERTINENT MEDS FT
MEDICATIONS  (STANDING):  acetaminophen   IVPB .. 1000 milliGRAM(s) IV Intermittent every 6 hours  atorvastatin 40 milliGRAM(s) Oral at bedtime  budesonide 160 MICROgram(s)/formoterol 4.5 MICROgram(s) Inhaler 2 Puff(s) Inhalation two times a day  heparin  Infusion 1100 Unit(s)/Hr (6 mL/Hr) IV Continuous <Continuous>  norepinephrine Infusion 0.05 MICROgram(s)/kG/Min (5.63 mL/Hr) IV Continuous <Continuous>    MEDICATIONS  (PRN):  oxyCODONE    IR 5 milliGRAM(s) Oral every 4 hours PRN Moderate Pain (4 - 6)  oxyCODONE    IR 10 milliGRAM(s) Oral every 4 hours PRN Severe Pain (7 - 10)

## 2023-07-20 NOTE — PHYSICAL THERAPY INITIAL EVALUATION ADULT - DID THE PATIENT HAVE SURGERY?
s/p Right lower extremity angiogram with percutaneous thrombectomy of the superficial femoral and popliteal arteries. RLE stent from SFA to distal popliteal artery as well as peroneal artery./yes

## 2023-07-20 NOTE — PROGRESS NOTE ADULT - ATTENDING COMMENTS
I agree with the detailed interval history, physical, and plan, which I have reviewed and edited where appropriate'; also agree with notes/assessment with my team on service.  I have personally examined the patient.  I was physically present for the key portions of the evaluation and management (E/M) service provided.  I reviewed all the pertinent data.  The patient is a critical care patient with life threatening hemodynamic and metabolic instability in SICU.  The SICU team has a constant risk benefit analyzes discussion and coordinating care with the primary team and all consultants.   The patient is in SICU with the chief complaint and diagnosis mentioned in the note.   The plan will be specified in the note.  63F female with acute RLE ischemia, s/p RLE angiogram with lysis catheter in SICU.  EXAM  NEUROLOGY  Exam: no focal deficits.   RESPIRATORY  Exam: Lungs clear   CARDIOVASCULAR  Exam: Regular rate  GI/NUTRITION  Exam: Abdomen soft  PLAN:  NEUROLOGIC   -APAP   -oxy PRN  RESPIRATORY   - Symbicort   CARDIOVASCULAR   - wean Levo   GASTROINTESTINAL   - Regular diet  /RENAL   - LR @100/hr  HEMATOLOGIC  - Heparin   INFECTIOUS DISEASE  - Meropenem   ENDOCRINE  - Monitor glucose  DISPO: SICU
I agree with the detailed interval history, physical, and plan, which I have reviewed and edited where appropriate'; also agree with notes/assessment with my team on service.  I have personally examined the patient.  I was physically present for the key portions of the evaluation and management (E/M) service provided.  I reviewed all the pertinent data.  The patient is a critical care patient with life threatening hemodynamic and metabolic instability in SICU.  The SICU team has a constant risk benefit analyzes discussion and coordinating care with the primary team and all consultants.   The patient is in SICU with the chief complaint and diagnosis mentioned in the note.   The plan will be specified in the note.  63F female with acute RLE ischemia, s/p RLE angiogram with lysis catheter in SICU.   EXAM  NEUROLOGY  Exam: no focal deficits.   RESPIRATORY  Exam: Lungs clear   CARDIOVASCULAR  Exam: Regular rate  GI/NUTRITION  Exam: Abdomen soft  PLAN:  NEUROLOGIC   -APAP and oxy PRN  RESPIRATORY   - Symbicort   CARDIOVASCULAR   - wean Levo   GASTROINTESTINAL   - Diet: Regular diet  /RENAL   - LR @100/hr  HEMATOLOGIC  - Heparin gtt per Vascular Surgery  INFECTIOUS DISEASE  - Meropenem   ENDOCRINE  - Monitor glucose    DISPO: SICU
I agree with the detailed interval history, physical, and plan, which I have reviewed and edited where appropriate'; also agree with notes/assessment with my team on service.  I have personally examined the patient.  I was physically present for the key portions of the evaluation and management (E/M) service provided.  I reviewed all the pertinent data.  The patient is a critical care patient with life threatening hemodynamic and metabolic instability in SICU.  The SICU team has a constant risk benefit analyzes discussion and coordinating care with the primary team and all consultants.   The patient is in SICU with the chief complaint and diagnosis mentioned in the note.   The plan will be specified in the note.  63F female s/p RLE angiogram with lysis catheter placement with JACKI and metabolic acidosis in SICU on heparin and tPA.   EXAM  NEUROLOGY  Exam: no focal deficits.   RESPIRATORY  Exam: Lungs clear   CARDIOVASCULAR  Exam: Regular rate   GI/NUTRITION  Exam: Abdomen soft, Non-tender  VASCULAR  Exam: Motor and sensation function intact b/l  No palpable pulses in RLE  LLE with good PT and perineal signal      PLAN:  NEUROLOGIC   - APAP and oxy PRN  RESPIRATORY   - Symbicort   CARDIOVASCULAR   - Maintain MAP >65  - Wean Levo gtt   - Hold home ASA/Plavix for now  GASTROINTESTINAL   - Diet: NPO  /RENAL   - LR @100/hr  HEMATOLOGIC  - Therapeutic heparin, TPA through lysis catheter   INFECTIOUS DISEASE  - Meropenem   ENDOCRINE  - Monitor glucose  DISPO: SICU
JACKI  Metabolic Acidosis    Cr better now, resolved JACKI    Will sign off, please call with questions
JACKI  Metabolic Acidosis  Hypophosphatemia    Please cont IVF and replete lytes, monitor labs and Is/Os

## 2023-07-20 NOTE — PROGRESS NOTE ADULT - SUBJECTIVE AND OBJECTIVE BOX
[facility] Division of Kidney Diseases & Hypertension  FOLLOW UP NOTE  [phone]--------------------------------------------------------------------------------    HPI: 64 yo F with significant vascular history, HLD, HTN who presented to [facility] due to worsening RLE pain. SCr on admission of 6.49. Nephrology consulted for acute renal failure. No prior labs for review per [facility]IMAGAL[facility]. Pt denied hx of renal failure in the past.    24 hour events/subjective: Pt seen and examined at bedside in the SICU. Underwent lower extremity angiogram with percutaneous thrombectomy on 7/19. Feels well. She denies fevers/chills, headaches, chest pain, shortness of breath, or abd pain. Still has some mild lower extremity pain.    PAST HISTORY  --------------------------------------------------------------------------------  No significant changes to PMH, PSH, FHx, SHx, unless otherwise noted    ALLERGIES & MEDICATIONS  --------------------------------------------------------------------------------  Allergies  No Known Allergies    Intolerances    Standing Inpatient Medications  acetaminophen   IVPB .. 1000 milliGRAM(s) IV Intermittent every 6 hours  atorvastatin 40 milliGRAM(s) Oral at bedtime  budesonide 160 MICROgram(s)/formoterol 4.5 MICROgram(s) Inhaler 2 Puff(s) Inhalation two times a day  heparin  Infusion 1100 Unit(s)/Hr IV Continuous <Continuous>  lactated ringers. 1000 milliLiter(s) IV Continuous <Continuous>  norepinephrine Infusion 0.05 MICROgram(s)/kG/Min IV Continuous <Continuous>  potassium phosphate IVPB 30 milliMole(s) IV Intermittent once    PRN Inpatient Medications  oxyCODONE    IR 5 milliGRAM(s) Oral every 4 hours PRN  oxyCODONE    IR 10 milliGRAM(s) Oral every 4 hours PRN    REVIEW OF SYSTEMS  --------------------------------------------------------------------------------  Gen: No fevers/chills  Head/Eyes/Ears: No HA  Respiratory: No dyspnea, cough  CV: No chest pain  GI: No abdominal pain, diarrhea  : No dysuria, hematuria  MSK: per HPI   Skin: No rashes  Heme: No easy bruising or bleeding      All other systems were reviewed and are negative, except as noted.    VITALS/PHYSICAL EXAM  --------------------------------------------------------------------------------  T(C): 36.6 (07-20-23 @ 04:00), Max: 36.8 (07-20-23 @ 00:00)  HR: 68 (07-20-23 @ 07:00) (51 - 88)  BP: 94/56 (07-20-23 @ 07:00) (72/59 - 131/70)  RR: 24 (07-20-23 @ 07:00) (13 - 24)  SpO2: 95% (07-20-23 @ 07:00) (92% - 100%)  Wt(kg): --  Height (cm): 160 (07-19-23 @ 07:48)    07-19-23 @ 07:01  -  07-20-23 @ 07:00  --------------------------------------------------------  IN: 5260.2 mL / OUT: 3775 mL / NET: 1485.2 mL    Physical Exam:  Gen: NAD  HEENT: Anicteric  Pulm: CTA B/L  CV: S1S2+  Abd: Soft, +BS    Ext: No LE edema B/L, mild pain in extremities  Neuro: Awake  Skin: Warm and dry    LABS/STUDIES  --------------------------------------------------------------------------------              8.3    11.74 >-----------<  124      [07-20-23 @ 03:14]              23.3     139  |  107  |  21  ----------------------------<  98      [07-20-23 @ 03:14]  3.1   |  21  |  0.72        Ca     8.4     [07-20-23 @ 03:14]      Mg     2.40     [07-20-23 @ 03:14]      Phos  1.4     [07-20-23 @ 03:14]    Creatinine Trend:  SCr 0.72 [07-20 @ 03:14]  SCr 0.92 [07-19 @ 21:20]  SCr 1.07 [07-19 @ 15:05]  SCr 1.58 [07-19 @ 08:50]  SCr 2.12 [07-19 @ 02:27]    Urine Protein 29      [07-18-23 @ 14:10]  Urine Sodium 97      [07-18-23 @ 11:03]  Urine Urea Nitrogen 243.5      [07-18-23 @ 11:03]  Urine Potassium 24.0      [07-18-23 @ 11:03]  Urine Osmolality 342      [07-18-23 @ 11:03]    HBsAg Nonreact      [07-18-23 @ 14:34]  HIV Nonreact      [07-18-23 @ 14:10]    BENNIE: titer Negative, pattern --      [07-18-23 @ 14:34]  C3 Complement 125      [07-18-23 @ 14:34]  C4 Complement 38      [07-18-23 @ 14:34]  ANCA: cANCA Negative, pANCA Negative, atypical ANCA Indeterminate Method interference due to BENNIE fluorescence      [07-18-23 @ 14:34] [facility] Division of Kidney Diseases & Hypertension  FOLLOW UP NOTE  [phone]--------------------------------------------------------------------------------    HPI: 62 yo F with significant vascular history, HLD, HTN who presented to [facility] due to worsening RLE pain. SCr on admission of 6.49. Nephrology consulted for acute renal failure. No prior labs for review per [facility]. Pt denied hx of renal failure in the past.    24 hour events/subjective: Pt seen and examined at bedside in the SICU. Underwent lower extremity angiogram with percutaneous thrombectomy on 7/19. Feels well. She denies fevers/chills, headaches, chest pain, shortness of breath, or abd pain. Still has some mild lower extremity pain.    PAST HISTORY  --------------------------------------------------------------------------------  No significant changes to PMH, PSH, FHx, SHx, unless otherwise noted    ALLERGIES & MEDICATIONS  --------------------------------------------------------------------------------  Allergies  No Known Allergies    Intolerances    Standing Inpatient Medications  acetaminophen   IVPB .. 1000 milliGRAM(s) IV Intermittent every 6 hours  atorvastatin 40 milliGRAM(s) Oral at bedtime  budesonide 160 MICROgram(s)/formoterol 4.5 MICROgram(s) Inhaler 2 Puff(s) Inhalation two times a day  heparin  Infusion 1100 Unit(s)/Hr IV Continuous <Continuous>  lactated ringers. 1000 milliLiter(s) IV Continuous <Continuous>  norepinephrine Infusion 0.05 MICROgram(s)/kG/Min IV Continuous <Continuous>  potassium phosphate IVPB 30 milliMole(s) IV Intermittent once    PRN Inpatient Medications  oxyCODONE    IR 5 milliGRAM(s) Oral every 4 hours PRN  oxyCODONE    IR 10 milliGRAM(s) Oral every 4 hours PRN    REVIEW OF SYSTEMS  --------------------------------------------------------------------------------  Gen: No fevers/chills  Head/Eyes/Ears: No HA  Respiratory: No dyspnea, cough  CV: No chest pain  GI: No abdominal pain, diarrhea  : No dysuria, hematuria  MSK: per HPI   Skin: No rashes  Heme: No easy bruising or bleeding      All other systems were reviewed and are negative, except as noted.    VITALS/PHYSICAL EXAM  --------------------------------------------------------------------------------  T(C): 36.6 (07-20-23 @ 04:00), Max: 36.8 (07-20-23 @ 00:00)  HR: 68 (07-20-23 @ 07:00) (51 - 88)  BP: 94/56 (07-20-23 @ 07:00) (72/59 - 131/70)  RR: 24 (07-20-23 @ 07:00) (13 - 24)  SpO2: 95% (07-20-23 @ 07:00) (92% - 100%)  Wt(kg): --  Height (cm): 160 (07-19-23 @ 07:48)    07-19-23 @ 07:01  -  07-20-23 @ 07:00  --------------------------------------------------------  IN: 5260.2 mL / OUT: 3775 mL / NET: 1485.2 mL    Physical Exam:  Gen: NAD  HEENT: Anicteric  Pulm: CTA B/L  CV: S1S2+  Abd: Soft, +BS    Ext: No LE edema B/L, mild pain in extremities  Neuro: Awake  Skin: Warm and dry    LABS/STUDIES  --------------------------------------------------------------------------------              8.3    11.74 >-----------<  124      [07-20-23 @ 03:14]              23.3     139  |  107  |  21  ----------------------------<  98      [07-20-23 @ 03:14]  3.1   |  21  |  0.72        Ca     8.4     [07-20-23 @ 03:14]      Mg     2.40     [07-20-23 @ 03:14]      Phos  1.4     [07-20-23 @ 03:14]    Creatinine Trend:  SCr 0.72 [07-20 @ 03:14]  SCr 0.92 [07-19 @ 21:20]  SCr 1.07 [07-19 @ 15:05]  SCr 1.58 [07-19 @ 08:50]  SCr 2.12 [07-19 @ 02:27]    Urine Protein 29      [07-18-23 @ 14:10]  Urine Sodium 97      [07-18-23 @ 11:03]  Urine Urea Nitrogen 243.5      [07-18-23 @ 11:03]  Urine Potassium 24.0      [07-18-23 @ 11:03]  Urine Osmolality 342      [07-18-23 @ 11:03]    HBsAg Nonreact      [07-18-23 @ 14:34]  HIV Nonreact      [07-18-23 @ 14:10]    BENNIE: titer Negative, pattern --      [07-18-23 @ 14:34]  C3 Complement 125      [07-18-23 @ 14:34]  C4 Complement 38      [07-18-23 @ 14:34]  ANCA: cANCA Negative, pANCA Negative, atypical ANCA Indeterminate Method interference due to BENNIE fluorescence      [07-18-23 @ 14:34] [facility] Division of Kidney Diseases & Hypertension  FOLLOW UP NOTE  [phone]--------------------------------------------------------------------------------    HPI: 62 yo F with significant vascular history, HLD, HTN who presented to [facility] due to worsening RLE pain. SCr on admission of 6.49. Nephrology consulted for acute renal failure. No prior labs for review per [facility]AG[facility]. Pt denied hx of renal failure in the past.    24 hour events/subjective: Pt seen and examined at bedside in the SICU. Underwent lower extremity angiogram with percutaneous thrombectomy on 7/19. Feels well. She denies fevers/chills, headaches, chest pain, shortness of breath, or abd pain. Still has some mild lower extremity pain.    PAST HISTORY  --------------------------------------------------------------------------------  No significant changes to PMH, PSH, FHx, SHx, unless otherwise noted    ALLERGIES & MEDICATIONS  --------------------------------------------------------------------------------  Allergies  No Known Allergies    Intolerances    Standing Inpatient Medications  acetaminophen   IVPB .. 1000 milliGRAM(s) IV Intermittent every 6 hours  atorvastatin 40 milliGRAM(s) Oral at bedtime  budesonide 160 MICROgram(s)/formoterol 4.5 MICROgram(s) Inhaler 2 Puff(s) Inhalation two times a day  heparin  Infusion 1100 Unit(s)/Hr IV Continuous <Continuous>  lactated ringers. 1000 milliLiter(s) IV Continuous <Continuous>  norepinephrine Infusion 0.05 MICROgram(s)/kG/Min IV Continuous <Continuous>  potassium phosphate IVPB 30 milliMole(s) IV Intermittent once    PRN Inpatient Medications  oxyCODONE    IR 5 milliGRAM(s) Oral every 4 hours PRN  oxyCODONE    IR 10 milliGRAM(s) Oral every 4 hours PRN    REVIEW OF SYSTEMS  --------------------------------------------------------------------------------  Gen: No fevers/chills  Head/Eyes/Ears: No HA  Respiratory: No dyspnea, cough  CV: No chest pain  GI: No abdominal pain, diarrhea  : No dysuria, hematuria  MSK: per HPI   Skin: No rashes  Heme: No easy bruising or bleeding      All other systems were reviewed and are negative, except as noted.    VITALS/PHYSICAL EXAM  --------------------------------------------------------------------------------  T(C): 36.6 (07-20-23 @ 04:00), Max: 36.8 (07-20-23 @ 00:00)  HR: 68 (07-20-23 @ 07:00) (51 - 88)  BP: 94/56 (07-20-23 @ 07:00) (72/59 - 131/70)  RR: 24 (07-20-23 @ 07:00) (13 - 24)  SpO2: 95% (07-20-23 @ 07:00) (92% - 100%)  Wt(kg): --  Height (cm): 160 (07-19-23 @ 07:48)    07-19-23 @ 07:01  -  07-20-23 @ 07:00  --------------------------------------------------------  IN: 5260.2 mL / OUT: 3775 mL / NET: 1485.2 mL    Physical Exam:  Gen: NAD  HEENT: Anicteric  Pulm: CTA B/L  CV: S1S2+  Abd: Soft, +BS    Ext: No LE edema B/L, mild pain in extremities  Neuro: Awake  Skin: Warm and dry    LABS/STUDIES  --------------------------------------------------------------------------------              8.3    11.74 >-----------<  124      [07-20-23 @ 03:14]              23.3     139  |  107  |  21  ----------------------------<  98      [07-20-23 @ 03:14]  3.1   |  21  |  0.72        Ca     8.4     [07-20-23 @ 03:14]      Mg     2.40     [07-20-23 @ 03:14]      Phos  1.4     [07-20-23 @ 03:14]    Creatinine Trend:  SCr 0.72 [07-20 @ 03:14]  SCr 0.92 [07-19 @ 21:20]  SCr 1.07 [07-19 @ 15:05]  SCr 1.58 [07-19 @ 08:50]  SCr 2.12 [07-19 @ 02:27]    Urine Protein 29      [07-18-23 @ 14:10]  Urine Sodium 97      [07-18-23 @ 11:03]  Urine Urea Nitrogen 243.5      [07-18-23 @ 11:03]  Urine Potassium 24.0      [07-18-23 @ 11:03]  Urine Osmolality 342      [07-18-23 @ 11:03]    HBsAg Nonreact      [07-18-23 @ 14:34]  HIV Nonreact      [07-18-23 @ 14:10]    BENNIE: titer Negative, pattern --      [07-18-23 @ 14:34]  C3 Complement 125      [07-18-23 @ 14:34]  C4 Complement 38      [07-18-23 @ 14:34]  ANCA: cANCA Negative, pANCA Negative, atypical ANCA Indeterminate Method interference due to BENNIE fluorescence      [07-18-23 @ 14:34]

## 2023-07-20 NOTE — DISCHARGE NOTE PROVIDER - HOSPITAL COURSE
63F female w/ PMHx CAD s/p stents, CVA, COPD, PAD w/ prev LLE poss RLE stenting (Dr. Hart,  [facility]), 20 pack/year smoking history, EtOH use, and HTN presents with acute RLE ischemia, new JACKI (Cr 6.49 on admission) and metabolic acidosis. Patient s/p RLE angiogram with lysis catheter placement on 7/18. The patient tolerated the procedure well. Post-operatively the patient was sent to the SICU for q1 hour vascular checks with TPA catheter and vasopressor support. Patient tolerated operation well and there were no post operative complications identified.     RTOR on 7/19 for removal of lysis catheter, right lower extremity angiogram with percutaneous thrombectomy of the superficial femoral and popliteal arteries. Now with AT/PT signals of RLE. Vasopressors were weaned off. Creatine normalized with resuscitation while in SICU.     The patient was seen by physical therapy which recommended discharge to home/rehab ***. The patient's pain was controlled by IV pain medications and then by PO pain medications. The patient was advanced to a regular diet and tolerated it well. The patient was placed on home medications. At the time of discharge, the patient was hemodynamically stable, was tolerating PO diet, was voiding urine and passing stool, was ambulating, and was comfortable with adequate pain control. The patient was instructed to follow up with Dr. Lawrence within 2 weeks after discharge from the hospital. The patient/family felt comfortable with discharge. The patient had no other issues.    63F female w/ PMHx CAD s/p stents, CVA, COPD, PAD w/ prev LLE poss RLE stenting (Dr. Hart,  [facility]), 20 pack/year smoking history, EtOH use, and HTN presents with acute RLE ischemia, new JACKI (Cr 6.49 on admission) and metabolic acidosis. Patient s/p RLE angiogram with lysis catheter placement on 7/18. The patient tolerated the procedure well. Post-operatively the patient was sent to the SICU for q1 hour vascular checks with TPA catheter and vasopressor support. Patient tolerated operation well and there were no post operative complications identified.     RTOR on 7/19 for removal of lysis catheter, right lower extremity angiogram with percutaneous thrombectomy of the superficial femoral and popliteal arteries.   RTOR on 7/24 for R CFA to PT bypass with PTFE.    PT recommending discharge with outpatient physical therapy.    The patient's pain was controlled by IV pain medications and then by PO pain medications. The patient was advanced to a regular diet and tolerated it well. The patient was placed on home medications. At the time of discharge, the patient was hemodynamically stable, was tolerating PO diet, was voiding urine and passing stool, was ambulating, and was comfortable with adequate pain control. The patient was instructed to follow up with Dr. Lawrence within 2 weeks after discharge from the hospital. The patient/family felt comfortable with discharge. The patient had no other issues.    63F female w/ PMHx CAD s/p stents, CVA, COPD, PAD w/ prev LLE poss RLE stenting (Dr. Hart,  [facility]), 20 pack/year smoking history, EtOH use, and HTN presents with acute RLE ischemia, new JACKI (Cr 6.49 on admission) and metabolic acidosis. Patient s/p RLE angiogram with lysis catheter placement on 7/18. The patient tolerated the procedure well. Post-operatively the patient was sent to the SICU for q1 hour vascular checks with TPA catheter and vasopressor support. Patient tolerated operation well and there were no post operative complications identified.     RTOR on 7/19 for removal of lysis catheter, right lower extremity angiogram with percutaneous thrombectomy of the superficial femoral and popliteal arteries.   RTOR on 7/24 for R CFA to PT bypass with PTFE.     PT recommending discharge with outpatient physical therapy.    The patient's pain was controlled by IV pain medications and then by PO pain medications. The patient was advanced to a regular diet and tolerated it well. The patient was placed on home medications. At the time of discharge, the patient was hemodynamically stable, was tolerating PO diet, was voiding urine and passing stool, was ambulating, and was comfortable with adequate pain control. The patient was instructed to follow up with Dr. Lawrence within 2 weeks after discharge from the hospital. The patient/family felt comfortable with discharge. The patient had no other issues.    63F female w/ PMHx CAD s/p stents, CVA, COPD, PAD w/ prev LLE poss RLE stenting (Dr. Hart,  [facility]), 20 pack/year smoking history, EtOH use, and HTN presents with acute RLE ischemia, new JACKI (Cr 6.49 on admission) and metabolic acidosis. Patient s/p RLE angiogram with lysis catheter placement on 7/18. The patient tolerated the procedure well. Post-operatively the patient was sent to the SICU for q1 hour vascular checks with TPA catheter and vasopressor support. Patient tolerated operation well and there were no post operative complications identified.     RTOR on 7/19 for removal of lysis catheter, right lower extremity angiogram with percutaneous thrombectomy of the superficial femoral and popliteal arteries.   RTOR on 7/24 for R CFA to PT bypass with PTFE.     PT recommending discharge to rehab facility.    The patient's pain was controlled by IV pain medications and then by PO pain medications. The patient was advanced to a regular diet and tolerated it well. The patient was placed on home medications. At the time of discharge, the patient was hemodynamically stable, was tolerating PO diet, was voiding urine and passing stool, was ambulating, and was comfortable with adequate pain control. The patient was instructed to follow up with Dr. Lawrence within 2 weeks after discharge from the hospital. The patient/family felt comfortable with discharge. The patient had no other issues.    63F female w/ PMHx CAD s/p stents, CVA, COPD, PAD w/ prev LLE poss RLE stenting (Dr. Hart,  [facility]), 20 pack/year smoking history, EtOH use, and HTN presents with acute RLE ischemia, new JACKI (Cr 6.49 on admission) and metabolic acidosis. Patient s/p RLE angiogram with lysis catheter placement on 7/18. The patient tolerated the procedure well. Post-operatively the patient was sent to the SICU for q1 hour vascular checks with TPA catheter and vasopressor support. Patient tolerated operation well and there were no post operative complications identified.     RTOR on 7/19 for removal of lysis catheter, right lower extremity angiogram with percutaneous thrombectomy of the superficial femoral and popliteal arteries.   RTOR on 7/24 for R CFA to PT bypass with PTFE.     PT recommending discharge to rehab facility.    The patient's pain was controlled by IV pain medications and then by PO pain medications. The patient was advanced to a regular diet and tolerated it well.   Patient had a tooth crack while eating a banana and dental was called.  The patient was placed on home medications. At the time of discharge, the patient was hemodynamically stable, was tolerating PO diet, was voiding urine and passing stool, was ambulating, and was comfortable with adequate pain control. The patient was instructed to follow up with Dr. Lawrence within 2 weeks after discharge from the hospital. The patient/family felt comfortable with discharge. The patient had no other issues.

## 2023-07-21 LAB
ANION GAP SERPL CALC-SCNC: 11 MMOL/L — SIGNIFICANT CHANGE UP (ref 7–14)
APTT BLD: 30.8 SEC — SIGNIFICANT CHANGE UP (ref 27–36.3)
APTT BLD: 32.6 SEC — SIGNIFICANT CHANGE UP (ref 27–36.3)
APTT BLD: 36.2 SEC — SIGNIFICANT CHANGE UP (ref 27–36.3)
BUN SERPL-MCNC: 11 MG/DL — SIGNIFICANT CHANGE UP (ref 7–23)
CALCIUM SERPL-MCNC: 8.8 MG/DL — SIGNIFICANT CHANGE UP (ref 8.4–10.5)
CHLORIDE SERPL-SCNC: 103 MMOL/L — SIGNIFICANT CHANGE UP (ref 98–107)
CO2 SERPL-SCNC: 23 MMOL/L — SIGNIFICANT CHANGE UP (ref 22–31)
CREAT SERPL-MCNC: 0.58 MG/DL — SIGNIFICANT CHANGE UP (ref 0.5–1.3)
EGFR: 102 ML/MIN/1.73M2 — SIGNIFICANT CHANGE UP
GLUCOSE SERPL-MCNC: 81 MG/DL — SIGNIFICANT CHANGE UP (ref 70–99)
HCT VFR BLD CALC: 24.9 % — LOW (ref 34.5–45)
HGB BLD-MCNC: 8.7 G/DL — LOW (ref 11.5–15.5)
MAGNESIUM SERPL-MCNC: 1.6 MG/DL — SIGNIFICANT CHANGE UP (ref 1.6–2.6)
MCHC RBC-ENTMCNC: 32.8 PG — SIGNIFICANT CHANGE UP (ref 27–34)
MCHC RBC-ENTMCNC: 34.9 GM/DL — SIGNIFICANT CHANGE UP (ref 32–36)
MCV RBC AUTO: 94 FL — SIGNIFICANT CHANGE UP (ref 80–100)
NRBC # BLD: 0 /100 WBCS — SIGNIFICANT CHANGE UP (ref 0–0)
NRBC # FLD: 0 K/UL — SIGNIFICANT CHANGE UP (ref 0–0)
PHOSPHATE SERPL-MCNC: 2.5 MG/DL — SIGNIFICANT CHANGE UP (ref 2.5–4.5)
PLATELET # BLD AUTO: 134 K/UL — LOW (ref 150–400)
POTASSIUM SERPL-MCNC: 3.4 MMOL/L — LOW (ref 3.5–5.3)
POTASSIUM SERPL-SCNC: 3.4 MMOL/L — LOW (ref 3.5–5.3)
RBC # BLD: 2.65 M/UL — LOW (ref 3.8–5.2)
RBC # FLD: 16.8 % — HIGH (ref 10.3–14.5)
SODIUM SERPL-SCNC: 137 MMOL/L — SIGNIFICANT CHANGE UP (ref 135–145)
WBC # BLD: 7.71 K/UL — SIGNIFICANT CHANGE UP (ref 3.8–10.5)
WBC # FLD AUTO: 7.71 K/UL — SIGNIFICANT CHANGE UP (ref 3.8–10.5)

## 2023-07-21 PROCEDURE — 93970 EXTREMITY STUDY: CPT | Mod: 26

## 2023-07-21 PROCEDURE — 99232 SBSQ HOSP IP/OBS MODERATE 35: CPT | Mod: 57

## 2023-07-21 PROCEDURE — 93923 UPR/LXTR ART STDY 3+ LVLS: CPT | Mod: 26

## 2023-07-21 RX ORDER — POTASSIUM CHLORIDE 20 MEQ
10 PACKET (EA) ORAL ONCE
Refills: 0 | Status: DISCONTINUED | OUTPATIENT
Start: 2023-07-21 | End: 2023-07-21

## 2023-07-21 RX ORDER — SODIUM,POTASSIUM PHOSPHATES 278-250MG
1 POWDER IN PACKET (EA) ORAL ONCE
Refills: 0 | Status: COMPLETED | OUTPATIENT
Start: 2023-07-21 | End: 2023-07-21

## 2023-07-21 RX ORDER — POTASSIUM CHLORIDE 20 MEQ
40 PACKET (EA) ORAL ONCE
Refills: 0 | Status: COMPLETED | OUTPATIENT
Start: 2023-07-21 | End: 2023-07-21

## 2023-07-21 RX ORDER — POTASSIUM PHOSPHATE, MONOBASIC POTASSIUM PHOSPHATE, DIBASIC 236; 224 MG/ML; MG/ML
30 INJECTION, SOLUTION INTRAVENOUS ONCE
Refills: 0 | Status: DISCONTINUED | OUTPATIENT
Start: 2023-07-21 | End: 2023-07-21

## 2023-07-21 RX ORDER — HEPARIN SODIUM 5000 [USP'U]/ML
1000 INJECTION INTRAVENOUS; SUBCUTANEOUS
Qty: 25000 | Refills: 0 | Status: DISCONTINUED | OUTPATIENT
Start: 2023-07-21 | End: 2023-07-21

## 2023-07-21 RX ORDER — HEPARIN SODIUM 5000 [USP'U]/ML
1100 INJECTION INTRAVENOUS; SUBCUTANEOUS
Qty: 25000 | Refills: 0 | Status: DISCONTINUED | OUTPATIENT
Start: 2023-07-21 | End: 2023-07-22

## 2023-07-21 RX ADMIN — HEPARIN SODIUM 11 UNIT(S)/HR: 5000 INJECTION INTRAVENOUS; SUBCUTANEOUS at 14:39

## 2023-07-21 RX ADMIN — Medication 40 MILLIEQUIVALENT(S): at 12:29

## 2023-07-21 RX ADMIN — HEPARIN SODIUM 13 UNIT(S)/HR: 5000 INJECTION INTRAVENOUS; SUBCUTANEOUS at 23:06

## 2023-07-21 RX ADMIN — Medication 1 TABLET(S): at 12:26

## 2023-07-21 RX ADMIN — BUDESONIDE AND FORMOTEROL FUMARATE DIHYDRATE 2 PUFF(S): 160; 4.5 AEROSOL RESPIRATORY (INHALATION) at 12:50

## 2023-07-21 RX ADMIN — HEPARIN SODIUM 9 UNIT(S)/HR: 5000 INJECTION INTRAVENOUS; SUBCUTANEOUS at 04:20

## 2023-07-21 RX ADMIN — HEPARIN SODIUM 9 UNIT(S)/HR: 5000 INJECTION INTRAVENOUS; SUBCUTANEOUS at 08:49

## 2023-07-21 RX ADMIN — HEPARIN SODIUM 11 UNIT(S)/HR: 5000 INJECTION INTRAVENOUS; SUBCUTANEOUS at 20:35

## 2023-07-21 RX ADMIN — BUDESONIDE AND FORMOTEROL FUMARATE DIHYDRATE 2 PUFF(S): 160; 4.5 AEROSOL RESPIRATORY (INHALATION) at 21:06

## 2023-07-21 RX ADMIN — ATORVASTATIN CALCIUM 40 MILLIGRAM(S): 80 TABLET, FILM COATED ORAL at 21:06

## 2023-07-21 RX ADMIN — OXYCODONE HYDROCHLORIDE 10 MILLIGRAM(S): 5 TABLET ORAL at 21:06

## 2023-07-21 RX ADMIN — HEPARIN SODIUM 13 UNIT(S)/HR: 5000 INJECTION INTRAVENOUS; SUBCUTANEOUS at 22:34

## 2023-07-21 RX ADMIN — Medication 400 MILLIGRAM(S): at 06:49

## 2023-07-21 RX ADMIN — OXYCODONE HYDROCHLORIDE 10 MILLIGRAM(S): 5 TABLET ORAL at 21:36

## 2023-07-21 NOTE — PROGRESS NOTE ADULT - SUBJECTIVE AND OBJECTIVE BOX
C Team Surgery Progress Note     S: Patient resting comfortably on morning rounds. Pain well-controlled currently. Patient denies nausea, vomiting, chest pain, shortness of breath.    MEDICATIONS  (STANDING):  atorvastatin 40 milliGRAM(s) Oral at bedtime  budesonide 160 MICROgram(s)/formoterol 4.5 MICROgram(s) Inhaler 2 Puff(s) Inhalation two times a day  heparin  Infusion 1100 Unit(s)/Hr (9 mL/Hr) IV Continuous <Continuous>    MEDICATIONS  (PRN):  oxyCODONE    IR 5 milliGRAM(s) Oral every 4 hours PRN Moderate Pain (4 - 6)  oxyCODONE    IR 10 milliGRAM(s) Oral every 4 hours PRN Severe Pain (7 - 10)      T(C): 36.9 (07-21-23 @ 02:06), Max: 36.9 (07-21-23 @ 00:00)  HR: 67 (07-21-23 @ 02:06) (60 - 76)  BP: 129/69 (07-21-23 @ 02:06) (101/53 - 144/64)  RR: 18 (07-21-23 @ 02:06) (17 - 23)  SpO2: 98% (07-21-23 @ 02:06) (95% - 100%)        07-20-23 @ 07:01  -  07-21-23 @ 07:00  --------------------------------------------------------  IN: 995 mL / OUT: 1300 mL / NET: -305 mL        Physical Exam:  General: NAD, AOx3  Respiratory: No labored breathing  CV: pulse regularly present  Pulses exam: R AT and PT signals.     LABS:                        8.3    11.74 )-----------( 124      ( 20 Jul 2023 03:14 )             23.3     07-20    139  |  107  |  21  ----------------------------<  98  3.1<L>   |  21<L>  |  0.72    Ca    8.4      20 Jul 2023 03:14  Phos  1.4     07-20  Mg     2.40     07-20

## 2023-07-21 NOTE — CHART NOTE - NSCHARTNOTEFT_GEN_A_CORE
GENERAL SURGERY FLOOR TRANSFER NOTE    63y Female transferred to floor from SICU w/ PMHx CAD s/p stents, CVA, COPD, PAD w/ prev LLE poss RLE stenting (Dr. Hart,  [facility]), 20 pack/year smoking history, and HTN. Presented with 1 week hx of worsening RLE pain - abruptly increasing over last 3 days. Worse in calf and foot. RLE pop and foot no palp pulses or signals. Unable to obtain CTA due to JACKI. Nephro consulted -hydrated and started on bicarb infusion. Started on therapeutic hep gtt. Acute on chronic limb ischemia with evidence of motor and sensory deficits. Patient taken emergently to OR for RLE angiogram with lysis catheter placement on 7/18. RTOR on 7/19 for removal of lysis catheter, right lower extremity angiogram with percutaneous thrombectomy of the superficial femoral and popliteal arteries. SICU was managing for hemodynamic monitoring and postop management.    SUBJECTIVE:  Patient seen and examined at bedside with no acute complaints. No fever/chills/nausea/vomiting. Pain controlled with current pain medications.     OBJECTIVE:  T(C): 36.9 (07-21-23 @ 02:06), Max: 36.9 (07-21-23 @ 00:00)  HR: 67 (07-21-23 @ 02:06) (58 - 78)  BP: 129/69 (07-21-23 @ 02:06) (89/46 - 144/64)  RR: 18 (07-21-23 @ 02:06) (17 - 24)  SpO2: 98% (07-21-23 @ 02:06) (94% - 100%)  Wt(kg): --      I&O's Summary    19 Jul 2023 07:01  -  20 Jul 2023 07:00  --------------------------------------------------------  IN: 5260.2 mL / OUT: 3775 mL / NET: 1485.2 mL    20 Jul 2023 07:01  -  21 Jul 2023 02:19  --------------------------------------------------------  IN: 995 mL / OUT: 1300 mL / NET: -305 mL                              8.3    11.74 )-----------( 124      ( 20 Jul 2023 03:14 )             23.3       07-20    139  |  107  |  21  ----------------------------<  98  3.1<L>   |  21<L>  |  0.72    Ca    8.4      20 Jul 2023 03:14  Phos  1.4     07-20  Mg     2.40     07-20        MEDICATIONS  (STANDING):  acetaminophen   IVPB .. 1000 milliGRAM(s) IV Intermittent every 6 hours  atorvastatin 40 milliGRAM(s) Oral at bedtime  budesonide 160 MICROgram(s)/formoterol 4.5 MICROgram(s) Inhaler 2 Puff(s) Inhalation two times a day  heparin  Infusion 1100 Unit(s)/Hr (7 mL/Hr) IV Continuous <Continuous>    MEDICATIONS  (PRN):  oxyCODONE    IR 5 milliGRAM(s) Oral every 4 hours PRN Moderate Pain (4 - 6)  oxyCODONE    IR 10 milliGRAM(s) Oral every 4 hours PRN Severe Pain (7 - 10)        PHYSICAL EXAM:  Gen: No acute distress  Cardio: no JVD  Resp: normal work of breathing, no audible wheezes  GI: Soft/non-tender/non-distended  Extremities: RLE AT/PT signals, warm. Left groin soft, NT    ASSESSMENT: 63F female w/ PMHx CAD s/p stents, CVA, COPD, PAD w/ prev LLE poss RLE stenting (Dr. Hart,  [facility]), 20 pack/year smoking history, EtOH use, and HTN who presented with acute RLE ischemia. Patient s/p RLE angiogram with lysis catheter placement on 7/18.  RTOR on 7/19 for removal of lysis catheter, right lower extremity angiogram with percutaneous thrombectomy of the superficial femoral and popliteal arteries. Course c/b new JACKI (Cr 6.49 on admission) and metabolic acidosis snd SICU admission.     PLAN:     - Diet:   - Pain control  - Monitor vitals and UOP  - AM labs  - OOB/IS  - Dispo: admitted to GENERAL SURGERY FLOOR TRANSFER NOTE    63y Female transferred to floor from SICU w/ PMHx CAD s/p stents, CVA, COPD, PAD w/ prev LLE poss RLE stenting (Dr. Hart,  [facility]), 20 pack/year smoking history, and HTN. Presented with 1 week hx of worsening RLE pain - abruptly increasing over last 3 days. Worse in calf and foot. RLE pop and foot no palp pulses or signals. Unable to obtain CTA due to JACKI. Nephro consulted -hydrated and started on bicarb infusion. Started on therapeutic hep gtt. Acute on chronic limb ischemia with evidence of motor and sensory deficits. Patient taken emergently to OR for RLE angiogram with lysis catheter placement on 7/18. RTOR on 7/19 for removal of lysis catheter, right lower extremity angiogram with percutaneous thrombectomy of the superficial femoral and popliteal arteries. SICU was managing for hemodynamic monitoring and postop management.    SUBJECTIVE:  Patient seen and examined at bedside with no acute complaints. No fever/chills/nausea/vomiting. Pain controlled with current pain medications.     OBJECTIVE:  T(C): 36.9 (07-21-23 @ 02:06), Max: 36.9 (07-21-23 @ 00:00)  HR: 67 (07-21-23 @ 02:06) (58 - 78)  BP: 129/69 (07-21-23 @ 02:06) (89/46 - 144/64)  RR: 18 (07-21-23 @ 02:06) (17 - 24)  SpO2: 98% (07-21-23 @ 02:06) (94% - 100%)  Wt(kg): --      I&O's Summary    19 Jul 2023 07:01  -  20 Jul 2023 07:00  --------------------------------------------------------  IN: 5260.2 mL / OUT: 3775 mL / NET: 1485.2 mL    20 Jul 2023 07:01  -  21 Jul 2023 02:19  --------------------------------------------------------  IN: 995 mL / OUT: 1300 mL / NET: -305 mL                              8.3    11.74 )-----------( 124      ( 20 Jul 2023 03:14 )             23.3       07-20    139  |  107  |  21  ----------------------------<  98  3.1<L>   |  21<L>  |  0.72    Ca    8.4      20 Jul 2023 03:14  Phos  1.4     07-20  Mg     2.40     07-20        MEDICATIONS  (STANDING):  acetaminophen   IVPB .. 1000 milliGRAM(s) IV Intermittent every 6 hours  atorvastatin 40 milliGRAM(s) Oral at bedtime  budesonide 160 MICROgram(s)/formoterol 4.5 MICROgram(s) Inhaler 2 Puff(s) Inhalation two times a day  heparin  Infusion 1100 Unit(s)/Hr (7 mL/Hr) IV Continuous <Continuous>    MEDICATIONS  (PRN):  oxyCODONE    IR 5 milliGRAM(s) Oral every 4 hours PRN Moderate Pain (4 - 6)  oxyCODONE    IR 10 milliGRAM(s) Oral every 4 hours PRN Severe Pain (7 - 10)        PHYSICAL EXAM:  Gen: No acute distress  Cardio: no JVD  Resp: normal work of breathing, no audible wheezes  GI: Soft/non-tender/non-distended  Extremities: RLE AT/PT signals, warm. Left groin soft, NT    ASSESSMENT: 63F female w/ PMHx CAD s/p stents, CVA, COPD, PAD w/ prev LLE poss RLE stenting (Dr. Hart[facility]), 20 pack/year smoking history, EtOH use, and HTN who presented with acute RLE ischemia. Patient s/p RLE angiogram with lysis catheter placement on 7/18.  RTOR on 7/19 for removal of lysis catheter, right lower extremity angiogram with percutaneous thrombectomy of the superficial femoral and popliteal arteries. Course c/b new JACKI (Cr 6.49 on admission) and metabolic acidosis snd SICU admission.     PLAN:   - C/w heparin   - Pain control  - Diet: Regular  - D/C'd Ayoub  - PT Consult  - vein mapping/cards consult for possible bypass planning.  - Vitals Q4  - I&Os

## 2023-07-21 NOTE — MEDICAL STUDENT PROGRESS NOTE(EDUCATION) - ASSESSMENT
64 y/o woman with a PMH of R SFA/popliteal stent, presented with pain in RLE, found to have acute on chronic limb ischemia. Now s/p RLE angiogram and percutaneous thrombectomy (07/19). At post op check had positive signal in AT and PT. Now off pressors, moved from SICU to floors.     - vein mapping/YANIRA/PVR and cardiac clearance for possible bypass  - c/w heparin drip, plan to switch to oral AC pending YANIRA/PVR  - now off levophed, continue monitor BP  - has not had melena for 2 days, continue to monitor H/H     62 y/o woman with a PMH of R SFA/popliteal stent, presented with pain in RLE, found to have acute on chronic limb ischemia. Now s/p RLE angiogram and percutaneous thrombectomy (07/19). At post op check had positive signal in AT and PT. Now off pressors, moved from SICU to floors.     - vein mapping/YANIRA/PVR and cardiac clearance for possible bypass  - c/w heparin drip, plan to switch to oral AC pending YANIRA/PVR  - now off levophed, continue monitor BP  - has not had melena for 2 days, continue to monitor H/H     62 y/o woman with a PMH of R SFA/popliteal stent, presented with pain in RLE, found to have acute on chronic limb ischemia. Now s/p RLE angiogram and percutaneous thrombectomy (07/19). At post op check had positive signal in AT and PT. Now off pressors, moved from SICU to floors.     - vein mapping, YANIRA/PVR and cardiac clearance for possible bypass  - c/w heparin drip, plan to switch to oral AC pending YANIRA/PVR  - now off levophed, continue monitor BP  - has not had melena for 2 days, continue to monitor H/H     64 y/o woman with a PMH of R SFA/popliteal stent, presented with pain in RLE, found to have acute on chronic limb ischemia. Now s/p RLE angiogram and percutaneous thrombectomy (07/19). At post op check had positive signal in AT and PT. Now off pressors, moved from SICU to floors.     - vein mapping, YANIRA/PVR and cardiac clearance for possible bypass  - c/w heparin drip, plan to switch to oral AC pending YANIRA/PVR  - now off levophed, continue monitor BP  - has not had melena for 2 days, continue to monitor H/H

## 2023-07-21 NOTE — MEDICAL STUDENT PROGRESS NOTE(EDUCATION) - SUBJECTIVE AND OBJECTIVE BOX
VASCULAR SURGERY DAILY PROGRESS NOTE:     SUBJECTIVE/ROS:     Overnight: no acute events   Patient seen and evaluated on AM rounds. Positive doppler DP and PT .   Patient otherwise denies nausea, vomiting, chest pain, shortness of breath     OBJECTIVE:  Vital Signs Last 24 Hrs  T(C): 36.9 (21 Jul 2023 02:06), Max: 36.9 (21 Jul 2023 00:00)  T(F): 98.4 (21 Jul 2023 02:06), Max: 98.4 (21 Jul 2023 00:00)  HR: 67 (21 Jul 2023 02:06) (60 - 76)  BP: 129/69 (21 Jul 2023 02:06) (101/53 - 144/64)  BP(mean): 85 (21 Jul 2023 00:00) (65 - 85)  RR: 18 (21 Jul 2023 02:06) (17 - 23)  SpO2: 98% (21 Jul 2023 02:06) (95% - 100%)    Parameters below as of 21 Jul 2023 02:06  Patient On (Oxygen Delivery Method): room air      I&O's Detail    20 Jul 2023 07:01  -  21 Jul 2023 07:00  --------------------------------------------------------  IN:    Heparin: 95 mL    IV PiggyBack: 300 mL    Lactated Ringers: 600 mL  Total IN: 995 mL    OUT:    Indwelling Catheter - Urethral (mL): 1000 mL    Voided (mL): 300 mL  Total OUT: 1300 mL    Total NET: -305 mL        Daily     Daily   MEDICATIONS  (STANDING):  atorvastatin 40 milliGRAM(s) Oral at bedtime  budesonide 160 MICROgram(s)/formoterol 4.5 MICROgram(s) Inhaler 2 Puff(s) Inhalation two times a day  heparin  Infusion 1100 Unit(s)/Hr (9 mL/Hr) IV Continuous <Continuous>    MEDICATIONS  (PRN):  oxyCODONE    IR 5 milliGRAM(s) Oral every 4 hours PRN Moderate Pain (4 - 6)  oxyCODONE    IR 10 milliGRAM(s) Oral every 4 hours PRN Severe Pain (7 - 10)      Labs:                        8.3    11.74 )-----------( 124      ( 20 Jul 2023 03:14 )             23.3     07-20    139  |  107  |  21  ----------------------------<  98  3.1<L>   |  21<L>  |  0.72    Ca    8.4      20 Jul 2023 03:14  Phos  1.4     07-20  Mg     2.40     07-20      PT/INR - ( 20 Jul 2023 03:14 )   PT: 11.9 sec;   INR: 1.03 ratio         PTT - ( 21 Jul 2023 02:49 )  PTT:30.8 sec  Urinalysis Basic - ( 20 Jul 2023 03:14 )    Color: x / Appearance: x / SG: x / pH: x  Gluc: 98 mg/dL / Ketone: x  / Bili: x / Urobili: x   Blood: x / Protein: x / Nitrite: x   Leuk Esterase: x / RBC: x / WBC x   Sq Epi: x / Non Sq Epi: x / Bacteria: x                Physical Exam:  General: well developed, well nourished, NAD  Cardiovascular: appears well perfused   Respiratory: respirations non labored  Gastrointestinal: soft, nontender, nondistended  Extremities: FROM, warm  Neurological: A+Ox3

## 2023-07-21 NOTE — PROGRESS NOTE ADULT - ASSESSMENT
62 y/o woman with a PMH of R SFA/popliteal stent, presented with pain in RLE, found to have acute on chronic limb ischemia. Now s/p RLE angiogram and percutaneous thrombectomy (07/19) of SFDA and popliteal. At post op check had positive signal in AT and PT. Now off pressors, moved from SICU to floors.     - obtain vein mapping, YANIRA/PVR and cardiac clearance for possible bypass  - c/w heparin drip, plan to switch to oral AC pending YANIRA/PVR  - now off levophed, continue monitor BP  - has not had melena for 2 days, continue to monitor H/H  - PT recs home w/ outpatient PT f/u 64 y/o woman with a PMH of R SFA/popliteal stent, presented with pain in RLE, found to have acute on chronic limb ischemia. Now s/p RLE angiogram and percutaneous thrombectomy (07/19) of SFDA and popliteal. At post op check had positive signal in AT and PT. Now off pressors, moved from SICU to floors.     - obtain vein mapping, YANIRA/PVR and cardiac clearance for possible bypass  - c/w heparin drip, plan to switch to oral AC pending YANIRA/PVR  - now off levophed, continue monitor BP  - has not had melena for 2 days, continue to monitor H/H  - PT recs home w/ outpatient PT f/u 62 y/o woman with a PMH of R SFA/popliteal stent, presented with pain in RLE, found to have acute on chronic limb ischemia. Now s/p RLE angiogram and percutaneous thrombectomy (07/19) of SFA and popliteal. At post op check had positive signal in AT and PT. Now off pressors, moved from SICU to floors.     - obtain vein mapping, YANIRA/PVR and cardiac clearance for possible bypass  - c/w heparin drip, plan to switch to oral AC pending YANIRA/PVR  - now off levophed, continue monitor BP  - has not had melena for 2 days, continue to monitor H/H  - PT recs home w/ outpatient PT 64 y/o woman with a PMH of R SFA/popliteal stent, presented with pain in RLE, found to have acute on chronic limb ischemia. Now s/p RLE angiogram and percutaneous thrombectomy (07/19) of SFA and popliteal. At post op check had positive signal in AT and PT. Now off pressors, moved from SICU to floors.     - obtain vein mapping, YANIRA/PVR and cardiac clearance for possible bypass  - c/w heparin drip, plan to switch to oral AC pending YANIRA/PVR  - now off levophed, continue monitor BP  - has not had melena for 2 days, continue to monitor H/H  - PT recs home w/ outpatient PT

## 2023-07-22 LAB
ANION GAP SERPL CALC-SCNC: 14 MMOL/L — SIGNIFICANT CHANGE UP (ref 7–14)
APTT BLD: 39.8 SEC — HIGH (ref 27–36.3)
APTT BLD: 42.4 SEC — HIGH (ref 27–36.3)
BUN SERPL-MCNC: 6 MG/DL — LOW (ref 7–23)
CALCIUM SERPL-MCNC: 9.2 MG/DL — SIGNIFICANT CHANGE UP (ref 8.4–10.5)
CHLORIDE SERPL-SCNC: 101 MMOL/L — SIGNIFICANT CHANGE UP (ref 98–107)
CO2 SERPL-SCNC: 25 MMOL/L — SIGNIFICANT CHANGE UP (ref 22–31)
CREAT SERPL-MCNC: 0.56 MG/DL — SIGNIFICANT CHANGE UP (ref 0.5–1.3)
EGFR: 103 ML/MIN/1.73M2 — SIGNIFICANT CHANGE UP
GLUCOSE SERPL-MCNC: 84 MG/DL — SIGNIFICANT CHANGE UP (ref 70–99)
HCT VFR BLD CALC: 23.5 % — LOW (ref 34.5–45)
HGB BLD-MCNC: 7.9 G/DL — LOW (ref 11.5–15.5)
MAGNESIUM SERPL-MCNC: 1.3 MG/DL — LOW (ref 1.6–2.6)
MCHC RBC-ENTMCNC: 32.8 PG — SIGNIFICANT CHANGE UP (ref 27–34)
MCHC RBC-ENTMCNC: 33.6 GM/DL — SIGNIFICANT CHANGE UP (ref 32–36)
MCV RBC AUTO: 97.5 FL — SIGNIFICANT CHANGE UP (ref 80–100)
NRBC # BLD: 0 /100 WBCS — SIGNIFICANT CHANGE UP (ref 0–0)
NRBC # FLD: 0.02 K/UL — HIGH (ref 0–0)
PHOSPHATE SERPL-MCNC: 3 MG/DL — SIGNIFICANT CHANGE UP (ref 2.5–4.5)
PLATELET # BLD AUTO: 140 K/UL — LOW (ref 150–400)
POTASSIUM SERPL-MCNC: 3.7 MMOL/L — SIGNIFICANT CHANGE UP (ref 3.5–5.3)
POTASSIUM SERPL-SCNC: 3.7 MMOL/L — SIGNIFICANT CHANGE UP (ref 3.5–5.3)
RBC # BLD: 2.41 M/UL — LOW (ref 3.8–5.2)
RBC # FLD: 16.1 % — HIGH (ref 10.3–14.5)
SODIUM SERPL-SCNC: 140 MMOL/L — SIGNIFICANT CHANGE UP (ref 135–145)
UFH PPP CHRO-ACNC: 0.13 IU/ML — LOW (ref 0.3–0.7)
WBC # BLD: 7.86 K/UL — SIGNIFICANT CHANGE UP (ref 3.8–10.5)
WBC # FLD AUTO: 7.86 K/UL — SIGNIFICANT CHANGE UP (ref 3.8–10.5)

## 2023-07-22 RX ORDER — HEPARIN SODIUM 5000 [USP'U]/ML
1600 INJECTION INTRAVENOUS; SUBCUTANEOUS
Qty: 25000 | Refills: 0 | Status: DISCONTINUED | OUTPATIENT
Start: 2023-07-22 | End: 2023-07-23

## 2023-07-22 RX ORDER — MAGNESIUM SULFATE 500 MG/ML
4 VIAL (ML) INJECTION ONCE
Refills: 0 | Status: COMPLETED | OUTPATIENT
Start: 2023-07-22 | End: 2023-07-22

## 2023-07-22 RX ORDER — MAGNESIUM OXIDE 400 MG ORAL TABLET 241.3 MG
400 TABLET ORAL
Refills: 0 | Status: DISCONTINUED | OUTPATIENT
Start: 2023-07-22 | End: 2023-07-22

## 2023-07-22 RX ORDER — POTASSIUM CHLORIDE 20 MEQ
20 PACKET (EA) ORAL
Refills: 0 | Status: COMPLETED | OUTPATIENT
Start: 2023-07-22 | End: 2023-07-22

## 2023-07-22 RX ORDER — HEPARIN SODIUM 5000 [USP'U]/ML
1500 INJECTION INTRAVENOUS; SUBCUTANEOUS
Qty: 25000 | Refills: 0 | Status: DISCONTINUED | OUTPATIENT
Start: 2023-07-22 | End: 2023-07-22

## 2023-07-22 RX ORDER — ASPIRIN/CALCIUM CARB/MAGNESIUM 324 MG
81 TABLET ORAL EVERY 24 HOURS
Refills: 0 | Status: DISCONTINUED | OUTPATIENT
Start: 2023-07-22 | End: 2023-08-01

## 2023-07-22 RX ADMIN — HEPARIN SODIUM 16 UNIT(S)/HR: 5000 INJECTION INTRAVENOUS; SUBCUTANEOUS at 17:31

## 2023-07-22 RX ADMIN — OXYCODONE HYDROCHLORIDE 10 MILLIGRAM(S): 5 TABLET ORAL at 07:11

## 2023-07-22 RX ADMIN — ATORVASTATIN CALCIUM 40 MILLIGRAM(S): 80 TABLET, FILM COATED ORAL at 23:32

## 2023-07-22 RX ADMIN — BUDESONIDE AND FORMOTEROL FUMARATE DIHYDRATE 2 PUFF(S): 160; 4.5 AEROSOL RESPIRATORY (INHALATION) at 10:14

## 2023-07-22 RX ADMIN — Medication 81 MILLIGRAM(S): at 12:58

## 2023-07-22 RX ADMIN — OXYCODONE HYDROCHLORIDE 10 MILLIGRAM(S): 5 TABLET ORAL at 06:41

## 2023-07-22 RX ADMIN — BUDESONIDE AND FORMOTEROL FUMARATE DIHYDRATE 2 PUFF(S): 160; 4.5 AEROSOL RESPIRATORY (INHALATION) at 23:33

## 2023-07-22 RX ADMIN — Medication 25 GRAM(S): at 10:09

## 2023-07-22 RX ADMIN — Medication 20 MILLIEQUIVALENT(S): at 10:09

## 2023-07-22 RX ADMIN — HEPARIN SODIUM 15 UNIT(S)/HR: 5000 INJECTION INTRAVENOUS; SUBCUTANEOUS at 11:30

## 2023-07-22 RX ADMIN — Medication 20 MILLIEQUIVALENT(S): at 12:58

## 2023-07-22 NOTE — CONSULT NOTE ADULT - SUBJECTIVE AND OBJECTIVE BOX
Name of Patient : BRE HODGE  MRN: [id number]  Date of visit: 07-22-23 @ 11:11      Subjective: Patient seen and examined. No new events except as noted.     REVIEW OF SYSTEMS:    CONSTITUTIONAL: No weakness, fevers or chills  EYES/ENT: No visual changes;  No vertigo or throat pain   NECK: No pain or stiffness  RESPIRATORY: No cough, wheezing, hemoptysis; No shortness of breath  CARDIOVASCULAR: No chest pain or palpitations  GASTROINTESTINAL: No abdominal or epigastric pain. No nausea, vomiting, or hematemesis; No diarrhea or constipation. No melena or hematochezia.  GENITOURINARY: No dysuria, frequency or hematuria  NEUROLOGICAL: No numbness or weakness  SKIN: No itching, burning, rashes, or lesions   All other review of systems is negative unless indicated above.    MEDICATIONS:  MEDICATIONS  (STANDING):  aspirin  chewable 81 milliGRAM(s) Oral every 24 hours  atorvastatin 40 milliGRAM(s) Oral at bedtime  budesonide 160 MICROgram(s)/formoterol 4.5 MICROgram(s) Inhaler 2 Puff(s) Inhalation two times a day  heparin  Infusion 1500 Unit(s)/Hr (15 mL/Hr) IV Continuous <Continuous>  potassium chloride    Tablet ER 20 milliEquivalent(s) Oral every 2 hours      PHYSICAL EXAM:  T(C): 37.2 (07-22-23 @ 06:41), Max: 37.2 (07-22-23 @ 06:41)  HR: 99 (07-22-23 @ 06:41) (64 - 99)  BP: 130/88 (07-22-23 @ 06:41) (118/62 - 154/71)  RR: 18 (07-22-23 @ 06:41) (18 - 18)  SpO2: 99% (07-22-23 @ 06:41) (99% - 100%)  Wt(kg): --  I&O's Summary    Appearance: Normal	  HEENT:  PERRLA   Lymphatic: No lymphadenopathy   Cardiovascular: Normal S1 S2, no JVD  Respiratory: normal effort , clear  Gastrointestinal:  Soft, Non-tender  Skin: No rashes,  warm to touch  Psychiatry:  Mood & affect appropriate  Musculuskeletal: No edema    recent labs, Imaging and EKGs personally reviewed                           7.9    7.86  )-----------( 140      ( 22 Jul 2023 05:38 )             23.5               07-22    140  |  101  |  6<L>  ----------------------------<  84  3.7   |  25  |  0.56    Ca    9.2      22 Jul 2023 05:38  Phos  3.0     07-22  Mg     1.30     07-22      PTT - ( 22 Jul 2023 05:38 )  PTT:39.8 sec                   Urinalysis Basic - ( 22 Jul 2023 05:38 )    Color: x / Appearance: x / SG: x / pH: x  Gluc: 84 mg/dL / Ketone: x  / Bili: x / Urobili: x   Blood: x / Protein: x / Nitrite: x   Leuk Esterase: x / RBC: x / WBC x   Sq Epi: x / Non Sq Epi: x / Bacteria: x                     Name of Patient : BRE HODGE  MRN: [id number]  Date of visit: 07-22-23 @ 11:11      Subjective: Patient seen and examined. No new events except as noted.     REVIEW OF SYSTEMS:    CONSTITUTIONAL: No weakness, fevers or chills  EYES/ENT: No visual changes;  No vertigo or throat pain   NECK: No pain or stiffness  RESPIRATORY: No cough, wheezing, hemoptysis; No shortness of breath  CARDIOVASCULAR: No chest pain or palpitations  GASTROINTESTINAL: No abdominal or epigastric pain. No nausea, vomiting, or hematemesis; No diarrhea or constipation. No melena or hematochezia.  GENITOURINARY: No dysuria, frequency or hematuria  NEUROLOGICAL: No numbness or weakness  SKIN: No itching, burning, rashes, or lesions   All other review of systems is negative unless indicated above.    MEDICATIONS:  MEDICATIONS  (STANDING):  aspirin  chewable 81 milliGRAM(s) Oral every 24 hours  atorvastatin 40 milliGRAM(s) Oral at bedtime  budesonide 160 MICROgram(s)/formoterol 4.5 MICROgram(s) Inhaler 2 Puff(s) Inhalation two times a day  heparin  Infusion 1500 Unit(s)/Hr (15 mL/Hr) IV Continuous <Continuous>  potassium chloride    Tablet ER 20 milliEquivalent(s) Oral every 2 hours      PHYSICAL EXAM:  T(C): 37.2 (07-22-23 @ 06:41), Max: 37.2 (07-22-23 @ 06:41)  HR: 99 (07-22-23 @ 06:41) (64 - 99)  BP: 130/88 (07-22-23 @ 06:41) (118/62 - 154/71)  RR: 18 (07-22-23 @ 06:41) (18 - 18)  SpO2: 99% (07-22-23 @ 06:41) (99% - 100%)  Wt(kg): --  I&O's Summary    Appearance: Normal	  HEENT:  PERRLA   Lymphatic: No lymphadenopathy   Cardiovascular: Normal S1 S2, no JVD  Respiratory: normal effort , clear  Gastrointestinal:  Soft, Non-tender  Skin: No rashes,  warm to touch  Psychiatry:  Mood & affect appropriate  Musculuskeletal: No edema    recent labs, Imaging and EKGs personally reviewed                           7.9    7.86  )-----------( 140      ( 22 Jul 2023 05:38 )             23.5               07-22    140  |  101  |  6<L>  ----------------------------<  84  3.7   |  25  |  0.56    Ca    9.2      22 Jul 2023 05:38  Phos  3.0     07-22  Mg     1.30     07-22      PTT - ( 22 Jul 2023 05:38 )  PTT:39.8 sec                   Urinalysis Basic - ( 22 Jul 2023 05:38 )    Color: x / Appearance: x / SG: x / pH: x  Gluc: 84 mg/dL / Ketone: x  / Bili: x / Urobili: x   Blood: x / Protein: x / Nitrite: x   Leuk Esterase: x / RBC: x / WBC x   Sq Epi: x / Non Sq Epi: x / Bacteria: x                     Name of Patient : BRE HODGE  MRN: [id number]  Date of visit: 07-22-23 @ 11:11      Subjective: Patient seen and examined. No new events except as noted.     REVIEW OF SYSTEMS:    CONSTITUTIONAL: No weakness, fevers or chills  EYES/ENT: No visual changes;  No vertigo or throat pain   NECK: No pain or stiffness  RESPIRATORY: No cough, wheezing, hemoptysis; No shortness of breath  CARDIOVASCULAR: No chest pain or palpitations  GASTROINTESTINAL: No abdominal or epigastric pain. No nausea, vomiting, or hematemesis; No diarrhea or constipation. No melena or hematochezia.  GENITOURINARY: No dysuria, frequency or hematuria  NEUROLOGICAL: No numbness or weakness  SKIN: No itching, burning, rashes, or lesions   All other review of systems is negative unless indicated above.    MEDICATIONS:  MEDICATIONS  (STANDING):  aspirin  chewable 81 milliGRAM(s) Oral every 24 hours  atorvastatin 40 milliGRAM(s) Oral at bedtime  budesonide 160 MICROgram(s)/formoterol 4.5 MICROgram(s) Inhaler 2 Puff(s) Inhalation two times a day  heparin  Infusion 1500 Unit(s)/Hr (15 mL/Hr) IV Continuous <Continuous>  potassium chloride    Tablet ER 20 milliEquivalent(s) Oral every 2 hours      PHYSICAL EXAM:  T(C): 37.2 (07-22-23 @ 06:41), Max: 37.2 (07-22-23 @ 06:41)  HR: 99 (07-22-23 @ 06:41) (64 - 99)  BP: 130/88 (07-22-23 @ 06:41) (118/62 - 154/71)  RR: 18 (07-22-23 @ 06:41) (18 - 18)  SpO2: 99% (07-22-23 @ 06:41) (99% - 100%)  Wt(kg): --  I&O's Summary    Appearance: Normal	  HEENT:  PERRLA   Lymphatic: No lymphadenopathy   Cardiovascular: Normal S1 S2, no JVD  Respiratory: normal effort , clear  Gastrointestinal:  Soft, Non-tender  Skin: No rashes,  warm to touch  Psychiatry:  Mood & affect appropriate  Musculuskeletal: No edema    recent labs, Imaging and EKGs personally reviewed                           7.9    7.86  )-----------( 140      ( 22 Jul 2023 05:38 )             23.5               07-22    140  |  101  |  6<L>  ----------------------------<  84  3.7   |  25  |  0.56    Ca    9.2      22 Jul 2023 05:38  Phos  3.0     07-22  Mg     1.30     07-22      PTT - ( 22 Jul 2023 05:38 )  PTT:39.8 sec                   Urinalysis Basic - ( 22 Jul 2023 05:38 )    Color: x / Appearance: x / SG: x / pH: x  Gluc: 84 mg/dL / Ketone: x  / Bili: x / Urobili: x   Blood: x / Protein: x / Nitrite: x   Leuk Esterase: x / RBC: x / WBC x   Sq Epi: x / Non Sq Epi: x / Bacteria: x                     Patient is a 62 y/o female with PMH of R SFA/popliteal stent, presented with pain in RLE, found to have acute on chronic limb ischemia. Now s/p RLE angiogram and percutaneous thrombectomy (07/19) of SFA and popliteal. At post op check had positive signal in AT and PT. Now off pressors, moved from SICU to floors.     Subjective: Patient seen and examined. No new events except as noted.     REVIEW OF SYSTEMS:    CONSTITUTIONAL: No weakness, fevers or chills  EYES/ENT: No visual changes;  No vertigo or throat pain   NECK: No pain or stiffness  RESPIRATORY: No cough, wheezing, hemoptysis; No shortness of breath  CARDIOVASCULAR: No chest pain or palpitations  GASTROINTESTINAL: No abdominal or epigastric pain. No nausea, vomiting, or hematemesis; No diarrhea or constipation. No melena or hematochezia.  GENITOURINARY: No dysuria, frequency or hematuria  NEUROLOGICAL: No numbness or weakness  SKIN: No itching, burning, rashes, or lesions   All other review of systems is negative unless indicated above.    MEDICATIONS:  MEDICATIONS  (STANDING):  aspirin  chewable 81 milliGRAM(s) Oral every 24 hours  atorvastatin 40 milliGRAM(s) Oral at bedtime  budesonide 160 MICROgram(s)/formoterol 4.5 MICROgram(s) Inhaler 2 Puff(s) Inhalation two times a day  heparin  Infusion 1500 Unit(s)/Hr (15 mL/Hr) IV Continuous <Continuous>  potassium chloride    Tablet ER 20 milliEquivalent(s) Oral every 2 hours      PHYSICAL EXAM:  T(C): 37.2 (07-22-23 @ 06:41), Max: 37.2 (07-22-23 @ 06:41)  HR: 99 (07-22-23 @ 06:41) (64 - 99)  BP: 130/88 (07-22-23 @ 06:41) (118/62 - 154/71)  RR: 18 (07-22-23 @ 06:41) (18 - 18)  SpO2: 99% (07-22-23 @ 06:41) (99% - 100%)  Wt(kg): --  I&O's Summary    Appearance: Normal	  HEENT:  PERRLA   Lymphatic: No lymphadenopathy   Cardiovascular: Normal S1 S2, no JVD  Respiratory: normal effort , clear  Gastrointestinal:  Soft, Non-tender  Skin: No rashes,  warm to touch  Psychiatry:  Mood & affect appropriate  Musculuskeletal: No edema    recent labs, Imaging and EKGs personally reviewed                           7.9    7.86  )-----------( 140      ( 22 Jul 2023 05:38 )             23.5               07-22    140  |  101  |  6<L>  ----------------------------<  84  3.7   |  25  |  0.56    Ca    9.2      22 Jul 2023 05:38  Phos  3.0     07-22  Mg     1.30     07-22      PTT - ( 22 Jul 2023 05:38 )  PTT:39.8 sec                   Urinalysis Basic - ( 22 Jul 2023 05:38 )    Color: x / Appearance: x / SG: x / pH: x  Gluc: 84 mg/dL / Ketone: x  / Bili: x / Urobili: x   Blood: x / Protein: x / Nitrite: x   Leuk Esterase: x / RBC: x / WBC x   Sq Epi: x / Non Sq Epi: x / Bacteria: x      < from: [facility] Duplex Physiol Ext Low 3+ Level, BI. (07.21.23 @ 12:17) >  Summary/Impressions:  1. Right YANIRA is severely reduced (0.46).  Nearly flat  metatarsal and flat left digit waveforms noted.  Findings  suggest the presence of multi-segment arterial disease in  the right lower extremity, including significant small  vessel arterial disease in the right foot.  2. Left YANIRA is borderline (0.91). Left TBI is normal  (0.79), with a toe pressure of 101 mmHg.  No significant  occlusive arterial disease in the left lower extremity.                     Patient is a 64 y/o female with PMH of R SFA/popliteal stent, presented with pain in RLE, found to have acute on chronic limb ischemia. Now s/p RLE angiogram and percutaneous thrombectomy (07/19) of SFA and popliteal. At post op check had positive signal in AT and PT. Now off pressors, moved from SICU to floors.     Subjective: Patient seen and examined. No new events except as noted.     REVIEW OF SYSTEMS:    CONSTITUTIONAL: No weakness, fevers or chills  EYES/ENT: No visual changes;  No vertigo or throat pain   NECK: No pain or stiffness  RESPIRATORY: No cough, wheezing, hemoptysis; No shortness of breath  CARDIOVASCULAR: No chest pain or palpitations  GASTROINTESTINAL: No abdominal or epigastric pain. No nausea, vomiting, or hematemesis; No diarrhea or constipation. No melena or hematochezia.  GENITOURINARY: No dysuria, frequency or hematuria  NEUROLOGICAL: No numbness or weakness  SKIN: No itching, burning, rashes, or lesions   All other review of systems is negative unless indicated above.    MEDICATIONS:  MEDICATIONS  (STANDING):  aspirin  chewable 81 milliGRAM(s) Oral every 24 hours  atorvastatin 40 milliGRAM(s) Oral at bedtime  budesonide 160 MICROgram(s)/formoterol 4.5 MICROgram(s) Inhaler 2 Puff(s) Inhalation two times a day  heparin  Infusion 1500 Unit(s)/Hr (15 mL/Hr) IV Continuous <Continuous>  potassium chloride    Tablet ER 20 milliEquivalent(s) Oral every 2 hours      PHYSICAL EXAM:  T(C): 37.2 (07-22-23 @ 06:41), Max: 37.2 (07-22-23 @ 06:41)  HR: 99 (07-22-23 @ 06:41) (64 - 99)  BP: 130/88 (07-22-23 @ 06:41) (118/62 - 154/71)  RR: 18 (07-22-23 @ 06:41) (18 - 18)  SpO2: 99% (07-22-23 @ 06:41) (99% - 100%)  Wt(kg): --  I&O's Summary    Appearance: Normal	  HEENT:  PERRLA   Lymphatic: No lymphadenopathy   Cardiovascular: Normal S1 S2, no JVD  Respiratory: normal effort , clear  Gastrointestinal:  Soft, Non-tender  Skin: No rashes,  warm to touch  Psychiatry:  Mood & affect appropriate  Musculuskeletal: No edema    recent labs, Imaging and EKGs personally reviewed                           7.9    7.86  )-----------( 140      ( 22 Jul 2023 05:38 )             23.5               07-22    140  |  101  |  6<L>  ----------------------------<  84  3.7   |  25  |  0.56    Ca    9.2      22 Jul 2023 05:38  Phos  3.0     07-22  Mg     1.30     07-22      PTT - ( 22 Jul 2023 05:38 )  PTT:39.8 sec                   Urinalysis Basic - ( 22 Jul 2023 05:38 )    Color: x / Appearance: x / SG: x / pH: x  Gluc: 84 mg/dL / Ketone: x  / Bili: x / Urobili: x   Blood: x / Protein: x / Nitrite: x   Leuk Esterase: x / RBC: x / WBC x   Sq Epi: x / Non Sq Epi: x / Bacteria: x      < from: [facility] Duplex Physiol Ext Low 3+ Level, BI. (07.21.23 @ 12:17) >  Summary/Impressions:  1. Right YANIRA is severely reduced (0.46).  Nearly flat  metatarsal and flat left digit waveforms noted.  Findings  suggest the presence of multi-segment arterial disease in  the right lower extremity, including significant small  vessel arterial disease in the right foot.  2. Left YANIRA is borderline (0.91). Left TBI is normal  (0.79), with a toe pressure of 101 mmHg.  No significant  occlusive arterial disease in the left lower extremity.

## 2023-07-22 NOTE — PROGRESS NOTE ADULT - ASSESSMENT
64 y/o woman with a PMH of R SFA/popliteal stent, presented with pain in RLE, found to have acute on chronic limb ischemia. Now s/p RLE angiogram and percutaneous thrombectomy (07/19) of SFA and popliteal. At post op check had positive signal in AT and PT. Now off pressors, moved from SICU to floors. She was seen by cardiology for clearance for possible bypass.     Plan:  - c/w heparin drip, plan to switch to oral AC pending YANIRA/PVR  - has not had melena for 3 days, continue to monitor H/H  - PT recs home w/ outpatient PT  - Cards recs stress test, ASA, statin

## 2023-07-22 NOTE — PROGRESS NOTE ADULT - SUBJECTIVE AND OBJECTIVE BOX
C Team Surgery Progress Note     S: Patient resting comfortably on morning rounds. Pain well-controlled currently, but reports some aching pain in her toes.       MEDICATIONS  (STANDING):  aspirin  chewable 81 milliGRAM(s) Oral every 24 hours  atorvastatin 40 milliGRAM(s) Oral at bedtime  budesonide 160 MICROgram(s)/formoterol 4.5 MICROgram(s) Inhaler 2 Puff(s) Inhalation two times a day  heparin  Infusion 1100 Unit(s)/Hr (13 mL/Hr) IV Continuous <Continuous>    MEDICATIONS  (PRN):  oxyCODONE    IR 5 milliGRAM(s) Oral every 4 hours PRN Moderate Pain (4 - 6)  oxyCODONE    IR 10 milliGRAM(s) Oral every 4 hours PRN Severe Pain (7 - 10)      T(C): 37.2 (07-22-23 @ 06:41), Max: 37.2 (07-22-23 @ 06:41)  HR: 99 (07-22-23 @ 06:41) (64 - 99)  BP: 130/88 (07-22-23 @ 06:41) (118/62 - 154/71)  RR: 18 (07-22-23 @ 06:41) (18 - 18)  SpO2: 99% (07-22-23 @ 06:41) (99% - 100%)          Physical Exam:  General: NAD, AOx3  Respiratory: No labored breathing  CV: pulse regularly present  Pulses exam: R AT and PT signals.   MSK: motility and sensation intact.     LABS:                        7.9    7.86  )-----------( 140      ( 22 Jul 2023 05:38 )             23.5     07-21    137  |  103  |  11  ----------------------------<  81  3.4<L>   |  23  |  0.58    Ca    8.8      21 Jul 2023 07:00  Phos  2.5     07-21  Mg     1.60     07-21

## 2023-07-22 NOTE — CONSULT NOTE ADULT - CONSULT REASON
Acute renal failure
Hemodynamic monitoring and postop management
Medical management
Pre-Operative Cardiac Risk Stratification and Optimization

## 2023-07-22 NOTE — CONSULT NOTE ADULT - ASSESSMENT
Patient is a 64 y/o female with PMH of R SFA/popliteal stent, presented with pain in RLE, found to have acute on chronic limb ischemia. Now s/p RLE angiogram and percutaneous thrombectomy (07/19) of SFA and popliteal. At post op check had positive signal in AT and PT. Now off pressors, moved from SICU to floors.     # PAD   S/P ANGIO   Possible YANIRA/PVR  plan for possible Bypass  vascular follow up   ASA, Statin and heparin drip   monitor hgb level   Echo noted   pending stress test   patient is a moderate risk candidate for OR, pending stress test     # Hx of CAD  S/P STent   Echo noted  ASA and statin   Check stress test   CHeck TSH, A!C     # Electrolytes imbalance  replete electrolyes    # Anemia   Anemia W/U      Patient is a 62 y/o female with PMH of R SFA/popliteal stent, presented with pain in RLE, found to have acute on chronic limb ischemia. Now s/p RLE angiogram and percutaneous thrombectomy (07/19) of SFA and popliteal. At post op check had positive signal in AT and PT. Now off pressors, moved from SICU to floors.     # PAD   S/P ANGIO   Possible YANIRA/PVR  plan for possible Bypass  vascular follow up   ASA, Statin and heparin drip   monitor hgb level   Echo noted   pending stress test   patient is a moderate risk candidate for OR, pending stress test     # Hx of CAD  S/P STent   Echo noted  ASA and statin   Check stress test   CHeck TSH, A!C     # Electrolytes imbalance  replete electrolyes    # Anemia   Anemia W/U      Patient is a 62 y/o female with PMH of R SFA/popliteal stent, presented with pain in RLE, found to have acute on chronic limb ischemia. Now s/p RLE angiogram and percutaneous thrombectomy (07/19) of SFA and popliteal. At post op check had positive signal in AT and PT. Now off pressors, moved from SICU to floors.     # PAD   S/P ANGIO   Possible YANIRA/PVR  plan for possible Bypass  vascular follow up   ASA, Statin and heparin drip   monitor hgb level   Echo noted   pending stress test   patient is a moderate risk candidate for OR, pending stress test     # Hx of CAD  S/P STent   Echo noted  ASA and statin   Check stress test   CHeck TSH, A!C     # Electrolytes imbalance  replete electrolyes    # Anemia   Anemia W/U   Keep hgb above 8 if possible      Patient is a 64 y/o female with PMH of R SFA/popliteal stent, presented with pain in RLE, found to have acute on chronic limb ischemia. Now s/p RLE angiogram and percutaneous thrombectomy (07/19) of SFA and popliteal. At post op check had positive signal in AT and PT. Now off pressors, moved from SICU to floors.     # PAD   S/P ANGIO   Possible YANIRA/PVR  plan for possible Bypass  vascular follow up   ASA, Statin and heparin drip   monitor hgb level   Echo noted   pending stress test   patient is a moderate risk candidate for OR, pending stress test     # Hx of CAD  S/P STent   Echo noted  ASA and statin   Check stress test   CHeck TSH, A!C     # Electrolytes imbalance  replete electrolyes    # Anemia   Anemia W/U   Keep hgb above 8 if possible

## 2023-07-22 NOTE — CONSULT NOTE ADULT - ASSESSMENT
PAD  on a/c  plan for bypass    CAD  history of stent  low dose asa is recommended   cont statin   echo shows normal LV function     Pre-Operative Cardiac Risk Stratification and Optimization   Based on patient history and physical exam, the patient is considered to have elevated risk   obtain stress test      Advanced care planning was discussed with patient and family.  Risks, benefits and alternatives of the cardiac treatments and medical therapy including procedures were discussed in detail and all questions were answered. Importance of compliance with medical therapy and lifestyle modification to improve cardiovascular health were addressed. Appropriate forms and patient educational materials were reviewed. 30 minutes face to face spent.

## 2023-07-22 NOTE — CONSULT NOTE ADULT - SUBJECTIVE AND OBJECTIVE BOX
CHIEF COMPLAINT:Patient is a 63y old  Female who presents with a chief complaint of Acute on Chronic limb ischemia, RLE (21 Jul 2023 07:59)      HISTORY OF PRESENT ILLNESS:    63 female with history of cad s/p stents, does not remember when, CVA   PAD s/p SFA stent  admitted with acute limb ischemia planned for bypass  No chest pain, dyspnea, palpitation, or dizziness.   exercise capacity is less than 4 METs.      PAST MEDICAL & SURGICAL HISTORY:  Stroke      PAD (peripheral artery disease)      Hypertension              MEDICATIONS:  heparin  Infusion 1100 Unit(s)/Hr IV Continuous <Continuous>      budesonide 160 MICROgram(s)/formoterol 4.5 MICROgram(s) Inhaler 2 Puff(s) Inhalation two times a day    oxyCODONE    IR 5 milliGRAM(s) Oral every 4 hours PRN  oxyCODONE    IR 10 milliGRAM(s) Oral every 4 hours PRN      atorvastatin 40 milliGRAM(s) Oral at bedtime        FAMILY HISTORY:  No pertinent family history in first degree relatives        Non-contributory    SOCIAL HISTORY:    No tobacco, drugs or etoh    Allergies    No Known Allergies    Intolerances    	    REVIEW OF SYSTEMS:  as above  The rest of the 14 points ROS reviewed and except above they are unremarkable.        PHYSICAL EXAM:  T(C): 37 (07-22-23 @ 02:40), Max: 37 (07-22-23 @ 02:40)  HR: 64 (07-22-23 @ 02:40) (64 - 95)  BP: 124/56 (07-22-23 @ 02:40) (118/62 - 154/71)  RR: 18 (07-22-23 @ 02:40) (18 - 18)  SpO2: 99% (07-22-23 @ 02:40) (98% - 100%)  Wt(kg): --  I&O's Summary    20 Jul 2023 07:01  -  21 Jul 2023 07:00  --------------------------------------------------------  IN: 995 mL / OUT: 1300 mL / NET: -305 mL        JVP: Normal  Neck: supple  Lung: clear   CV: S1 S2 , Murmur:  Abd: soft  Ext: No edema  neuro: Awake / alert  Psych: flat affect  Skin: normal      LABS/DATA:    TELEMETRY: 	    ECG:  	sinus, septal T wave inversion    	  CARDIAC MARKERS:      < from: Transthoracic Echocardiogram (07.18.23 @ 19:11) >  CONCLUSIONS:  1. Normal left atrium.  LA volume index = 18 cc/m2.  2. Normal left ventricular internal dimensions and wall  thicknesses.  3. Hyperdynamic  left ventricular systolic function. No  segmental wall motion abnormalities. LVEF calculated using  biplane is 76%.  4. Normal left ventricular diastolic function.  5. Normal right atrium.  6. Normal right ventricular size and function.  7. Normal tricuspid valve.   Mild tricuspid regurgitation.  8. Estimated pulmonary artery systolic pressure equals 42  mm Hg, assuming right atrial pressure equals 3  mm Hg,  consistent with mild pulmonary hypertension.  9. No pericardial effusion seen.  *** No previous Echo exam.  ------------------------------------------------------------------------  Confirmed on  7/19/2023 - 08:32:00 by Naila Bowling M.D.    < end of copied text >                                  8.7    7.71  )-----------( 134      ( 21 Jul 2023 07:00 )             24.9     07-21    137  |  103  |  11  ----------------------------<  81  3.4<L>   |  23  |  0.58    Ca    8.8      21 Jul 2023 07:00  Phos  2.5     07-21  Mg     1.60     07-21      proBNP:   Lipid Profile:   HgA1c:   TSH:

## 2023-07-23 ENCOUNTER — TRANSCRIPTION ENCOUNTER (OUTPATIENT)
Age: 63
End: 2023-07-23

## 2023-07-23 LAB
A1C WITH ESTIMATED AVERAGE GLUCOSE RESULT: 5.1 % — SIGNIFICANT CHANGE UP (ref 4–5.6)
ANION GAP SERPL CALC-SCNC: 12 MMOL/L — SIGNIFICANT CHANGE UP (ref 7–14)
APTT BLD: 60 SEC — HIGH (ref 27–36.3)
APTT BLD: 70.7 SEC — HIGH (ref 27–36.3)
BUN SERPL-MCNC: 7 MG/DL — SIGNIFICANT CHANGE UP (ref 7–23)
CALCIUM SERPL-MCNC: 9.7 MG/DL — SIGNIFICANT CHANGE UP (ref 8.4–10.5)
CHLORIDE SERPL-SCNC: 100 MMOL/L — SIGNIFICANT CHANGE UP (ref 98–107)
CO2 SERPL-SCNC: 26 MMOL/L — SIGNIFICANT CHANGE UP (ref 22–31)
CREAT SERPL-MCNC: 0.56 MG/DL — SIGNIFICANT CHANGE UP (ref 0.5–1.3)
EGFR: 103 ML/MIN/1.73M2 — SIGNIFICANT CHANGE UP
ESTIMATED AVERAGE GLUCOSE: 100 — SIGNIFICANT CHANGE UP
FERRITIN SERPL-MCNC: 499 NG/ML — HIGH (ref 13–330)
FOLATE SERPL-MCNC: 10.8 NG/ML — SIGNIFICANT CHANGE UP (ref 3.1–17.5)
GLUCOSE SERPL-MCNC: 91 MG/DL — SIGNIFICANT CHANGE UP (ref 70–99)
HCT VFR BLD CALC: 23.5 % — LOW (ref 34.5–45)
HGB BLD-MCNC: 7.8 G/DL — LOW (ref 11.5–15.5)
IRON SATN MFR SERPL: 39 % — SIGNIFICANT CHANGE UP (ref 14–50)
IRON SATN MFR SERPL: 66 UG/DL — SIGNIFICANT CHANGE UP (ref 30–160)
MAGNESIUM SERPL-MCNC: 1.5 MG/DL — LOW (ref 1.6–2.6)
MCHC RBC-ENTMCNC: 32.5 PG — SIGNIFICANT CHANGE UP (ref 27–34)
MCHC RBC-ENTMCNC: 33.2 GM/DL — SIGNIFICANT CHANGE UP (ref 32–36)
MCV RBC AUTO: 97.9 FL — SIGNIFICANT CHANGE UP (ref 80–100)
NRBC # BLD: 0 /100 WBCS — SIGNIFICANT CHANGE UP (ref 0–0)
NRBC # FLD: 0 K/UL — SIGNIFICANT CHANGE UP (ref 0–0)
PHOSPHATE SERPL-MCNC: 3 MG/DL — SIGNIFICANT CHANGE UP (ref 2.5–4.5)
PLATELET # BLD AUTO: 164 K/UL — SIGNIFICANT CHANGE UP (ref 150–400)
POTASSIUM SERPL-MCNC: 4 MMOL/L — SIGNIFICANT CHANGE UP (ref 3.5–5.3)
POTASSIUM SERPL-SCNC: 4 MMOL/L — SIGNIFICANT CHANGE UP (ref 3.5–5.3)
RBC # BLD: 2.4 M/UL — LOW (ref 3.8–5.2)
RBC # FLD: 15.8 % — HIGH (ref 10.3–14.5)
SODIUM SERPL-SCNC: 138 MMOL/L — SIGNIFICANT CHANGE UP (ref 135–145)
TIBC SERPL-MCNC: 170 UG/DL — LOW (ref 220–430)
TSH SERPL-MCNC: 2.52 UIU/ML — SIGNIFICANT CHANGE UP (ref 0.27–4.2)
UIBC SERPL-MCNC: 104 UG/DL — LOW (ref 110–370)
VIT B12 SERPL-MCNC: 634 PG/ML — SIGNIFICANT CHANGE UP (ref 200–900)
WBC # BLD: 8.36 K/UL — SIGNIFICANT CHANGE UP (ref 3.8–10.5)
WBC # FLD AUTO: 8.36 K/UL — SIGNIFICANT CHANGE UP (ref 3.8–10.5)

## 2023-07-23 PROCEDURE — 93016 CV STRESS TEST SUPVJ ONLY: CPT | Mod: GC

## 2023-07-23 PROCEDURE — 78452 HT MUSCLE IMAGE SPECT MULT: CPT | Mod: 26

## 2023-07-23 PROCEDURE — 93018 CV STRESS TEST I&R ONLY: CPT | Mod: GC

## 2023-07-23 RX ORDER — MAGNESIUM SULFATE 500 MG/ML
4 VIAL (ML) INJECTION ONCE
Refills: 0 | Status: COMPLETED | OUTPATIENT
Start: 2023-07-23 | End: 2023-07-23

## 2023-07-23 RX ORDER — SODIUM CHLORIDE 9 MG/ML
1000 INJECTION, SOLUTION INTRAVENOUS
Refills: 0 | Status: DISCONTINUED | OUTPATIENT
Start: 2023-07-23 | End: 2023-07-25

## 2023-07-23 RX ORDER — HEPARIN SODIUM 5000 [USP'U]/ML
1600 INJECTION INTRAVENOUS; SUBCUTANEOUS
Qty: 25000 | Refills: 0 | Status: DISCONTINUED | OUTPATIENT
Start: 2023-07-23 | End: 2023-07-24

## 2023-07-23 RX ADMIN — Medication 25 GRAM(S): at 18:53

## 2023-07-23 RX ADMIN — BUDESONIDE AND FORMOTEROL FUMARATE DIHYDRATE 2 PUFF(S): 160; 4.5 AEROSOL RESPIRATORY (INHALATION) at 09:29

## 2023-07-23 RX ADMIN — BUDESONIDE AND FORMOTEROL FUMARATE DIHYDRATE 2 PUFF(S): 160; 4.5 AEROSOL RESPIRATORY (INHALATION) at 21:25

## 2023-07-23 RX ADMIN — OXYCODONE HYDROCHLORIDE 10 MILLIGRAM(S): 5 TABLET ORAL at 19:15

## 2023-07-23 RX ADMIN — HEPARIN SODIUM 16 UNIT(S)/HR: 5000 INJECTION INTRAVENOUS; SUBCUTANEOUS at 02:02

## 2023-07-23 RX ADMIN — ATORVASTATIN CALCIUM 40 MILLIGRAM(S): 80 TABLET, FILM COATED ORAL at 21:26

## 2023-07-23 RX ADMIN — HEPARIN SODIUM 16 UNIT(S)/HR: 5000 INJECTION INTRAVENOUS; SUBCUTANEOUS at 07:46

## 2023-07-23 RX ADMIN — Medication 81 MILLIGRAM(S): at 14:47

## 2023-07-23 RX ADMIN — HEPARIN SODIUM 16 UNIT(S)/HR: 5000 INJECTION INTRAVENOUS; SUBCUTANEOUS at 20:42

## 2023-07-23 RX ADMIN — OXYCODONE HYDROCHLORIDE 10 MILLIGRAM(S): 5 TABLET ORAL at 19:45

## 2023-07-23 RX ADMIN — HEPARIN SODIUM 16 UNIT(S)/HR: 5000 INJECTION INTRAVENOUS; SUBCUTANEOUS at 20:47

## 2023-07-23 NOTE — PROGRESS NOTE ADULT - SUBJECTIVE AND OBJECTIVE BOX
C Team Surgery Progress Note     S: Patient resting comfortably on morning rounds. Pain well-controlled currently.        MEDICATIONS  (STANDING):  aspirin  chewable 81 milliGRAM(s) Oral every 24 hours  atorvastatin 40 milliGRAM(s) Oral at bedtime  budesonide 160 MICROgram(s)/formoterol 4.5 MICROgram(s) Inhaler 2 Puff(s) Inhalation two times a day  heparin  Infusion 1600 Unit(s)/Hr (16 mL/Hr) IV Continuous <Continuous>    MEDICATIONS  (PRN):  oxyCODONE    IR 5 milliGRAM(s) Oral every 4 hours PRN Moderate Pain (4 - 6)  oxyCODONE    IR 10 milliGRAM(s) Oral every 4 hours PRN Severe Pain (7 - 10)      T(C): 36.8 (07-23-23 @ 00:18), Max: 37 (07-22-23 @ 18:07)  HR: 100 (07-23-23 @ 00:18) (74 - 100)  BP: 111/87 (07-23-23 @ 00:18) (111/87 - 138/81)  RR: 18 (07-23-23 @ 00:18) (16 - 18)  SpO2: 99% (07-23-23 @ 00:18) (96% - 100%)          Physical Exam:  General: NAD, AOx3  Respiratory: No labored breathing  CV: pulse regularly present  MSK:   Pulses exam:     LABS:                        7.9    7.86  )-----------( 140      ( 22 Jul 2023 05:38 )             23.5     07-22    140  |  101  |  6<L>  ----------------------------<  84  3.7   |  25  |  0.56    Ca    9.2      22 Jul 2023 05:38  Phos  3.0     07-22  Mg     1.30     07-22           C Team Surgery Progress Note     S: Patient resting comfortably on morning rounds. Pain well-controlled currently. No complaints this AM but does report her roommate has been loud, made it difficult to sleep. Continues to endorse mild numbness/tingling of right foot (unchanged).      MEDICATIONS  (STANDING):  aspirin  chewable 81 milliGRAM(s) Oral every 24 hours  atorvastatin 40 milliGRAM(s) Oral at bedtime  budesonide 160 MICROgram(s)/formoterol 4.5 MICROgram(s) Inhaler 2 Puff(s) Inhalation two times a day  heparin  Infusion 1600 Unit(s)/Hr (16 mL/Hr) IV Continuous <Continuous>    MEDICATIONS  (PRN):  oxyCODONE    IR 5 milliGRAM(s) Oral every 4 hours PRN Moderate Pain (4 - 6)  oxyCODONE    IR 10 milliGRAM(s) Oral every 4 hours PRN Severe Pain (7 - 10)      T(C): 36.8 (07-23-23 @ 00:18), Max: 37 (07-22-23 @ 18:07)  HR: 100 (07-23-23 @ 00:18) (74 - 100)  BP: 111/87 (07-23-23 @ 00:18) (111/87 - 138/81)  RR: 18 (07-23-23 @ 00:18) (16 - 18)  SpO2: 99% (07-23-23 @ 00:18) (96% - 100%)          Physical Exam:  General: NAD, AOx3  Respiratory: No labored breathing  CV: pulse regularly present  Pulses exam: R AT and PT signals.   MSK: motility and sensation intact.    LABS:                        7.9    7.86  )-----------( 140      ( 22 Jul 2023 05:38 )             23.5     07-22    140  |  101  |  6<L>  ----------------------------<  84  3.7   |  25  |  0.56    Ca    9.2      22 Jul 2023 05:38  Phos  3.0     07-22  Mg     1.30     07-22

## 2023-07-23 NOTE — CHART NOTE - NSCHARTNOTEFT_GEN_A_CORE
Vascular Surgery     Added-on to OR tomorrow for RLE Femoral to Tibial Bypass w/ PTFE, Possible Completion Angiogram (#1 add-on)     - Appreciate cardiac optimization for OR   - Pre-op labs ordered  - NPO@MN/IVF  - Hold heparin ggt on way to the OR   - Consent to be obtained       C Team Surgery   [id number] Vascular Surgery     Added-on to OR tomorrow for RLE Femoral to Tibial Bypass w/ PTFE, Possible Completion Angiogram (#1 add-on)     - Appreciate cardiac optimization for OR   - Pre-op labs ordered  - NPO@MN/IVF  - Hold heparin ggt on way to the OR   - Consent to be obtained       C Team Surgery   [id number] Vascular Surgery     Added-on to OR tomorrow for RLE Femoral to Tibial Bypass w/ PTFE, Possible Completion Angiogram (#1 add-on)     - Appreciate cardiac optimization for OR   - Pre-op labs ordered  - NPO@MN/IVF  - Hold heparin ggt on way to the OR   - Consent to be obtained       C Team Surgery   [id number]

## 2023-07-23 NOTE — PROGRESS NOTE ADULT - SUBJECTIVE AND OBJECTIVE BOX
Name of Patient : BRE HODGE  MRN: [id number]  Date of visit: 07-23-23       Subjective: Patient seen and examined. No new events except as noted.   doing okay      REVIEW OF SYSTEMS:    CONSTITUTIONAL: No weakness, fevers or chills  EYES/ENT: No visual changes;  No vertigo or throat pain   NECK: No pain or stiffness  RESPIRATORY: No cough, wheezing, hemoptysis; No shortness of breath  CARDIOVASCULAR: No chest pain or palpitations  GASTROINTESTINAL: No abdominal or epigastric pain. No nausea, vomiting, or hematemesis; No diarrhea or constipation. No melena or hematochezia.  GENITOURINARY: No dysuria, frequency or hematuria  NEUROLOGICAL: No numbness or weakness  SKIN: No itching, burning, rashes, or lesions   All other review of systems is negative unless indicated above.    MEDICATIONS:  MEDICATIONS  (STANDING):  aspirin  chewable 81 milliGRAM(s) Oral every 24 hours  atorvastatin 40 milliGRAM(s) Oral at bedtime  budesonide 160 MICROgram(s)/formoterol 4.5 MICROgram(s) Inhaler 2 Puff(s) Inhalation two times a day  heparin  Infusion 1600 Unit(s)/Hr (16 mL/Hr) IV Continuous <Continuous>  lactated ringers. 1000 milliLiter(s) (75 mL/Hr) IV Continuous <Continuous>      PHYSICAL EXAM:  T(C): 37 (07-23-23 @ 17:26), Max: 37.2 (07-23-23 @ 07:46)  HR: 96 (07-23-23 @ 17:26) (68 - 100)  BP: 110/77 (07-23-23 @ 17:26) (110/77 - 119/74)  RR: 17 (07-23-23 @ 17:26) (16 - 18)  SpO2: 100% (07-23-23 @ 17:26) (99% - 100%)  Wt(kg): --  I&O's Summary        Appearance: Normal	  HEENT:  PERRLA   Lymphatic: No lymphadenopathy   Cardiovascular: Normal S1 S2, no JVD  Respiratory: normal effort , clear  Gastrointestinal:  Soft, Non-tender  Skin: No rashes,  warm to touch  Psychiatry:  Mood & affect appropriate  Musculuskeletal: No edema    recent labs, Imaging and EKGs personally reviewed                           7.8    8.36  )-----------( 164      ( 23 Jul 2023 06:37 )             23.5               07-23    138  |  100  |  7   ----------------------------<  91  4.0   |  26  |  0.56    Ca    9.7      23 Jul 2023 06:37  Phos  3.0     07-23  Mg     1.50     07-23      PTT - ( 23 Jul 2023 06:37 )  PTT:70.7 sec                   Urinalysis Basic - ( 23 Jul 2023 06:37 )    Color: x / Appearance: x / SG: x / pH: x  Gluc: 91 mg/dL / Ketone: x  / Bili: x / Urobili: x   Blood: x / Protein: x / Nitrite: x   Leuk Esterase: x / RBC: x / WBC x   Sq Epi: x / Non Sq Epi: x / Bacteria: x                     Name of Patient : BRE HODGE  MRN: [id number]  Date of visit: 07-23-23       Subjective: Patient seen and examined. No new events except as noted.   doing okay      REVIEW OF SYSTEMS:    CONSTITUTIONAL: No weakness, fevers or chills  EYES/ENT: No visual changes;  No vertigo or throat pain   NECK: No pain or stiffness  RESPIRATORY: No cough, wheezing, hemoptysis; No shortness of breath  CARDIOVASCULAR: No chest pain or palpitations  GASTROINTESTINAL: No abdominal or epigastric pain. No nausea, vomiting, or hematemesis; No diarrhea or constipation. No melena or hematochezia.  GENITOURINARY: No dysuria, frequency or hematuria  NEUROLOGICAL: No numbness or weakness  SKIN: No itching, burning, rashes, or lesions   All other review of systems is negative unless indicated above.    MEDICATIONS:  MEDICATIONS  (STANDING):  aspirin  chewable 81 milliGRAM(s) Oral every 24 hours  atorvastatin 40 milliGRAM(s) Oral at bedtime  budesonide 160 MICROgram(s)/formoterol 4.5 MICROgram(s) Inhaler 2 Puff(s) Inhalation two times a day  heparin  Infusion 1600 Unit(s)/Hr (16 mL/Hr) IV Continuous <Continuous>  lactated ringers. 1000 milliLiter(s) (75 mL/Hr) IV Continuous <Continuous>      PHYSICAL EXAM:  T(C): 37 (07-23-23 @ 17:26), Max: 37.2 (07-23-23 @ 07:46)  HR: 96 (07-23-23 @ 17:26) (68 - 100)  BP: 110/77 (07-23-23 @ 17:26) (110/77 - 119/74)  RR: 17 (07-23-23 @ 17:26) (16 - 18)  SpO2: 100% (07-23-23 @ 17:26) (99% - 100%)  Wt(kg): --  I&O's Summary        Appearance: Normal	  HEENT:  PERRLA   Lymphatic: No lymphadenopathy   Cardiovascular: Normal S1 S2, no JVD  Respiratory: normal effort , clear  Gastrointestinal:  Soft, Non-tender  Skin: No rashes,  warm to touch  Psychiatry:  Mood & affect appropriate  Musculuskeletal: No edema    recent labs, Imaging and EKGs personally reviewed                           7.8    8.36  )-----------( 164      ( 23 Jul 2023 06:37 )             23.5               07-23    138  |  100  |  7   ----------------------------<  91  4.0   |  26  |  0.56    Ca    9.7      23 Jul 2023 06:37  Phos  3.0     07-23  Mg     1.50     07-23      PTT - ( 23 Jul 2023 06:37 )  PTT:70.7 sec                   Urinalysis Basic - ( 23 Jul 2023 06:37 )    Color: x / Appearance: x / SG: x / pH: x  Gluc: 91 mg/dL / Ketone: x  / Bili: x / Urobili: x   Blood: x / Protein: x / Nitrite: x   Leuk Esterase: x / RBC: x / WBC x   Sq Epi: x / Non Sq Epi: x / Bacteria: x                     Name of Patient : BRE HODGE  MRN: [id number]  Date of visit: 07-23-23       Subjective: Patient seen and examined. No new events except as noted.   doing okay      REVIEW OF SYSTEMS:    CONSTITUTIONAL: No weakness, fevers or chills  EYES/ENT: No visual changes;  No vertigo or throat pain   NECK: No pain or stiffness  RESPIRATORY: No cough, wheezing, hemoptysis; No shortness of breath  CARDIOVASCULAR: No chest pain or palpitations  GASTROINTESTINAL: No abdominal or epigastric pain. No nausea, vomiting, or hematemesis; No diarrhea or constipation. No melena or hematochezia.  GENITOURINARY: No dysuria, frequency or hematuria  NEUROLOGICAL: No numbness or weakness  SKIN: No itching, burning, rashes, or lesions   All other review of systems is negative unless indicated above.    MEDICATIONS:  MEDICATIONS  (STANDING):  aspirin  chewable 81 milliGRAM(s) Oral every 24 hours  atorvastatin 40 milliGRAM(s) Oral at bedtime  budesonide 160 MICROgram(s)/formoterol 4.5 MICROgram(s) Inhaler 2 Puff(s) Inhalation two times a day  heparin  Infusion 1600 Unit(s)/Hr (16 mL/Hr) IV Continuous <Continuous>  lactated ringers. 1000 milliLiter(s) (75 mL/Hr) IV Continuous <Continuous>      PHYSICAL EXAM:  T(C): 37 (07-23-23 @ 17:26), Max: 37.2 (07-23-23 @ 07:46)  HR: 96 (07-23-23 @ 17:26) (68 - 100)  BP: 110/77 (07-23-23 @ 17:26) (110/77 - 119/74)  RR: 17 (07-23-23 @ 17:26) (16 - 18)  SpO2: 100% (07-23-23 @ 17:26) (99% - 100%)  Wt(kg): --  I&O's Summary        Appearance: Normal	  HEENT:  PERRLA   Lymphatic: No lymphadenopathy   Cardiovascular: Normal S1 S2, no JVD  Respiratory: normal effort , clear  Gastrointestinal:  Soft, Non-tender  Skin: No rashes,  warm to touch  Psychiatry:  Mood & affect appropriate  Musculuskeletal: No edema    recent labs, Imaging and EKGs personally reviewed                           7.8    8.36  )-----------( 164      ( 23 Jul 2023 06:37 )             23.5               07-23    138  |  100  |  7   ----------------------------<  91  4.0   |  26  |  0.56    Ca    9.7      23 Jul 2023 06:37  Phos  3.0     07-23  Mg     1.50     07-23      PTT - ( 23 Jul 2023 06:37 )  PTT:70.7 sec                   Urinalysis Basic - ( 23 Jul 2023 06:37 )    Color: x / Appearance: x / SG: x / pH: x  Gluc: 91 mg/dL / Ketone: x  / Bili: x / Urobili: x   Blood: x / Protein: x / Nitrite: x   Leuk Esterase: x / RBC: x / WBC x   Sq Epi: x / Non Sq Epi: x / Bacteria: x

## 2023-07-23 NOTE — PROGRESS NOTE ADULT - ASSESSMENT
62 y/o woman with a PMH of R SFA/popliteal stent, presented with pain in RLE, found to have acute on chronic limb ischemia. Now s/p RLE angiogram and percutaneous thrombectomy (07/19) of SFA and popliteal. At post op check had positive signal in AT and PT. Now off pressors, moved from SICU to floors. She was seen by cardiology for clearance for possible bypass.     Plan:  - c/w heparin drip, plan to switch to oral AC pending YANIRA/PVR  - has not had melena for 3 days, continue to monitor H/H  - PT recs home w/ outpatient PT  - Cards recs stress test, ASA, statin 64 y/o woman with a PMH of R SFA/popliteal stent, presented with pain in RLE, found to have acute on chronic limb ischemia. Now s/p RLE angiogram and percutaneous thrombectomy (07/19) of SFA and popliteal. At post op check had positive signal in AT and PT. Now off pressors, moved from SICU to floors. She was seen by cardiology for clearance for possible bypass.     Plan:  - c/w heparin drip, plan to switch to oral AC pending YANIRA/PVR  - has not had melena for 3 days, continue to monitor H/H  - PT recs home w/ outpatient PT  - Cards recs stress test, ASA, statin 62 y/o woman with a PMH of R SFA/popliteal stent, presented with pain in RLE, found to have acute on chronic limb ischemia. Now s/p RLE angiogram and percutaneous thrombectomy (07/19) of SFA and popliteal. At post op check had positive signal in AT and PT. Now off pressors, moved from SICU to floors. She was seen by cardiology for clearance for possible bypass. Plan for stress test today.     Plan:  - c/w heparin drip, plan to switch to oral AC pending YANIRA/PVR  - PTT finally therapeutic this AM, f/u 6am level  - PT recs home w/ outpatient PT  - Cards recs stress test, ASA, statin 64 y/o woman with a PMH of R SFA/popliteal stent, presented with pain in RLE, found to have acute on chronic limb ischemia. Now s/p RLE angiogram and percutaneous thrombectomy (07/19) of SFA and popliteal. At post op check had positive signal in AT and PT. Now off pressors, moved from SICU to floors. She was seen by cardiology for clearance for possible bypass. Plan for stress test today.     Plan:  - c/w heparin drip, plan to switch to oral AC pending YANIRA/PVR  - PTT finally therapeutic this AM, f/u 6am level  - PT recs home w/ outpatient PT  - Cards recs stress test, ASA, statin 62 y/o woman with a PMH of R SFA/popliteal stent, presented with pain in RLE, found to have acute on chronic limb ischemia. Now s/p RLE angiogram and percutaneous thrombectomy (07/19) of SFA and popliteal. At post op check had positive signal in AT and PT. Now off pressors, moved from SICU to floors. She was seen by cardiology for clearance for possible bypass. Plan for stress test today.     Plan:  - c/w heparin drip, plan to switch to oral AC pending YANIRA/PVR  - PTT finally therapeutic this AM, f/u 6am level (70.7)  - PT recs home w/ outpatient PT  - Cards recs stress test, ASA, statin 64 y/o woman with a PMH of R SFA/popliteal stent, presented with pain in RLE, found to have acute on chronic limb ischemia. Now s/p RLE angiogram and percutaneous thrombectomy (07/19) of SFA and popliteal. At post op check had positive signal in AT and PT. Now off pressors, moved from SICU to floors. She was seen by cardiology for clearance for possible bypass. Plan for stress test today.     Plan:  - c/w heparin drip, plan to switch to oral AC pending YANIRA/PVR  - PTT finally therapeutic this AM, f/u 6am level (70.7)  - PT recs home w/ outpatient PT  - Cards recs stress test, ASA, statin

## 2023-07-23 NOTE — PROGRESS NOTE ADULT - ASSESSMENT
PAD  on a/c  plan for bypass    CAD  history of stent  cont asa   cont statin   echo shows normal LV function     Pre-Operative Cardiac Risk Stratification and Optimization   Based on patient history and physical exam, the patient is considered to have elevated risk   obtain stress test      Advanced care planning was discussed with patient and family.  Risks, benefits and alternatives of the cardiac treatments and medical therapy including procedures were discussed in detail and all questions were answered. Importance of compliance with medical therapy and lifestyle modification to improve cardiovascular health were addressed. Appropriate forms and patient educational materials were reviewed. 30 minutes face to face spent.

## 2023-07-23 NOTE — PROGRESS NOTE ADULT - SUBJECTIVE AND OBJECTIVE BOX
Subjective: Patient seen and examined. No new events except as noted.     SUBJECTIVE/ROS:        MEDICATIONS:  MEDICATIONS  (STANDING):  aspirin  chewable 81 milliGRAM(s) Oral every 24 hours  atorvastatin 40 milliGRAM(s) Oral at bedtime  budesonide 160 MICROgram(s)/formoterol 4.5 MICROgram(s) Inhaler 2 Puff(s) Inhalation two times a day  heparin  Infusion 1600 Unit(s)/Hr (16 mL/Hr) IV Continuous <Continuous>      PHYSICAL EXAM:  T(C): 36.8 (07-23-23 @ 00:18), Max: 37 (07-22-23 @ 18:07)  HR: 100 (07-23-23 @ 00:18) (74 - 100)  BP: 111/87 (07-23-23 @ 00:18) (111/87 - 138/81)  RR: 18 (07-23-23 @ 00:18) (16 - 18)  SpO2: 99% (07-23-23 @ 00:18) (96% - 100%)  Wt(kg): --  I&O's Summary          JVP: Normal  Neck: supple  Lung: clear   CV: S1 S2 , Murmur:  Abd: soft  Ext: No edema  neuro: Awake / alert  Psych: flat affect  Skin: normal``    LABS/DATA:    CARDIAC MARKERS:                                7.8    8.36  )-----------( 164      ( 23 Jul 2023 06:37 )             23.5     07-23    138  |  100  |  7   ----------------------------<  91  4.0   |  26  |  0.56    Ca    9.7      23 Jul 2023 06:37  Phos  3.0     07-23  Mg     1.50     07-23      proBNP:   Lipid Profile:   HgA1c:   TSH:     TELE:  EKG:

## 2023-07-23 NOTE — PROGRESS NOTE ADULT - ASSESSMENT
Patient is a 62 y/o female with PMH of R SFA/popliteal stent, presented with pain in RLE, found to have acute on chronic limb ischemia. Now s/p RLE angiogram and percutaneous thrombectomy (07/19) of SFA and popliteal. At post op check had positive signal in AT and PT. Now off pressors, moved from SICU to floors.     # PAD   S/P ANGIO    YANIRA/PVR  plan for Bypass  vascular follow up   ASA, Statin and heparin drip   monitor hgb level   Echo noted   stress test noted, normal   patient is a moderate risk candidate for OR    # Hx of CAD  S/P STent   Echo noted  ASA and statin   Check stress test   CHeck TSH, A!C     # Electrolytes imbalance  replete electrolyses  Monitor daily     # Anemia   Anemia W/U   Keep hgb above 8 if possible      Patient is a 64 y/o female with PMH of R SFA/popliteal stent, presented with pain in RLE, found to have acute on chronic limb ischemia. Now s/p RLE angiogram and percutaneous thrombectomy (07/19) of SFA and popliteal. At post op check had positive signal in AT and PT. Now off pressors, moved from SICU to floors.     # PAD   S/P ANGIO    YANIRA/PVR  plan for Bypass  vascular follow up   ASA, Statin and heparin drip   monitor hgb level   Echo noted   stress test noted, normal   patient is a moderate risk candidate for OR    # Hx of CAD  S/P STent   Echo noted  ASA and statin   Check stress test   CHeck TSH, A!C     # Electrolytes imbalance  replete electrolyses  Monitor daily     # Anemia   Anemia W/U   Keep hgb above 8 if possible

## 2023-07-24 LAB
ANION GAP SERPL CALC-SCNC: 11 MMOL/L — SIGNIFICANT CHANGE UP (ref 7–14)
ANION GAP SERPL CALC-SCNC: 9 MMOL/L — SIGNIFICANT CHANGE UP (ref 7–14)
APTT BLD: 104.4 SEC — HIGH (ref 27–36.3)
BLD GP AB SCN SERPL QL: NEGATIVE — SIGNIFICANT CHANGE UP
BUN SERPL-MCNC: 10 MG/DL — SIGNIFICANT CHANGE UP (ref 7–23)
BUN SERPL-MCNC: 7 MG/DL — SIGNIFICANT CHANGE UP (ref 7–23)
CALCIUM SERPL-MCNC: 9.8 MG/DL — SIGNIFICANT CHANGE UP (ref 8.4–10.5)
CHLORIDE SERPL-SCNC: 100 MMOL/L — SIGNIFICANT CHANGE UP (ref 98–107)
CHLORIDE SERPL-SCNC: 102 MMOL/L — SIGNIFICANT CHANGE UP (ref 98–107)
CO2 SERPL-SCNC: 27 MMOL/L — SIGNIFICANT CHANGE UP (ref 22–31)
CREAT SERPL-MCNC: 0.64 MG/DL — SIGNIFICANT CHANGE UP (ref 0.5–1.3)
CREAT SERPL-MCNC: 0.66 MG/DL — SIGNIFICANT CHANGE UP (ref 0.5–1.3)
EGFR: 100 ML/MIN/1.73M2 — SIGNIFICANT CHANGE UP
EGFR: 99 ML/MIN/1.73M2 — SIGNIFICANT CHANGE UP
GAS PNL BLDA: SIGNIFICANT CHANGE UP
GLUCOSE SERPL-MCNC: 103 MG/DL — HIGH (ref 70–99)
GLUCOSE SERPL-MCNC: 97 MG/DL — SIGNIFICANT CHANGE UP (ref 70–99)
HCT VFR BLD CALC: 25.3 % — LOW (ref 34.5–45)
HCT VFR BLD CALC: 26.4 % — LOW (ref 34.5–45)
HGB BLD-MCNC: 8.4 G/DL — LOW (ref 11.5–15.5)
HGB BLD-MCNC: 8.8 G/DL — LOW (ref 11.5–15.5)
INR BLD: 0.97 RATIO — SIGNIFICANT CHANGE UP (ref 0.88–1.16)
INTERPRETATION 24H UR IFE-IMP: SIGNIFICANT CHANGE UP
INTERPRETATION SERPL IFE-IMP: SIGNIFICANT CHANGE UP
MAGNESIUM SERPL-MCNC: 1.7 MG/DL — SIGNIFICANT CHANGE UP (ref 1.6–2.6)
MAGNESIUM SERPL-MCNC: 2.7 MG/DL — HIGH (ref 1.6–2.6)
MCHC RBC-ENTMCNC: 31.7 PG — SIGNIFICANT CHANGE UP (ref 27–34)
MCHC RBC-ENTMCNC: 33.1 PG — SIGNIFICANT CHANGE UP (ref 27–34)
MCHC RBC-ENTMCNC: 33.2 GM/DL — SIGNIFICANT CHANGE UP (ref 32–36)
MCHC RBC-ENTMCNC: 33.3 GM/DL — SIGNIFICANT CHANGE UP (ref 32–36)
MCV RBC AUTO: 95 FL — SIGNIFICANT CHANGE UP (ref 80–100)
MCV RBC AUTO: 99.6 FL — SIGNIFICANT CHANGE UP (ref 80–100)
NRBC # BLD: 0 /100 WBCS — SIGNIFICANT CHANGE UP (ref 0–0)
NRBC # FLD: 0.02 K/UL — HIGH (ref 0–0)
NRBC # FLD: 0.03 K/UL — HIGH (ref 0–0)
PHOSPHATE SERPL-MCNC: 3.7 MG/DL — SIGNIFICANT CHANGE UP (ref 2.5–4.5)
PHOSPHATE SERPL-MCNC: 4.3 MG/DL — SIGNIFICANT CHANGE UP (ref 2.5–4.5)
PLATELET # BLD AUTO: 163 K/UL — SIGNIFICANT CHANGE UP (ref 150–400)
PLATELET # BLD AUTO: 212 K/UL — SIGNIFICANT CHANGE UP (ref 150–400)
POTASSIUM SERPL-MCNC: 4 MMOL/L — SIGNIFICANT CHANGE UP (ref 3.5–5.3)
POTASSIUM SERPL-MCNC: 4.3 MMOL/L — SIGNIFICANT CHANGE UP (ref 3.5–5.3)
POTASSIUM SERPL-SCNC: 4 MMOL/L — SIGNIFICANT CHANGE UP (ref 3.5–5.3)
POTASSIUM SERPL-SCNC: 4.3 MMOL/L — SIGNIFICANT CHANGE UP (ref 3.5–5.3)
PROTHROM AB SERPL-ACNC: 11.3 SEC — SIGNIFICANT CHANGE UP (ref 10.5–13.4)
RBC # BLD: 2.54 M/UL — LOW (ref 3.8–5.2)
RBC # BLD: 2.78 M/UL — LOW (ref 3.8–5.2)
RBC # FLD: 15.8 % — HIGH (ref 10.3–14.5)
RBC # FLD: 17.3 % — HIGH (ref 10.3–14.5)
RH IG SCN BLD-IMP: POSITIVE — SIGNIFICANT CHANGE UP
SODIUM SERPL-SCNC: 136 MMOL/L — SIGNIFICANT CHANGE UP (ref 135–145)
SODIUM SERPL-SCNC: 140 MMOL/L — SIGNIFICANT CHANGE UP (ref 135–145)
WBC # BLD: 11.71 K/UL — HIGH (ref 3.8–10.5)
WBC # BLD: 9.26 K/UL — SIGNIFICANT CHANGE UP (ref 3.8–10.5)
WBC # FLD AUTO: 11.71 K/UL — HIGH (ref 3.8–10.5)
WBC # FLD AUTO: 9.26 K/UL — SIGNIFICANT CHANGE UP (ref 3.8–10.5)

## 2023-07-24 PROCEDURE — 35666 BPG FEM-ANT TIB PST TIB/PRNL: CPT

## 2023-07-24 DEVICE — KIT A-LINE 1LUM 20G X 12CM SAFE KIT: Type: IMPLANTABLE DEVICE | Status: FUNCTIONAL

## 2023-07-24 DEVICE — SURGIFOAM PAD 8CM X 12.5CM X 2MM (100C): Type: IMPLANTABLE DEVICE | Status: FUNCTIONAL

## 2023-07-24 DEVICE — CLIP APPLIER ETHICON LIGACLIP 9 3/8" SMALL: Type: IMPLANTABLE DEVICE | Status: FUNCTIONAL

## 2023-07-24 DEVICE — GRAFT VASC PROPATEN 6MMX60X80CM TW REMOV RING: Type: IMPLANTABLE DEVICE | Status: FUNCTIONAL

## 2023-07-24 DEVICE — CLIP APPLIER COVIDIEN SURGICLIP 11.5" MEDIUM: Type: IMPLANTABLE DEVICE | Status: FUNCTIONAL

## 2023-07-24 RX ORDER — ACETAMINOPHEN 500 MG
1000 TABLET ORAL EVERY 6 HOURS
Refills: 0 | Status: DISCONTINUED | OUTPATIENT
Start: 2023-07-24 | End: 2023-08-01

## 2023-07-24 RX ORDER — HYDROMORPHONE HYDROCHLORIDE 2 MG/ML
1 INJECTION INTRAMUSCULAR; INTRAVENOUS; SUBCUTANEOUS
Refills: 0 | Status: DISCONTINUED | OUTPATIENT
Start: 2023-07-24 | End: 2023-07-24

## 2023-07-24 RX ORDER — HYDROMORPHONE HYDROCHLORIDE 2 MG/ML
0.5 INJECTION INTRAMUSCULAR; INTRAVENOUS; SUBCUTANEOUS
Refills: 0 | Status: DISCONTINUED | OUTPATIENT
Start: 2023-07-24 | End: 2023-07-24

## 2023-07-24 RX ORDER — ONDANSETRON 8 MG/1
4 TABLET, FILM COATED ORAL ONCE
Refills: 0 | Status: DISCONTINUED | OUTPATIENT
Start: 2023-07-24 | End: 2023-07-24

## 2023-07-24 RX ORDER — HEPARIN SODIUM 5000 [USP'U]/ML
5000 INJECTION INTRAVENOUS; SUBCUTANEOUS EVERY 8 HOURS
Refills: 0 | Status: DISCONTINUED | OUTPATIENT
Start: 2023-07-24 | End: 2023-07-25

## 2023-07-24 RX ADMIN — Medication 1000 MILLIGRAM(S): at 22:31

## 2023-07-24 RX ADMIN — BUDESONIDE AND FORMOTEROL FUMARATE DIHYDRATE 2 PUFF(S): 160; 4.5 AEROSOL RESPIRATORY (INHALATION) at 22:31

## 2023-07-24 RX ADMIN — HEPARIN SODIUM 15 UNIT(S)/HR: 5000 INJECTION INTRAVENOUS; SUBCUTANEOUS at 02:11

## 2023-07-24 RX ADMIN — Medication 1000 MILLIGRAM(S): at 23:01

## 2023-07-24 RX ADMIN — HEPARIN SODIUM 5000 UNIT(S): 5000 INJECTION INTRAVENOUS; SUBCUTANEOUS at 22:32

## 2023-07-24 RX ADMIN — ATORVASTATIN CALCIUM 40 MILLIGRAM(S): 80 TABLET, FILM COATED ORAL at 22:30

## 2023-07-24 NOTE — BRIEF OPERATIVE NOTE - OPERATION/FINDINGS
R CFA to PT bypass with PTFE   PT signal
Right lower extremity angiogram with percutaneous thrombectomy of the superficial femoral and popliteal arteries.
RLE stent from SFA to distal popliteal artery as well as peroneal artery. Occluded SFA/Popliteal stent. No runoff below the knee. Lysis catheter left in place. tPA to run at 10 cc/hr through catheter and heparin to run at 9 cc/hr through sheath sideport.

## 2023-07-24 NOTE — PROGRESS NOTE ADULT - SUBJECTIVE AND OBJECTIVE BOX
C Team Surgery Progress Note     S: Patient resting comfortably on morning rounds. Pain and LLE swelling have improved.    MEDICATIONS  (STANDING):  aspirin  chewable 81 milliGRAM(s) Oral every 24 hours  atorvastatin 40 milliGRAM(s) Oral at bedtime  budesonide 160 MICROgram(s)/formoterol 4.5 MICROgram(s) Inhaler 2 Puff(s) Inhalation two times a day  lactated ringers. 1000 milliLiter(s) (75 mL/Hr) IV Continuous <Continuous>    MEDICATIONS  (PRN):  oxyCODONE    IR 5 milliGRAM(s) Oral every 4 hours PRN Moderate Pain (4 - 6)  oxyCODONE    IR 10 milliGRAM(s) Oral every 4 hours PRN Severe Pain (7 - 10)      T(C): 36.3 (07-24-23 @ 06:50), Max: 37.2 (07-23-23 @ 07:46)  HR: 63 (07-24-23 @ 06:50) (63 - 96)  BP: 119/63 (07-24-23 @ 06:50) (100/57 - 132/59)  RR: 18 (07-24-23 @ 06:50) (16 - 18)  SpO2: 99% (07-24-23 @ 06:39) (99% - 100%)      Physical Exam:  General: NAD, AOx3  Respiratory: No labored breathing  CV: pulse regularly present  MSK: LLE and RLE motor and sensation intact  Abd: L groin soft  Pulses exam: +AT/PT signals on RLE  Dressing: LLE calf wound vac 450mL sanguinous output. Strikethrough noted on left lateral thigh.    LABS:                        8.4    11.71 )-----------( 212      ( 24 Jul 2023 01:00 )             25.3     07-24    136  |  100  |  10  ----------------------------<  103<H>  4.0   |  27  |  0.64    Ca    9.8      24 Jul 2023 01:00  Phos  3.7     07-24  Mg     2.70     07-24

## 2023-07-24 NOTE — PROGRESS NOTE ADULT - SUBJECTIVE AND OBJECTIVE BOX
Name of Patient : BRE HODGE  MRN: [id number]  Date of visit: 07-24-23       Subjective: Patient seen and examined. No new events except as noted.   Doing okay     REVIEW OF SYSTEMS:    CONSTITUTIONAL: No weakness, fevers or chills  EYES/ENT: No visual changes;  No vertigo or throat pain   NECK: No pain or stiffness  RESPIRATORY: No cough, wheezing, hemoptysis; No shortness of breath  CARDIOVASCULAR: No chest pain or palpitations  GASTROINTESTINAL: No abdominal or epigastric pain. No nausea, vomiting, or hematemesis; No diarrhea or constipation. No melena or hematochezia.  GENITOURINARY: No dysuria, frequency or hematuria  NEUROLOGICAL: No numbness or weakness  SKIN: No itching, burning, rashes, or lesions   All other review of systems is negative unless indicated above.    MEDICATIONS:  MEDICATIONS  (STANDING):  acetaminophen     Tablet .. 1000 milliGRAM(s) Oral every 6 hours  aspirin  chewable 81 milliGRAM(s) Oral every 24 hours  atorvastatin 40 milliGRAM(s) Oral at bedtime  budesonide 160 MICROgram(s)/formoterol 4.5 MICROgram(s) Inhaler 2 Puff(s) Inhalation two times a day  lactated ringers. 1000 milliLiter(s) (75 mL/Hr) IV Continuous <Continuous>      PHYSICAL EXAM:  T(C): 36.3 (07-24-23 @ 06:50), Max: 37.2 (07-23-23 @ 22:11)  HR: 63 (07-24-23 @ 06:50) (63 - 96)  BP: 119/63 (07-24-23 @ 06:50) (100/57 - 132/59)  RR: 18 (07-24-23 @ 06:50) (17 - 18)  SpO2: 99% (07-24-23 @ 06:39) (99% - 100%)  Wt(kg): --  I&O's Summary    23 Jul 2023 07:01  -  24 Jul 2023 07:00  --------------------------------------------------------  IN: 250 mL / OUT: 0 mL / NET: 250 mL      Height (cm): 160 (07-24 @ 07:37)  Weight (kg): 60 (07-24 @ 07:37)  BMI (kg/m2): 23.4 (07-24 @ 07:37)  BSA (m2): 1.62 (07-24 @ 07:37)    Appearance: Normal	  HEENT:  PERRLA   Lymphatic: No lymphadenopathy   Cardiovascular: Normal S1 S2, no JVD  Respiratory: normal effort , clear  Gastrointestinal:  Soft, Non-tender  Skin: No rashes,  warm to touch  Psychiatry:  Mood & affect appropriate  Musculuskeletal: No edema    recent labs, Imaging and EKGs personally reviewed     07-23-23 @ 07:01  -  07-24-23 @ 07:00  --------------------------------------------------------  IN: 250 mL / OUT: 0 mL / NET: 250 mL                        8.4    11.71 )-----------( 212      ( 24 Jul 2023 01:00 )             25.3               07-24    136  |  100  |  10  ----------------------------<  103<H>  4.0   |  27  |  0.64    Ca    9.8      24 Jul 2023 01:00  Phos  3.7     07-24  Mg     2.70     07-24      PT/INR - ( 24 Jul 2023 01:00 )   PT: 11.3 sec;   INR: 0.97 ratio         PTT - ( 24 Jul 2023 01:00 )  PTT:104.4 sec                 ABG - ( 24 Jul 2023 13:03 )  pH, Arterial: 7.47  pH, Blood: x     /  pCO2: 38    /  pO2: 164   / HCO3: 28    / Base Excess: 3.8   /  SaO2: 98.8              Urinalysis Basic - ( 24 Jul 2023 01:00 )    Color: x / Appearance: x / SG: x / pH: x  Gluc: 103 mg/dL / Ketone: x  / Bili: x / Urobili: x   Blood: x / Protein: x / Nitrite: x   Leuk Esterase: x / RBC: x / WBC x   Sq Epi: x / Non Sq Epi: x / Bacteria: x                 Name of Patient : BRE HODGE  MRN: [id number]  Date of visit: 07-24-23       Subjective: Patient seen and examined. No new events except as noted.   Doing okay     REVIEW OF SYSTEMS:    CONSTITUTIONAL: No weakness, fevers or chills  EYES/ENT: No visual changes;  No vertigo or throat pain   NECK: No pain or stiffness  RESPIRATORY: No cough, wheezing, hemoptysis; No shortness of breath  CARDIOVASCULAR: No chest pain or palpitations  GASTROINTESTINAL: No abdominal or epigastric pain. No nausea, vomiting, or hematemesis; No diarrhea or constipation. No melena or hematochezia.  GENITOURINARY: No dysuria, frequency or hematuria  NEUROLOGICAL: No numbness or weakness  SKIN: No itching, burning, rashes, or lesions   All other review of systems is negative unless indicated above.    MEDICATIONS:  MEDICATIONS  (STANDING):  acetaminophen     Tablet .. 1000 milliGRAM(s) Oral every 6 hours  aspirin  chewable 81 milliGRAM(s) Oral every 24 hours  atorvastatin 40 milliGRAM(s) Oral at bedtime  budesonide 160 MICROgram(s)/formoterol 4.5 MICROgram(s) Inhaler 2 Puff(s) Inhalation two times a day  lactated ringers. 1000 milliLiter(s) (75 mL/Hr) IV Continuous <Continuous>      PHYSICAL EXAM:  T(C): 36.3 (07-24-23 @ 06:50), Max: 37.2 (07-23-23 @ 22:11)  HR: 63 (07-24-23 @ 06:50) (63 - 96)  BP: 119/63 (07-24-23 @ 06:50) (100/57 - 132/59)  RR: 18 (07-24-23 @ 06:50) (17 - 18)  SpO2: 99% (07-24-23 @ 06:39) (99% - 100%)  Wt(kg): --  I&O's Summary    23 Jul 2023 07:01  -  24 Jul 2023 07:00  --------------------------------------------------------  IN: 250 mL / OUT: 0 mL / NET: 250 mL      Height (cm): 160 (07-24 @ 07:37)  Weight (kg): 60 (07-24 @ 07:37)  BMI (kg/m2): 23.4 (07-24 @ 07:37)  BSA (m2): 1.62 (07-24 @ 07:37)    Appearance: Normal	  HEENT:  PERRLA   Lymphatic: No lymphadenopathy   Cardiovascular: Normal S1 S2, no JVD  Respiratory: normal effort , clear  Gastrointestinal:  Soft, Non-tender  Skin: No rashes,  warm to touch  Psychiatry:  Mood & affect appropriate  Musculuskeletal: No edema    recent labs, Imaging and EKGs personally reviewed     07-23-23 @ 07:01  -  07-24-23 @ 07:00  --------------------------------------------------------  IN: 250 mL / OUT: 0 mL / NET: 250 mL                        8.4    11.71 )-----------( 212      ( 24 Jul 2023 01:00 )             25.3               07-24    136  |  100  |  10  ----------------------------<  103<H>  4.0   |  27  |  0.64    Ca    9.8      24 Jul 2023 01:00  Phos  3.7     07-24  Mg     2.70     07-24      PT/INR - ( 24 Jul 2023 01:00 )   PT: 11.3 sec;   INR: 0.97 ratio         PTT - ( 24 Jul 2023 01:00 )  PTT:104.4 sec                 ABG - ( 24 Jul 2023 13:03 )  pH, Arterial: 7.47  pH, Blood: x     /  pCO2: 38    /  pO2: 164   / HCO3: 28    / Base Excess: 3.8   /  SaO2: 98.8              Urinalysis Basic - ( 24 Jul 2023 01:00 )    Color: x / Appearance: x / SG: x / pH: x  Gluc: 103 mg/dL / Ketone: x  / Bili: x / Urobili: x   Blood: x / Protein: x / Nitrite: x   Leuk Esterase: x / RBC: x / WBC x   Sq Epi: x / Non Sq Epi: x / Bacteria: x                 Name of Patient : BRE HODGE  MRN: [id number]  Date of visit: 07-24-23       Subjective: Patient seen and examined. No new events except as noted.   Doing okay     REVIEW OF SYSTEMS:    CONSTITUTIONAL: No weakness, fevers or chills  EYES/ENT: No visual changes;  No vertigo or throat pain   NECK: No pain or stiffness  RESPIRATORY: No cough, wheezing, hemoptysis; No shortness of breath  CARDIOVASCULAR: No chest pain or palpitations  GASTROINTESTINAL: No abdominal or epigastric pain. No nausea, vomiting, or hematemesis; No diarrhea or constipation. No melena or hematochezia.  GENITOURINARY: No dysuria, frequency or hematuria  NEUROLOGICAL: No numbness or weakness  SKIN: No itching, burning, rashes, or lesions   All other review of systems is negative unless indicated above.    MEDICATIONS:  MEDICATIONS  (STANDING):  acetaminophen     Tablet .. 1000 milliGRAM(s) Oral every 6 hours  aspirin  chewable 81 milliGRAM(s) Oral every 24 hours  atorvastatin 40 milliGRAM(s) Oral at bedtime  budesonide 160 MICROgram(s)/formoterol 4.5 MICROgram(s) Inhaler 2 Puff(s) Inhalation two times a day  lactated ringers. 1000 milliLiter(s) (75 mL/Hr) IV Continuous <Continuous>      PHYSICAL EXAM:  T(C): 36.3 (07-24-23 @ 06:50), Max: 37.2 (07-23-23 @ 22:11)  HR: 63 (07-24-23 @ 06:50) (63 - 96)  BP: 119/63 (07-24-23 @ 06:50) (100/57 - 132/59)  RR: 18 (07-24-23 @ 06:50) (17 - 18)  SpO2: 99% (07-24-23 @ 06:39) (99% - 100%)  Wt(kg): --  I&O's Summary    23 Jul 2023 07:01  -  24 Jul 2023 07:00  --------------------------------------------------------  IN: 250 mL / OUT: 0 mL / NET: 250 mL      Height (cm): 160 (07-24 @ 07:37)  Weight (kg): 60 (07-24 @ 07:37)  BMI (kg/m2): 23.4 (07-24 @ 07:37)  BSA (m2): 1.62 (07-24 @ 07:37)    Appearance: Normal	  HEENT:  PERRLA   Lymphatic: No lymphadenopathy   Cardiovascular: Normal S1 S2, no JVD  Respiratory: normal effort , clear  Gastrointestinal:  Soft, Non-tender  Skin: No rashes,  warm to touch  Psychiatry:  Mood & affect appropriate  Musculuskeletal: No edema    recent labs, Imaging and EKGs personally reviewed     07-23-23 @ 07:01  -  07-24-23 @ 07:00  --------------------------------------------------------  IN: 250 mL / OUT: 0 mL / NET: 250 mL                        8.4    11.71 )-----------( 212      ( 24 Jul 2023 01:00 )             25.3               07-24    136  |  100  |  10  ----------------------------<  103<H>  4.0   |  27  |  0.64    Ca    9.8      24 Jul 2023 01:00  Phos  3.7     07-24  Mg     2.70     07-24      PT/INR - ( 24 Jul 2023 01:00 )   PT: 11.3 sec;   INR: 0.97 ratio         PTT - ( 24 Jul 2023 01:00 )  PTT:104.4 sec                 ABG - ( 24 Jul 2023 13:03 )  pH, Arterial: 7.47  pH, Blood: x     /  pCO2: 38    /  pO2: 164   / HCO3: 28    / Base Excess: 3.8   /  SaO2: 98.8              Urinalysis Basic - ( 24 Jul 2023 01:00 )    Color: x / Appearance: x / SG: x / pH: x  Gluc: 103 mg/dL / Ketone: x  / Bili: x / Urobili: x   Blood: x / Protein: x / Nitrite: x   Leuk Esterase: x / RBC: x / WBC x   Sq Epi: x / Non Sq Epi: x / Bacteria: x

## 2023-07-24 NOTE — PROGRESS NOTE ADULT - ASSESSMENT
64 y/o woman with a PMH of R SFA/popliteal stent, presented with pain in RLE, found to have acute on chronic limb ischemia. Now s/p RLE angiogram and percutaneous thrombectomy (07/19) of SFA and popliteal.    Plan:  - heparin drip held this am, OR today for Femoral -> Tibial bypass  - stress test was within normal limits  - Cardiology -> elevated risk, stress test obtained  - Medicine -> moderate risk candidate for OR  - Plastics to change graft site dressing today and change wound vac tmrw 62 y/o woman with a PMH of R SFA/popliteal stent, presented with pain in RLE, found to have acute on chronic limb ischemia. Now s/p RLE angiogram and percutaneous thrombectomy (07/19) of SFA and popliteal.    Plan:  - heparin drip held this am, OR today for Femoral -> Tibial bypass  - stress test was within normal limits  - Cardiology -> elevated risk, stress test obtained  - Medicine -> moderate risk candidate for OR  - Plastics to change graft site dressing today and change wound vac tmrw 64 y/o woman with a PMH of R SFA/popliteal stent, presented with pain in RLE, found to have acute on chronic limb ischemia. Now s/p RLE angiogram and percutaneous thrombectomy (07/19) of SFA and popliteal.    Plan:  - heparin drip held this am, OR today for Femoral -> Tibial bypass  - stress test was within normal limits  - Cardiology -> elevated risk, stress test obtained  - Medicine -> moderate risk candidate for OR 62 y/o woman with a PMH of R SFA/popliteal stent, presented with pain in RLE, found to have acute on chronic limb ischemia. Now s/p RLE angiogram and percutaneous thrombectomy (07/19) of SFA and popliteal.    Plan:  - heparin drip held this am, OR today for Femoral -> Tibial bypass  - stress test was within normal limits  - Cardiology -> elevated risk, stress test obtained  - Medicine -> moderate risk candidate for OR

## 2023-07-24 NOTE — BRIEF OPERATIVE NOTE - NSICDXBRIEFPOSTOP_GEN_ALL_CORE_FT
POST-OP DIAGNOSIS:  Insufficiency, arterial, peripheral 18-Jul-2023 15:39:29  Van Brunson  
POST-OP DIAGNOSIS:  Acute lower limb ischemia 24-Jul-2023 14:57:35  Steve Muir

## 2023-07-24 NOTE — BRIEF OPERATIVE NOTE - NSICDXBRIEFPROCEDURE_GEN_ALL_CORE_FT
PROCEDURES:  Replacement, catheter, for thrombolysis 18-Jul-2023 15:39:03  Van Brunson  
PROCEDURES:  Creation of bypass from femoral artery to distal tibial artery 24-Jul-2023 14:57:15  Steve Muir

## 2023-07-24 NOTE — PRE-OP CHECKLIST - INTERNAL PROSTHESES
Pt stated she has an implant in her butt/yes(specify) Pt stated she has an implant in her butt. cardiac stent/yes(specify) Pt stated she has an implant in her butt. cardiac and periphral stents/yes(specify)

## 2023-07-24 NOTE — CHART NOTE - NSCHARTNOTEFT_GEN_A_CORE
Surgery Post-Op Note    Procedure: Creation of bypass from femoral artery to distal tibial artery      Surgeon: Dr. Lawrence    SUBJECTIVE:  Pt seen and examined at the bedside. Pt w/ no complaints. Denies F/C/N/V. Denies pain. Endorses minimal numbness in toes. No pain in toes.    Pain controlled with medication.     OBJECTIVE:  Vital Signs Last 24 Hrs  T(C): 36.8 (24 Jul 2023 17:00), Max: 37.2 (23 Jul 2023 22:11)  T(F): 98.2 (24 Jul 2023 17:00), Max: 98.9 (23 Jul 2023 22:11)  HR: 69 (24 Jul 2023 18:30) (63 - 82)  BP: 116/70 (24 Jul 2023 18:30) (97/47 - 132/59)  BP(mean): 80 (24 Jul 2023 18:30) (60 - 80)  RR: 16 (24 Jul 2023 18:30) (14 - 18)  SpO2: 97% (24 Jul 2023 18:30) (93% - 100%)    Parameters below as of 24 Jul 2023 18:00  Patient On (Oxygen Delivery Method): room air    Physical Exam:  General: NAD, resting comfortably in bed  Neuro: A&O x 3, no focal deficits  Pulmonary: Nonlabored breathing, no respiratory distress  Cardiovascular: NSR  Abdominal: soft, nondistended, NTTP. No rebound or guarding.   Extremities: Right PT and DP signal on Doppler. Movement of right toes. Sensation of R toes and foot. Tenderness to right groin with overlaying bandage c/d/i. L groin soft.     LABS:                        8.8    9.26  )-----------( 163      ( 24 Jul 2023 15:18 )             26.4     07-24    140  |  102  |  7   ----------------------------<  97  4.3   |  27  |  0.66    Ca    9.8      24 Jul 2023 15:18  Phos  4.3     07-24  Mg     1.70     07-24      PT/INR - ( 24 Jul 2023 01:00 )   PT: 11.3 sec;   INR: 0.97 ratio         PTT - ( 24 Jul 2023 01:00 )  PTT:104.4 sec  CAPILLARY BLOOD GLUCOSE        Urinalysis Basic - ( 24 Jul 2023 15:18 )    Color: x / Appearance: x / SG: x / pH: x  Gluc: 97 mg/dL / Ketone: x  / Bili: x / Urobili: x   Blood: x / Protein: x / Nitrite: x   Leuk Esterase: x / RBC: x / WBC x   Sq Epi: x / Non Sq Epi: x / Bacteria: x    ASSESSMENT:63y Female now 4hours s/p Creation of bypass from femoral artery to distal tibial artery, pt got 2U intraop.     PLAN:  - Pain control  - F/U PACU Labs including CBC  - Regular diet

## 2023-07-24 NOTE — PROGRESS NOTE ADULT - ASSESSMENT
Patient is a 64 y/o female with PMH of R SFA/popliteal stent, presented with pain in RLE, found to have acute on chronic limb ischemia. Now s/p RLE angiogram and percutaneous thrombectomy (07/19) of SFA and popliteal. At post op check had positive signal in AT and PT. Now off pressors, moved from SICU to floors.     # PAD   S/P ANGIO    YANIRA/PVR  plan for Bypass  vascular follow up   ASA, Statin and heparin drip   monitor hgb level   Echo noted   stress test noted, normal   patient is a moderate risk candidate for OR    # Hx of CAD  S/P STent   Echo noted  ASA and statin   Check stress test   CHeck TSH, A!C     # Electrolytes imbalance  replete electrolyses  Monitor daily     # Anemia   Anemia W/U   Keep hgb above 8 if possible

## 2023-07-25 LAB
ANION GAP SERPL CALC-SCNC: 8 MMOL/L — SIGNIFICANT CHANGE UP (ref 7–14)
APTT BLD: 52.8 SEC — HIGH (ref 27–36.3)
APTT BLD: >200 SEC — CRITICAL HIGH (ref 24.5–35.6)
BUN SERPL-MCNC: 7 MG/DL — SIGNIFICANT CHANGE UP (ref 7–23)
CALCIUM SERPL-MCNC: 9 MG/DL — SIGNIFICANT CHANGE UP (ref 8.4–10.5)
CHLORIDE SERPL-SCNC: 101 MMOL/L — SIGNIFICANT CHANGE UP (ref 98–107)
CO2 SERPL-SCNC: 25 MMOL/L — SIGNIFICANT CHANGE UP (ref 22–31)
CREAT SERPL-MCNC: 0.67 MG/DL — SIGNIFICANT CHANGE UP (ref 0.5–1.3)
EGFR: 99 ML/MIN/1.73M2 — SIGNIFICANT CHANGE UP
GLUCOSE SERPL-MCNC: 86 MG/DL — SIGNIFICANT CHANGE UP (ref 70–99)
HCT VFR BLD CALC: 28.8 % — LOW (ref 34.5–45)
HGB BLD-MCNC: 9.5 G/DL — LOW (ref 11.5–15.5)
INR BLD: 1.08 RATIO — SIGNIFICANT CHANGE UP (ref 0.88–1.16)
MAGNESIUM SERPL-MCNC: 1.8 MG/DL — SIGNIFICANT CHANGE UP (ref 1.6–2.6)
MCHC RBC-ENTMCNC: 31.1 PG — SIGNIFICANT CHANGE UP (ref 27–34)
MCHC RBC-ENTMCNC: 33 GM/DL — SIGNIFICANT CHANGE UP (ref 32–36)
MCV RBC AUTO: 94.4 FL — SIGNIFICANT CHANGE UP (ref 80–100)
NRBC # BLD: 0 /100 WBCS — SIGNIFICANT CHANGE UP (ref 0–0)
NRBC # FLD: 0 K/UL — SIGNIFICANT CHANGE UP (ref 0–0)
PHOSPHATE SERPL-MCNC: 3.9 MG/DL — SIGNIFICANT CHANGE UP (ref 2.5–4.5)
PLATELET # BLD AUTO: 223 K/UL — SIGNIFICANT CHANGE UP (ref 150–400)
POTASSIUM SERPL-MCNC: 4.3 MMOL/L — SIGNIFICANT CHANGE UP (ref 3.5–5.3)
POTASSIUM SERPL-SCNC: 4.3 MMOL/L — SIGNIFICANT CHANGE UP (ref 3.5–5.3)
PROTHROM AB SERPL-ACNC: 12.5 SEC — SIGNIFICANT CHANGE UP (ref 10.5–13.4)
RBC # BLD: 3.05 M/UL — LOW (ref 3.8–5.2)
RBC # FLD: 19.3 % — HIGH (ref 10.3–14.5)
SODIUM SERPL-SCNC: 134 MMOL/L — LOW (ref 135–145)
WBC # BLD: 10.71 K/UL — HIGH (ref 3.8–10.5)
WBC # FLD AUTO: 10.71 K/UL — HIGH (ref 3.8–10.5)

## 2023-07-25 RX ORDER — HEPARIN SODIUM 5000 [USP'U]/ML
1500 INJECTION INTRAVENOUS; SUBCUTANEOUS
Qty: 25000 | Refills: 0 | Status: DISCONTINUED | OUTPATIENT
Start: 2023-07-25 | End: 2023-07-26

## 2023-07-25 RX ORDER — MAGNESIUM SULFATE 500 MG/ML
2 VIAL (ML) INJECTION ONCE
Refills: 0 | Status: COMPLETED | OUTPATIENT
Start: 2023-07-25 | End: 2023-07-25

## 2023-07-25 RX ADMIN — SODIUM CHLORIDE 75 MILLILITER(S): 9 INJECTION, SOLUTION INTRAVENOUS at 00:35

## 2023-07-25 RX ADMIN — OXYCODONE HYDROCHLORIDE 10 MILLIGRAM(S): 5 TABLET ORAL at 07:00

## 2023-07-25 RX ADMIN — BUDESONIDE AND FORMOTEROL FUMARATE DIHYDRATE 2 PUFF(S): 160; 4.5 AEROSOL RESPIRATORY (INHALATION) at 10:20

## 2023-07-25 RX ADMIN — HEPARIN SODIUM 13 UNIT(S)/HR: 5000 INJECTION INTRAVENOUS; SUBCUTANEOUS at 20:33

## 2023-07-25 RX ADMIN — Medication 1000 MILLIGRAM(S): at 21:58

## 2023-07-25 RX ADMIN — Medication 1000 MILLIGRAM(S): at 21:28

## 2023-07-25 RX ADMIN — Medication 25 GRAM(S): at 12:27

## 2023-07-25 RX ADMIN — Medication 1000 MILLIGRAM(S): at 10:19

## 2023-07-25 RX ADMIN — OXYCODONE HYDROCHLORIDE 10 MILLIGRAM(S): 5 TABLET ORAL at 06:04

## 2023-07-25 RX ADMIN — Medication 81 MILLIGRAM(S): at 12:26

## 2023-07-25 RX ADMIN — Medication 1000 MILLIGRAM(S): at 17:43

## 2023-07-25 RX ADMIN — OXYCODONE HYDROCHLORIDE 10 MILLIGRAM(S): 5 TABLET ORAL at 10:19

## 2023-07-25 RX ADMIN — Medication 1000 MILLIGRAM(S): at 04:18

## 2023-07-25 RX ADMIN — HEPARIN SODIUM 5000 UNIT(S): 5000 INJECTION INTRAVENOUS; SUBCUTANEOUS at 06:04

## 2023-07-25 RX ADMIN — HEPARIN SODIUM 13 UNIT(S)/HR: 5000 INJECTION INTRAVENOUS; SUBCUTANEOUS at 17:43

## 2023-07-25 RX ADMIN — BUDESONIDE AND FORMOTEROL FUMARATE DIHYDRATE 2 PUFF(S): 160; 4.5 AEROSOL RESPIRATORY (INHALATION) at 21:28

## 2023-07-25 RX ADMIN — OXYCODONE HYDROCHLORIDE 10 MILLIGRAM(S): 5 TABLET ORAL at 10:10

## 2023-07-25 RX ADMIN — ATORVASTATIN CALCIUM 40 MILLIGRAM(S): 80 TABLET, FILM COATED ORAL at 21:28

## 2023-07-25 RX ADMIN — Medication 1000 MILLIGRAM(S): at 04:48

## 2023-07-25 RX ADMIN — Medication 1000 MILLIGRAM(S): at 10:10

## 2023-07-25 RX ADMIN — HEPARIN SODIUM 15 UNIT(S)/HR: 5000 INJECTION INTRAVENOUS; SUBCUTANEOUS at 08:51

## 2023-07-25 RX ADMIN — OXYCODONE HYDROCHLORIDE 5 MILLIGRAM(S): 5 TABLET ORAL at 17:43

## 2023-07-25 NOTE — PROGRESS NOTE ADULT - ASSESSMENT
PAD  on a/c  s/p  bypass    CAD  history of stent  cont asa   cont statin   echo shows normal LV function   stress test shows no ischemia

## 2023-07-25 NOTE — PROGRESS NOTE ADULT - ASSESSMENT
63F female w/ PMHx CAD s/p stents, CVA, COPD, PAD w/ prev LLE poss RLE stenting (Dr. Hatr[facility]), 20 pack/year smoking history, EtOH use, and HTN presents with RLE acute ischemia. Patient taken emergently to the OR for RLE angiogram and lysis catheter placement on 7/18. RTOR for repeat RLE angiogram w percutaneous thrombectomy of superficial fem/pop arteries on 7/19. RTOR for R CFA to PT bypass w PTFE on 7/24    Plan  - Diet: regular  - IVF lock  - DC ha, f/u TOV  - Heparin gtt today, plan to transition to DOAC tmrw 7/26  - C/w aspirin  - Pain control  - PT recommending outpatient PT    C team surgery [id number] 63F female w/ PMHx CAD s/p stents, CVA, COPD, PAD w/ prev LLE poss RLE stenting (Dr. Hart[facility]), 20 pack/year smoking history, EtOH use, and HTN presents with RLE acute ischemia. Patient taken emergently to the OR for RLE angiogram and lysis catheter placement on 7/18. RTOR for repeat RLE angiogram w percutaneous thrombectomy of superficial fem/pop arteries on 7/19. RTOR for R CFA to PT bypass w PTFE on 7/24    Plan  - Diet: regular  - IVF lock  - DC ha, f/u TOV  - Heparin gtt today, plan to transition to DOAC tmrw 7/26  - C/w aspirin  - Pain control  - PT recommending outpatient PT    C team surgery [id number] 63F female w/ PMHx CAD s/p stents, CVA, COPD, PAD w/ prev LLE poss RLE stenting (Dr. Hart[facility]), 20 pack/year smoking history, EtOH use, and HTN presents with RLE acute ischemia. Patient taken emergently to the OR for RLE angiogram and lysis catheter placement on 7/18. RTOR for repeat RLE angiogram w percutaneous thrombectomy of superficial fem/pop arteries on 7/19. RTOR for R CFA to PT bypass w PTFE on 7/24    Plan  - Diet: regular  - IVF lock  - DC ha, f/u TOV  - Heparin gtt today, plan to transition to DOAC tmrw 7/26  - C/w aspirin  - Pain control  - PT recommending outpatient PT    C team surgery [id number]

## 2023-07-25 NOTE — PROGRESS NOTE ADULT - SUBJECTIVE AND OBJECTIVE BOX
Subjective: Patient seen and examined. No new events except as noted.     SUBJECTIVE/ROS:  s/p bypass  No chest pain, dyspnea, palpitation, or dizziness.       MEDICATIONS:  MEDICATIONS  (STANDING):  acetaminophen     Tablet .. 1000 milliGRAM(s) Oral every 6 hours  aspirin  chewable 81 milliGRAM(s) Oral every 24 hours  atorvastatin 40 milliGRAM(s) Oral at bedtime  budesonide 160 MICROgram(s)/formoterol 4.5 MICROgram(s) Inhaler 2 Puff(s) Inhalation two times a day  heparin  Infusion 1500 Unit(s)/Hr (15 mL/Hr) IV Continuous <Continuous>      PHYSICAL EXAM:  T(C): 36.8 (07-25-23 @ 05:59), Max: 37.3 (07-25-23 @ 02:22)  HR: 78 (07-25-23 @ 05:59) (64 - 78)  BP: 116/64 (07-25-23 @ 05:59) (97/47 - 119/63)  RR: 18 (07-25-23 @ 05:59) (14 - 20)  SpO2: 97% (07-25-23 @ 05:59) (92% - 100%)  Wt(kg): --  I&O's Summary    24 Jul 2023 07:01  -  25 Jul 2023 07:00  --------------------------------------------------------  IN: 1770 mL / OUT: 985 mL / NET: 785 mL            JVP: Normal  Neck: supple  Lung: clear   CV: S1 S2 , Murmur:  Abd: soft  Ext: No edema  neuro: Awake / alert  Psych: flat affect  Skin: normal``    LABS/DATA:    CARDIAC MARKERS:                                9.5    10.71 )-----------( 223      ( 25 Jul 2023 07:47 )             28.8     07-25    134<L>  |  101  |  7   ----------------------------<  86  4.3   |  25  |  0.67    Ca    9.0      25 Jul 2023 06:44  Phos  3.9     07-25  Mg     1.80     07-25      proBNP:   Lipid Profile:   HgA1c:   TSH:     TELE:  EKG:

## 2023-07-25 NOTE — PROGRESS NOTE ADULT - ASSESSMENT
Patient is a 62 y/o female with PMH of R SFA/popliteal stent, presented with pain in RLE, found to have acute on chronic limb ischemia. Now s/p RLE angiogram and percutaneous thrombectomy (07/19) of SFA and popliteal. At post op check had positive signal in AT and PT. Now off pressors, moved from SICU to floors.     # PAD   S/P ANGIO    YANIRA/PVR  plan for Bypass  vascular follow up   ASA, Statin and heparin drip   monitor hgb level   Echo noted   stress test noted, normal   patient is a moderate risk candidate for OR    # Hx of CAD  S/P STent   Echo noted  ASA and statin   Check stress test   CHeck TSH, A!C     # Electrolytes imbalance  replete electrolyses  Monitor daily     # Anemia   Anemia W/U   Keep hgb above 8 if possible     DVT andGI PPX  Patient is a 64 y/o female with PMH of R SFA/popliteal stent, presented with pain in RLE, found to have acute on chronic limb ischemia. Now s/p RLE angiogram and percutaneous thrombectomy (07/19) of SFA and popliteal. At post op check had positive signal in AT and PT. Now off pressors, moved from SICU to floors.     # PAD   S/P ANGIO    YANIRA/PVR  plan for Bypass  vascular follow up   ASA, Statin and heparin drip   monitor hgb level   Echo noted   stress test noted, normal   patient is a moderate risk candidate for OR    # Hx of CAD  S/P STent   Echo noted  ASA and statin   Check stress test   CHeck TSH, A!C     # Electrolytes imbalance  replete electrolyses  Monitor daily     # Anemia   Anemia W/U   Keep hgb above 8 if possible     DVT andGI PPX

## 2023-07-25 NOTE — PROGRESS NOTE ADULT - SUBJECTIVE AND OBJECTIVE BOX
TEAM [ ** ] Surgery Daily Progress Note  =====================================================    SUBJECTIVE: Patient seen and examined at bedside on AM rounds. Patient reports that they're feeling well. Denies fever, chills, N/V, chest pain, SOB    ALLERGIES:  No Known Allergies      --------------------------------------------------------------------------------------    MEDICATIONS:    Neurologic Medications  acetaminophen     Tablet .. 1000 milliGRAM(s) Oral every 6 hours  oxyCODONE    IR 5 milliGRAM(s) Oral every 4 hours PRN Moderate Pain (4 - 6)  oxyCODONE    IR 10 milliGRAM(s) Oral every 4 hours PRN Severe Pain (7 - 10)    Respiratory Medications  budesonide 160 MICROgram(s)/formoterol 4.5 MICROgram(s) Inhaler 2 Puff(s) Inhalation two times a day    Cardiovascular Medications    Gastrointestinal Medications  lactated ringers. 1000 milliLiter(s) IV Continuous <Continuous>    Genitourinary Medications    Hematologic/Oncologic Medications  aspirin  chewable 81 milliGRAM(s) Oral every 24 hours  heparin   Injectable 5000 Unit(s) SubCutaneous every 8 hours    Antimicrobial/Immunologic Medications    Endocrine/Metabolic Medications  atorvastatin 40 milliGRAM(s) Oral at bedtime    Topical/Other Medications    --------------------------------------------------------------------------------------    VITAL SIGNS:  T(C): 36.8 (07-25-23 @ 05:59), Max: 37.3 (07-25-23 @ 02:22)  HR: 78 (07-25-23 @ 05:59) (63 - 78)  BP: 116/64 (07-25-23 @ 05:59) (97/47 - 119/63)  RR: 18 (07-25-23 @ 05:59) (14 - 20)  SpO2: 97% (07-25-23 @ 05:59) (92% - 100%)  --------------------------------------------------------------------------------------    INS AND OUTS:    07-23-23 @ 07:01  -  07-24-23 @ 07:00  --------------------------------------------------------  IN: 250 mL / OUT: 0 mL / NET: 250 mL    07-24-23 @ 07:01  -  07-25-23 @ 06:05  --------------------------------------------------------  IN: 1470 mL / OUT: 285 mL / NET: 1185 mL      --------------------------------------------------------------------------------------      EXAM    General: NAD, resting in bed comfortably.  Cardiac: regular rate, warm and well perfused  Respiratory: Nonlabored respirations, normal cw expansion.  Abdomen: soft, nontender, nondistended. ___ incision is c/d/i, ostomy, NGT, ha.   Extremities: normal strength, FROM, no deformities    --------------------------------------------------------------------------------------    LABS       TEAM [ C ] Surgery Daily Progress Note  =====================================================    SUBJECTIVE: Patient seen and examined at bedside on AM rounds. Patient reports that they're feeling well. Denies fever, chills, N/V, chest pain, SOB    ALLERGIES:  No Known Allergies      --------------------------------------------------------------------------------------    MEDICATIONS:    Neurologic Medications  acetaminophen     Tablet .. 1000 milliGRAM(s) Oral every 6 hours  oxyCODONE    IR 5 milliGRAM(s) Oral every 4 hours PRN Moderate Pain (4 - 6)  oxyCODONE    IR 10 milliGRAM(s) Oral every 4 hours PRN Severe Pain (7 - 10)    Respiratory Medications  budesonide 160 MICROgram(s)/formoterol 4.5 MICROgram(s) Inhaler 2 Puff(s) Inhalation two times a day    Cardiovascular Medications    Gastrointestinal Medications  lactated ringers. 1000 milliLiter(s) IV Continuous <Continuous>    Genitourinary Medications    Hematologic/Oncologic Medications  aspirin  chewable 81 milliGRAM(s) Oral every 24 hours  heparin   Injectable 5000 Unit(s) SubCutaneous every 8 hours    Antimicrobial/Immunologic Medications    Endocrine/Metabolic Medications  atorvastatin 40 milliGRAM(s) Oral at bedtime    Topical/Other Medications    --------------------------------------------------------------------------------------    VITAL SIGNS:  T(C): 36.8 (07-25-23 @ 05:59), Max: 37.3 (07-25-23 @ 02:22)  HR: 78 (07-25-23 @ 05:59) (63 - 78)  BP: 116/64 (07-25-23 @ 05:59) (97/47 - 119/63)  RR: 18 (07-25-23 @ 05:59) (14 - 20)  SpO2: 97% (07-25-23 @ 05:59) (92% - 100%)  --------------------------------------------------------------------------------------    INS AND OUTS:    07-23-23 @ 07:01  -  07-24-23 @ 07:00  --------------------------------------------------------  IN: 250 mL / OUT: 0 mL / NET: 250 mL    07-24-23 @ 07:01  -  07-25-23 @ 06:05  --------------------------------------------------------  IN: 1470 mL / OUT: 285 mL / NET: 1185 mL      --------------------------------------------------------------------------------------      EXAM    General: NAD, resting in bed comfortably.  Cardiac: regular rate, warm and well perfused  Respiratory: Nonlabored respirations, normal cw expansion.  Abdomen: soft, nontender, nondistended  Extremities: R groin with aquacel dressing, RLE with aquacel dressing x 2 c/d/i, R dop PT    --------------------------------------------------------------------------------------    LABS                        8.8    9.26  )-----------( 163 ( 24 Jul 2023 15:18 )             26.4   07-24    140  |  102  |  7   ----------------------------<  97  4.3   |  27  |  0.66    Ca    9.8      24 Jul 2023 15:18  Phos  4.3     07-24  Mg     1.70     07-24

## 2023-07-25 NOTE — PROGRESS NOTE ADULT - SUBJECTIVE AND OBJECTIVE BOX
Name of Patient : BRE HODGE  MRN: [id number]  Date of visit: 07-25-23       Subjective: Patient seen and examined. No new events except as noted.   Doing okay       REVIEW OF SYSTEMS:    CONSTITUTIONAL: No weakness, fevers or chills  EYES/ENT: No visual changes;  No vertigo or throat pain   NECK: No pain or stiffness  RESPIRATORY: No cough, wheezing, hemoptysis; No shortness of breath  CARDIOVASCULAR: No chest pain or palpitations  GASTROINTESTINAL: No abdominal or epigastric pain. No nausea, vomiting, or hematemesis; No diarrhea or constipation. No melena or hematochezia.  GENITOURINARY: No dysuria, frequency or hematuria  NEUROLOGICAL: No numbness or weakness  SKIN: No itching, burning, rashes, or lesions   All other review of systems is negative unless indicated above.    MEDICATIONS:  MEDICATIONS  (STANDING):  acetaminophen     Tablet .. 1000 milliGRAM(s) Oral every 6 hours  aspirin  chewable 81 milliGRAM(s) Oral every 24 hours  atorvastatin 40 milliGRAM(s) Oral at bedtime  budesonide 160 MICROgram(s)/formoterol 4.5 MICROgram(s) Inhaler 2 Puff(s) Inhalation two times a day  heparin  Infusion 1500 Unit(s)/Hr (13 mL/Hr) IV Continuous <Continuous>      PHYSICAL EXAM:  T(C): 36.8 (07-25-23 @ 21:42), Max: 37.5 (07-25-23 @ 18:12)  HR: 85 (07-25-23 @ 21:42) (65 - 85)  BP: 123/75 (07-25-23 @ 21:42) (101/54 - 123/75)  RR: 17 (07-25-23 @ 21:42) (16 - 18)  SpO2: 100% (07-25-23 @ 21:42) (92% - 100%)  Wt(kg): --  I&O's Summary    24 Jul 2023 07:01  -  25 Jul 2023 07:00  --------------------------------------------------------  IN: 1770 mL / OUT: 985 mL / NET: 785 mL          Appearance: Normal	  HEENT:  PERRLA   Lymphatic: No lymphadenopathy   Cardiovascular: Normal S1 S2, no JVD  Respiratory: normal effort , clear  Gastrointestinal:  Soft, Non-tender  Skin: No rashes,  warm to touch  Psychiatry:  Mood & affect appropriate  Musculuskeletal: No edema    recent labs, Imaging and EKGs personally reviewed     07-24-23 @ 07:01  -  07-25-23 @ 07:00  --------------------------------------------------------  IN: 1770 mL / OUT: 985 mL / NET: 785 mL                          9.5    10.71 )-----------( 223      ( 25 Jul 2023 07:47 )             28.8               07-25    134<L>  |  101  |  7   ----------------------------<  86  4.3   |  25  |  0.67    Ca    9.0      25 Jul 2023 06:44  Phos  3.9     07-25  Mg     1.80     07-25      PT/INR - ( 25 Jul 2023 07:47 )   PT: 12.5 sec;   INR: 1.08 ratio         PTT - ( 25 Jul 2023 15:17 )  PTT:>200.0 sec                 ABG - ( 24 Jul 2023 13:03 )  pH, Arterial: 7.47  pH, Blood: x     /  pCO2: 38    /  pO2: 164   / HCO3: 28    / Base Excess: 3.8   /  SaO2: 98.8              Urinalysis Basic - ( 25 Jul 2023 06:44 )    Color: x / Appearance: x / SG: x / pH: x  Gluc: 86 mg/dL / Ketone: x  / Bili: x / Urobili: x   Blood: x / Protein: x / Nitrite: x   Leuk Esterase: x / RBC: x / WBC x   Sq Epi: x / Non Sq Epi: x / Bacteria: x               Name of Patient : BRE HODGE  MRN: [id number]  Date of visit: 07-25-23       Subjective: Patient seen and examined. No new events except as noted.   Doing okay       REVIEW OF SYSTEMS:    CONSTITUTIONAL: No weakness, fevers or chills  EYES/ENT: No visual changes;  No vertigo or throat pain   NECK: No pain or stiffness  RESPIRATORY: No cough, wheezing, hemoptysis; No shortness of breath  CARDIOVASCULAR: No chest pain or palpitations  GASTROINTESTINAL: No abdominal or epigastric pain. No nausea, vomiting, or hematemesis; No diarrhea or constipation. No melena or hematochezia.  GENITOURINARY: No dysuria, frequency or hematuria  NEUROLOGICAL: No numbness or weakness  SKIN: No itching, burning, rashes, or lesions   All other review of systems is negative unless indicated above.    MEDICATIONS:  MEDICATIONS  (STANDING):  acetaminophen     Tablet .. 1000 milliGRAM(s) Oral every 6 hours  aspirin  chewable 81 milliGRAM(s) Oral every 24 hours  atorvastatin 40 milliGRAM(s) Oral at bedtime  budesonide 160 MICROgram(s)/formoterol 4.5 MICROgram(s) Inhaler 2 Puff(s) Inhalation two times a day  heparin  Infusion 1500 Unit(s)/Hr (13 mL/Hr) IV Continuous <Continuous>      PHYSICAL EXAM:  T(C): 36.8 (07-25-23 @ 21:42), Max: 37.5 (07-25-23 @ 18:12)  HR: 85 (07-25-23 @ 21:42) (65 - 85)  BP: 123/75 (07-25-23 @ 21:42) (101/54 - 123/75)  RR: 17 (07-25-23 @ 21:42) (16 - 18)  SpO2: 100% (07-25-23 @ 21:42) (92% - 100%)  Wt(kg): --  I&O's Summary    24 Jul 2023 07:01  -  25 Jul 2023 07:00  --------------------------------------------------------  IN: 1770 mL / OUT: 985 mL / NET: 785 mL          Appearance: Normal	  HEENT:  PERRLA   Lymphatic: No lymphadenopathy   Cardiovascular: Normal S1 S2, no JVD  Respiratory: normal effort , clear  Gastrointestinal:  Soft, Non-tender  Skin: No rashes,  warm to touch  Psychiatry:  Mood & affect appropriate  Musculuskeletal: No edema    recent labs, Imaging and EKGs personally reviewed     07-24-23 @ 07:01  -  07-25-23 @ 07:00  --------------------------------------------------------  IN: 1770 mL / OUT: 985 mL / NET: 785 mL                          9.5    10.71 )-----------( 223      ( 25 Jul 2023 07:47 )             28.8               07-25    134<L>  |  101  |  7   ----------------------------<  86  4.3   |  25  |  0.67    Ca    9.0      25 Jul 2023 06:44  Phos  3.9     07-25  Mg     1.80     07-25      PT/INR - ( 25 Jul 2023 07:47 )   PT: 12.5 sec;   INR: 1.08 ratio         PTT - ( 25 Jul 2023 15:17 )  PTT:>200.0 sec                 ABG - ( 24 Jul 2023 13:03 )  pH, Arterial: 7.47  pH, Blood: x     /  pCO2: 38    /  pO2: 164   / HCO3: 28    / Base Excess: 3.8   /  SaO2: 98.8              Urinalysis Basic - ( 25 Jul 2023 06:44 )    Color: x / Appearance: x / SG: x / pH: x  Gluc: 86 mg/dL / Ketone: x  / Bili: x / Urobili: x   Blood: x / Protein: x / Nitrite: x   Leuk Esterase: x / RBC: x / WBC x   Sq Epi: x / Non Sq Epi: x / Bacteria: x               Name of Patient : BRE HODGE  MRN: [id number]  Date of visit: 07-25-23       Subjective: Patient seen and examined. No new events except as noted.   Doing okay       REVIEW OF SYSTEMS:    CONSTITUTIONAL: No weakness, fevers or chills  EYES/ENT: No visual changes;  No vertigo or throat pain   NECK: No pain or stiffness  RESPIRATORY: No cough, wheezing, hemoptysis; No shortness of breath  CARDIOVASCULAR: No chest pain or palpitations  GASTROINTESTINAL: No abdominal or epigastric pain. No nausea, vomiting, or hematemesis; No diarrhea or constipation. No melena or hematochezia.  GENITOURINARY: No dysuria, frequency or hematuria  NEUROLOGICAL: No numbness or weakness  SKIN: No itching, burning, rashes, or lesions   All other review of systems is negative unless indicated above.    MEDICATIONS:  MEDICATIONS  (STANDING):  acetaminophen     Tablet .. 1000 milliGRAM(s) Oral every 6 hours  aspirin  chewable 81 milliGRAM(s) Oral every 24 hours  atorvastatin 40 milliGRAM(s) Oral at bedtime  budesonide 160 MICROgram(s)/formoterol 4.5 MICROgram(s) Inhaler 2 Puff(s) Inhalation two times a day  heparin  Infusion 1500 Unit(s)/Hr (13 mL/Hr) IV Continuous <Continuous>      PHYSICAL EXAM:  T(C): 36.8 (07-25-23 @ 21:42), Max: 37.5 (07-25-23 @ 18:12)  HR: 85 (07-25-23 @ 21:42) (65 - 85)  BP: 123/75 (07-25-23 @ 21:42) (101/54 - 123/75)  RR: 17 (07-25-23 @ 21:42) (16 - 18)  SpO2: 100% (07-25-23 @ 21:42) (92% - 100%)  Wt(kg): --  I&O's Summary    24 Jul 2023 07:01  -  25 Jul 2023 07:00  --------------------------------------------------------  IN: 1770 mL / OUT: 985 mL / NET: 785 mL          Appearance: Normal	  HEENT:  PERRLA   Lymphatic: No lymphadenopathy   Cardiovascular: Normal S1 S2, no JVD  Respiratory: normal effort , clear  Gastrointestinal:  Soft, Non-tender  Skin: No rashes,  warm to touch  Psychiatry:  Mood & affect appropriate  Musculuskeletal: No edema    recent labs, Imaging and EKGs personally reviewed     07-24-23 @ 07:01  -  07-25-23 @ 07:00  --------------------------------------------------------  IN: 1770 mL / OUT: 985 mL / NET: 785 mL                          9.5    10.71 )-----------( 223      ( 25 Jul 2023 07:47 )             28.8               07-25    134<L>  |  101  |  7   ----------------------------<  86  4.3   |  25  |  0.67    Ca    9.0      25 Jul 2023 06:44  Phos  3.9     07-25  Mg     1.80     07-25      PT/INR - ( 25 Jul 2023 07:47 )   PT: 12.5 sec;   INR: 1.08 ratio         PTT - ( 25 Jul 2023 15:17 )  PTT:>200.0 sec                 ABG - ( 24 Jul 2023 13:03 )  pH, Arterial: 7.47  pH, Blood: x     /  pCO2: 38    /  pO2: 164   / HCO3: 28    / Base Excess: 3.8   /  SaO2: 98.8              Urinalysis Basic - ( 25 Jul 2023 06:44 )    Color: x / Appearance: x / SG: x / pH: x  Gluc: 86 mg/dL / Ketone: x  / Bili: x / Urobili: x   Blood: x / Protein: x / Nitrite: x   Leuk Esterase: x / RBC: x / WBC x   Sq Epi: x / Non Sq Epi: x / Bacteria: x

## 2023-07-25 NOTE — PROGRESS NOTE ADULT - SUBJECTIVE AND OBJECTIVE BOX
ANESTHESIA POSTOP CHECK    63y Female POSTOP DAY 1 s/p Right femoral to posterior distal bypass.    No COMPLAINTS    NO APPARENT ANESTHESIA COMPLICATIONS

## 2023-07-26 LAB
ANION GAP SERPL CALC-SCNC: 2 MMOL/L — LOW (ref 7–14)
APTT BLD: >200 SEC — CRITICAL HIGH (ref 24.5–35.6)
BUN SERPL-MCNC: 11 MG/DL — SIGNIFICANT CHANGE UP (ref 7–23)
CALCIUM SERPL-MCNC: 8.9 MG/DL — SIGNIFICANT CHANGE UP (ref 8.4–10.5)
CHLORIDE SERPL-SCNC: 104 MMOL/L — SIGNIFICANT CHANGE UP (ref 98–107)
CO2 SERPL-SCNC: 26 MMOL/L — SIGNIFICANT CHANGE UP (ref 22–31)
CREAT SERPL-MCNC: 0.66 MG/DL — SIGNIFICANT CHANGE UP (ref 0.5–1.3)
EGFR: 99 ML/MIN/1.73M2 — SIGNIFICANT CHANGE UP
GLUCOSE SERPL-MCNC: 91 MG/DL — SIGNIFICANT CHANGE UP (ref 70–99)
HCT VFR BLD CALC: 29.5 % — LOW (ref 34.5–45)
HGB BLD-MCNC: 9.6 G/DL — LOW (ref 11.5–15.5)
INR BLD: 1.06 RATIO — SIGNIFICANT CHANGE UP (ref 0.85–1.18)
MAGNESIUM SERPL-MCNC: 2.1 MG/DL — SIGNIFICANT CHANGE UP (ref 1.6–2.6)
MCHC RBC-ENTMCNC: 31.4 PG — SIGNIFICANT CHANGE UP (ref 27–34)
MCHC RBC-ENTMCNC: 32.5 GM/DL — SIGNIFICANT CHANGE UP (ref 32–36)
MCV RBC AUTO: 96.4 FL — SIGNIFICANT CHANGE UP (ref 80–100)
NRBC # BLD: 0 /100 WBCS — SIGNIFICANT CHANGE UP (ref 0–0)
NRBC # FLD: 0 K/UL — SIGNIFICANT CHANGE UP (ref 0–0)
PHOSPHATE SERPL-MCNC: 3.6 MG/DL — SIGNIFICANT CHANGE UP (ref 2.5–4.5)
PLATELET # BLD AUTO: 275 K/UL — SIGNIFICANT CHANGE UP (ref 150–400)
POTASSIUM SERPL-MCNC: 4.1 MMOL/L — SIGNIFICANT CHANGE UP (ref 3.5–5.3)
POTASSIUM SERPL-SCNC: 4.1 MMOL/L — SIGNIFICANT CHANGE UP (ref 3.5–5.3)
PROTHROM AB SERPL-ACNC: 12 SEC — SIGNIFICANT CHANGE UP (ref 9.5–13)
RBC # BLD: 3.06 M/UL — LOW (ref 3.8–5.2)
RBC # FLD: 19.4 % — HIGH (ref 10.3–14.5)
SODIUM SERPL-SCNC: 132 MMOL/L — LOW (ref 135–145)
WBC # BLD: 11.93 K/UL — HIGH (ref 3.8–10.5)
WBC # FLD AUTO: 11.93 K/UL — HIGH (ref 3.8–10.5)

## 2023-07-26 RX ORDER — OXYCODONE HYDROCHLORIDE 5 MG/1
5 TABLET ORAL EVERY 4 HOURS
Refills: 0 | Status: DISCONTINUED | OUTPATIENT
Start: 2023-07-26 | End: 2023-08-01

## 2023-07-26 RX ORDER — APIXABAN 2.5 MG/1
1 TABLET, FILM COATED ORAL
Qty: 60 | Refills: 0
Start: 2023-07-26 | End: 2023-08-24

## 2023-07-26 RX ORDER — OXYCODONE HYDROCHLORIDE 5 MG/1
10 TABLET ORAL EVERY 4 HOURS
Refills: 0 | Status: DISCONTINUED | OUTPATIENT
Start: 2023-07-26 | End: 2023-08-01

## 2023-07-26 RX ORDER — APIXABAN 2.5 MG/1
2 TABLET, FILM COATED ORAL
Qty: 0 | Refills: 0 | DISCHARGE
Start: 2023-07-26 | End: 2023-08-02

## 2023-07-26 RX ORDER — APIXABAN 2.5 MG/1
5 TABLET, FILM COATED ORAL
Refills: 0 | Status: DISCONTINUED | OUTPATIENT
Start: 2023-08-02 | End: 2023-08-01

## 2023-07-26 RX ORDER — ACETAMINOPHEN 500 MG
2 TABLET ORAL
Qty: 0 | Refills: 0 | DISCHARGE
Start: 2023-07-26

## 2023-07-26 RX ORDER — APIXABAN 2.5 MG/1
10 TABLET, FILM COATED ORAL
Refills: 0 | Status: DISCONTINUED | OUTPATIENT
Start: 2023-07-26 | End: 2023-08-01

## 2023-07-26 RX ADMIN — ATORVASTATIN CALCIUM 40 MILLIGRAM(S): 80 TABLET, FILM COATED ORAL at 21:46

## 2023-07-26 RX ADMIN — Medication 1000 MILLIGRAM(S): at 08:26

## 2023-07-26 RX ADMIN — HEPARIN SODIUM 11 UNIT(S)/HR: 5000 INJECTION INTRAVENOUS; SUBCUTANEOUS at 02:07

## 2023-07-26 RX ADMIN — APIXABAN 10 MILLIGRAM(S): 2.5 TABLET, FILM COATED ORAL at 17:38

## 2023-07-26 RX ADMIN — Medication 1000 MILLIGRAM(S): at 21:45

## 2023-07-26 RX ADMIN — Medication 1000 MILLIGRAM(S): at 02:14

## 2023-07-26 RX ADMIN — OXYCODONE HYDROCHLORIDE 10 MILLIGRAM(S): 5 TABLET ORAL at 18:57

## 2023-07-26 RX ADMIN — HEPARIN SODIUM 13 UNIT(S)/HR: 5000 INJECTION INTRAVENOUS; SUBCUTANEOUS at 00:14

## 2023-07-26 RX ADMIN — Medication 1000 MILLIGRAM(S): at 14:55

## 2023-07-26 RX ADMIN — Medication 1000 MILLIGRAM(S): at 14:20

## 2023-07-26 RX ADMIN — Medication 81 MILLIGRAM(S): at 12:08

## 2023-07-26 RX ADMIN — HEPARIN SODIUM 11 UNIT(S)/HR: 5000 INJECTION INTRAVENOUS; SUBCUTANEOUS at 07:43

## 2023-07-26 RX ADMIN — Medication 1000 MILLIGRAM(S): at 08:50

## 2023-07-26 RX ADMIN — OXYCODONE HYDROCHLORIDE 10 MILLIGRAM(S): 5 TABLET ORAL at 14:20

## 2023-07-26 RX ADMIN — BUDESONIDE AND FORMOTEROL FUMARATE DIHYDRATE 2 PUFF(S): 160; 4.5 AEROSOL RESPIRATORY (INHALATION) at 21:46

## 2023-07-26 RX ADMIN — BUDESONIDE AND FORMOTEROL FUMARATE DIHYDRATE 2 PUFF(S): 160; 4.5 AEROSOL RESPIRATORY (INHALATION) at 08:24

## 2023-07-26 RX ADMIN — Medication 1000 MILLIGRAM(S): at 02:44

## 2023-07-26 RX ADMIN — OXYCODONE HYDROCHLORIDE 10 MILLIGRAM(S): 5 TABLET ORAL at 08:50

## 2023-07-26 RX ADMIN — OXYCODONE HYDROCHLORIDE 10 MILLIGRAM(S): 5 TABLET ORAL at 14:55

## 2023-07-26 RX ADMIN — APIXABAN 10 MILLIGRAM(S): 2.5 TABLET, FILM COATED ORAL at 10:05

## 2023-07-26 RX ADMIN — OXYCODONE HYDROCHLORIDE 10 MILLIGRAM(S): 5 TABLET ORAL at 08:23

## 2023-07-26 NOTE — PROGRESS NOTE ADULT - ASSESSMENT
Patient is a 62 y/o female with PMH of R SFA/popliteal stent, presented with pain in RLE, found to have acute on chronic limb ischemia. Now s/p RLE angiogram and percutaneous thrombectomy (07/19) of SFA and popliteal. At post op check had positive signal in AT and PT. Now off pressors, moved from SICU to floors.     # PAD   S/P ANGIO   YANIRA/PVR  vascular follow up   ASA, Statin and heparin drip , AC switch to doacs   monitor hgb level   Echo noted   stress test noted, normal   S/P BYPASS    # Hx of CAD  S/P STent   Echo noted  ASA and statin   Card follow up appreciated     # Electrolytes imbalance  replete electrolyses  Monitor daily     # Anemia   Anemia W/U   Keep hgb above 8 if possible     DVT andGI PPX     D/C planing

## 2023-07-26 NOTE — PROGRESS NOTE ADULT - SUBJECTIVE AND OBJECTIVE BOX
Subjective: Patient seen and examined. No new events except as noted.     SUBJECTIVE/ROS:  nad      MEDICATIONS:  MEDICATIONS  (STANDING):  acetaminophen     Tablet .. 1000 milliGRAM(s) Oral every 6 hours  aspirin  chewable 81 milliGRAM(s) Oral every 24 hours  atorvastatin 40 milliGRAM(s) Oral at bedtime  budesonide 160 MICROgram(s)/formoterol 4.5 MICROgram(s) Inhaler 2 Puff(s) Inhalation two times a day      PHYSICAL EXAM:  T(C): 36.8 (07-26-23 @ 06:09), Max: 37.5 (07-25-23 @ 18:12)  HR: 66 (07-26-23 @ 06:09) (66 - 85)  BP: 100/60 (07-26-23 @ 06:09) (99/51 - 123/75)  RR: 16 (07-26-23 @ 06:09) (16 - 18)  SpO2: 100% (07-26-23 @ 06:09) (98% - 100%)  Wt(kg): --  I&O's Summary    25 Jul 2023 07:01  -  26 Jul 2023 07:00  --------------------------------------------------------  IN: 384 mL / OUT: 1000 mL / NET: -616 mL            JVP: Normal  Neck: supple  Lung: clear   CV: S1 S2 , Murmur:  Abd: soft  Ext: No edema  neuro: Awake / alert  Psych: flat affect  Skin: normal``    LABS/DATA:    CARDIAC MARKERS:                                9.6    11.93 )-----------( 275      ( 26 Jul 2023 06:47 )             29.5     07-26    132<L>  |  104  |  11  ----------------------------<  91  4.1   |  26  |  0.66    Ca    8.9      26 Jul 2023 06:47  Phos  3.6     07-26  Mg     2.10     07-26      proBNP:   Lipid Profile:   HgA1c:   TSH:     TELE:  EKG:

## 2023-07-26 NOTE — PROGRESS NOTE ADULT - SUBJECTIVE AND OBJECTIVE BOX
C Team Surgery Progress Note     S: Patient resting comfortably on morning rounds. Pain well-controlled currently. No acute distress. Overnight trial of void was passed (700 ml/24hr).      MEDICATIONS  (STANDING):  acetaminophen     Tablet .. 1000 milliGRAM(s) Oral every 6 hours  aspirin  chewable 81 milliGRAM(s) Oral every 24 hours  atorvastatin 40 milliGRAM(s) Oral at bedtime  budesonide 160 MICROgram(s)/formoterol 4.5 MICROgram(s) Inhaler 2 Puff(s) Inhalation two times a day    MEDICATIONS  (PRN):  oxyCODONE    IR 10 milliGRAM(s) Oral every 4 hours PRN Severe Pain (7 - 10)  oxyCODONE    IR 5 milliGRAM(s) Oral every 4 hours PRN Moderate Pain (4 - 6)      T(C): 36.8 (07-26-23 @ 06:09), Max: 37.5 (07-25-23 @ 18:12)  HR: 66 (07-26-23 @ 06:09) (66 - 85)  BP: 100/60 (07-26-23 @ 06:09) (99/51 - 123/75)  RR: 16 (07-26-23 @ 06:09) (16 - 18)  SpO2: 100% (07-26-23 @ 06:09) (98% - 100%)        07-25-23 @ 07:01  -  07-26-23 @ 07:00  --------------------------------------------------------  IN: 384 mL / OUT: 1000 mL / NET: -616 mL        Physical Exam:  General: NAD, AOx3  Respiratory: No labored breathing  CV: pulse regularly present  MSK: RLE motor and sensory grossly intact.  Pulses exam: L PT signals.    LABS:                        9.6    11.93 )-----------( 275      ( 26 Jul 2023 06:47 )             29.5     07-26    132<L>  |  104  |  11  ----------------------------<  91  4.1   |  26  |  0.66    Ca    8.9      26 Jul 2023 06:47  Phos  3.6     07-26  Mg     2.10     07-26           C Team Surgery Progress Note     S: Patient resting comfortably on morning rounds. Pain well-controlled currently. No acute distress. Overnight trial of void was passed (700 ml/24hr).      MEDICATIONS  (STANDING):  acetaminophen     Tablet .. 1000 milliGRAM(s) Oral every 6 hours  aspirin  chewable 81 milliGRAM(s) Oral every 24 hours  atorvastatin 40 milliGRAM(s) Oral at bedtime  budesonide 160 MICROgram(s)/formoterol 4.5 MICROgram(s) Inhaler 2 Puff(s) Inhalation two times a day    MEDICATIONS  (PRN):  oxyCODONE    IR 10 milliGRAM(s) Oral every 4 hours PRN Severe Pain (7 - 10)  oxyCODONE    IR 5 milliGRAM(s) Oral every 4 hours PRN Moderate Pain (4 - 6)      T(C): 36.8 (07-26-23 @ 06:09), Max: 37.5 (07-25-23 @ 18:12)  HR: 66 (07-26-23 @ 06:09) (66 - 85)  BP: 100/60 (07-26-23 @ 06:09) (99/51 - 123/75)  RR: 16 (07-26-23 @ 06:09) (16 - 18)  SpO2: 100% (07-26-23 @ 06:09) (98% - 100%)        07-25-23 @ 07:01  -  07-26-23 @ 07:00  --------------------------------------------------------  IN: 384 mL / OUT: 1000 mL / NET: -616 mL        Physical Exam:  General: NAD, AOx3  Respiratory: No labored breathing  CV: pulse regularly present  MSK: RLE motor and sensory grossly intact.  Pulses exam: R PT signals.    LABS:                        9.6    11.93 )-----------( 275      ( 26 Jul 2023 06:47 )             29.5     07-26    132<L>  |  104  |  11  ----------------------------<  91  4.1   |  26  |  0.66    Ca    8.9      26 Jul 2023 06:47  Phos  3.6     07-26  Mg     2.10     07-26

## 2023-07-26 NOTE — PROGRESS NOTE ADULT - ASSESSMENT
62 y/o woman with a PMH of R SFA/popliteal stent, presented with pain in RLE, found to have acute on chronic limb ischemia. s/p RLE angiogram and percutaneous thrombectomy (07/19) of SFA and popliteal. Now s/p R CFA -> PT bypass with PTFE (7/25).    Plan:  - discontinue hep gtt, transition to DOAC (indication: distal attachment of PTFE to PT)  - PT recommending rehab, 2/2 to recent bypass  - c/w ASA  - pain control PRN  - diet: regular   64 y/o woman with a PMH of R SFA/popliteal stent, presented with pain in RLE, found to have acute on chronic limb ischemia. s/p RLE angiogram and percutaneous thrombectomy (07/19) of SFA and popliteal. Now s/p R CFA -> PT bypass with PTFE (7/25).    Plan:  - discontinue hep gtt, transition to DOAC (indication: distal attachment of PTFE to PT)  - PT recommending rehab, 2/2 to recent bypass  - c/w ASA  - pain control PRN  - diet: regular

## 2023-07-26 NOTE — PROGRESS NOTE ADULT - SUBJECTIVE AND OBJECTIVE BOX
Name of Patient : BRE HODGE  MRN: [id number]  Date of visit: 07-26-23 @ 13:08      Subjective: Patient seen and examined. No new events except as noted.   Doing okay      REVIEW OF SYSTEMS:    CONSTITUTIONAL: No weakness, fevers or chills  EYES/ENT: No visual changes;  No vertigo or throat pain   NECK: No pain or stiffness  RESPIRATORY: No cough, wheezing, hemoptysis; No shortness of breath  CARDIOVASCULAR: No chest pain or palpitations  GASTROINTESTINAL: No abdominal or epigastric pain. No nausea, vomiting, or hematemesis; No diarrhea or constipation. No melena or hematochezia.  GENITOURINARY: No dysuria, frequency or hematuria  NEUROLOGICAL: No numbness or weakness  SKIN: No itching, burning, rashes, or lesions   All other review of systems is negative unless indicated above.    MEDICATIONS:  MEDICATIONS  (STANDING):  acetaminophen     Tablet .. 1000 milliGRAM(s) Oral every 6 hours  apixaban 10 milliGRAM(s) Oral two times a day  aspirin  chewable 81 milliGRAM(s) Oral every 24 hours  atorvastatin 40 milliGRAM(s) Oral at bedtime  budesonide 160 MICROgram(s)/formoterol 4.5 MICROgram(s) Inhaler 2 Puff(s) Inhalation two times a day      PHYSICAL EXAM:  T(C): 36.9 (07-26-23 @ 09:55), Max: 37.5 (07-25-23 @ 18:12)  HR: 69 (07-26-23 @ 09:55) (66 - 85)  BP: 104/47 (07-26-23 @ 09:55) (99/51 - 123/75)  RR: 17 (07-26-23 @ 09:55) (16 - 18)  SpO2: 97% (07-26-23 @ 09:55) (97% - 100%)  Wt(kg): --  I&O's Summary    25 Jul 2023 07:01  -  26 Jul 2023 07:00  --------------------------------------------------------  IN: 384 mL / OUT: 1000 mL / NET: -616 mL    26 Jul 2023 07:01  -  26 Jul 2023 13:08  --------------------------------------------------------  IN: 0 mL / OUT: 150 mL / NET: -150 mL          Appearance: Normal	  HEENT:  PERRLA   Lymphatic: No lymphadenopathy   Cardiovascular: Normal S1 S2, no JVD  Respiratory: normal effort , clear  Gastrointestinal:  Soft, Non-tender  Skin: No rashes,  warm to touch  Psychiatry:  Mood & affect appropriate  Musculuskeletal: No edema    recent labs, Imaging and EKGs personally reviewed     07-25-23 @ 07:01  -  07-26-23 @ 07:00  --------------------------------------------------------  IN: 384 mL / OUT: 1000 mL / NET: -616 mL    07-26-23 @ 07:01  -  07-26-23 @ 13:08  --------------------------------------------------------  IN: 0 mL / OUT: 150 mL / NET: -150 mL                          9.6    11.93 )-----------( 275      ( 26 Jul 2023 06:47 )             29.5               07-26    132<L>  |  104  |  11  ----------------------------<  91  4.1   |  26  |  0.66    Ca    8.9      26 Jul 2023 06:47  Phos  3.6     07-26  Mg     2.10     07-26      PT/INR - ( 26 Jul 2023 06:47 )   PT: 12.0 sec;   INR: 1.06 ratio         PTT - ( 26 Jul 2023 06:47 )  PTT:>200.0 sec                   Urinalysis Basic - ( 26 Jul 2023 06:47 )    Color: x / Appearance: x / SG: x / pH: x  Gluc: 91 mg/dL / Ketone: x  / Bili: x / Urobili: x   Blood: x / Protein: x / Nitrite: x   Leuk Esterase: x / RBC: x / WBC x   Sq Epi: x / Non Sq Epi: x / Bacteria: x               Name of Patient : BRE HODGE  MRN: [id number]  Date of visit: 07-26-23 @ 13:08      Subjective: Patient seen and examined. No new events except as noted.   Doing okay      REVIEW OF SYSTEMS:    CONSTITUTIONAL: No weakness, fevers or chills  EYES/ENT: No visual changes;  No vertigo or throat pain   NECK: No pain or stiffness  RESPIRATORY: No cough, wheezing, hemoptysis; No shortness of breath  CARDIOVASCULAR: No chest pain or palpitations  GASTROINTESTINAL: No abdominal or epigastric pain. No nausea, vomiting, or hematemesis; No diarrhea or constipation. No melena or hematochezia.  GENITOURINARY: No dysuria, frequency or hematuria  NEUROLOGICAL: No numbness or weakness  SKIN: No itching, burning, rashes, or lesions   All other review of systems is negative unless indicated above.    MEDICATIONS:  MEDICATIONS  (STANDING):  acetaminophen     Tablet .. 1000 milliGRAM(s) Oral every 6 hours  apixaban 10 milliGRAM(s) Oral two times a day  aspirin  chewable 81 milliGRAM(s) Oral every 24 hours  atorvastatin 40 milliGRAM(s) Oral at bedtime  budesonide 160 MICROgram(s)/formoterol 4.5 MICROgram(s) Inhaler 2 Puff(s) Inhalation two times a day      PHYSICAL EXAM:  T(C): 36.9 (07-26-23 @ 09:55), Max: 37.5 (07-25-23 @ 18:12)  HR: 69 (07-26-23 @ 09:55) (66 - 85)  BP: 104/47 (07-26-23 @ 09:55) (99/51 - 123/75)  RR: 17 (07-26-23 @ 09:55) (16 - 18)  SpO2: 97% (07-26-23 @ 09:55) (97% - 100%)  Wt(kg): --  I&O's Summary    25 Jul 2023 07:01  -  26 Jul 2023 07:00  --------------------------------------------------------  IN: 384 mL / OUT: 1000 mL / NET: -616 mL    26 Jul 2023 07:01  -  26 Jul 2023 13:08  --------------------------------------------------------  IN: 0 mL / OUT: 150 mL / NET: -150 mL          Appearance: Normal	  HEENT:  PERRLA   Lymphatic: No lymphadenopathy   Cardiovascular: Normal S1 S2, no JVD  Respiratory: normal effort , clear  Gastrointestinal:  Soft, Non-tender  Skin: No rashes,  warm to touch  Psychiatry:  Mood & affect appropriate  Musculuskeletal: No edema    recent labs, Imaging and EKGs personally reviewed     07-25-23 @ 07:01  -  07-26-23 @ 07:00  --------------------------------------------------------  IN: 384 mL / OUT: 1000 mL / NET: -616 mL    07-26-23 @ 07:01  -  07-26-23 @ 13:08  --------------------------------------------------------  IN: 0 mL / OUT: 150 mL / NET: -150 mL                          9.6    11.93 )-----------( 275      ( 26 Jul 2023 06:47 )             29.5               07-26    132<L>  |  104  |  11  ----------------------------<  91  4.1   |  26  |  0.66    Ca    8.9      26 Jul 2023 06:47  Phos  3.6     07-26  Mg     2.10     07-26      PT/INR - ( 26 Jul 2023 06:47 )   PT: 12.0 sec;   INR: 1.06 ratio         PTT - ( 26 Jul 2023 06:47 )  PTT:>200.0 sec                   Urinalysis Basic - ( 26 Jul 2023 06:47 )    Color: x / Appearance: x / SG: x / pH: x  Gluc: 91 mg/dL / Ketone: x  / Bili: x / Urobili: x   Blood: x / Protein: x / Nitrite: x   Leuk Esterase: x / RBC: x / WBC x   Sq Epi: x / Non Sq Epi: x / Bacteria: x               Name of Patient : BRE HODGE  MRN: [id number]  Date of visit: 07-26-23 @ 13:08      Subjective: Patient seen and examined. No new events except as noted.   Doing okay      REVIEW OF SYSTEMS:    CONSTITUTIONAL: No weakness, fevers or chills  EYES/ENT: No visual changes;  No vertigo or throat pain   NECK: No pain or stiffness  RESPIRATORY: No cough, wheezing, hemoptysis; No shortness of breath  CARDIOVASCULAR: No chest pain or palpitations  GASTROINTESTINAL: No abdominal or epigastric pain. No nausea, vomiting, or hematemesis; No diarrhea or constipation. No melena or hematochezia.  GENITOURINARY: No dysuria, frequency or hematuria  NEUROLOGICAL: No numbness or weakness  SKIN: No itching, burning, rashes, or lesions   All other review of systems is negative unless indicated above.    MEDICATIONS:  MEDICATIONS  (STANDING):  acetaminophen     Tablet .. 1000 milliGRAM(s) Oral every 6 hours  apixaban 10 milliGRAM(s) Oral two times a day  aspirin  chewable 81 milliGRAM(s) Oral every 24 hours  atorvastatin 40 milliGRAM(s) Oral at bedtime  budesonide 160 MICROgram(s)/formoterol 4.5 MICROgram(s) Inhaler 2 Puff(s) Inhalation two times a day      PHYSICAL EXAM:  T(C): 36.9 (07-26-23 @ 09:55), Max: 37.5 (07-25-23 @ 18:12)  HR: 69 (07-26-23 @ 09:55) (66 - 85)  BP: 104/47 (07-26-23 @ 09:55) (99/51 - 123/75)  RR: 17 (07-26-23 @ 09:55) (16 - 18)  SpO2: 97% (07-26-23 @ 09:55) (97% - 100%)  Wt(kg): --  I&O's Summary    25 Jul 2023 07:01  -  26 Jul 2023 07:00  --------------------------------------------------------  IN: 384 mL / OUT: 1000 mL / NET: -616 mL    26 Jul 2023 07:01  -  26 Jul 2023 13:08  --------------------------------------------------------  IN: 0 mL / OUT: 150 mL / NET: -150 mL          Appearance: Normal	  HEENT:  PERRLA   Lymphatic: No lymphadenopathy   Cardiovascular: Normal S1 S2, no JVD  Respiratory: normal effort , clear  Gastrointestinal:  Soft, Non-tender  Skin: No rashes,  warm to touch  Psychiatry:  Mood & affect appropriate  Musculuskeletal: No edema    recent labs, Imaging and EKGs personally reviewed     07-25-23 @ 07:01  -  07-26-23 @ 07:00  --------------------------------------------------------  IN: 384 mL / OUT: 1000 mL / NET: -616 mL    07-26-23 @ 07:01  -  07-26-23 @ 13:08  --------------------------------------------------------  IN: 0 mL / OUT: 150 mL / NET: -150 mL                          9.6    11.93 )-----------( 275      ( 26 Jul 2023 06:47 )             29.5               07-26    132<L>  |  104  |  11  ----------------------------<  91  4.1   |  26  |  0.66    Ca    8.9      26 Jul 2023 06:47  Phos  3.6     07-26  Mg     2.10     07-26      PT/INR - ( 26 Jul 2023 06:47 )   PT: 12.0 sec;   INR: 1.06 ratio         PTT - ( 26 Jul 2023 06:47 )  PTT:>200.0 sec                   Urinalysis Basic - ( 26 Jul 2023 06:47 )    Color: x / Appearance: x / SG: x / pH: x  Gluc: 91 mg/dL / Ketone: x  / Bili: x / Urobili: x   Blood: x / Protein: x / Nitrite: x   Leuk Esterase: x / RBC: x / WBC x   Sq Epi: x / Non Sq Epi: x / Bacteria: x

## 2023-07-27 LAB
ANION GAP SERPL CALC-SCNC: 13 MMOL/L — SIGNIFICANT CHANGE UP (ref 7–14)
BUN SERPL-MCNC: 13 MG/DL — SIGNIFICANT CHANGE UP (ref 7–23)
CALCIUM SERPL-MCNC: 9.2 MG/DL — SIGNIFICANT CHANGE UP (ref 8.4–10.5)
CHLORIDE SERPL-SCNC: 101 MMOL/L — SIGNIFICANT CHANGE UP (ref 98–107)
CO2 SERPL-SCNC: 23 MMOL/L — SIGNIFICANT CHANGE UP (ref 22–31)
CREAT SERPL-MCNC: 0.59 MG/DL — SIGNIFICANT CHANGE UP (ref 0.5–1.3)
EGFR: 102 ML/MIN/1.73M2 — SIGNIFICANT CHANGE UP
GLUCOSE SERPL-MCNC: 94 MG/DL — SIGNIFICANT CHANGE UP (ref 70–99)
HCT VFR BLD CALC: 29.9 % — LOW (ref 34.5–45)
HGB BLD-MCNC: 9.4 G/DL — LOW (ref 11.5–15.5)
MAGNESIUM SERPL-MCNC: 1.7 MG/DL — SIGNIFICANT CHANGE UP (ref 1.6–2.6)
MCHC RBC-ENTMCNC: 31.4 GM/DL — LOW (ref 32–36)
MCHC RBC-ENTMCNC: 31.6 PG — SIGNIFICANT CHANGE UP (ref 27–34)
MCV RBC AUTO: 100.7 FL — HIGH (ref 80–100)
NRBC # BLD: 0 /100 WBCS — SIGNIFICANT CHANGE UP (ref 0–0)
NRBC # FLD: 0 K/UL — SIGNIFICANT CHANGE UP (ref 0–0)
PHOSPHATE SERPL-MCNC: 3 MG/DL — SIGNIFICANT CHANGE UP (ref 2.5–4.5)
PLATELET # BLD AUTO: 300 K/UL — SIGNIFICANT CHANGE UP (ref 150–400)
POTASSIUM SERPL-MCNC: 4.1 MMOL/L — SIGNIFICANT CHANGE UP (ref 3.5–5.3)
POTASSIUM SERPL-SCNC: 4.1 MMOL/L — SIGNIFICANT CHANGE UP (ref 3.5–5.3)
RBC # BLD: 2.97 M/UL — LOW (ref 3.8–5.2)
RBC # FLD: 19.2 % — HIGH (ref 10.3–14.5)
SODIUM SERPL-SCNC: 137 MMOL/L — SIGNIFICANT CHANGE UP (ref 135–145)
WBC # BLD: 11.52 K/UL — HIGH (ref 3.8–10.5)
WBC # FLD AUTO: 11.52 K/UL — HIGH (ref 3.8–10.5)

## 2023-07-27 RX ORDER — MAGNESIUM SULFATE 500 MG/ML
2 VIAL (ML) INJECTION ONCE
Refills: 0 | Status: COMPLETED | OUTPATIENT
Start: 2023-07-27 | End: 2023-07-27

## 2023-07-27 RX ORDER — POLYETHYLENE GLYCOL 3350 17 G/17G
17 POWDER, FOR SOLUTION ORAL ONCE
Refills: 0 | Status: COMPLETED | OUTPATIENT
Start: 2023-07-27 | End: 2023-07-28

## 2023-07-27 RX ADMIN — OXYCODONE HYDROCHLORIDE 10 MILLIGRAM(S): 5 TABLET ORAL at 21:05

## 2023-07-27 RX ADMIN — BUDESONIDE AND FORMOTEROL FUMARATE DIHYDRATE 2 PUFF(S): 160; 4.5 AEROSOL RESPIRATORY (INHALATION) at 09:27

## 2023-07-27 RX ADMIN — Medication 1000 MILLIGRAM(S): at 22:38

## 2023-07-27 RX ADMIN — Medication 81 MILLIGRAM(S): at 12:45

## 2023-07-27 RX ADMIN — Medication 25 GRAM(S): at 12:45

## 2023-07-27 RX ADMIN — Medication 1000 MILLIGRAM(S): at 12:45

## 2023-07-27 RX ADMIN — OXYCODONE HYDROCHLORIDE 10 MILLIGRAM(S): 5 TABLET ORAL at 09:27

## 2023-07-27 RX ADMIN — Medication 1000 MILLIGRAM(S): at 04:34

## 2023-07-27 RX ADMIN — OXYCODONE HYDROCHLORIDE 10 MILLIGRAM(S): 5 TABLET ORAL at 05:04

## 2023-07-27 RX ADMIN — OXYCODONE HYDROCHLORIDE 10 MILLIGRAM(S): 5 TABLET ORAL at 09:57

## 2023-07-27 RX ADMIN — OXYCODONE HYDROCHLORIDE 10 MILLIGRAM(S): 5 TABLET ORAL at 20:35

## 2023-07-27 RX ADMIN — APIXABAN 10 MILLIGRAM(S): 2.5 TABLET, FILM COATED ORAL at 05:50

## 2023-07-27 RX ADMIN — ATORVASTATIN CALCIUM 40 MILLIGRAM(S): 80 TABLET, FILM COATED ORAL at 22:38

## 2023-07-27 RX ADMIN — OXYCODONE HYDROCHLORIDE 10 MILLIGRAM(S): 5 TABLET ORAL at 04:34

## 2023-07-27 RX ADMIN — APIXABAN 10 MILLIGRAM(S): 2.5 TABLET, FILM COATED ORAL at 17:49

## 2023-07-27 RX ADMIN — Medication 1000 MILLIGRAM(S): at 17:48

## 2023-07-27 NOTE — PROGRESS NOTE ADULT - ASSESSMENT
Patient is a 62 y/o female with PMH of R SFA/popliteal stent, presented with pain in RLE, found to have acute on chronic limb ischemia. Now s/p RLE angiogram and percutaneous thrombectomy (07/19) of SFA and popliteal. At post op check had positive signal in AT and PT. Now off pressors, moved from SICU to floors.     # PAD   S/P ANGIO   YANIRA/PVR  vascular follow up   ASA, Statin and heparin drip , AC switch to doacs   monitor hgb level   Echo noted   stress test noted, normal   S/P BYPASS    # Hx of CAD  S/P STent   Echo noted  ASA and statin   Card follow up appreciated     # Electrolytes imbalance  replete electrolyses  Monitor daily     # Anemia   Anemia W/U   Keep hgb above 8 if possible     DVT andGI PPX     D/C planing  Patient is a 64 y/o female with PMH of R SFA/popliteal stent, presented with pain in RLE, found to have acute on chronic limb ischemia. Now s/p RLE angiogram and percutaneous thrombectomy (07/19) of SFA and popliteal. At post op check had positive signal in AT and PT. Now off pressors, moved from SICU to floors.     # PAD   S/P ANGIO   YANIRA/PVR  vascular follow up   ASA, Statin and heparin drip , AC switch to doacs   monitor hgb level   Echo noted   stress test noted, normal   S/P BYPASS    # Hx of CAD  S/P STent   Echo noted  ASA and statin   Card follow up appreciated     # Electrolytes imbalance  replete electrolyses  Monitor daily     # Anemia   Anemia W/U   Keep hgb above 8 if possible     DVT andGI PPX     D/C planing

## 2023-07-27 NOTE — PROGRESS NOTE ADULT - SUBJECTIVE AND OBJECTIVE BOX
Subjective: Patient seen and examined. No new events except as noted.     SUBJECTIVE/ROS:  nad      MEDICATIONS:  MEDICATIONS  (STANDING):  acetaminophen     Tablet .. 1000 milliGRAM(s) Oral every 6 hours  apixaban 10 milliGRAM(s) Oral two times a day  aspirin  chewable 81 milliGRAM(s) Oral every 24 hours  atorvastatin 40 milliGRAM(s) Oral at bedtime  budesonide 160 MICROgram(s)/formoterol 4.5 MICROgram(s) Inhaler 2 Puff(s) Inhalation two times a day      PHYSICAL EXAM:  T(C): 37.3 (07-27-23 @ 05:49), Max: 37.3 (07-27-23 @ 05:49)  HR: 78 (07-27-23 @ 09:27) (75 - 92)  BP: 106/68 (07-27-23 @ 09:27) (106/68 - 155/50)  RR: 17 (07-27-23 @ 09:27) (16 - 18)  SpO2: 99% (07-27-23 @ 09:27) (98% - 100%)  Wt(kg): --  I&O's Summary    26 Jul 2023 07:01  -  27 Jul 2023 07:00  --------------------------------------------------------  IN: 0 mL / OUT: 550 mL / NET: -550 mL            JVP: Normal  Neck: supple  Lung: clear   CV: S1 S2 , Murmur:  Abd: soft  Ext: No edema  neuro: Awake / alert  Psych: flat affect  Skin: normal``    LABS/DATA:    CARDIAC MARKERS:                                9.4    11.52 )-----------( 300      ( 27 Jul 2023 06:27 )             29.9     07-27    137  |  101  |  13  ----------------------------<  94  4.1   |  23  |  0.59    Ca    9.2      27 Jul 2023 06:27  Phos  3.0     07-27  Mg     1.70     07-27      proBNP:   Lipid Profile:   HgA1c:   TSH:     TELE:  EKG:

## 2023-07-27 NOTE — PROGRESS NOTE ADULT - ASSESSMENT
62 y/o woman with a PMH of R SFA/popliteal stent, presented with pain in RLE, found to have acute on chronic limb ischemia. s/p RLE angiogram and percutaneous thrombectomy (07/19) of SFA and popliteal. Now s/p R CFA -> PT bypass with PTFE (7/25).    Plan:  - Eliquis  - Rehab  - c/w ASA  - pain control PRN  - diet: regular 64 y/o woman with a PMH of R SFA/popliteal stent, presented with pain in RLE, found to have acute on chronic limb ischemia. s/p RLE angiogram and percutaneous thrombectomy (07/19) of SFA and popliteal. Now s/p R CFA -> PT bypass with PTFE (7/25).    Plan:  - Eliquis  - Rehab  - c/w ASA  - pain control PRN  - diet: regular

## 2023-07-27 NOTE — PROGRESS NOTE ADULT - SUBJECTIVE AND OBJECTIVE BOX
Name of Patient : BRE HODGE  MRN: [id number]  Date of visit: 07-27-23 @ 12:10      Subjective: Patient seen and examined. No new events except as noted.   Doing okay       REVIEW OF SYSTEMS:    CONSTITUTIONAL: No weakness, fevers or chills  EYES/ENT: No visual changes;  No vertigo or throat pain   NECK: No pain or stiffness  RESPIRATORY: No cough, wheezing, hemoptysis; No shortness of breath  CARDIOVASCULAR: No chest pain or palpitations  GASTROINTESTINAL: No abdominal or epigastric pain. No nausea, vomiting, or hematemesis; No diarrhea or constipation. No melena or hematochezia.  GENITOURINARY: No dysuria, frequency or hematuria  NEUROLOGICAL: No numbness or weakness  SKIN: No itching, burning, rashes, or lesions   All other review of systems is negative unless indicated above.    MEDICATIONS:  MEDICATIONS  (STANDING):  acetaminophen     Tablet .. 1000 milliGRAM(s) Oral every 6 hours  apixaban 10 milliGRAM(s) Oral two times a day  aspirin  chewable 81 milliGRAM(s) Oral every 24 hours  atorvastatin 40 milliGRAM(s) Oral at bedtime  budesonide 160 MICROgram(s)/formoterol 4.5 MICROgram(s) Inhaler 2 Puff(s) Inhalation two times a day  magnesium sulfate  IVPB 2 Gram(s) IV Intermittent once      PHYSICAL EXAM:  T(C): 37.3 (07-27-23 @ 05:49), Max: 37.3 (07-27-23 @ 05:49)  HR: 78 (07-27-23 @ 09:27) (75 - 92)  BP: 106/68 (07-27-23 @ 09:27) (106/68 - 155/50)  RR: 17 (07-27-23 @ 09:27) (16 - 18)  SpO2: 99% (07-27-23 @ 09:27) (98% - 100%)  Wt(kg): --  I&O's Summary    26 Jul 2023 07:01  -  27 Jul 2023 07:00  --------------------------------------------------------  IN: 0 mL / OUT: 550 mL / NET: -550 mL          Appearance: Normal	  HEENT:  PERRLA   Lymphatic: No lymphadenopathy   Cardiovascular: Normal S1 S2, no JVD  Respiratory: normal effort , clear  Gastrointestinal:  Soft, Non-tender  Skin: No rashes,  warm to touch  Psychiatry:  Mood & affect appropriate  Musculuskeletal: No edema    recent labs, Imaging and EKGs personally reviewed     07-26-23 @ 07:01  -  07-27-23 @ 07:00  --------------------------------------------------------  IN: 0 mL / OUT: 550 mL / NET: -550 mL                          9.4    11.52 )-----------( 300      ( 27 Jul 2023 06:27 )             29.9               07-27    137  |  101  |  13  ----------------------------<  94  4.1   |  23  |  0.59    Ca    9.2      27 Jul 2023 06:27  Phos  3.0     07-27  Mg     1.70     07-27      PT/INR - ( 26 Jul 2023 06:47 )   PT: 12.0 sec;   INR: 1.06 ratio         PTT - ( 26 Jul 2023 06:47 )  PTT:>200.0 sec                   Urinalysis Basic - ( 27 Jul 2023 06:27 )    Color: x / Appearance: x / SG: x / pH: x  Gluc: 94 mg/dL / Ketone: x  / Bili: x / Urobili: x   Blood: x / Protein: x / Nitrite: x   Leuk Esterase: x / RBC: x / WBC x   Sq Epi: x / Non Sq Epi: x / Bacteria: x               Name of Patient : BRE HODGE  MRN: [id number]  Date of visit: 07-27-23 @ 12:10      Subjective: Patient seen and examined. No new events except as noted.   Doing okay       REVIEW OF SYSTEMS:    CONSTITUTIONAL: No weakness, fevers or chills  EYES/ENT: No visual changes;  No vertigo or throat pain   NECK: No pain or stiffness  RESPIRATORY: No cough, wheezing, hemoptysis; No shortness of breath  CARDIOVASCULAR: No chest pain or palpitations  GASTROINTESTINAL: No abdominal or epigastric pain. No nausea, vomiting, or hematemesis; No diarrhea or constipation. No melena or hematochezia.  GENITOURINARY: No dysuria, frequency or hematuria  NEUROLOGICAL: No numbness or weakness  SKIN: No itching, burning, rashes, or lesions   All other review of systems is negative unless indicated above.    MEDICATIONS:  MEDICATIONS  (STANDING):  acetaminophen     Tablet .. 1000 milliGRAM(s) Oral every 6 hours  apixaban 10 milliGRAM(s) Oral two times a day  aspirin  chewable 81 milliGRAM(s) Oral every 24 hours  atorvastatin 40 milliGRAM(s) Oral at bedtime  budesonide 160 MICROgram(s)/formoterol 4.5 MICROgram(s) Inhaler 2 Puff(s) Inhalation two times a day  magnesium sulfate  IVPB 2 Gram(s) IV Intermittent once      PHYSICAL EXAM:  T(C): 37.3 (07-27-23 @ 05:49), Max: 37.3 (07-27-23 @ 05:49)  HR: 78 (07-27-23 @ 09:27) (75 - 92)  BP: 106/68 (07-27-23 @ 09:27) (106/68 - 155/50)  RR: 17 (07-27-23 @ 09:27) (16 - 18)  SpO2: 99% (07-27-23 @ 09:27) (98% - 100%)  Wt(kg): --  I&O's Summary    26 Jul 2023 07:01  -  27 Jul 2023 07:00  --------------------------------------------------------  IN: 0 mL / OUT: 550 mL / NET: -550 mL          Appearance: Normal	  HEENT:  PERRLA   Lymphatic: No lymphadenopathy   Cardiovascular: Normal S1 S2, no JVD  Respiratory: normal effort , clear  Gastrointestinal:  Soft, Non-tender  Skin: No rashes,  warm to touch  Psychiatry:  Mood & affect appropriate  Musculuskeletal: No edema    recent labs, Imaging and EKGs personally reviewed     07-26-23 @ 07:01  -  07-27-23 @ 07:00  --------------------------------------------------------  IN: 0 mL / OUT: 550 mL / NET: -550 mL                          9.4    11.52 )-----------( 300      ( 27 Jul 2023 06:27 )             29.9               07-27    137  |  101  |  13  ----------------------------<  94  4.1   |  23  |  0.59    Ca    9.2      27 Jul 2023 06:27  Phos  3.0     07-27  Mg     1.70     07-27      PT/INR - ( 26 Jul 2023 06:47 )   PT: 12.0 sec;   INR: 1.06 ratio         PTT - ( 26 Jul 2023 06:47 )  PTT:>200.0 sec                   Urinalysis Basic - ( 27 Jul 2023 06:27 )    Color: x / Appearance: x / SG: x / pH: x  Gluc: 94 mg/dL / Ketone: x  / Bili: x / Urobili: x   Blood: x / Protein: x / Nitrite: x   Leuk Esterase: x / RBC: x / WBC x   Sq Epi: x / Non Sq Epi: x / Bacteria: x               Name of Patient : BRE HODGE  MRN: [id number]  Date of visit: 07-27-23 @ 12:10      Subjective: Patient seen and examined. No new events except as noted.   Doing okay       REVIEW OF SYSTEMS:    CONSTITUTIONAL: No weakness, fevers or chills  EYES/ENT: No visual changes;  No vertigo or throat pain   NECK: No pain or stiffness  RESPIRATORY: No cough, wheezing, hemoptysis; No shortness of breath  CARDIOVASCULAR: No chest pain or palpitations  GASTROINTESTINAL: No abdominal or epigastric pain. No nausea, vomiting, or hematemesis; No diarrhea or constipation. No melena or hematochezia.  GENITOURINARY: No dysuria, frequency or hematuria  NEUROLOGICAL: No numbness or weakness  SKIN: No itching, burning, rashes, or lesions   All other review of systems is negative unless indicated above.    MEDICATIONS:  MEDICATIONS  (STANDING):  acetaminophen     Tablet .. 1000 milliGRAM(s) Oral every 6 hours  apixaban 10 milliGRAM(s) Oral two times a day  aspirin  chewable 81 milliGRAM(s) Oral every 24 hours  atorvastatin 40 milliGRAM(s) Oral at bedtime  budesonide 160 MICROgram(s)/formoterol 4.5 MICROgram(s) Inhaler 2 Puff(s) Inhalation two times a day  magnesium sulfate  IVPB 2 Gram(s) IV Intermittent once      PHYSICAL EXAM:  T(C): 37.3 (07-27-23 @ 05:49), Max: 37.3 (07-27-23 @ 05:49)  HR: 78 (07-27-23 @ 09:27) (75 - 92)  BP: 106/68 (07-27-23 @ 09:27) (106/68 - 155/50)  RR: 17 (07-27-23 @ 09:27) (16 - 18)  SpO2: 99% (07-27-23 @ 09:27) (98% - 100%)  Wt(kg): --  I&O's Summary    26 Jul 2023 07:01  -  27 Jul 2023 07:00  --------------------------------------------------------  IN: 0 mL / OUT: 550 mL / NET: -550 mL          Appearance: Normal	  HEENT:  PERRLA   Lymphatic: No lymphadenopathy   Cardiovascular: Normal S1 S2, no JVD  Respiratory: normal effort , clear  Gastrointestinal:  Soft, Non-tender  Skin: No rashes,  warm to touch  Psychiatry:  Mood & affect appropriate  Musculuskeletal: No edema    recent labs, Imaging and EKGs personally reviewed     07-26-23 @ 07:01  -  07-27-23 @ 07:00  --------------------------------------------------------  IN: 0 mL / OUT: 550 mL / NET: -550 mL                          9.4    11.52 )-----------( 300      ( 27 Jul 2023 06:27 )             29.9               07-27    137  |  101  |  13  ----------------------------<  94  4.1   |  23  |  0.59    Ca    9.2      27 Jul 2023 06:27  Phos  3.0     07-27  Mg     1.70     07-27      PT/INR - ( 26 Jul 2023 06:47 )   PT: 12.0 sec;   INR: 1.06 ratio         PTT - ( 26 Jul 2023 06:47 )  PTT:>200.0 sec                   Urinalysis Basic - ( 27 Jul 2023 06:27 )    Color: x / Appearance: x / SG: x / pH: x  Gluc: 94 mg/dL / Ketone: x  / Bili: x / Urobili: x   Blood: x / Protein: x / Nitrite: x   Leuk Esterase: x / RBC: x / WBC x   Sq Epi: x / Non Sq Epi: x / Bacteria: x

## 2023-07-27 NOTE — CHART NOTE - NSCHARTNOTEFT_GEN_A_CORE
Reason for Follow-Up Assessment: ICU Transfer    RD visited with patient for f/u.  Patient stated she is sometimes has difficulty with swallowing.  Patient amenable to change in supplement from Ensure Surgery to Ensure Plus High Protein 3 times daily.  Patient requesting d/c low sodium diet.    Source: [ ] Patient [ ] Family [ ] RN [ ] Chart      Diet, Regular:   Low Sodium  Supplement Feeding Modality:  Oral  Ensure Surgery Cans or Servings Per Day:  3       Frequency:  Three Times a day (07-24-23 @ 14:56)      GI: WDL. Last BM noted on [  ]    PO intake:  [ ] Poor < 50%  [ ] Fair 50-75% [ ] Good  % [ ] Inconsistent PO intake    Enteral /Parenteral Nutrition:       [  ] n/a    Anthropometrics: Height (cm): 160 (07-24)  Weight (kg): 60 (07-24)  BMI (kg/m2): 23.4 (07-24)    Edema:    Pressure Injuries:    _______________ Pertinent Medications_______________  MEDICATIONS  (STANDING):  acetaminophen     Tablet .. 1000 milliGRAM(s) Oral every 6 hours  apixaban 10 milliGRAM(s) Oral two times a day  aspirin  chewable 81 milliGRAM(s) Oral every 24 hours  atorvastatin 40 milliGRAM(s) Oral at bedtime  budesonide 160 MICROgram(s)/formoterol 4.5 MICROgram(s) Inhaler 2 Puff(s) Inhalation two times a day  magnesium sulfate  IVPB 2 Gram(s) IV Intermittent once    MEDICATIONS  (PRN):  oxyCODONE    IR 10 milliGRAM(s) Oral every 4 hours PRN Severe Pain (7 - 10)  oxyCODONE    IR 5 milliGRAM(s) Oral every 4 hours PRN Moderate Pain (4 - 6)      __________________ Pertinent Labs__________________   07-27 Na137 mmol/L Glu 94 mg/dL K+ 4.1 mmol/L Cr  0.59 mg/dL BUN 13 mg/dL 07-27 Phos 3.0 mg/dL            Estimated Needs:   leif/d  gm pro/d    [ ] no change since previous assessment  [ ] recalculated:     Previous Nutrition Diagnosis:     Nutrition Diagnosis is [ ] ongoing  [ ] resolved [ ] not applicable     New Nutrition Diagnosis:    Monitoring and Evaluation:     [ x ] Tolerance to diet prescription / adequacy of meal intake  [ x ] Weight trends   [ x ] Pertinent labs  [ x ] Other:    Recommendations:  1) Diet / Supplement Changes:  2) Obtain and record current weights to best monitor for acute changes in nutritional status.  3) Please consistently document % meal intake in nursing flowsheets. Reason for Follow-Up Assessment: ICU Transfer    RD visited with patient for f/u.    64 y/o woman with a PMH of R SFA/popliteal stent, presented with pain in RLE, found to have acute on chronic limb ischemia. s/p RLE angiogram and percutaneous thrombectomy (07/19) of SFA and popliteal. Now s/p R CFA -> PT bypass with PTFE (7/25).    Patient stated she is sometimes has difficulty with swallowing. Discussed that it would be best to obtain a bedside swallow assessment to evaluate swallowing fxn and to determine most appropriate diet / liquid consistencies.  Patient amenable to change in supplement from Ensure Surgery to Ensure Plus High Protein 3 times daily.  Patient requesting d/c low sodium diet, requesting for salt and pepper with meals. Educated patient on current diet order, but despite education, requests sodium restriction be lifted.  Spoke with vascular team and communicated the above.      Source: [ ] Patient [ ] Family [ ] RN [ X ] Chart Review    Diet, Regular:   Low Sodium  Supplement Feeding Modality:  Oral  Ensure Surgery Cans or Servings Per Day:  3       Frequency:  Three Times a day (07-24-23 @ 14:56)      GI: WDL. Last BM noted on [ 7/20 ] per nursing flowsheets    PO intake:  [X] Poor < 50%  to Fair 50-75% [ ] Good  % [ ] Inconsistent PO intake    Enteral /Parenteral Nutrition:       [ X ] n/a    Anthropometrics: Height (cm): 160 (07-24)  Weight (kg): 60 (07-24), no new weights to assess  BMI (kg/m2): 23.4 (07-24)    Edema: No edema noted    Pressure Injuries: No pressure ulcers/DTI noted in flowsheets.     _______________ Pertinent Medications_______________  MEDICATIONS  (STANDING):  acetaminophen     Tablet .. 1000 milliGRAM(s) Oral every 6 hours  apixaban 10 milliGRAM(s) Oral two times a day  aspirin  chewable 81 milliGRAM(s) Oral every 24 hours  atorvastatin 40 milliGRAM(s) Oral at bedtime  budesonide 160 MICROgram(s)/formoterol 4.5 MICROgram(s) Inhaler 2 Puff(s) Inhalation two times a day  magnesium sulfate  IVPB 2 Gram(s) IV Intermittent once    MEDICATIONS  (PRN):  oxyCODONE    IR 10 milliGRAM(s) Oral every 4 hours PRN Severe Pain (7 - 10)  oxyCODONE    IR 5 milliGRAM(s) Oral every 4 hours PRN Moderate Pain (4 - 6)      __________________ Pertinent Labs__________________   07-27 Na137 mmol/L Glu 94 mg/dL K+ 4.1 mmol/L Cr  0.59 mg/dL BUN 13 mg/dL 07-27 Phos 3.0 mg/dL    Estimated Needs:   [X] no change since previous assessment  [ ] recalculated:     Previous Nutrition Diagnosis:  Inadequate Oral Intake     Nutrition Diagnosis is [ X ] ongoing  [ ] resolved [ ] not applicable     New Nutrition Diagnosis: n/a     Monitoring and Evaluation:      [ x ] Tolerance to diet prescription / adequacy of meal intake  [ x ] Weight trends   [ x ] Pertinent labs    Recommendations:  1) Diet / Supplement Changes: Suggest change in supplement from Ensure Surgery 3x day to Ensure Plus High Protein Therapeutic Shake 3x daily (1050kcal, 60g protein). Defer appropriateness of diet liberalization to team (pt. requesting for low sodium diet restriction be removed).  2) Obtain bedside swallow assessment due to reports of intermittent swallowing difficulty.  3) Obtain and record current weights to best monitor for acute changes in nutritional status.  4) Please consistently document % meal intake in nursing flowsheets.    Sylvia Lima, MS, RDN, CDN  Pager [phone]  Also available on MS Teams Reason for Follow-Up Assessment: ICU Transfer    RD visited with patient for f/u.    64 y/o woman with a PMH of R SFA/popliteal stent, presented with pain in RLE, found to have acute on chronic limb ischemia. s/p RLE angiogram and percutaneous thrombectomy (07/19) of SFA and popliteal. Now s/p R CFA -> PT bypass with PTFE (7/25).    Patient stated she is sometimes has difficulty with swallowing. Discussed that it would be best to obtain a bedside swallow assessment to evaluate swallowing fxn and to determine most appropriate diet / liquid consistencies.  Patient amenable to change in supplement from Ensure Surgery to Ensure Plus High Protein 3 times daily.  Patient requesting d/c low sodium diet, requesting for salt and pepper with meals. Educated patient on current diet order, but despite education, requests sodium restriction be lifted.  Spoke with vascular team and communicated the above.      Source: [ ] Patient [ ] Family [ ] RN [ X ] Chart Review    Diet, Regular:   Low Sodium  Supplement Feeding Modality:  Oral  Ensure Surgery Cans or Servings Per Day:  3       Frequency:  Three Times a day (07-24-23 @ 14:56)      GI: WDL. Last BM noted on [ 7/20 ] per nursing flowsheets    PO intake:  [X] Poor < 50%  to Fair 50-75% [ ] Good  % [ ] Inconsistent PO intake    Enteral /Parenteral Nutrition:       [ X ] n/a    Anthropometrics: Height (cm): 160 (07-24)  Weight (kg): 60 (07-24), no new weights to assess  BMI (kg/m2): 23.4 (07-24)    Edema: No edema noted    Pressure Injuries: No pressure ulcers/DTI noted in flowsheets.     _______________ Pertinent Medications_______________  MEDICATIONS  (STANDING):  acetaminophen     Tablet .. 1000 milliGRAM(s) Oral every 6 hours  apixaban 10 milliGRAM(s) Oral two times a day  aspirin  chewable 81 milliGRAM(s) Oral every 24 hours  atorvastatin 40 milliGRAM(s) Oral at bedtime  budesonide 160 MICROgram(s)/formoterol 4.5 MICROgram(s) Inhaler 2 Puff(s) Inhalation two times a day  magnesium sulfate  IVPB 2 Gram(s) IV Intermittent once    MEDICATIONS  (PRN):  oxyCODONE    IR 10 milliGRAM(s) Oral every 4 hours PRN Severe Pain (7 - 10)  oxyCODONE    IR 5 milliGRAM(s) Oral every 4 hours PRN Moderate Pain (4 - 6)      __________________ Pertinent Labs__________________   07-27 Na137 mmol/L Glu 94 mg/dL K+ 4.1 mmol/L Cr  0.59 mg/dL BUN 13 mg/dL 07-27 Phos 3.0 mg/dL    Estimated Needs:   [X] no change since previous assessment  [ ] recalculated:     Previous Nutrition Diagnosis:  Inadequate Oral Intake     Nutrition Diagnosis is [ X ] ongoing  [ ] resolved [ ] not applicable     New Nutrition Diagnosis: n/a     Monitoring and Evaluation:      [ x ] Tolerance to diet prescription / adequacy of meal intake  [ x ] Weight trends   [ x ] Pertinent labs    Recommendations:  1) Diet / Supplement Changes: Suggest change in supplement from Ensure Surgery 3x day to Ensure Plus High Protein Therapeutic Shake 3x daily (1050kcal, 60g protein). Defer appropriateness of diet liberalization to team (pt. requesting for low sodium diet restriction be removed).  2) Obtain bedside swallow assessment due to reports of intermittent swallowing difficulty.  3) Obtain and record current weights to best monitor for acute changes in nutritional status.  4) Please consistently document % meal intake in nursing flowsheets.    Sylvia Lima, MS, RDN, CDN  Pager [phone]  Also available on MS Teams Reason for Follow-Up Assessment: ICU Transfer    RD visited with patient for f/u.    62 y/o woman with a PMH of R SFA/popliteal stent, presented with pain in RLE, found to have acute on chronic limb ischemia. s/p RLE angiogram and percutaneous thrombectomy (07/19) of SFA and popliteal. Now s/p R CFA -> PT bypass with PTFE (7/25).    Patient stated she is sometimes has difficulty with swallowing. Discussed that it would be best to obtain a bedside swallow assessment to evaluate swallowing fxn and to determine most appropriate diet / liquid consistencies.  Patient amenable to change in supplement from Ensure Surgery to Ensure Plus High Protein 3 times daily.  Patient requesting d/c low sodium diet, requesting for salt and pepper with meals. Educated patient on current diet order, but despite education, requests sodium restriction be lifted.  Spoke with vascular team and communicated the above.      Source: [ ] Patient [ ] Family [ ] RN [ X ] Chart Review    Diet, Regular:   Low Sodium  Supplement Feeding Modality:  Oral  Ensure Surgery Cans or Servings Per Day:  3       Frequency:  Three Times a day (07-24-23 @ 14:56)      GI: WDL. Last BM noted on [ 7/20 ] per nursing flowsheets    PO intake:  [X] Poor < 50%  to Fair 50-75% [ ] Good  % [ ] Inconsistent PO intake    Enteral /Parenteral Nutrition:       [ X ] n/a    Anthropometrics: Height (cm): 160 (07-24)  Weight (kg): 60 (07-24), no new weights to assess  BMI (kg/m2): 23.4 (07-24)    Edema: No edema noted    Pressure Injuries: No pressure ulcers/DTI noted in flowsheets.     _______________ Pertinent Medications_______________  MEDICATIONS  (STANDING):  acetaminophen     Tablet .. 1000 milliGRAM(s) Oral every 6 hours  apixaban 10 milliGRAM(s) Oral two times a day  aspirin  chewable 81 milliGRAM(s) Oral every 24 hours  atorvastatin 40 milliGRAM(s) Oral at bedtime  budesonide 160 MICROgram(s)/formoterol 4.5 MICROgram(s) Inhaler 2 Puff(s) Inhalation two times a day  magnesium sulfate  IVPB 2 Gram(s) IV Intermittent once    MEDICATIONS  (PRN):  oxyCODONE    IR 10 milliGRAM(s) Oral every 4 hours PRN Severe Pain (7 - 10)  oxyCODONE    IR 5 milliGRAM(s) Oral every 4 hours PRN Moderate Pain (4 - 6)      __________________ Pertinent Labs__________________   07-27 Na137 mmol/L Glu 94 mg/dL K+ 4.1 mmol/L Cr  0.59 mg/dL BUN 13 mg/dL 07-27 Phos 3.0 mg/dL    Estimated Needs:   [X] no change since previous assessment  [ ] recalculated:     Previous Nutrition Diagnosis:  Inadequate Oral Intake     Nutrition Diagnosis is [ X ] ongoing  [ ] resolved [ ] not applicable     New Nutrition Diagnosis: n/a     Monitoring and Evaluation:      [ x ] Tolerance to diet prescription / adequacy of meal intake  [ x ] Weight trends   [ x ] Pertinent labs    Recommendations:  1) Diet / Supplement Changes: Suggest change in supplement from Ensure Surgery 3x day to Ensure Plus High Protein Therapeutic Shake 3x daily (1050kcal, 60g protein). Defer appropriateness of diet liberalization to team (pt. requesting for low sodium diet restriction be removed).  2) Obtain bedside swallow assessment due to reports of intermittent swallowing difficulty.  3) Obtain and record current weights to best monitor for acute changes in nutritional status.  4) Please consistently document % meal intake in nursing flowsheets.    Sylvia Lima, MS, RDN, CDN  Pager [phone]  Also available on MS Teams

## 2023-07-27 NOTE — PROGRESS NOTE ADULT - SUBJECTIVE AND OBJECTIVE BOX
C Team Surgery Progress Note     S: Patient resting comfortably on morning rounds. Pain is controlled. Pt reports continued tingling sensation in right foot. She also is hoping to get her EJ IV out.       MEDICATIONS  (STANDING):  acetaminophen     Tablet .. 1000 milliGRAM(s) Oral every 6 hours  apixaban 10 milliGRAM(s) Oral two times a day  aspirin  chewable 81 milliGRAM(s) Oral every 24 hours  atorvastatin 40 milliGRAM(s) Oral at bedtime  budesonide 160 MICROgram(s)/formoterol 4.5 MICROgram(s) Inhaler 2 Puff(s) Inhalation two times a day  magnesium sulfate  IVPB 2 Gram(s) IV Intermittent once    MEDICATIONS  (PRN):  oxyCODONE    IR 10 milliGRAM(s) Oral every 4 hours PRN Severe Pain (7 - 10)  oxyCODONE    IR 5 milliGRAM(s) Oral every 4 hours PRN Moderate Pain (4 - 6)      T(C): 37.3 (07-27-23 @ 05:49), Max: 37.3 (07-27-23 @ 05:49)  HR: 79 (07-27-23 @ 05:49) (69 - 92)  BP: 115/66 (07-27-23 @ 05:49) (104/47 - 155/50)  RR: 18 (07-27-23 @ 05:49) (16 - 18)  SpO2: 100% (07-27-23 @ 05:49) (97% - 100%)        07-26-23 @ 07:01  -  07-27-23 @ 07:00  --------------------------------------------------------  IN: 0 mL / OUT: 550 mL / NET: -550 mL        Physical Exam:  General: NAD, AOx3  Respiratory: No labored breathing  CV: pulse regularly present  MSK:   Pulses exam:     LABS:                        9.4    11.52 )-----------( 300      ( 27 Jul 2023 06:27 )             29.9     07-27    137  |  101  |  13  ----------------------------<  94  4.1   |  23  |  0.59    Ca    9.2      27 Jul 2023 06:27  Phos  3.0     07-27  Mg     1.70     07-27           C Team Surgery Progress Note     S: Patient resting comfortably on morning rounds. Pain is controlled. Pt reports continued tingling sensation in right foot. She also is hoping to get her EJ IV out.       MEDICATIONS  (STANDING):  acetaminophen     Tablet .. 1000 milliGRAM(s) Oral every 6 hours  apixaban 10 milliGRAM(s) Oral two times a day  aspirin  chewable 81 milliGRAM(s) Oral every 24 hours  atorvastatin 40 milliGRAM(s) Oral at bedtime  budesonide 160 MICROgram(s)/formoterol 4.5 MICROgram(s) Inhaler 2 Puff(s) Inhalation two times a day  magnesium sulfate  IVPB 2 Gram(s) IV Intermittent once    MEDICATIONS  (PRN):  oxyCODONE    IR 10 milliGRAM(s) Oral every 4 hours PRN Severe Pain (7 - 10)  oxyCODONE    IR 5 milliGRAM(s) Oral every 4 hours PRN Moderate Pain (4 - 6)      T(C): 37.3 (07-27-23 @ 05:49), Max: 37.3 (07-27-23 @ 05:49)  HR: 79 (07-27-23 @ 05:49) (69 - 92)  BP: 115/66 (07-27-23 @ 05:49) (104/47 - 155/50)  RR: 18 (07-27-23 @ 05:49) (16 - 18)  SpO2: 100% (07-27-23 @ 05:49) (97% - 100%)        07-26-23 @ 07:01  -  07-27-23 @ 07:00  --------------------------------------------------------  IN: 0 mL / OUT: 550 mL / NET: -550 mL        Physical Exam:  General: NAD, AOx3  Respiratory: No labored breathing  CV: pulse regularly present  MSK: RLE motor and sensory grossly intact, generalized edema of right leg. Dressings intact with some fluid underneath.   Pulses exam: L PT signals.    LABS:                        9.4    11.52 )-----------( 300      ( 27 Jul 2023 06:27 )             29.9     07-27    137  |  101  |  13  ----------------------------<  94  4.1   |  23  |  0.59    Ca    9.2      27 Jul 2023 06:27  Phos  3.0     07-27  Mg     1.70     07-27           C Team Surgery Progress Note     S: Patient resting comfortably on morning rounds. Pain is controlled. Pt reports continued tingling sensation in right foot. She also is hoping to get her EJ IV out.       MEDICATIONS  (STANDING):  acetaminophen     Tablet .. 1000 milliGRAM(s) Oral every 6 hours  apixaban 10 milliGRAM(s) Oral two times a day  aspirin  chewable 81 milliGRAM(s) Oral every 24 hours  atorvastatin 40 milliGRAM(s) Oral at bedtime  budesonide 160 MICROgram(s)/formoterol 4.5 MICROgram(s) Inhaler 2 Puff(s) Inhalation two times a day  magnesium sulfate  IVPB 2 Gram(s) IV Intermittent once    MEDICATIONS  (PRN):  oxyCODONE    IR 10 milliGRAM(s) Oral every 4 hours PRN Severe Pain (7 - 10)  oxyCODONE    IR 5 milliGRAM(s) Oral every 4 hours PRN Moderate Pain (4 - 6)      T(C): 37.3 (07-27-23 @ 05:49), Max: 37.3 (07-27-23 @ 05:49)  HR: 79 (07-27-23 @ 05:49) (69 - 92)  BP: 115/66 (07-27-23 @ 05:49) (104/47 - 155/50)  RR: 18 (07-27-23 @ 05:49) (16 - 18)  SpO2: 100% (07-27-23 @ 05:49) (97% - 100%)        07-26-23 @ 07:01  -  07-27-23 @ 07:00  --------------------------------------------------------  IN: 0 mL / OUT: 550 mL / NET: -550 mL        Physical Exam:  General: NAD, AOx3  Respiratory: No labored breathing  CV: pulse regularly present  MSK: RLE motor and sensory grossly intact, generalized edema of right leg. Dressings intact with some fluid underneath.   Pulses exam: R PT signals.    LABS:                        9.4    11.52 )-----------( 300      ( 27 Jul 2023 06:27 )             29.9     07-27    137  |  101  |  13  ----------------------------<  94  4.1   |  23  |  0.59    Ca    9.2      27 Jul 2023 06:27  Phos  3.0     07-27  Mg     1.70     07-27

## 2023-07-27 NOTE — CHART NOTE - NSCHARTNOTESELECT_GEN_ALL_CORE
Post Op Check
PostOp Check
Follow-Up/Nutrition Services
Post Op Check
Transfer Note
Vascular Surgery
Event Note
SICU Transfer Note/Event Note
Event Note

## 2023-07-28 LAB
ANION GAP SERPL CALC-SCNC: 10 MMOL/L — SIGNIFICANT CHANGE UP (ref 7–14)
BUN SERPL-MCNC: 10 MG/DL — SIGNIFICANT CHANGE UP (ref 7–23)
CALCIUM SERPL-MCNC: 9.3 MG/DL — SIGNIFICANT CHANGE UP (ref 8.4–10.5)
CHLORIDE SERPL-SCNC: 99 MMOL/L — SIGNIFICANT CHANGE UP (ref 98–107)
CO2 SERPL-SCNC: 24 MMOL/L — SIGNIFICANT CHANGE UP (ref 22–31)
CREAT SERPL-MCNC: 0.53 MG/DL — SIGNIFICANT CHANGE UP (ref 0.5–1.3)
EGFR: 104 ML/MIN/1.73M2 — SIGNIFICANT CHANGE UP
GLUCOSE SERPL-MCNC: 100 MG/DL — HIGH (ref 70–99)
HCT VFR BLD CALC: 27.4 % — LOW (ref 34.5–45)
HGB BLD-MCNC: 8.8 G/DL — LOW (ref 11.5–15.5)
MAGNESIUM SERPL-MCNC: 1.8 MG/DL — SIGNIFICANT CHANGE UP (ref 1.6–2.6)
MCHC RBC-ENTMCNC: 31.7 PG — SIGNIFICANT CHANGE UP (ref 27–34)
MCHC RBC-ENTMCNC: 32.1 GM/DL — SIGNIFICANT CHANGE UP (ref 32–36)
MCV RBC AUTO: 98.6 FL — SIGNIFICANT CHANGE UP (ref 80–100)
NRBC # BLD: 0 /100 WBCS — SIGNIFICANT CHANGE UP (ref 0–0)
NRBC # FLD: 0 K/UL — SIGNIFICANT CHANGE UP (ref 0–0)
PHOSPHATE SERPL-MCNC: 3.4 MG/DL — SIGNIFICANT CHANGE UP (ref 2.5–4.5)
PLATELET # BLD AUTO: 367 K/UL — SIGNIFICANT CHANGE UP (ref 150–400)
POTASSIUM SERPL-MCNC: 4 MMOL/L — SIGNIFICANT CHANGE UP (ref 3.5–5.3)
POTASSIUM SERPL-SCNC: 4 MMOL/L — SIGNIFICANT CHANGE UP (ref 3.5–5.3)
RBC # BLD: 2.78 M/UL — LOW (ref 3.8–5.2)
RBC # FLD: 18.6 % — HIGH (ref 10.3–14.5)
SODIUM SERPL-SCNC: 133 MMOL/L — LOW (ref 135–145)
WBC # BLD: 12.67 K/UL — HIGH (ref 3.8–10.5)
WBC # FLD AUTO: 12.67 K/UL — HIGH (ref 3.8–10.5)

## 2023-07-28 RX ORDER — MAGNESIUM SULFATE 500 MG/ML
2 VIAL (ML) INJECTION ONCE
Refills: 0 | Status: COMPLETED | OUTPATIENT
Start: 2023-07-28 | End: 2023-07-29

## 2023-07-28 RX ADMIN — Medication 10 MILLIGRAM(S): at 22:13

## 2023-07-28 RX ADMIN — Medication 1000 MILLIGRAM(S): at 22:13

## 2023-07-28 RX ADMIN — OXYCODONE HYDROCHLORIDE 10 MILLIGRAM(S): 5 TABLET ORAL at 05:51

## 2023-07-28 RX ADMIN — Medication 1000 MILLIGRAM(S): at 18:05

## 2023-07-28 RX ADMIN — ATORVASTATIN CALCIUM 40 MILLIGRAM(S): 80 TABLET, FILM COATED ORAL at 22:14

## 2023-07-28 RX ADMIN — Medication 81 MILLIGRAM(S): at 11:54

## 2023-07-28 RX ADMIN — OXYCODONE HYDROCHLORIDE 10 MILLIGRAM(S): 5 TABLET ORAL at 11:57

## 2023-07-28 RX ADMIN — APIXABAN 10 MILLIGRAM(S): 2.5 TABLET, FILM COATED ORAL at 05:22

## 2023-07-28 RX ADMIN — OXYCODONE HYDROCHLORIDE 10 MILLIGRAM(S): 5 TABLET ORAL at 01:14

## 2023-07-28 RX ADMIN — Medication 5 MILLIGRAM(S): at 12:04

## 2023-07-28 RX ADMIN — Medication 1000 MILLIGRAM(S): at 08:54

## 2023-07-28 RX ADMIN — BUDESONIDE AND FORMOTEROL FUMARATE DIHYDRATE 2 PUFF(S): 160; 4.5 AEROSOL RESPIRATORY (INHALATION) at 22:13

## 2023-07-28 RX ADMIN — OXYCODONE HYDROCHLORIDE 10 MILLIGRAM(S): 5 TABLET ORAL at 01:44

## 2023-07-28 RX ADMIN — BUDESONIDE AND FORMOTEROL FUMARATE DIHYDRATE 2 PUFF(S): 160; 4.5 AEROSOL RESPIRATORY (INHALATION) at 11:54

## 2023-07-28 RX ADMIN — Medication 1000 MILLIGRAM(S): at 04:04

## 2023-07-28 RX ADMIN — POLYETHYLENE GLYCOL 3350 17 GRAM(S): 17 POWDER, FOR SOLUTION ORAL at 05:22

## 2023-07-28 RX ADMIN — OXYCODONE HYDROCHLORIDE 10 MILLIGRAM(S): 5 TABLET ORAL at 05:21

## 2023-07-28 RX ADMIN — OXYCODONE HYDROCHLORIDE 10 MILLIGRAM(S): 5 TABLET ORAL at 12:27

## 2023-07-28 RX ADMIN — APIXABAN 10 MILLIGRAM(S): 2.5 TABLET, FILM COATED ORAL at 18:06

## 2023-07-28 NOTE — PROVIDER CONTACT NOTE (OTHER) - ASSESSMENT
pt complaining of tooth ache, wanting to see dental/ oral surgeon and left leg dressing coming off with visible blood coming out

## 2023-07-28 NOTE — SWALLOW BEDSIDE ASSESSMENT ADULT - COMMENTS
Attempted to see patient at bedside, however, patient off unit for procedure/test per PCA. This service to follow up as schedule permits.

## 2023-07-28 NOTE — PROVIDER CONTACT NOTE (OTHER) - SITUATION
pt complaining of tooth ache and L leg dressing pt complaining of tooth ache and L leg dressing soiled

## 2023-07-28 NOTE — PROGRESS NOTE ADULT - SUBJECTIVE AND OBJECTIVE BOX
Subjective: Patient seen and examined. No new events except as noted.     SUBJECTIVE/ROS:  nad      MEDICATIONS:  MEDICATIONS  (STANDING):  acetaminophen     Tablet .. 1000 milliGRAM(s) Oral every 6 hours  apixaban 10 milliGRAM(s) Oral two times a day  aspirin  chewable 81 milliGRAM(s) Oral every 24 hours  atorvastatin 40 milliGRAM(s) Oral at bedtime  budesonide 160 MICROgram(s)/formoterol 4.5 MICROgram(s) Inhaler 2 Puff(s) Inhalation two times a day  magnesium sulfate  IVPB 2 Gram(s) IV Intermittent once      PHYSICAL EXAM:  T(C): 36.9 (07-28-23 @ 05:20), Max: 37.2 (07-28-23 @ 01:54)  HR: 82 (07-28-23 @ 05:20) (71 - 82)  BP: 149/63 (07-28-23 @ 05:20) (99/52 - 149/63)  RR: 18 (07-28-23 @ 05:20) (17 - 18)  SpO2: 100% (07-28-23 @ 05:20) (95% - 100%)  Wt(kg): --  I&O's Summary    27 Jul 2023 07:01  -  28 Jul 2023 07:00  --------------------------------------------------------  IN: 0 mL / OUT: 400 mL / NET: -400 mL        Weight (kg): 68.4 (07-28 @ 05:20)      JVP: Normal  Neck: supple  Lung: clear   CV: S1 S2 , Murmur:  Abd: soft  Ext: No edema  neuro: Awake / alert  Psych: flat affect      LABS/DATA:    CARDIAC MARKERS:                                8.8    12.67 )-----------( 367      ( 28 Jul 2023 06:40 )             27.4     07-28    133<L>  |  99  |  10  ----------------------------<  100<H>  4.0   |  24  |  0.53    Ca    9.3      28 Jul 2023 06:40  Phos  3.4     07-28  Mg     1.80     07-28      proBNP:   Lipid Profile:   HgA1c:   TSH:     TELE:  EKG:

## 2023-07-28 NOTE — PROGRESS NOTE ADULT - ASSESSMENT
64 y/o woman with a PMH of R SFA/popliteal stent, presented with pain in RLE, found to have acute on chronic limb ischemia. s/p RLE angiogram and percutaneous thrombectomy (07/19) of SFA and popliteal. Now s/p R CFA -> PT bypass with PTFE (7/25).    Plan:  - Eliquis + ASA  - Dispo planning to rehab, pending ins authorization  - pain control PRN  - diet: regular   62 y/o woman with a PMH of R SFA/popliteal stent, presented with pain in RLE, found to have acute on chronic limb ischemia. s/p RLE angiogram and percutaneous thrombectomy (07/19) of SFA and popliteal. Now s/p R CFA -> PT bypass with PTFE (7/25).    Plan:  - Eliquis + ASA  - Dispo planning to rehab, pending ins authorization  - pain control PRN  - diet: regular

## 2023-07-28 NOTE — PROGRESS NOTE ADULT - SUBJECTIVE AND OBJECTIVE BOX
C Team Surgery Progress Note     S: Patient resting comfortably on morning rounds. Pain well-controlled currently. No acute distress.    MEDICATIONS  (STANDING):  acetaminophen     Tablet .. 1000 milliGRAM(s) Oral every 6 hours  apixaban 10 milliGRAM(s) Oral two times a day  aspirin  chewable 81 milliGRAM(s) Oral every 24 hours  atorvastatin 40 milliGRAM(s) Oral at bedtime  budesonide 160 MICROgram(s)/formoterol 4.5 MICROgram(s) Inhaler 2 Puff(s) Inhalation two times a day    MEDICATIONS  (PRN):  oxyCODONE    IR 10 milliGRAM(s) Oral every 4 hours PRN Severe Pain (7 - 10)  oxyCODONE    IR 5 milliGRAM(s) Oral every 4 hours PRN Moderate Pain (4 - 6)      T(C): 36.9 (07-28-23 @ 05:20), Max: 37.2 (07-28-23 @ 01:54)  HR: 82 (07-28-23 @ 05:20) (71 - 82)  BP: 149/63 (07-28-23 @ 05:20) (99/52 - 149/63)  RR: 18 (07-28-23 @ 05:20) (17 - 18)  SpO2: 100% (07-28-23 @ 05:20) (95% - 100%)        07-27-23 @ 07:01  -  07-28-23 @ 07:00  --------------------------------------------------------  IN: 0 mL / OUT: 400 mL / NET: -400 mL        Physical Exam:  General: NAD, AOx3  Respiratory: No labored breathing  CV: pulse regularly present  MSK: RLE motor and sensory grossly intact. Dressings intact with some fluid underneath.   Pulses exam: R PT signals      LABS:                        8.8    12.67 )-----------( 367      ( 28 Jul 2023 06:40 )             27.4     07-27    137  |  101  |  13  ----------------------------<  94  4.1   |  23  |  0.59    Ca    9.2      27 Jul 2023 06:27  Phos  3.0     07-27  Mg     1.70     07-27

## 2023-07-28 NOTE — PROGRESS NOTE ADULT - SUBJECTIVE AND OBJECTIVE BOX
Name of Patient : BRE HODGE  MRN: [id number]  Date of visit: 07-28-23 @ 12:11      Subjective: Patient seen and examined. No new events except as noted.   Doing okay   D/C planing     REVIEW OF SYSTEMS:    CONSTITUTIONAL: No weakness, fevers or chills  EYES/ENT: No visual changes;  No vertigo or throat pain   NECK: No pain or stiffness  RESPIRATORY: No cough, wheezing, hemoptysis; No shortness of breath  CARDIOVASCULAR: No chest pain or palpitations  GASTROINTESTINAL: No abdominal or epigastric pain. No nausea, vomiting, or hematemesis; No diarrhea or constipation. No melena or hematochezia.  GENITOURINARY: No dysuria, frequency or hematuria  NEUROLOGICAL: No numbness or weakness  SKIN: No itching, burning, rashes, or lesions   All other review of systems is negative unless indicated above.    MEDICATIONS:  MEDICATIONS  (STANDING):  acetaminophen     Tablet .. 1000 milliGRAM(s) Oral every 6 hours  apixaban 10 milliGRAM(s) Oral two times a day  aspirin  chewable 81 milliGRAM(s) Oral every 24 hours  atorvastatin 40 milliGRAM(s) Oral at bedtime  budesonide 160 MICROgram(s)/formoterol 4.5 MICROgram(s) Inhaler 2 Puff(s) Inhalation two times a day  magnesium sulfate  IVPB 2 Gram(s) IV Intermittent once      PHYSICAL EXAM:  T(C): 37.2 (07-28-23 @ 10:05), Max: 37.2 (07-28-23 @ 01:54)  HR: 72 (07-28-23 @ 10:05) (71 - 82)  BP: 131/49 (07-28-23 @ 10:05) (99/52 - 149/63)  RR: 18 (07-28-23 @ 10:05) (18 - 18)  SpO2: 96% (07-28-23 @ 10:05) (95% - 100%)  Wt(kg): --  I&O's Summary    27 Jul 2023 07:01  -  28 Jul 2023 07:00  --------------------------------------------------------  IN: 0 mL / OUT: 400 mL / NET: -400 mL        Weight (kg): 68.4 (07-28 @ 05:20)    Appearance: Normal	  HEENT:  PERRLA   Lymphatic: No lymphadenopathy   Cardiovascular: Normal S1 S2, no JVD  Respiratory: normal effort , clear  Gastrointestinal:  Soft, Non-tender  Skin: No rashes,  warm to touch  Psychiatry:  Mood & affect appropriate  Musculuskeletal: No edema    recent labs, Imaging and EKGs personally reviewed     07-27-23 @ 07:01  -  07-28-23 @ 07:00  --------------------------------------------------------  IN: 0 mL / OUT: 400 mL / NET: -400 mL                          8.8    12.67 )-----------( 367      ( 28 Jul 2023 06:40 )             27.4               07-28    133<L>  |  99  |  10  ----------------------------<  100<H>  4.0   |  24  |  0.53    Ca    9.3      28 Jul 2023 06:40  Phos  3.4     07-28  Mg     1.80     07-28                         Urinalysis Basic - ( 28 Jul 2023 06:40 )    Color: x / Appearance: x / SG: x / pH: x  Gluc: 100 mg/dL / Ketone: x  / Bili: x / Urobili: x   Blood: x / Protein: x / Nitrite: x   Leuk Esterase: x / RBC: x / WBC x   Sq Epi: x / Non Sq Epi: x / Bacteria: x               Name of Patient : BRE HODGE  MRN: [id number]  Date of visit: 07-28-23 @ 12:11      Subjective: Patient seen and examined. No new events except as noted.   Doing okay   D/C planing     REVIEW OF SYSTEMS:    CONSTITUTIONAL: No weakness, fevers or chills  EYES/ENT: No visual changes;  No vertigo or throat pain   NECK: No pain or stiffness  RESPIRATORY: No cough, wheezing, hemoptysis; No shortness of breath  CARDIOVASCULAR: No chest pain or palpitations  GASTROINTESTINAL: No abdominal or epigastric pain. No nausea, vomiting, or hematemesis; No diarrhea or constipation. No melena or hematochezia.  GENITOURINARY: No dysuria, frequency or hematuria  NEUROLOGICAL: No numbness or weakness  SKIN: No itching, burning, rashes, or lesions   All other review of systems is negative unless indicated above.    MEDICATIONS:  MEDICATIONS  (STANDING):  acetaminophen     Tablet .. 1000 milliGRAM(s) Oral every 6 hours  apixaban 10 milliGRAM(s) Oral two times a day  aspirin  chewable 81 milliGRAM(s) Oral every 24 hours  atorvastatin 40 milliGRAM(s) Oral at bedtime  budesonide 160 MICROgram(s)/formoterol 4.5 MICROgram(s) Inhaler 2 Puff(s) Inhalation two times a day  magnesium sulfate  IVPB 2 Gram(s) IV Intermittent once      PHYSICAL EXAM:  T(C): 37.2 (07-28-23 @ 10:05), Max: 37.2 (07-28-23 @ 01:54)  HR: 72 (07-28-23 @ 10:05) (71 - 82)  BP: 131/49 (07-28-23 @ 10:05) (99/52 - 149/63)  RR: 18 (07-28-23 @ 10:05) (18 - 18)  SpO2: 96% (07-28-23 @ 10:05) (95% - 100%)  Wt(kg): --  I&O's Summary    27 Jul 2023 07:01  -  28 Jul 2023 07:00  --------------------------------------------------------  IN: 0 mL / OUT: 400 mL / NET: -400 mL        Weight (kg): 68.4 (07-28 @ 05:20)    Appearance: Normal	  HEENT:  PERRLA   Lymphatic: No lymphadenopathy   Cardiovascular: Normal S1 S2, no JVD  Respiratory: normal effort , clear  Gastrointestinal:  Soft, Non-tender  Skin: No rashes,  warm to touch  Psychiatry:  Mood & affect appropriate  Musculuskeletal: No edema    recent labs, Imaging and EKGs personally reviewed     07-27-23 @ 07:01  -  07-28-23 @ 07:00  --------------------------------------------------------  IN: 0 mL / OUT: 400 mL / NET: -400 mL                          8.8    12.67 )-----------( 367      ( 28 Jul 2023 06:40 )             27.4               07-28    133<L>  |  99  |  10  ----------------------------<  100<H>  4.0   |  24  |  0.53    Ca    9.3      28 Jul 2023 06:40  Phos  3.4     07-28  Mg     1.80     07-28                         Urinalysis Basic - ( 28 Jul 2023 06:40 )    Color: x / Appearance: x / SG: x / pH: x  Gluc: 100 mg/dL / Ketone: x  / Bili: x / Urobili: x   Blood: x / Protein: x / Nitrite: x   Leuk Esterase: x / RBC: x / WBC x   Sq Epi: x / Non Sq Epi: x / Bacteria: x               Name of Patient : BRE HODGE  MRN: [id number]  Date of visit: 07-28-23 @ 12:11      Subjective: Patient seen and examined. No new events except as noted.   Doing okay   D/C planing     REVIEW OF SYSTEMS:    CONSTITUTIONAL: No weakness, fevers or chills  EYES/ENT: No visual changes;  No vertigo or throat pain   NECK: No pain or stiffness  RESPIRATORY: No cough, wheezing, hemoptysis; No shortness of breath  CARDIOVASCULAR: No chest pain or palpitations  GASTROINTESTINAL: No abdominal or epigastric pain. No nausea, vomiting, or hematemesis; No diarrhea or constipation. No melena or hematochezia.  GENITOURINARY: No dysuria, frequency or hematuria  NEUROLOGICAL: No numbness or weakness  SKIN: No itching, burning, rashes, or lesions   All other review of systems is negative unless indicated above.    MEDICATIONS:  MEDICATIONS  (STANDING):  acetaminophen     Tablet .. 1000 milliGRAM(s) Oral every 6 hours  apixaban 10 milliGRAM(s) Oral two times a day  aspirin  chewable 81 milliGRAM(s) Oral every 24 hours  atorvastatin 40 milliGRAM(s) Oral at bedtime  budesonide 160 MICROgram(s)/formoterol 4.5 MICROgram(s) Inhaler 2 Puff(s) Inhalation two times a day  magnesium sulfate  IVPB 2 Gram(s) IV Intermittent once      PHYSICAL EXAM:  T(C): 37.2 (07-28-23 @ 10:05), Max: 37.2 (07-28-23 @ 01:54)  HR: 72 (07-28-23 @ 10:05) (71 - 82)  BP: 131/49 (07-28-23 @ 10:05) (99/52 - 149/63)  RR: 18 (07-28-23 @ 10:05) (18 - 18)  SpO2: 96% (07-28-23 @ 10:05) (95% - 100%)  Wt(kg): --  I&O's Summary    27 Jul 2023 07:01  -  28 Jul 2023 07:00  --------------------------------------------------------  IN: 0 mL / OUT: 400 mL / NET: -400 mL        Weight (kg): 68.4 (07-28 @ 05:20)    Appearance: Normal	  HEENT:  PERRLA   Lymphatic: No lymphadenopathy   Cardiovascular: Normal S1 S2, no JVD  Respiratory: normal effort , clear  Gastrointestinal:  Soft, Non-tender  Skin: No rashes,  warm to touch  Psychiatry:  Mood & affect appropriate  Musculuskeletal: No edema    recent labs, Imaging and EKGs personally reviewed     07-27-23 @ 07:01  -  07-28-23 @ 07:00  --------------------------------------------------------  IN: 0 mL / OUT: 400 mL / NET: -400 mL                          8.8    12.67 )-----------( 367      ( 28 Jul 2023 06:40 )             27.4               07-28    133<L>  |  99  |  10  ----------------------------<  100<H>  4.0   |  24  |  0.53    Ca    9.3      28 Jul 2023 06:40  Phos  3.4     07-28  Mg     1.80     07-28                         Urinalysis Basic - ( 28 Jul 2023 06:40 )    Color: x / Appearance: x / SG: x / pH: x  Gluc: 100 mg/dL / Ketone: x  / Bili: x / Urobili: x   Blood: x / Protein: x / Nitrite: x   Leuk Esterase: x / RBC: x / WBC x   Sq Epi: x / Non Sq Epi: x / Bacteria: x

## 2023-07-28 NOTE — PROGRESS NOTE ADULT - ASSESSMENT
Patient is a 64 y/o female with PMH of R SFA/popliteal stent, presented with pain in RLE, found to have acute on chronic limb ischemia. Now s/p RLE angiogram and percutaneous thrombectomy (07/19) of SFA and popliteal. At post op check had positive signal in AT and PT. Now off pressors, moved from SICU to floors.     # PAD   S/P ANGIO   YANIRA/PVR  vascular follow up   ASA, Statin and heparin drip , AC switch to doacs   monitor hgb level   Echo noted   stress test noted, normal   S/P BYPASS    # Hx of CAD  S/P STent   Echo noted  ASA and statin   Card follow up appreciated     # Electrolytes imbalance  replete electrolyses  Monitor daily     # Anemia   Anemia W/U   Keep hgb above 8 if possible     DVT andGI PPX     D/C planing  Patient is a 62 y/o female with PMH of R SFA/popliteal stent, presented with pain in RLE, found to have acute on chronic limb ischemia. Now s/p RLE angiogram and percutaneous thrombectomy (07/19) of SFA and popliteal. At post op check had positive signal in AT and PT. Now off pressors, moved from SICU to floors.     # PAD   S/P ANGIO   YANIRA/PVR  vascular follow up   ASA, Statin and heparin drip , AC switch to doacs   monitor hgb level   Echo noted   stress test noted, normal   S/P BYPASS    # Hx of CAD  S/P STent   Echo noted  ASA and statin   Card follow up appreciated     # Electrolytes imbalance  replete electrolyses  Monitor daily     # Anemia   Anemia W/U   Keep hgb above 8 if possible     DVT andGI PPX     D/C planing

## 2023-07-29 LAB
ANION GAP SERPL CALC-SCNC: 13 MMOL/L — SIGNIFICANT CHANGE UP (ref 7–14)
BUN SERPL-MCNC: 9 MG/DL — SIGNIFICANT CHANGE UP (ref 7–23)
CALCIUM SERPL-MCNC: 9.6 MG/DL — SIGNIFICANT CHANGE UP (ref 8.4–10.5)
CHLORIDE SERPL-SCNC: 101 MMOL/L — SIGNIFICANT CHANGE UP (ref 98–107)
CO2 SERPL-SCNC: 24 MMOL/L — SIGNIFICANT CHANGE UP (ref 22–31)
CREAT SERPL-MCNC: 0.59 MG/DL — SIGNIFICANT CHANGE UP (ref 0.5–1.3)
EGFR: 102 ML/MIN/1.73M2 — SIGNIFICANT CHANGE UP
GLUCOSE SERPL-MCNC: 86 MG/DL — SIGNIFICANT CHANGE UP (ref 70–99)
HCT VFR BLD CALC: 27.5 % — LOW (ref 34.5–45)
HGB BLD-MCNC: 8.6 G/DL — LOW (ref 11.5–15.5)
MAGNESIUM SERPL-MCNC: 1.9 MG/DL — SIGNIFICANT CHANGE UP (ref 1.6–2.6)
MCHC RBC-ENTMCNC: 31.3 GM/DL — LOW (ref 32–36)
MCHC RBC-ENTMCNC: 31.7 PG — SIGNIFICANT CHANGE UP (ref 27–34)
MCV RBC AUTO: 101.5 FL — HIGH (ref 80–100)
NRBC # BLD: 0 /100 WBCS — SIGNIFICANT CHANGE UP (ref 0–0)
NRBC # FLD: 0 K/UL — SIGNIFICANT CHANGE UP (ref 0–0)
PHOSPHATE SERPL-MCNC: 3.8 MG/DL — SIGNIFICANT CHANGE UP (ref 2.5–4.5)
PLATELET # BLD AUTO: 416 K/UL — HIGH (ref 150–400)
POTASSIUM SERPL-MCNC: 3.7 MMOL/L — SIGNIFICANT CHANGE UP (ref 3.5–5.3)
POTASSIUM SERPL-SCNC: 3.7 MMOL/L — SIGNIFICANT CHANGE UP (ref 3.5–5.3)
RBC # BLD: 2.71 M/UL — LOW (ref 3.8–5.2)
RBC # FLD: 18.5 % — HIGH (ref 10.3–14.5)
SODIUM SERPL-SCNC: 138 MMOL/L — SIGNIFICANT CHANGE UP (ref 135–145)
WBC # BLD: 6.94 K/UL — SIGNIFICANT CHANGE UP (ref 3.8–10.5)
WBC # FLD AUTO: 6.94 K/UL — SIGNIFICANT CHANGE UP (ref 3.8–10.5)

## 2023-07-29 RX ADMIN — Medication 81 MILLIGRAM(S): at 14:04

## 2023-07-29 RX ADMIN — BUDESONIDE AND FORMOTEROL FUMARATE DIHYDRATE 2 PUFF(S): 160; 4.5 AEROSOL RESPIRATORY (INHALATION) at 21:24

## 2023-07-29 RX ADMIN — Medication 1000 MILLIGRAM(S): at 14:04

## 2023-07-29 RX ADMIN — OXYCODONE HYDROCHLORIDE 10 MILLIGRAM(S): 5 TABLET ORAL at 21:54

## 2023-07-29 RX ADMIN — OXYCODONE HYDROCHLORIDE 10 MILLIGRAM(S): 5 TABLET ORAL at 21:24

## 2023-07-29 RX ADMIN — Medication 25 GRAM(S): at 17:03

## 2023-07-29 RX ADMIN — APIXABAN 10 MILLIGRAM(S): 2.5 TABLET, FILM COATED ORAL at 14:04

## 2023-07-29 RX ADMIN — Medication 5 MILLIGRAM(S): at 21:36

## 2023-07-29 RX ADMIN — Medication 1000 MILLIGRAM(S): at 09:21

## 2023-07-29 RX ADMIN — ATORVASTATIN CALCIUM 40 MILLIGRAM(S): 80 TABLET, FILM COATED ORAL at 21:24

## 2023-07-29 RX ADMIN — APIXABAN 10 MILLIGRAM(S): 2.5 TABLET, FILM COATED ORAL at 07:32

## 2023-07-29 RX ADMIN — Medication 1000 MILLIGRAM(S): at 21:24

## 2023-07-29 RX ADMIN — BUDESONIDE AND FORMOTEROL FUMARATE DIHYDRATE 2 PUFF(S): 160; 4.5 AEROSOL RESPIRATORY (INHALATION) at 09:20

## 2023-07-29 NOTE — PROGRESS NOTE ADULT - ASSESSMENT
PAD  on a/c as per vascular surgery   s/p  bypass    CAD  history of stent  cont asa   cont statin   echo shows normal LV function   stress test shows no ischemia

## 2023-07-29 NOTE — PROGRESS NOTE ADULT - REASON FOR ADMISSION
Acute limb ischemia of RLE, now s/p RLE angio with lysis catheter placement/takedown
RLE acute on chronic limb ischemia
RLE acute on chronic limb ischemia
acute on chronic ischemia (RLE)
Acute on Chronic limb ischemia, RLE

## 2023-07-29 NOTE — SWALLOW BEDSIDE ASSESSMENT ADULT - SWALLOW EVAL: DIAGNOSIS
1. Functional oral stage for puree, regular solids, thin liquids characterized by adequate acceptance and containment, prolonged but adequate mastication/ gumming of regular solids, adequate anterior to posterior transport and adequate oral clearance. 2. Functional pharyngeal stage suspected for the aforementioned consistencies characterized by adequate initiation of the pharyngeal swallow and hyolaryngeal excursion upon digital palpation with no overt s/s penetration/ aspiration noted. [place] Counseling:  I discussed with the patient the risks of imiquimod including but not limited to erythema, scaling, itching, weeping, crusting, and pain.  Patient understands that the inflammatory response to imiquimod is variable from person to person and was educated regarded proper titration schedule.  If flu-like symptoms develop, patient knows to discontinue the medication and contact us.

## 2023-07-29 NOTE — PROGRESS NOTE ADULT - ASSESSMENT
62 y/o woman with a PMH of R SFA/popliteal stent, presented with pain in RLE, found to have acute on chronic limb ischemia. s/p RLE angiogram and percutaneous thrombectomy (07/19) of SFA and popliteal. Now s/p R CFA -> PT bypass with PTFE (7/25).    Plan:  - Eliquis + ASA  - Dispo planning to rehab, pending ins authorization  - pain control PRN  - diet: regular   64 y/o woman with a PMH of R SFA/popliteal stent, presented with pain in RLE, found to have acute on chronic limb ischemia. s/p RLE angiogram and percutaneous thrombectomy (07/19) of SFA and popliteal. Now s/p R CFA -> PT bypass with PTFE (7/25).    Plan:  - Eliquis + ASA  - Dispo planning to rehab, pending ins authorization  - pain control PRN  - diet: regular   62 y/o woman with a PMH of R SFA/popliteal stent, presented with pain in RLE, found to have acute on chronic limb ischemia. s/p RLE angiogram and percutaneous thrombectomy (07/19) of SFA and popliteal. Now s/p R CFA -> PT bypass with PTFE (7/25).    Plan:  - Eliquis + ASA  - Dental work completed, no concerns for infection at this time. (afebrile, WBC downtrending 12.7->6.9)  - Dispo planning to rehab, pending ins authorization  - pain control PRN  - diet: regular   64 y/o woman with a PMH of R SFA/popliteal stent, presented with pain in RLE, found to have acute on chronic limb ischemia. s/p RLE angiogram and percutaneous thrombectomy (07/19) of SFA and popliteal. Now s/p R CFA -> PT bypass with PTFE (7/25).    Plan:  - Eliquis + ASA  - Dental work completed, no concerns for infection at this time. (afebrile, WBC downtrending 12.7->6.9)  - Dispo planning to rehab, pending ins authorization  - pain control PRN  - diet: regular

## 2023-07-29 NOTE — PROGRESS NOTE ADULT - SUBJECTIVE AND OBJECTIVE BOX
C Team Surgery Progress Note     S: Patient resting comfortably on morning rounds. Pain well-controlled currently. Patient was seen by hospital dentist yesterday for concern of oral abscess and dental work. No acute distress this morning.       MEDICATIONS  (STANDING):  acetaminophen     Tablet .. 1000 milliGRAM(s) Oral every 6 hours  apixaban 10 milliGRAM(s) Oral two times a day  aspirin  chewable 81 milliGRAM(s) Oral every 24 hours  atorvastatin 40 milliGRAM(s) Oral at bedtime  budesonide 160 MICROgram(s)/formoterol 4.5 MICROgram(s) Inhaler 2 Puff(s) Inhalation two times a day  magnesium sulfate  IVPB 2 Gram(s) IV Intermittent once    MEDICATIONS  (PRN):  bisacodyl 5 milliGRAM(s) Oral every 12 hours PRN Constipation  oxyCODONE    IR 10 milliGRAM(s) Oral every 4 hours PRN Severe Pain (7 - 10)  oxyCODONE    IR 5 milliGRAM(s) Oral every 4 hours PRN Moderate Pain (4 - 6)      T(C): 37.3 (07-29-23 @ 07:28), Max: 37.6 (07-28-23 @ 21:28)  HR: 63 (07-29-23 @ 07:28) (63 - 72)  BP: 122/61 (07-29-23 @ 07:28) (115/58 - 132/83)  RR: 18 (07-29-23 @ 07:28) (18 - 18)  SpO2: 100% (07-29-23 @ 07:28) (96% - 100%)      Physical Exam:  General: NAD, AOx3  Respiratory: No labored breathing  CV: pulse regularly present  MSK: RLE motor and sensory grossly intact. Dressings c/d/i.  Pulses exam: R PT signals    LABS:                        8.6    6.94  )-----------( 416      ( 29 Jul 2023 07:06 )             27.5     07-29    138  |  101  |  9   ----------------------------<  86  3.7   |  24  |  0.59    Ca    9.6      29 Jul 2023 07:06  Phos  3.8     07-29  Mg     1.90     07-29           C Team Surgery Progress Note     S: Patient resting comfortably on morning rounds. Pain well-controlled currently. Patient was seen by hospital dentist yesterday for concern of oral abscess and dental work. No acute distress this morning. Denies any fever or tooth pain this am.      MEDICATIONS  (STANDING):  acetaminophen     Tablet .. 1000 milliGRAM(s) Oral every 6 hours  apixaban 10 milliGRAM(s) Oral two times a day  aspirin  chewable 81 milliGRAM(s) Oral every 24 hours  atorvastatin 40 milliGRAM(s) Oral at bedtime  budesonide 160 MICROgram(s)/formoterol 4.5 MICROgram(s) Inhaler 2 Puff(s) Inhalation two times a day  magnesium sulfate  IVPB 2 Gram(s) IV Intermittent once    MEDICATIONS  (PRN):  bisacodyl 5 milliGRAM(s) Oral every 12 hours PRN Constipation  oxyCODONE    IR 10 milliGRAM(s) Oral every 4 hours PRN Severe Pain (7 - 10)  oxyCODONE    IR 5 milliGRAM(s) Oral every 4 hours PRN Moderate Pain (4 - 6)      T(C): 37.3 (07-29-23 @ 07:28), Max: 37.6 (07-28-23 @ 21:28)  HR: 63 (07-29-23 @ 07:28) (63 - 72)  BP: 122/61 (07-29-23 @ 07:28) (115/58 - 132/83)  RR: 18 (07-29-23 @ 07:28) (18 - 18)  SpO2: 100% (07-29-23 @ 07:28) (96% - 100%)      Physical Exam:  General: NAD, AOx3  Respiratory: No labored breathing  CV: pulse regularly present  MSK: RLE motor and sensory grossly intact. Dressings c/d/i.  Pulses exam: R PT signals    LABS:                        8.6    6.94  )-----------( 416      ( 29 Jul 2023 07:06 )             27.5     07-29    138  |  101  |  9   ----------------------------<  86  3.7   |  24  |  0.59    Ca    9.6      29 Jul 2023 07:06  Phos  3.8     07-29  Mg     1.90     07-29

## 2023-07-29 NOTE — PROGRESS NOTE ADULT - SUBJECTIVE AND OBJECTIVE BOX
Name of Patient : BRE HODGE  MRN: [id number]  Date of visit: 07-29-23       Subjective: Patient seen and examined. No new events except as noted.   Doing okay     REVIEW OF SYSTEMS:    CONSTITUTIONAL: No weakness, fevers or chills  EYES/ENT: No visual changes;  No vertigo or throat pain   NECK: No pain or stiffness  RESPIRATORY: No cough, wheezing, hemoptysis; No shortness of breath  CARDIOVASCULAR: No chest pain or palpitations  GASTROINTESTINAL: No abdominal or epigastric pain. No nausea, vomiting, or hematemesis; No diarrhea or constipation. No melena or hematochezia.  GENITOURINARY: No dysuria, frequency or hematuria  NEUROLOGICAL: No numbness or weakness  SKIN: No itching, burning, rashes, or lesions   All other review of systems is negative unless indicated above.    MEDICATIONS:  MEDICATIONS  (STANDING):  acetaminophen     Tablet .. 1000 milliGRAM(s) Oral every 6 hours  apixaban 10 milliGRAM(s) Oral two times a day  aspirin  chewable 81 milliGRAM(s) Oral every 24 hours  atorvastatin 40 milliGRAM(s) Oral at bedtime  budesonide 160 MICROgram(s)/formoterol 4.5 MICROgram(s) Inhaler 2 Puff(s) Inhalation two times a day      PHYSICAL EXAM:  T(C): 37.1 (07-29-23 @ 14:14), Max: 37.6 (07-28-23 @ 21:28)  HR: 70 (07-29-23 @ 14:14) (63 - 70)  BP: 115/60 (07-29-23 @ 14:14) (110/57 - 122/61)  RR: 18 (07-29-23 @ 14:14) (17 - 18)  SpO2: 100% (07-29-23 @ 14:14) (99% - 100%)  Wt(kg): --  I&O's Summary        Appearance: Normal	  HEENT:  PERRLA   Lymphatic: No lymphadenopathy   Cardiovascular: Normal S1 S2, no JVD  Respiratory: normal effort , clear  Gastrointestinal:  Soft, Non-tender  Skin: No rashes,  warm to touch  Psychiatry:  Mood & affect appropriate  Musculuskeletal: No edema    recent labs, Imaging and EKGs personally reviewed                           8.6    6.94  )-----------( 416      ( 29 Jul 2023 07:06 )             27.5               07-29    138  |  101  |  9   ----------------------------<  86  3.7   |  24  |  0.59    Ca    9.6      29 Jul 2023 07:06  Phos  3.8     07-29  Mg     1.90     07-29                         Urinalysis Basic - ( 29 Jul 2023 07:06 )    Color: x / Appearance: x / SG: x / pH: x  Gluc: 86 mg/dL / Ketone: x  / Bili: x / Urobili: x   Blood: x / Protein: x / Nitrite: x   Leuk Esterase: x / RBC: x / WBC x   Sq Epi: x / Non Sq Epi: x / Bacteria: x                     Name of Patient : BRE HODGE  MRN: [id number]  Date of visit: 07-29-23       Subjective: Patient seen and examined. No new events except as noted.   Doing okay     REVIEW OF SYSTEMS:    CONSTITUTIONAL: No weakness, fevers or chills  EYES/ENT: No visual changes;  No vertigo or throat pain   NECK: No pain or stiffness  RESPIRATORY: No cough, wheezing, hemoptysis; No shortness of breath  CARDIOVASCULAR: No chest pain or palpitations  GASTROINTESTINAL: No abdominal or epigastric pain. No nausea, vomiting, or hematemesis; No diarrhea or constipation. No melena or hematochezia.  GENITOURINARY: No dysuria, frequency or hematuria  NEUROLOGICAL: No numbness or weakness  SKIN: No itching, burning, rashes, or lesions   All other review of systems is negative unless indicated above.    MEDICATIONS:  MEDICATIONS  (STANDING):  acetaminophen     Tablet .. 1000 milliGRAM(s) Oral every 6 hours  apixaban 10 milliGRAM(s) Oral two times a day  aspirin  chewable 81 milliGRAM(s) Oral every 24 hours  atorvastatin 40 milliGRAM(s) Oral at bedtime  budesonide 160 MICROgram(s)/formoterol 4.5 MICROgram(s) Inhaler 2 Puff(s) Inhalation two times a day      PHYSICAL EXAM:  T(C): 37.1 (07-29-23 @ 14:14), Max: 37.6 (07-28-23 @ 21:28)  HR: 70 (07-29-23 @ 14:14) (63 - 70)  BP: 115/60 (07-29-23 @ 14:14) (110/57 - 122/61)  RR: 18 (07-29-23 @ 14:14) (17 - 18)  SpO2: 100% (07-29-23 @ 14:14) (99% - 100%)  Wt(kg): --  I&O's Summary        Appearance: Normal	  HEENT:  PERRLA   Lymphatic: No lymphadenopathy   Cardiovascular: Normal S1 S2, no JVD  Respiratory: normal effort , clear  Gastrointestinal:  Soft, Non-tender  Skin: No rashes,  warm to touch  Psychiatry:  Mood & affect appropriate  Musculuskeletal: No edema    recent labs, Imaging and EKGs personally reviewed                           8.6    6.94  )-----------( 416      ( 29 Jul 2023 07:06 )             27.5               07-29    138  |  101  |  9   ----------------------------<  86  3.7   |  24  |  0.59    Ca    9.6      29 Jul 2023 07:06  Phos  3.8     07-29  Mg     1.90     07-29                         Urinalysis Basic - ( 29 Jul 2023 07:06 )    Color: x / Appearance: x / SG: x / pH: x  Gluc: 86 mg/dL / Ketone: x  / Bili: x / Urobili: x   Blood: x / Protein: x / Nitrite: x   Leuk Esterase: x / RBC: x / WBC x   Sq Epi: x / Non Sq Epi: x / Bacteria: x                     Name of Patient : BRE HODGE  MRN: [id number]  Date of visit: 07-29-23       Subjective: Patient seen and examined. No new events except as noted.   Doing okay     REVIEW OF SYSTEMS:    CONSTITUTIONAL: No weakness, fevers or chills  EYES/ENT: No visual changes;  No vertigo or throat pain   NECK: No pain or stiffness  RESPIRATORY: No cough, wheezing, hemoptysis; No shortness of breath  CARDIOVASCULAR: No chest pain or palpitations  GASTROINTESTINAL: No abdominal or epigastric pain. No nausea, vomiting, or hematemesis; No diarrhea or constipation. No melena or hematochezia.  GENITOURINARY: No dysuria, frequency or hematuria  NEUROLOGICAL: No numbness or weakness  SKIN: No itching, burning, rashes, or lesions   All other review of systems is negative unless indicated above.    MEDICATIONS:  MEDICATIONS  (STANDING):  acetaminophen     Tablet .. 1000 milliGRAM(s) Oral every 6 hours  apixaban 10 milliGRAM(s) Oral two times a day  aspirin  chewable 81 milliGRAM(s) Oral every 24 hours  atorvastatin 40 milliGRAM(s) Oral at bedtime  budesonide 160 MICROgram(s)/formoterol 4.5 MICROgram(s) Inhaler 2 Puff(s) Inhalation two times a day      PHYSICAL EXAM:  T(C): 37.1 (07-29-23 @ 14:14), Max: 37.6 (07-28-23 @ 21:28)  HR: 70 (07-29-23 @ 14:14) (63 - 70)  BP: 115/60 (07-29-23 @ 14:14) (110/57 - 122/61)  RR: 18 (07-29-23 @ 14:14) (17 - 18)  SpO2: 100% (07-29-23 @ 14:14) (99% - 100%)  Wt(kg): --  I&O's Summary        Appearance: Normal	  HEENT:  PERRLA   Lymphatic: No lymphadenopathy   Cardiovascular: Normal S1 S2, no JVD  Respiratory: normal effort , clear  Gastrointestinal:  Soft, Non-tender  Skin: No rashes,  warm to touch  Psychiatry:  Mood & affect appropriate  Musculuskeletal: No edema    recent labs, Imaging and EKGs personally reviewed                           8.6    6.94  )-----------( 416      ( 29 Jul 2023 07:06 )             27.5               07-29    138  |  101  |  9   ----------------------------<  86  3.7   |  24  |  0.59    Ca    9.6      29 Jul 2023 07:06  Phos  3.8     07-29  Mg     1.90     07-29                         Urinalysis Basic - ( 29 Jul 2023 07:06 )    Color: x / Appearance: x / SG: x / pH: x  Gluc: 86 mg/dL / Ketone: x  / Bili: x / Urobili: x   Blood: x / Protein: x / Nitrite: x   Leuk Esterase: x / RBC: x / WBC x   Sq Epi: x / Non Sq Epi: x / Bacteria: x

## 2023-07-29 NOTE — SWALLOW BEDSIDE ASSESSMENT ADULT - COMMENTS
Of Note: This service attempted to see patient 7/28; however, patient was off unit for test.     Patient received sleeping in bed, easily arousable to awake/ alert state given verbal cues, patient seated upright, able to make basic wants/ needs known and follow simple directives. Patient complaints of gums/ dentition pain discomfort and distaste to hospital food; otherwise, denies difficulty with swallowing. Per Internal Medicine 7/28, "Patient is a 64 y/o female with PMH of R SFA/popliteal stent, presented with pain in RLE, found to have acute on chronic limb ischemia. Now s/p RLE angiogram and percutaneous thrombectomy (07/19) of SFA and popliteal. At post op check had positive signal in AT and PT. Now off pressors, moved from SICU to floors."     Per Dental 7/28, No abscess, gingival swelling non-drainable. Granulation tissue around #13, 14 area. Consulted with OMFS. No extraction indicated. Elective exo for root remnants suggested #3,14. Pt insisted exo under sedation with OMFS. Pt declined tx today. Informed pt she would have to make separate consultation appt with OMFS at next visit prior to determine if she can undergo exo with sedation, but she would have to be medically stable prior to extraction, would need clearance from her physician."    CXR 7/18: IMPRESSION: Clear lungs.    Of Note: This service attempted to see patient 7/28; however, patient was off unit for procedure/ test (see report for details).     Patient received sleeping in bed, easily arousable to awake/ alert state given verbal cues, patient seated upright, able to make basic wants/ needs known and follow simple directives. Patient complaints of gums/ dentition pain discomfort and distaste to hospital food; otherwise, denies difficulty with swallowing. Per Internal Medicine 7/28, "Patient is a 62 y/o female with PMH of R SFA/popliteal stent, presented with pain in RLE, found to have acute on chronic limb ischemia. Now s/p RLE angiogram and percutaneous thrombectomy (07/19) of SFA and popliteal. At post op check had positive signal in AT and PT. Now off pressors, moved from SICU to floors."     Per Dental 7/28, No abscess, gingival swelling non-drainable. Granulation tissue around #13, 14 area. Consulted with OMFS. No extraction indicated. Elective exo for root remnants suggested #3,14. Pt insisted exo under sedation with OMFS. Pt declined tx today. Informed pt she would have to make separate consultation appt with OMFS at next visit prior to determine if she can undergo exo with sedation, but she would have to be medically stable prior to extraction, would need clearance from her physician."    CXR 7/18: IMPRESSION: Clear lungs.    Of Note: This service attempted to see patient 7/28; however, patient was off unit for procedure/ test (see report for details).     Patient received sleeping in bed, easily arousable to awake/ alert state given verbal cues, patient seated upright, able to make basic wants/ needs known and follow simple directives. Patient complaints of gums/ dentition pain discomfort and distaste to hospital food; otherwise, denies difficulty with swallowing.

## 2023-07-29 NOTE — SWALLOW BEDSIDE ASSESSMENT ADULT - ASR SWALLOW RECOMMEND DIAG
Left message for mother to call office back to schedule physical. Pt's last physical was in office 8/17/22.   Objective testing NOT warranted at this time given patient without overt s/s aspiration and clear lungs on chest imaging (7/18- most recent)

## 2023-07-29 NOTE — SWALLOW BEDSIDE ASSESSMENT ADULT - ADDITIONAL RECOMMENDATIONS
1. RD follow up as patient may benefit from nutritional supplements (i.e., Ensure shakes) to meet nutritional needs given patient reporting reduced PO intake due to distaste to food. 2. This service to follow up to ensure tolerance of recommended PO as schedule permits. 3. Medical team further advised to reconsult this department if patient is with a change in medical status or change in tolerance of recommended PO.

## 2023-07-29 NOTE — PROGRESS NOTE ADULT - SUBJECTIVE AND OBJECTIVE BOX
Subjective: Patient seen and examined. No new events except as noted.     SUBJECTIVE/ROS:  nad    MEDICATIONS:  MEDICATIONS  (STANDING):  acetaminophen     Tablet .. 1000 milliGRAM(s) Oral every 6 hours  apixaban 10 milliGRAM(s) Oral two times a day  aspirin  chewable 81 milliGRAM(s) Oral every 24 hours  atorvastatin 40 milliGRAM(s) Oral at bedtime  budesonide 160 MICROgram(s)/formoterol 4.5 MICROgram(s) Inhaler 2 Puff(s) Inhalation two times a day  magnesium sulfate  IVPB 2 Gram(s) IV Intermittent once      PHYSICAL EXAM:  T(C): 37.3 (07-29-23 @ 07:28), Max: 37.6 (07-28-23 @ 21:28)  HR: 63 (07-29-23 @ 07:28) (63 - 72)  BP: 122/61 (07-29-23 @ 07:28) (115/58 - 132/83)  RR: 18 (07-29-23 @ 07:28) (18 - 18)  SpO2: 100% (07-29-23 @ 07:28) (96% - 100%)  Wt(kg): --  I&O's Summary          JVP: Normal  Neck: supple  Lung: clear   CV: S1 S2 , Murmur:  Abd: soft  Ext: No edema  neuro: Awake / alert  Psych: flat affect  Skin: normal``    LABS/DATA:    CARDIAC MARKERS:                                8.6    6.94  )-----------( 416      ( 29 Jul 2023 07:06 )             27.5     07-29    138  |  101  |  9   ----------------------------<  86  3.7   |  24  |  0.59    Ca    9.6      29 Jul 2023 07:06  Phos  3.8     07-29  Mg     1.90     07-29      proBNP:   Lipid Profile:   HgA1c:   TSH:     TELE:  EKG:

## 2023-07-29 NOTE — SWALLOW BEDSIDE ASSESSMENT ADULT - SWALLOW EVAL: RECOMMENDED FEEDING/EATING TECHNIQUES
slow rate of intake/maintain upright posture during/after eating for 30 mins/oral hygiene/small sips/bites

## 2023-07-30 LAB
ANION GAP SERPL CALC-SCNC: 9 MMOL/L — SIGNIFICANT CHANGE UP (ref 7–14)
BUN SERPL-MCNC: 10 MG/DL — SIGNIFICANT CHANGE UP (ref 7–23)
CALCIUM SERPL-MCNC: 9.4 MG/DL — SIGNIFICANT CHANGE UP (ref 8.4–10.5)
CHLORIDE SERPL-SCNC: 104 MMOL/L — SIGNIFICANT CHANGE UP (ref 98–107)
CO2 SERPL-SCNC: 25 MMOL/L — SIGNIFICANT CHANGE UP (ref 22–31)
CREAT SERPL-MCNC: 0.52 MG/DL — SIGNIFICANT CHANGE UP (ref 0.5–1.3)
EGFR: 105 ML/MIN/1.73M2 — SIGNIFICANT CHANGE UP
GLUCOSE SERPL-MCNC: 87 MG/DL — SIGNIFICANT CHANGE UP (ref 70–99)
HCT VFR BLD CALC: 27.4 % — LOW (ref 34.5–45)
HGB BLD-MCNC: 8.6 G/DL — LOW (ref 11.5–15.5)
MAGNESIUM SERPL-MCNC: 1.8 MG/DL — SIGNIFICANT CHANGE UP (ref 1.6–2.6)
MCHC RBC-ENTMCNC: 31.4 GM/DL — LOW (ref 32–36)
MCHC RBC-ENTMCNC: 32 PG — SIGNIFICANT CHANGE UP (ref 27–34)
MCV RBC AUTO: 101.9 FL — HIGH (ref 80–100)
NRBC # BLD: 0 /100 WBCS — SIGNIFICANT CHANGE UP (ref 0–0)
NRBC # FLD: 0 K/UL — SIGNIFICANT CHANGE UP (ref 0–0)
PHOSPHATE SERPL-MCNC: 3.7 MG/DL — SIGNIFICANT CHANGE UP (ref 2.5–4.5)
PLATELET # BLD AUTO: 428 K/UL — HIGH (ref 150–400)
POTASSIUM SERPL-MCNC: 4.1 MMOL/L — SIGNIFICANT CHANGE UP (ref 3.5–5.3)
POTASSIUM SERPL-SCNC: 4.1 MMOL/L — SIGNIFICANT CHANGE UP (ref 3.5–5.3)
RBC # BLD: 2.69 M/UL — LOW (ref 3.8–5.2)
RBC # FLD: 18.1 % — HIGH (ref 10.3–14.5)
SODIUM SERPL-SCNC: 138 MMOL/L — SIGNIFICANT CHANGE UP (ref 135–145)
WBC # BLD: 5.74 K/UL — SIGNIFICANT CHANGE UP (ref 3.8–10.5)
WBC # FLD AUTO: 5.74 K/UL — SIGNIFICANT CHANGE UP (ref 3.8–10.5)

## 2023-07-30 RX ADMIN — Medication 1000 MILLIGRAM(S): at 09:13

## 2023-07-30 RX ADMIN — BUDESONIDE AND FORMOTEROL FUMARATE DIHYDRATE 2 PUFF(S): 160; 4.5 AEROSOL RESPIRATORY (INHALATION) at 22:10

## 2023-07-30 RX ADMIN — OXYCODONE HYDROCHLORIDE 10 MILLIGRAM(S): 5 TABLET ORAL at 19:41

## 2023-07-30 RX ADMIN — APIXABAN 10 MILLIGRAM(S): 2.5 TABLET, FILM COATED ORAL at 14:03

## 2023-07-30 RX ADMIN — Medication 1000 MILLIGRAM(S): at 14:03

## 2023-07-30 RX ADMIN — ATORVASTATIN CALCIUM 40 MILLIGRAM(S): 80 TABLET, FILM COATED ORAL at 22:09

## 2023-07-30 RX ADMIN — Medication 1000 MILLIGRAM(S): at 22:10

## 2023-07-30 RX ADMIN — APIXABAN 10 MILLIGRAM(S): 2.5 TABLET, FILM COATED ORAL at 05:18

## 2023-07-30 RX ADMIN — Medication 1000 MILLIGRAM(S): at 02:33

## 2023-07-30 RX ADMIN — OXYCODONE HYDROCHLORIDE 10 MILLIGRAM(S): 5 TABLET ORAL at 20:41

## 2023-07-30 RX ADMIN — BUDESONIDE AND FORMOTEROL FUMARATE DIHYDRATE 2 PUFF(S): 160; 4.5 AEROSOL RESPIRATORY (INHALATION) at 09:12

## 2023-07-30 RX ADMIN — Medication 81 MILLIGRAM(S): at 14:04

## 2023-07-30 RX ADMIN — Medication 5 MILLIGRAM(S): at 22:09

## 2023-07-30 NOTE — PROGRESS NOTE ADULT - SUBJECTIVE AND OBJECTIVE BOX
C Team Surgery Progress Note     S: Patient resting comfortably on morning rounds. Pain well-controlled currently.        MEDICATIONS  (STANDING):  acetaminophen     Tablet .. 1000 milliGRAM(s) Oral every 6 hours  apixaban 10 milliGRAM(s) Oral two times a day  aspirin  chewable 81 milliGRAM(s) Oral every 24 hours  atorvastatin 40 milliGRAM(s) Oral at bedtime  budesonide 160 MICROgram(s)/formoterol 4.5 MICROgram(s) Inhaler 2 Puff(s) Inhalation two times a day    MEDICATIONS  (PRN):  bisacodyl 5 milliGRAM(s) Oral every 12 hours PRN Constipation  oxyCODONE    IR 10 milliGRAM(s) Oral every 4 hours PRN Severe Pain (7 - 10)  oxyCODONE    IR 5 milliGRAM(s) Oral every 4 hours PRN Moderate Pain (4 - 6)      T(C): 36.9 (07-30-23 @ 05:15), Max: 37.2 (07-29-23 @ 22:27)  HR: 70 (07-30-23 @ 05:15) (68 - 70)  BP: 124/60 (07-30-23 @ 05:15) (110/57 - 130/52)  RR: 18 (07-30-23 @ 05:15) (17 - 18)  SpO2: 100% (07-30-23 @ 05:15) (96% - 100%)        07-29-23 @ 07:01  -  07-30-23 @ 07:00  --------------------------------------------------------  IN: 0 mL / OUT: 500 mL / NET: -500 mL        Physical Exam:  General: NAD, AOx3  Respiratory: No labored breathing  CV: pulse regularly present  MSK: RLE motor and sensory grossly intact. Dressings c/d/i.  Pulses exam: R PT signals    LABS:                        8.6    5.74  )-----------( 428      ( 30 Jul 2023 05:51 )             27.4     07-30    138  |  104  |  10  ----------------------------<  87  4.1   |  25  |  0.52    Ca    9.4      30 Jul 2023 05:51  Phos  3.7     07-30  Mg     1.80     07-30

## 2023-07-30 NOTE — PROGRESS NOTE ADULT - ASSESSMENT
64 y/o woman with a PMH of R SFA/popliteal stent, presented with pain in RLE, found to have acute on chronic limb ischemia. s/p RLE angiogram and percutaneous thrombectomy (07/19) of SFA and popliteal. Now s/p R CFA -> PT bypass with PTFE (7/25).    Plan:  - Eliquis + ASA  - Dental work completed, no concerns for infection at this time. (afebrile, WBC downtrending 12.7->6.9)  - Dispo planning to rehab, pending ins authorization  - pain control PRN  - diet: regular 62 y/o woman with a PMH of R SFA/popliteal stent, presented with pain in RLE, found to have acute on chronic limb ischemia. s/p RLE angiogram and percutaneous thrombectomy (07/19) of SFA and popliteal. Now s/p R CFA -> PT bypass with PTFE (7/25).    Plan:  - Eliquis + ASA  - Dental work completed, no concerns for infection at this time. (afebrile, WBC downtrending 12.7->6.9)  - Dispo planning to rehab, pending ins authorization  - pain control PRN  - diet: regular

## 2023-07-30 NOTE — PROGRESS NOTE ADULT - SUBJECTIVE AND OBJECTIVE BOX
Subjective: Patient seen and examined. No new events except as noted.     SUBJECTIVE/ROS:  nad      MEDICATIONS:  MEDICATIONS  (STANDING):  acetaminophen     Tablet .. 1000 milliGRAM(s) Oral every 6 hours  apixaban 10 milliGRAM(s) Oral two times a day  aspirin  chewable 81 milliGRAM(s) Oral every 24 hours  atorvastatin 40 milliGRAM(s) Oral at bedtime  budesonide 160 MICROgram(s)/formoterol 4.5 MICROgram(s) Inhaler 2 Puff(s) Inhalation two times a day      PHYSICAL EXAM:  T(C): 36.9 (07-30-23 @ 05:15), Max: 37.3 (07-29-23 @ 07:28)  HR: 70 (07-30-23 @ 05:15) (63 - 70)  BP: 124/60 (07-30-23 @ 05:15) (110/57 - 130/52)  RR: 18 (07-30-23 @ 05:15) (17 - 18)  SpO2: 100% (07-30-23 @ 05:15) (96% - 100%)  Wt(kg): --  I&O's Summary    29 Jul 2023 07:01  -  30 Jul 2023 06:28  --------------------------------------------------------  IN: 0 mL / OUT: 350 mL / NET: -350 mL        Weight (kg): 66 (07-30 @ 05:15)      JVP: Normal  Neck: supple  Lung: clear   CV: S1 S2 , Murmur:  Abd: soft  Ext: No edema  neuro: Awake / alert  Psych: flat affect    LABS/DATA:    CARDIAC MARKERS:                                8.6    6.94  )-----------( 416      ( 29 Jul 2023 07:06 )             27.5     07-29    138  |  101  |  9   ----------------------------<  86  3.7   |  24  |  0.59    Ca    9.6      29 Jul 2023 07:06  Phos  3.8     07-29  Mg     1.90     07-29      proBNP:   Lipid Profile:   HgA1c:   TSH:     TELE:  EKG:

## 2023-07-30 NOTE — PROGRESS NOTE ADULT - SUBJECTIVE AND OBJECTIVE BOX
Name of Patient : BRE HODGE  MRN: [id number]  Date of visit: 07-30-23       Subjective: Patient seen and examined. No new events except as noted.   Doing okay     REVIEW OF SYSTEMS:    CONSTITUTIONAL: No weakness, fevers or chills  EYES/ENT: No visual changes;  No vertigo or throat pain   NECK: No pain or stiffness  RESPIRATORY: No cough, wheezing, hemoptysis; No shortness of breath  CARDIOVASCULAR: No chest pain or palpitations  GASTROINTESTINAL: No abdominal or epigastric pain. No nausea, vomiting, or hematemesis; No diarrhea or constipation. No melena or hematochezia.  GENITOURINARY: No dysuria, frequency or hematuria  NEUROLOGICAL: No numbness or weakness  SKIN: No itching, burning, rashes, or lesions   All other review of systems is negative unless indicated above.    MEDICATIONS:  MEDICATIONS  (STANDING):  acetaminophen     Tablet .. 1000 milliGRAM(s) Oral every 6 hours  apixaban 10 milliGRAM(s) Oral two times a day  aspirin  chewable 81 milliGRAM(s) Oral every 24 hours  atorvastatin 40 milliGRAM(s) Oral at bedtime  budesonide 160 MICROgram(s)/formoterol 4.5 MICROgram(s) Inhaler 2 Puff(s) Inhalation two times a day      PHYSICAL EXAM:  T(C): 36.7 (07-30-23 @ 19:15), Max: 37.2 (07-29-23 @ 22:27)  HR: 72 (07-30-23 @ 19:15) (68 - 73)  BP: 111/62 (07-30-23 @ 19:15) (104/54 - 130/52)  RR: 18 (07-30-23 @ 19:15) (17 - 18)  SpO2: 100% (07-30-23 @ 19:15) (96% - 100%)  Wt(kg): --  I&O's Summary    29 Jul 2023 07:01  -  30 Jul 2023 07:00  --------------------------------------------------------  IN: 0 mL / OUT: 500 mL / NET: -500 mL        Weight (kg): 66 (07-30 @ 05:15)    Appearance: Normal	  HEENT:  PERRLA   Lymphatic: No lymphadenopathy   Cardiovascular: Normal S1 S2, no JVD  Respiratory: normal effort , clear  Gastrointestinal:  Soft, Non-tender  Skin: No rashes,  warm to touch  Psychiatry:  Mood & affect appropriate  Musculuskeletal: No edema    recent labs, Imaging and EKGs personally reviewed     07-29-23 @ 07:01  -  07-30-23 @ 07:00  --------------------------------------------------------  IN: 0 mL / OUT: 500 mL / NET: -500 mL             Name of Patient : BRE HODGE  MRN: [id number]  Date of visit: 07-30-23       Subjective: Patient seen and examined. No new events except as noted.   Doing okay     REVIEW OF SYSTEMS:    CONSTITUTIONAL: No weakness, fevers or chills  EYES/ENT: No visual changes;  No vertigo or throat pain   NECK: No pain or stiffness  RESPIRATORY: No cough, wheezing, hemoptysis; No shortness of breath  CARDIOVASCULAR: No chest pain or palpitations  GASTROINTESTINAL: No abdominal or epigastric pain. No nausea, vomiting, or hematemesis; No diarrhea or constipation. No melena or hematochezia.  GENITOURINARY: No dysuria, frequency or hematuria  NEUROLOGICAL: No numbness or weakness  SKIN: No itching, burning, rashes, or lesions   All other review of systems is negative unless indicated above.    MEDICATIONS:  MEDICATIONS  (STANDING):  acetaminophen     Tablet .. 1000 milliGRAM(s) Oral every 6 hours  apixaban 10 milliGRAM(s) Oral two times a day  aspirin  chewable 81 milliGRAM(s) Oral every 24 hours  atorvastatin 40 milliGRAM(s) Oral at bedtime  budesonide 160 MICROgram(s)/formoterol 4.5 MICROgram(s) Inhaler 2 Puff(s) Inhalation two times a day      PHYSICAL EXAM:  T(C): 36.7 (07-30-23 @ 19:15), Max: 37.2 (07-29-23 @ 22:27)  HR: 72 (07-30-23 @ 19:15) (68 - 73)  BP: 111/62 (07-30-23 @ 19:15) (104/54 - 130/52)  RR: 18 (07-30-23 @ 19:15) (17 - 18)  SpO2: 100% (07-30-23 @ 19:15) (96% - 100%)  Wt(kg): --  I&O's Summary    29 Jul 2023 07:01  -  30 Jul 2023 07:00  --------------------------------------------------------  IN: 0 mL / OUT: 500 mL / NET: -500 mL        Weight (kg): 66 (07-30 @ 05:15)    Appearance: Normal	  HEENT:  PERRLA   Lymphatic: No lymphadenopathy   Cardiovascular: Normal S1 S2, no JVD  Respiratory: normal effort , clear  Gastrointestinal:  Soft, Non-tender  Skin: No rashes,  warm to touch  Psychiatry:  Mood & affect appropriate  Musculuskeletal: No edema    recent labs, Imaging and EKGs personally reviewed     07-29-23 @ 07:01  -  07-30-23 @ 07:00  --------------------------------------------------------  IN: 0 mL / OUT: 500 mL / NET: -500 mL             Name of Patient : BRE HODGE  MRN: [id number]  Date of visit: 07-30-23       Subjective: Patient seen and examined. No new events except as noted.   Doing okay     REVIEW OF SYSTEMS:    CONSTITUTIONAL: No weakness, fevers or chills  EYES/ENT: No visual changes;  No vertigo or throat pain   NECK: No pain or stiffness  RESPIRATORY: No cough, wheezing, hemoptysis; No shortness of breath  CARDIOVASCULAR: No chest pain or palpitations  GASTROINTESTINAL: No abdominal or epigastric pain. No nausea, vomiting, or hematemesis; No diarrhea or constipation. No melena or hematochezia.  GENITOURINARY: No dysuria, frequency or hematuria  NEUROLOGICAL: No numbness or weakness  SKIN: No itching, burning, rashes, or lesions   All other review of systems is negative unless indicated above.    MEDICATIONS:  MEDICATIONS  (STANDING):  acetaminophen     Tablet .. 1000 milliGRAM(s) Oral every 6 hours  apixaban 10 milliGRAM(s) Oral two times a day  aspirin  chewable 81 milliGRAM(s) Oral every 24 hours  atorvastatin 40 milliGRAM(s) Oral at bedtime  budesonide 160 MICROgram(s)/formoterol 4.5 MICROgram(s) Inhaler 2 Puff(s) Inhalation two times a day      PHYSICAL EXAM:  T(C): 36.7 (07-30-23 @ 19:15), Max: 37.2 (07-29-23 @ 22:27)  HR: 72 (07-30-23 @ 19:15) (68 - 73)  BP: 111/62 (07-30-23 @ 19:15) (104/54 - 130/52)  RR: 18 (07-30-23 @ 19:15) (17 - 18)  SpO2: 100% (07-30-23 @ 19:15) (96% - 100%)  Wt(kg): --  I&O's Summary    29 Jul 2023 07:01  -  30 Jul 2023 07:00  --------------------------------------------------------  IN: 0 mL / OUT: 500 mL / NET: -500 mL        Weight (kg): 66 (07-30 @ 05:15)    Appearance: Normal	  HEENT:  PERRLA   Lymphatic: No lymphadenopathy   Cardiovascular: Normal S1 S2, no JVD  Respiratory: normal effort , clear  Gastrointestinal:  Soft, Non-tender  Skin: No rashes,  warm to touch  Psychiatry:  Mood & affect appropriate  Musculuskeletal: No edema    recent labs, Imaging and EKGs personally reviewed     07-29-23 @ 07:01  -  07-30-23 @ 07:00  --------------------------------------------------------  IN: 0 mL / OUT: 500 mL / NET: -500 mL

## 2023-07-31 LAB
ANION GAP SERPL CALC-SCNC: 24 MMOL/L — HIGH (ref 7–14)
BUN SERPL-MCNC: 13 MG/DL — SIGNIFICANT CHANGE UP (ref 7–23)
CALCIUM SERPL-MCNC: 9.4 MG/DL — SIGNIFICANT CHANGE UP (ref 8.4–10.5)
CHLORIDE SERPL-SCNC: 100 MMOL/L — SIGNIFICANT CHANGE UP (ref 98–107)
CO2 SERPL-SCNC: 21 MMOL/L — LOW (ref 22–31)
CREAT SERPL-MCNC: 0.59 MG/DL — SIGNIFICANT CHANGE UP (ref 0.5–1.3)
EGFR: 102 ML/MIN/1.73M2 — SIGNIFICANT CHANGE UP
GLUCOSE SERPL-MCNC: 94 MG/DL — SIGNIFICANT CHANGE UP (ref 70–99)
HCT VFR BLD CALC: 29.2 % — LOW (ref 34.5–45)
HGB BLD-MCNC: 8.9 G/DL — LOW (ref 11.5–15.5)
MAGNESIUM SERPL-MCNC: 1.9 MG/DL — SIGNIFICANT CHANGE UP (ref 1.6–2.6)
MCHC RBC-ENTMCNC: 30.5 GM/DL — LOW (ref 32–36)
MCHC RBC-ENTMCNC: 32 PG — SIGNIFICANT CHANGE UP (ref 27–34)
MCV RBC AUTO: 105 FL — HIGH (ref 80–100)
NRBC # BLD: 0 /100 WBCS — SIGNIFICANT CHANGE UP (ref 0–0)
NRBC # FLD: 0 K/UL — SIGNIFICANT CHANGE UP (ref 0–0)
PHOSPHATE SERPL-MCNC: 4 MG/DL — SIGNIFICANT CHANGE UP (ref 2.5–4.5)
PLATELET # BLD AUTO: 464 K/UL — HIGH (ref 150–400)
POTASSIUM SERPL-MCNC: 4.5 MMOL/L — SIGNIFICANT CHANGE UP (ref 3.5–5.3)
POTASSIUM SERPL-SCNC: 4.5 MMOL/L — SIGNIFICANT CHANGE UP (ref 3.5–5.3)
RBC # BLD: 2.78 M/UL — LOW (ref 3.8–5.2)
RBC # FLD: 18.6 % — HIGH (ref 10.3–14.5)
SODIUM SERPL-SCNC: 145 MMOL/L — SIGNIFICANT CHANGE UP (ref 135–145)
WBC # BLD: 5.22 K/UL — SIGNIFICANT CHANGE UP (ref 3.8–10.5)
WBC # FLD AUTO: 5.22 K/UL — SIGNIFICANT CHANGE UP (ref 3.8–10.5)

## 2023-07-31 RX ORDER — MULTIVIT WITH MIN/MFOLATE/K2 340-15/3 G
296 POWDER (GRAM) ORAL ONCE
Refills: 0 | Status: COMPLETED | OUTPATIENT
Start: 2023-07-31 | End: 2023-07-31

## 2023-07-31 RX ADMIN — OXYCODONE HYDROCHLORIDE 10 MILLIGRAM(S): 5 TABLET ORAL at 06:07

## 2023-07-31 RX ADMIN — Medication 1000 MILLIGRAM(S): at 09:05

## 2023-07-31 RX ADMIN — Medication 5 MILLIGRAM(S): at 21:30

## 2023-07-31 RX ADMIN — Medication 1000 MILLIGRAM(S): at 21:26

## 2023-07-31 RX ADMIN — Medication 296 MILLILITER(S): at 11:50

## 2023-07-31 RX ADMIN — Medication 1000 MILLIGRAM(S): at 14:18

## 2023-07-31 RX ADMIN — OXYCODONE HYDROCHLORIDE 10 MILLIGRAM(S): 5 TABLET ORAL at 07:07

## 2023-07-31 RX ADMIN — APIXABAN 10 MILLIGRAM(S): 2.5 TABLET, FILM COATED ORAL at 06:08

## 2023-07-31 RX ADMIN — Medication 81 MILLIGRAM(S): at 14:18

## 2023-07-31 RX ADMIN — BUDESONIDE AND FORMOTEROL FUMARATE DIHYDRATE 2 PUFF(S): 160; 4.5 AEROSOL RESPIRATORY (INHALATION) at 21:25

## 2023-07-31 RX ADMIN — ATORVASTATIN CALCIUM 40 MILLIGRAM(S): 80 TABLET, FILM COATED ORAL at 21:26

## 2023-07-31 RX ADMIN — APIXABAN 10 MILLIGRAM(S): 2.5 TABLET, FILM COATED ORAL at 14:19

## 2023-07-31 RX ADMIN — BUDESONIDE AND FORMOTEROL FUMARATE DIHYDRATE 2 PUFF(S): 160; 4.5 AEROSOL RESPIRATORY (INHALATION) at 09:04

## 2023-07-31 NOTE — PROGRESS NOTE ADULT - ASSESSMENT
Patient is a 64 y/o female with PMH of R SFA/popliteal stent, presented with pain in RLE, found to have acute on chronic limb ischemia. Now s/p RLE angiogram and percutaneous thrombectomy (07/19) of SFA and popliteal. At post op check had positive signal in AT and PT. Now off pressors, moved from SICU to floors.     # PAD   S/P ANGIO   YANIRA/PVR  vascular follow up   ASA, Statin and heparin drip , AC switch to doacs   monitor hgb level   Echo noted   stress test noted, normal   S/P BYPASS    # Hx of CAD  S/P STent   Echo noted  ASA and statin   Card follow up appreciated     # Electrolytes imbalance  replete electrolyses  Monitor daily     # Anemia   Anemia W/U   Keep hgb above 8 if possible     DVT andGI PPX     D/C planing   call back with any questions

## 2023-07-31 NOTE — PROGRESS NOTE ADULT - SUBJECTIVE AND OBJECTIVE BOX
C Team Surgery Progress Note     S: Patient resting comfortably on morning rounds. Pain well-controlled currently.        MEDICATIONS  (STANDING):  acetaminophen     Tablet .. 1000 milliGRAM(s) Oral every 6 hours  apixaban 10 milliGRAM(s) Oral two times a day  aspirin  chewable 81 milliGRAM(s) Oral every 24 hours  atorvastatin 40 milliGRAM(s) Oral at bedtime  budesonide 160 MICROgram(s)/formoterol 4.5 MICROgram(s) Inhaler 2 Puff(s) Inhalation two times a day    MEDICATIONS  (PRN):  bisacodyl 5 milliGRAM(s) Oral every 12 hours PRN Constipation  oxyCODONE    IR 10 milliGRAM(s) Oral every 4 hours PRN Severe Pain (7 - 10)  oxyCODONE    IR 5 milliGRAM(s) Oral every 4 hours PRN Moderate Pain (4 - 6)      T(C): 37.1 (07-31-23 @ 06:11), Max: 37.1 (07-31-23 @ 06:11)  HR: 64 (07-31-23 @ 06:11) (64 - 74)  BP: 125/55 (07-31-23 @ 06:11) (104/54 - 125/55)  RR: 18 (07-31-23 @ 06:11) (17 - 18)  SpO2: 98% (07-31-23 @ 06:11) (98% - 100%)        07-30-23 @ 07:01  -  07-31-23 @ 07:00  --------------------------------------------------------  IN: 480 mL / OUT: 700 mL / NET: -220 mL        Physical Exam:  General: NAD, AOx3  Respiratory: No labored breathing  CV: pulse regularly present  MSK: RLE motor and sensory grossly intact. Dressings c/d/i.  Pulses exam: R PT signals    LABS:                        8.6    5.74  )-----------( 428      ( 30 Jul 2023 05:51 )             27.4     07-30    138  |  104  |  10  ----------------------------<  87  4.1   |  25  |  0.52    Ca    9.4      30 Jul 2023 05:51  Phos  3.7     07-30  Mg     1.80     07-30

## 2023-07-31 NOTE — PROGRESS NOTE ADULT - SUBJECTIVE AND OBJECTIVE BOX
Subjective: Patient seen and examined. No new events except as noted.     SUBJECTIVE/ROS:  No chest pain, dyspnea, palpitation, or dizziness.       MEDICATIONS:  MEDICATIONS  (STANDING):  acetaminophen     Tablet .. 1000 milliGRAM(s) Oral every 6 hours  apixaban 10 milliGRAM(s) Oral two times a day  aspirin  chewable 81 milliGRAM(s) Oral every 24 hours  atorvastatin 40 milliGRAM(s) Oral at bedtime  budesonide 160 MICROgram(s)/formoterol 4.5 MICROgram(s) Inhaler 2 Puff(s) Inhalation two times a day      PHYSICAL EXAM:  T(C): 37.1 (07-31-23 @ 06:11), Max: 37.1 (07-31-23 @ 06:11)  HR: 64 (07-31-23 @ 06:11) (64 - 74)  BP: 125/55 (07-31-23 @ 06:11) (104/54 - 125/55)  RR: 18 (07-31-23 @ 06:11) (17 - 18)  SpO2: 98% (07-31-23 @ 06:11) (98% - 100%)  Wt(kg): --  I&O's Summary    29 Jul 2023 07:01  -  30 Jul 2023 07:00  --------------------------------------------------------  IN: 0 mL / OUT: 500 mL / NET: -500 mL    30 Jul 2023 07:01  -  31 Jul 2023 06:57  --------------------------------------------------------  IN: 480 mL / OUT: 700 mL / NET: -220 mL            JVP: Normal  Neck: supple  Lung: clear   CV: S1 S2 , Murmur:  Abd: soft  Ext: No edema  neuro: Awake / alert  Psych: flat affect  Skin: normal``    LABS/DATA:    CARDIAC MARKERS:                                8.6    5.74  )-----------( 428      ( 30 Jul 2023 05:51 )             27.4     07-30    138  |  104  |  10  ----------------------------<  87  4.1   |  25  |  0.52    Ca    9.4      30 Jul 2023 05:51  Phos  3.7     07-30  Mg     1.80     07-30      proBNP:   Lipid Profile:   HgA1c:   TSH:     TELE:  EKG:

## 2023-07-31 NOTE — PROGRESS NOTE ADULT - ASSESSMENT
64 y/o woman with a PMH of R SFA/popliteal stent, presented with pain in RLE, found to have acute on chronic limb ischemia. s/p RLE angiogram and percutaneous thrombectomy (07/19) of SFA and popliteal. Now s/p R CFA -> PT bypass with PTFE (7/25). Currently waiting for rehab placement.     Plan:  - Eliquis + ASA  - Dental work completed, no concerns for infection at this time. (afebrile, WBC downtrending 12.7->6.9)  - Dispo planning to rehab, pending ins authorization  - pain control PRN  - diet: regular

## 2023-07-31 NOTE — PROGRESS NOTE ADULT - SUBJECTIVE AND OBJECTIVE BOX
Name of Patient : BRE HODGE  MRN: [id number]  Date of visit: 07-31-23 @ 13:24      Subjective: Patient seen and examined. No new events except as noted.   Doing okay     REVIEW OF SYSTEMS:    CONSTITUTIONAL: No weakness, fevers or chills  EYES/ENT: No visual changes;  No vertigo or throat pain   NECK: No pain or stiffness  RESPIRATORY: No cough, wheezing, hemoptysis; No shortness of breath  CARDIOVASCULAR: No chest pain or palpitations  GASTROINTESTINAL: No abdominal or epigastric pain. No nausea, vomiting, or hematemesis; No diarrhea or constipation. No melena or hematochezia.  GENITOURINARY: No dysuria, frequency or hematuria  NEUROLOGICAL: No numbness or weakness  SKIN: No itching, burning, rashes, or lesions   All other review of systems is negative unless indicated above.    MEDICATIONS:  MEDICATIONS  (STANDING):  acetaminophen     Tablet .. 1000 milliGRAM(s) Oral every 6 hours  apixaban 10 milliGRAM(s) Oral two times a day  aspirin  chewable 81 milliGRAM(s) Oral every 24 hours  atorvastatin 40 milliGRAM(s) Oral at bedtime  budesonide 160 MICROgram(s)/formoterol 4.5 MICROgram(s) Inhaler 2 Puff(s) Inhalation two times a day      PHYSICAL EXAM:  T(C): 37.1 (07-31-23 @ 06:11), Max: 37.1 (07-31-23 @ 06:11)  HR: 64 (07-31-23 @ 06:11) (64 - 74)  BP: 125/55 (07-31-23 @ 06:11) (105/62 - 125/55)  RR: 18 (07-31-23 @ 06:11) (18 - 18)  SpO2: 98% (07-31-23 @ 06:11) (98% - 100%)  Wt(kg): --  I&O's Summary    30 Jul 2023 07:01  -  31 Jul 2023 07:00  --------------------------------------------------------  IN: 480 mL / OUT: 700 mL / NET: -220 mL          Appearance: Normal	  HEENT:  PERRLA   Lymphatic: No lymphadenopathy   Cardiovascular: Normal S1 S2, no JVD  Respiratory: normal effort , clear  Gastrointestinal:  Soft, Non-tender  Skin: No rashes,  warm to touch                        8.9    5.22  )-----------( 464      ( 31 Jul 2023 06:30 )             29.2               07-31    145  |  100  |  13  ----------------------------<  94  4.5   |  21<L>  |  0.59    Ca    9.4      31 Jul 2023 06:30  Phos  4.0     07-31  Mg     1.90     07-31                         Urinalysis Basic - ( 31 Jul 2023 06:30 )    Color: x / Appearance: x / SG: x / pH: x  Gluc: 94 mg/dL / Ketone: x  / Bili: x / Urobili: x   Blood: x / Protein: x / Nitrite: x   Leuk Esterase: x / RBC: x / WBC x   Sq Epi: x / Non Sq Epi: x / Bacteria: x      Psychiatry:  Mood & affect appropriate  Musculuskeletal: No edema    recent labs, Imaging and EKGs personally reviewed     07-30-23 @ 07:01  -  07-31-23 @ 07:00  --------------------------------------------------------  IN: 480 mL / OUT: 700 mL / NET: -220 mL             Name of Patient : BRE HODGE  MRN: [id number]  Date of visit: 07-31-23 @ 13:24      Subjective: Patient seen and examined. No new events except as noted.   Doing okay     REVIEW OF SYSTEMS:    CONSTITUTIONAL: No weakness, fevers or chills  EYES/ENT: No visual changes;  No vertigo or throat pain   NECK: No pain or stiffness  RESPIRATORY: No cough, wheezing, hemoptysis; No shortness of breath  CARDIOVASCULAR: No chest pain or palpitations  GASTROINTESTINAL: No abdominal or epigastric pain. No nausea, vomiting, or hematemesis; No diarrhea or constipation. No melena or hematochezia.  GENITOURINARY: No dysuria, frequency or hematuria  NEUROLOGICAL: No numbness or weakness  SKIN: No itching, burning, rashes, or lesions   All other review of systems is negative unless indicated above.    MEDICATIONS:  MEDICATIONS  (STANDING):  acetaminophen     Tablet .. 1000 milliGRAM(s) Oral every 6 hours  apixaban 10 milliGRAM(s) Oral two times a day  aspirin  chewable 81 milliGRAM(s) Oral every 24 hours  atorvastatin 40 milliGRAM(s) Oral at bedtime  budesonide 160 MICROgram(s)/formoterol 4.5 MICROgram(s) Inhaler 2 Puff(s) Inhalation two times a day      PHYSICAL EXAM:  T(C): 37.1 (07-31-23 @ 06:11), Max: 37.1 (07-31-23 @ 06:11)  HR: 64 (07-31-23 @ 06:11) (64 - 74)  BP: 125/55 (07-31-23 @ 06:11) (105/62 - 125/55)  RR: 18 (07-31-23 @ 06:11) (18 - 18)  SpO2: 98% (07-31-23 @ 06:11) (98% - 100%)  Wt(kg): --  I&O's Summary    30 Jul 2023 07:01  -  31 Jul 2023 07:00  --------------------------------------------------------  IN: 480 mL / OUT: 700 mL / NET: -220 mL          Appearance: Normal	  HEENT:  PERRLA   Lymphatic: No lymphadenopathy   Cardiovascular: Normal S1 S2, no JVD  Respiratory: normal effort , clear  Gastrointestinal:  Soft, Non-tender  Skin: No rashes,  warm to touch                        8.9    5.22  )-----------( 464      ( 31 Jul 2023 06:30 )             29.2               07-31    145  |  100  |  13  ----------------------------<  94  4.5   |  21<L>  |  0.59    Ca    9.4      31 Jul 2023 06:30  Phos  4.0     07-31  Mg     1.90     07-31                         Urinalysis Basic - ( 31 Jul 2023 06:30 )    Color: x / Appearance: x / SG: x / pH: x  Gluc: 94 mg/dL / Ketone: x  / Bili: x / Urobili: x   Blood: x / Protein: x / Nitrite: x   Leuk Esterase: x / RBC: x / WBC x   Sq Epi: x / Non Sq Epi: x / Bacteria: x      Psychiatry:  Mood & affect appropriate  Musculuskeletal: No edema    recent labs, Imaging and EKGs personally reviewed     07-30-23 @ 07:01  -  07-31-23 @ 07:00  --------------------------------------------------------  IN: 480 mL / OUT: 700 mL / NET: -220 mL             Name of Patient : BRE HODGE  MRN: [id number]  Date of visit: 07-31-23 @ 13:24      Subjective: Patient seen and examined. No new events except as noted.   Doing okay     REVIEW OF SYSTEMS:    CONSTITUTIONAL: No weakness, fevers or chills  EYES/ENT: No visual changes;  No vertigo or throat pain   NECK: No pain or stiffness  RESPIRATORY: No cough, wheezing, hemoptysis; No shortness of breath  CARDIOVASCULAR: No chest pain or palpitations  GASTROINTESTINAL: No abdominal or epigastric pain. No nausea, vomiting, or hematemesis; No diarrhea or constipation. No melena or hematochezia.  GENITOURINARY: No dysuria, frequency or hematuria  NEUROLOGICAL: No numbness or weakness  SKIN: No itching, burning, rashes, or lesions   All other review of systems is negative unless indicated above.    MEDICATIONS:  MEDICATIONS  (STANDING):  acetaminophen     Tablet .. 1000 milliGRAM(s) Oral every 6 hours  apixaban 10 milliGRAM(s) Oral two times a day  aspirin  chewable 81 milliGRAM(s) Oral every 24 hours  atorvastatin 40 milliGRAM(s) Oral at bedtime  budesonide 160 MICROgram(s)/formoterol 4.5 MICROgram(s) Inhaler 2 Puff(s) Inhalation two times a day      PHYSICAL EXAM:  T(C): 37.1 (07-31-23 @ 06:11), Max: 37.1 (07-31-23 @ 06:11)  HR: 64 (07-31-23 @ 06:11) (64 - 74)  BP: 125/55 (07-31-23 @ 06:11) (105/62 - 125/55)  RR: 18 (07-31-23 @ 06:11) (18 - 18)  SpO2: 98% (07-31-23 @ 06:11) (98% - 100%)  Wt(kg): --  I&O's Summary    30 Jul 2023 07:01  -  31 Jul 2023 07:00  --------------------------------------------------------  IN: 480 mL / OUT: 700 mL / NET: -220 mL          Appearance: Normal	  HEENT:  PERRLA   Lymphatic: No lymphadenopathy   Cardiovascular: Normal S1 S2, no JVD  Respiratory: normal effort , clear  Gastrointestinal:  Soft, Non-tender  Skin: No rashes,  warm to touch                        8.9    5.22  )-----------( 464      ( 31 Jul 2023 06:30 )             29.2               07-31    145  |  100  |  13  ----------------------------<  94  4.5   |  21<L>  |  0.59    Ca    9.4      31 Jul 2023 06:30  Phos  4.0     07-31  Mg     1.90     07-31                         Urinalysis Basic - ( 31 Jul 2023 06:30 )    Color: x / Appearance: x / SG: x / pH: x  Gluc: 94 mg/dL / Ketone: x  / Bili: x / Urobili: x   Blood: x / Protein: x / Nitrite: x   Leuk Esterase: x / RBC: x / WBC x   Sq Epi: x / Non Sq Epi: x / Bacteria: x      Psychiatry:  Mood & affect appropriate  Musculuskeletal: No edema    recent labs, Imaging and EKGs personally reviewed     07-30-23 @ 07:01  -  07-31-23 @ 07:00  --------------------------------------------------------  IN: 480 mL / OUT: 700 mL / NET: -220 mL

## 2023-08-01 ENCOUNTER — TRANSCRIPTION ENCOUNTER (OUTPATIENT)
Age: 63
End: 2023-08-01

## 2023-08-01 VITALS
DIASTOLIC BLOOD PRESSURE: 65 MMHG | RESPIRATION RATE: 17 BRPM | TEMPERATURE: 98 F | HEART RATE: 66 BPM | OXYGEN SATURATION: 95 % | SYSTOLIC BLOOD PRESSURE: 123 MMHG

## 2023-08-01 LAB
ANION GAP SERPL CALC-SCNC: 12 MMOL/L — SIGNIFICANT CHANGE UP (ref 7–14)
BUN SERPL-MCNC: 16 MG/DL — SIGNIFICANT CHANGE UP (ref 7–23)
CALCIUM SERPL-MCNC: 9.6 MG/DL — SIGNIFICANT CHANGE UP (ref 8.4–10.5)
CHLORIDE SERPL-SCNC: 103 MMOL/L — SIGNIFICANT CHANGE UP (ref 98–107)
CO2 SERPL-SCNC: 21 MMOL/L — LOW (ref 22–31)
CREAT SERPL-MCNC: 0.61 MG/DL — SIGNIFICANT CHANGE UP (ref 0.5–1.3)
EGFR: 101 ML/MIN/1.73M2 — SIGNIFICANT CHANGE UP
GLUCOSE SERPL-MCNC: 81 MG/DL — SIGNIFICANT CHANGE UP (ref 70–99)
HCT VFR BLD CALC: 29 % — LOW (ref 34.5–45)
HGB BLD-MCNC: 9 G/DL — LOW (ref 11.5–15.5)
MAGNESIUM SERPL-MCNC: 2.7 MG/DL — HIGH (ref 1.6–2.6)
MCHC RBC-ENTMCNC: 31 GM/DL — LOW (ref 32–36)
MCHC RBC-ENTMCNC: 31.9 PG — SIGNIFICANT CHANGE UP (ref 27–34)
MCV RBC AUTO: 102.8 FL — HIGH (ref 80–100)
NRBC # BLD: 0 /100 WBCS — SIGNIFICANT CHANGE UP (ref 0–0)
NRBC # FLD: 0 K/UL — SIGNIFICANT CHANGE UP (ref 0–0)
PHOSPHATE SERPL-MCNC: 4.1 MG/DL — SIGNIFICANT CHANGE UP (ref 2.5–4.5)
PLATELET # BLD AUTO: 492 K/UL — HIGH (ref 150–400)
POTASSIUM SERPL-MCNC: 4.5 MMOL/L — SIGNIFICANT CHANGE UP (ref 3.5–5.3)
POTASSIUM SERPL-SCNC: 4.5 MMOL/L — SIGNIFICANT CHANGE UP (ref 3.5–5.3)
RBC # BLD: 2.82 M/UL — LOW (ref 3.8–5.2)
RBC # FLD: 18.6 % — HIGH (ref 10.3–14.5)
SARS-COV-2 RNA SPEC QL NAA+PROBE: SIGNIFICANT CHANGE UP
SODIUM SERPL-SCNC: 136 MMOL/L — SIGNIFICANT CHANGE UP (ref 135–145)
WBC # BLD: 5.01 K/UL — SIGNIFICANT CHANGE UP (ref 3.8–10.5)
WBC # FLD AUTO: 5.01 K/UL — SIGNIFICANT CHANGE UP (ref 3.8–10.5)

## 2023-08-01 RX ADMIN — OXYCODONE HYDROCHLORIDE 5 MILLIGRAM(S): 5 TABLET ORAL at 15:01

## 2023-08-01 RX ADMIN — APIXABAN 10 MILLIGRAM(S): 2.5 TABLET, FILM COATED ORAL at 05:24

## 2023-08-01 RX ADMIN — Medication 1000 MILLIGRAM(S): at 10:10

## 2023-08-01 RX ADMIN — Medication 1000 MILLIGRAM(S): at 15:01

## 2023-08-01 RX ADMIN — Medication 81 MILLIGRAM(S): at 11:52

## 2023-08-01 RX ADMIN — BUDESONIDE AND FORMOTEROL FUMARATE DIHYDRATE 2 PUFF(S): 160; 4.5 AEROSOL RESPIRATORY (INHALATION) at 10:10

## 2023-08-01 RX ADMIN — Medication 1000 MILLIGRAM(S): at 02:03

## 2023-08-01 NOTE — PROGRESS NOTE ADULT - SUBJECTIVE AND OBJECTIVE BOX
Subjective: Patient seen and examined. No new events except as noted.     SUBJECTIVE/ROS:  nad      MEDICATIONS:  MEDICATIONS  (STANDING):  acetaminophen     Tablet .. 1000 milliGRAM(s) Oral every 6 hours  apixaban 10 milliGRAM(s) Oral two times a day  aspirin  chewable 81 milliGRAM(s) Oral every 24 hours  atorvastatin 40 milliGRAM(s) Oral at bedtime  budesonide 160 MICROgram(s)/formoterol 4.5 MICROgram(s) Inhaler 2 Puff(s) Inhalation two times a day      PHYSICAL EXAM:  T(C): 36.8 (08-01-23 @ 05:20), Max: 37 (07-31-23 @ 21:47)  HR: 61 (08-01-23 @ 05:20) (61 - 71)  BP: 126/62 (08-01-23 @ 05:20) (113/64 - 126/62)  RR: 16 (08-01-23 @ 05:20) (16 - 18)  SpO2: 100% (08-01-23 @ 05:20) (96% - 100%)  Wt(kg): --  I&O's Summary    31 Jul 2023 07:01  -  01 Aug 2023 07:00  --------------------------------------------------------  IN: 275 mL / OUT: 600 mL / NET: -325 mL            JVP: Normal  Neck: supple  Lung: clear   CV: S1 S2 , Murmur:  Abd: soft  Ext: No edema  neuro: Awake / alert  Psych: flat affect  Skin: normal``    LABS/DATA:    CARDIAC MARKERS:                                8.9    5.22  )-----------( 464      ( 31 Jul 2023 06:30 )             29.2     07-31    145  |  100  |  13  ----------------------------<  94  4.5   |  21<L>  |  0.59    Ca    9.4      31 Jul 2023 06:30  Phos  4.0     07-31  Mg     1.90     07-31      proBNP:   Lipid Profile:   HgA1c:   TSH:     TELE:  EKG:

## 2023-08-01 NOTE — DISCHARGE NOTE NURSING/CASE MANAGEMENT/SOCIAL WORK - NSDCPEFALRISK_GEN_ALL_CORE
For information on Fall & Injury Prevention, visit: [email or web address] OR  [email or web address]://[email or web address]/[email or web address]-injury OR  https://www.cdc.gov/steadi/patient.html For information on Fall & Injury Prevention, visit: [email or web address]://www.[email or web address]/[email or web address]-maintains-health-[email or web address]-mobility OR  [email or web address]://www.[facility]/news/fall-prevention-tips-to-avoid-injury OR  https://www.cdc.gov/steadi/patient.html For information on Fall & Injury Prevention, visit: https://www.[facility]/news/fall-prevention-protects-and-maintains-health-and-mobility OR  https://www.[facility]/news/fall-prevention-tips-to-avoid-injury OR  https://www.cdc.gov/steadi/patient.html

## 2023-08-01 NOTE — PROGRESS NOTE ADULT - PROVIDER SPECIALTY LIST ADULT
Anesthesia
Cardiology
Internal Medicine
SICU
Vascular Surgery
Anesthesia
Anesthesia
Cardiology
Cardiology
Dental
Internal Medicine
SICU
SICU
Cardiology
Internal Medicine
Vascular Surgery
Cardiology
Cardiology
Internal Medicine
Vascular Surgery
Cardiology
Internal Medicine
Vascular Surgery
Internal Medicine
Nephrology
Nephrology

## 2023-08-01 NOTE — DISCHARGE NOTE NURSING/CASE MANAGEMENT/SOCIAL WORK - PATIENT PORTAL LINK FT
You can access the FollowMyHealth Patient Portal offered by [facility] by registering at the following website: [email or web address]. By joining [employer]’s FollowMyHealth portal, you will also be able to view your health information using other applications (apps) compatible with our system. You can access the FollowMyHealth Patient Portal offered by [facility] by registering at the following website: [email or web address]. By joining [email or web address]’s FollowMyHealth portal, you will also be able to view your health information using other applications (apps) compatible with our system. You can access the FollowMyHealth Patient Portal offered by [facility] by registering at the following website: [email or web address]. By joining [employer]’s FollowMyHealth portal, you will also be able to view your health information using other applications (apps) compatible with our system.

## 2023-08-03 RX ORDER — APIXABAN 2.5 MG/1
1 TABLET, FILM COATED ORAL
Qty: 0 | Refills: 0 | DISCHARGE
Start: 2023-08-03

## 2023-08-10 ENCOUNTER — APPOINTMENT (OUTPATIENT)
Dept: VASCULAR SURGERY | Facility: CLINIC | Age: 63
End: 2023-08-10

## 2023-08-10 ENCOUNTER — APPOINTMENT (OUTPATIENT)
Dept: VASCULAR SURGERY | Facility: CLINIC | Age: 63
End: 2023-08-10
Payer: MEDICAID

## 2023-08-10 VITALS
BODY MASS INDEX: 24.27 KG/M2 | HEIGHT: 63 IN | DIASTOLIC BLOOD PRESSURE: 83 MMHG | WEIGHT: 137 LBS | SYSTOLIC BLOOD PRESSURE: 133 MMHG | TEMPERATURE: 97.8 F | HEART RATE: 57 BPM

## 2023-08-10 DIAGNOSIS — F17.200 NICOTINE DEPENDENCE, UNSPECIFIED, UNCOMPLICATED: ICD-10-CM

## 2023-08-10 DIAGNOSIS — Z86.79 PERSONAL HISTORY OF OTHER DISEASES OF THE CIRCULATORY SYSTEM: ICD-10-CM

## 2023-08-10 PROCEDURE — 99024 POSTOP FOLLOW-UP VISIT: CPT

## 2023-08-10 RX ORDER — ATORVASTATIN CALCIUM 80 MG/1
TABLET, FILM COATED ORAL
Refills: 0 | Status: ACTIVE | COMMUNITY

## 2023-08-10 NOTE — PHYSICAL EXAM
[FreeTextEntry1] : Palpable bilateral radial pulses.  Palpable bilateral femoral pulses.  Right incision healed.  Right leg sutures were removed.  Biphasic Doppler signals in PT and DP.

## 2023-08-10 NOTE — REASON FOR VISIT
[de-identified] : Status post right lower extremity thrombolysis followed by right common femoral to posterior tibialis bypass with PTFE [de-identified] : Doing well Complain of right foot numbness No claudication no rest pain

## 2023-08-10 NOTE — DISCUSSION/SUMMARY
[FreeTextEntry1] : Status post right femoral to posterior tibialis bypass with 6 mm PTFE  Plan Continue aspirin and Eliquis Return to office in 1 month with duplex

## 2023-08-29 PROBLEM — Z00.00 ENCOUNTER FOR PREVENTIVE HEALTH EXAMINATION: Status: ACTIVE | Noted: 2023-08-29

## 2023-09-14 ENCOUNTER — APPOINTMENT (OUTPATIENT)
Dept: VASCULAR SURGERY | Facility: CLINIC | Age: 63
End: 2023-09-14
Payer: MEDICAID

## 2023-09-14 VITALS
SYSTOLIC BLOOD PRESSURE: 166 MMHG | BODY MASS INDEX: 24.8 KG/M2 | DIASTOLIC BLOOD PRESSURE: 89 MMHG | TEMPERATURE: 98.6 F | HEIGHT: 63 IN | WEIGHT: 140 LBS | HEART RATE: 56 BPM

## 2023-09-14 DIAGNOSIS — I70.221 ATHEROSCLEROSIS OF NATIVE ARTERIES OF EXTREMITIES WITH REST PAIN, RIGHT LEG: ICD-10-CM

## 2023-09-14 DIAGNOSIS — Z86.79 PERSONAL HISTORY OF OTHER DISEASES OF THE CIRCULATORY SYSTEM: ICD-10-CM

## 2023-09-14 DIAGNOSIS — Z80.9 FAMILY HISTORY OF MALIGNANT NEOPLASM, UNSPECIFIED: ICD-10-CM

## 2023-09-14 DIAGNOSIS — Z78.9 OTHER SPECIFIED HEALTH STATUS: ICD-10-CM

## 2023-09-14 PROCEDURE — 93926 LOWER EXTREMITY STUDY: CPT

## 2023-09-14 PROCEDURE — 99214 OFFICE O/P EST MOD 30 MIN: CPT

## 2023-09-14 RX ORDER — ATORVASTATIN CALCIUM 80 MG/1
TABLET, FILM COATED ORAL
Refills: 0 | Status: ACTIVE | COMMUNITY

## 2023-09-14 RX ORDER — APIXABAN 5 MG/1
TABLET, FILM COATED ORAL
Refills: 0 | Status: ACTIVE | COMMUNITY

## 2023-11-16 ENCOUNTER — INPATIENT (INPATIENT)
Facility: HOSPITAL | Age: 63
LOS: 10 days | Discharge: HOME HEALTH | End: 2023-11-27
Attending: SURGERY | Admitting: SURGERY
Payer: MEDICAID

## 2023-11-16 VITALS
TEMPERATURE: 99 F | OXYGEN SATURATION: 99 % | RESPIRATION RATE: 16 BRPM | DIASTOLIC BLOOD PRESSURE: 83 MMHG | HEART RATE: 70 BPM | SYSTOLIC BLOOD PRESSURE: 107 MMHG

## 2023-11-16 DIAGNOSIS — R09.89 OTHER SPECIFIED SYMPTOMS AND SIGNS INVOLVING THE CIRCULATORY AND RESPIRATORY SYSTEMS: ICD-10-CM

## 2023-11-16 DIAGNOSIS — I99.8 OTHER DISORDER OF CIRCULATORY SYSTEM: ICD-10-CM

## 2023-11-16 LAB
ALBUMIN SERPL ELPH-MCNC: 3.7 G/DL — SIGNIFICANT CHANGE UP (ref 3.3–5)
ALP SERPL-CCNC: 78 U/L — SIGNIFICANT CHANGE UP (ref 40–120)
ALT FLD-CCNC: 17 U/L — SIGNIFICANT CHANGE UP (ref 4–33)
ANION GAP SERPL CALC-SCNC: 11 MMOL/L — SIGNIFICANT CHANGE UP (ref 7–14)
APTT BLD: 33.9 SEC — SIGNIFICANT CHANGE UP (ref 24.5–35.6)
AST SERPL-CCNC: 27 U/L — SIGNIFICANT CHANGE UP (ref 4–32)
BASOPHILS # BLD AUTO: 0.01 K/UL — SIGNIFICANT CHANGE UP (ref 0–0.2)
BASOPHILS NFR BLD AUTO: 0.2 % — SIGNIFICANT CHANGE UP (ref 0–2)
BILIRUB SERPL-MCNC: 0.4 MG/DL — SIGNIFICANT CHANGE UP (ref 0.2–1.2)
BLD GP AB SCN SERPL QL: NEGATIVE — SIGNIFICANT CHANGE UP
BUN SERPL-MCNC: 19 MG/DL — SIGNIFICANT CHANGE UP (ref 7–23)
CALCIUM SERPL-MCNC: 9.9 MG/DL — SIGNIFICANT CHANGE UP (ref 8.4–10.5)
CHLORIDE SERPL-SCNC: 99 MMOL/L — SIGNIFICANT CHANGE UP (ref 98–107)
CO2 SERPL-SCNC: 30 MMOL/L — SIGNIFICANT CHANGE UP (ref 22–31)
CREAT SERPL-MCNC: 1.08 MG/DL — SIGNIFICANT CHANGE UP (ref 0.5–1.3)
EGFR: 58 ML/MIN/1.73M2 — LOW
EOSINOPHIL # BLD AUTO: 0.06 K/UL — SIGNIFICANT CHANGE UP (ref 0–0.5)
EOSINOPHIL NFR BLD AUTO: 1.2 % — SIGNIFICANT CHANGE UP (ref 0–6)
GLUCOSE SERPL-MCNC: 89 MG/DL — SIGNIFICANT CHANGE UP (ref 70–99)
HCT VFR BLD CALC: 38.7 % — SIGNIFICANT CHANGE UP (ref 34.5–45)
HGB BLD-MCNC: 12.8 G/DL — SIGNIFICANT CHANGE UP (ref 11.5–15.5)
IANC: 2.61 K/UL — SIGNIFICANT CHANGE UP (ref 1.8–7.4)
IMM GRANULOCYTES NFR BLD AUTO: 0.2 % — SIGNIFICANT CHANGE UP (ref 0–0.9)
INR BLD: 1.17 RATIO — SIGNIFICANT CHANGE UP (ref 0.85–1.18)
LYMPHOCYTES # BLD AUTO: 1.91 K/UL — SIGNIFICANT CHANGE UP (ref 1–3.3)
LYMPHOCYTES # BLD AUTO: 37 % — SIGNIFICANT CHANGE UP (ref 13–44)
MCHC RBC-ENTMCNC: 32.6 PG — SIGNIFICANT CHANGE UP (ref 27–34)
MCHC RBC-ENTMCNC: 33.1 GM/DL — SIGNIFICANT CHANGE UP (ref 32–36)
MCV RBC AUTO: 98.5 FL — SIGNIFICANT CHANGE UP (ref 80–100)
MONOCYTES # BLD AUTO: 0.56 K/UL — SIGNIFICANT CHANGE UP (ref 0–0.9)
MONOCYTES NFR BLD AUTO: 10.9 % — SIGNIFICANT CHANGE UP (ref 2–14)
NEUTROPHILS # BLD AUTO: 2.61 K/UL — SIGNIFICANT CHANGE UP (ref 1.8–7.4)
NEUTROPHILS NFR BLD AUTO: 50.5 % — SIGNIFICANT CHANGE UP (ref 43–77)
NRBC # BLD: 0 /100 WBCS — SIGNIFICANT CHANGE UP (ref 0–0)
NRBC # FLD: 0 K/UL — SIGNIFICANT CHANGE UP (ref 0–0)
PLATELET # BLD AUTO: 195 K/UL — SIGNIFICANT CHANGE UP (ref 150–400)
POTASSIUM SERPL-MCNC: 3.5 MMOL/L — SIGNIFICANT CHANGE UP (ref 3.5–5.3)
POTASSIUM SERPL-SCNC: 3.5 MMOL/L — SIGNIFICANT CHANGE UP (ref 3.5–5.3)
PROT SERPL-MCNC: 7.3 G/DL — SIGNIFICANT CHANGE UP (ref 6–8.3)
PROTHROM AB SERPL-ACNC: 13.2 SEC — HIGH (ref 9.5–13)
RBC # BLD: 3.93 M/UL — SIGNIFICANT CHANGE UP (ref 3.8–5.2)
RBC # FLD: 13 % — SIGNIFICANT CHANGE UP (ref 10.3–14.5)
RH IG SCN BLD-IMP: POSITIVE — SIGNIFICANT CHANGE UP
SODIUM SERPL-SCNC: 140 MMOL/L — SIGNIFICANT CHANGE UP (ref 135–145)
UFH PPP CHRO-ACNC: 0.64 IU/ML — SIGNIFICANT CHANGE UP (ref 0.3–0.7)
WBC # BLD: 5.16 K/UL — SIGNIFICANT CHANGE UP (ref 3.8–10.5)
WBC # FLD AUTO: 5.16 K/UL — SIGNIFICANT CHANGE UP (ref 3.8–10.5)

## 2023-11-16 PROCEDURE — 99291 CRITICAL CARE FIRST HOUR: CPT

## 2023-11-16 PROCEDURE — 75635 CT ANGIO ABDOMINAL ARTERIES: CPT | Mod: 26,MA

## 2023-11-16 PROCEDURE — 99292 CRITICAL CARE ADDL 30 MIN: CPT

## 2023-11-16 PROCEDURE — 99222 1ST HOSP IP/OBS MODERATE 55: CPT

## 2023-11-16 RX ORDER — HEPARIN SODIUM 5000 [USP'U]/ML
4500 INJECTION INTRAVENOUS; SUBCUTANEOUS ONCE
Refills: 0 | Status: COMPLETED | OUTPATIENT
Start: 2023-11-16 | End: 2023-11-16

## 2023-11-16 RX ORDER — HEPARIN SODIUM 5000 [USP'U]/ML
1100 INJECTION INTRAVENOUS; SUBCUTANEOUS
Qty: 25000 | Refills: 0 | Status: DISCONTINUED | OUTPATIENT
Start: 2023-11-16 | End: 2023-11-22

## 2023-11-16 RX ORDER — ATORVASTATIN CALCIUM 80 MG/1
40 TABLET, FILM COATED ORAL AT BEDTIME
Refills: 0 | Status: DISCONTINUED | OUTPATIENT
Start: 2023-11-16 | End: 2023-11-27

## 2023-11-16 RX ORDER — LOSARTAN POTASSIUM 100 MG/1
100 TABLET, FILM COATED ORAL DAILY
Refills: 0 | Status: DISCONTINUED | OUTPATIENT
Start: 2023-11-16 | End: 2023-11-27

## 2023-11-16 RX ORDER — ACETAMINOPHEN 500 MG
1000 TABLET ORAL EVERY 6 HOURS
Refills: 0 | Status: DISCONTINUED | OUTPATIENT
Start: 2023-11-16 | End: 2023-11-19

## 2023-11-16 RX ORDER — OXYCODONE HYDROCHLORIDE 5 MG/1
5 TABLET ORAL EVERY 6 HOURS
Refills: 0 | Status: DISCONTINUED | OUTPATIENT
Start: 2023-11-16 | End: 2023-11-17

## 2023-11-16 RX ORDER — ATENOLOL 25 MG/1
50 TABLET ORAL DAILY
Refills: 0 | Status: DISCONTINUED | OUTPATIENT
Start: 2023-11-16 | End: 2023-11-20

## 2023-11-16 RX ORDER — ASPIRIN/CALCIUM CARB/MAGNESIUM 324 MG
81 TABLET ORAL DAILY
Refills: 0 | Status: DISCONTINUED | OUTPATIENT
Start: 2023-11-16 | End: 2023-11-27

## 2023-11-16 RX ORDER — AMLODIPINE BESYLATE 2.5 MG/1
10 TABLET ORAL EVERY 24 HOURS
Refills: 0 | Status: DISCONTINUED | OUTPATIENT
Start: 2023-11-16 | End: 2023-11-21

## 2023-11-16 RX ORDER — OXYCODONE HYDROCHLORIDE 5 MG/1
2.5 TABLET ORAL EVERY 6 HOURS
Refills: 0 | Status: DISCONTINUED | OUTPATIENT
Start: 2023-11-16 | End: 2023-11-17

## 2023-11-16 RX ADMIN — HEPARIN SODIUM 11 UNIT(S)/HR: 5000 INJECTION INTRAVENOUS; SUBCUTANEOUS at 17:59

## 2023-11-16 RX ADMIN — HEPARIN SODIUM 4500 UNIT(S): 5000 INJECTION INTRAVENOUS; SUBCUTANEOUS at 17:51

## 2023-11-16 RX ADMIN — OXYCODONE HYDROCHLORIDE 5 MILLIGRAM(S): 5 TABLET ORAL at 20:43

## 2023-11-16 RX ADMIN — ATORVASTATIN CALCIUM 40 MILLIGRAM(S): 80 TABLET, FILM COATED ORAL at 23:54

## 2023-11-16 NOTE — ED ADULT NURSE REASSESSMENT NOTE - NS ED NURSE REASSESS COMMENT FT1
report received CARTER Jimenez. pt remains at baseline mental status, RR even unlabored completing full sentences. pt sitting in stretcher c/o increasing pain to R Leg area and ankle. pt rates pain 8/10 on scale of 1-10. pt medicated per order set. heparin infusion running at 11mL/hr per order set. stretcher lowest position siderails up safety measures in place. pt admitted pending bed placement.

## 2023-11-16 NOTE — ED ADULT TRIAGE NOTE - HEART RATE (BEATS/MIN)
NOTIFICATION RETURN TO WORK / SCHOOL    4/29/2022 4:15 PM    Mr. Mendel Martin  [facility] [address]      To Whom It May Concern:    Mendel Martin is currently under the care of [address] He will return to work/school on: 5/2/22    If there are questions or concerns please have the patient contact our office.         Sincerely,      Yesi Reddy, DO
70
pt's JORGE Mcknight [phone]
Patient medically cleared. Telepsych called and awaiting callback.
Pt signed out to me by Dr. Richardson pending psych evaluation. Pt cleared by Dr. Wright. will d/c to home instructed to return for new/concerning symptoms.  Pt given a copy of all results and instructed to f/up with pcp regarding any abnormal results.

## 2023-11-16 NOTE — H&P ADULT - NSHPPHYSICALEXAM_GEN_ALL_CORE
General: not acutely distressed  Neurological: AO X4  Respiratory: nonlabored respirations  Cardiac: pulse present  Abdominal: soft, nontender, nondistended, no rebound, no guarding, no palpable mass  Extremities: FROM. Right foot sole cold compared to left. Motor function intact bilaterally. Reduced sensation in right foot  Vasc: dopplerable DP/PT on left, non-palpable non-dopplerable DP/PT on right

## 2023-11-16 NOTE — ED PROVIDER NOTE - OBJECTIVE STATEMENT
62 y/o F with a PMH of right PAD, chronic limb ischemia, s/p R SFA/popliteal, s/p RLE angiogram and percutaneous thrombectomy (07/19) of SFA and popliteal, s/p R CFA -> PT bypass with PTFE (7/25), presents to ED c/o worsening right lower leg pain x1.5 wks. Reports pain with ambulation and sleeping. Admits taking Eliquis and Aspirin. Denies any fever, chills, leg swelling, calf pain, weakness, numbness, chest pain, sob, or any other complaints.

## 2023-11-16 NOTE — ED PROVIDER NOTE - CLINICAL SUMMARY MEDICAL DECISION MAKING FREE TEXT BOX
RLE pain consistent with claudication. Concern for limb ischemia. Elio att: 64 yo F hx of recent RLE angiogram and percutaneous thrombectomy (07/19) of SFA and popliteal arteries, R CFA -> PT bypass with PTFE (7/25) for acute limb ischemia, presenting now with 1.5 weeks of pain in RLE, concerning for acute on chronic limb ischemia.   -stat vascular consult since there is non-palpable non-dopplerable DP/PT on right; stat CTA aorta with bilateral runoff; heparin gtt with bolus; signed out to night team pending admission to vascular. Elio att: 62 yo F hx of recent RLE angiogram and percutaneous thrombectomy (07/19) of SFA and popliteal arteries, R CFA -> PT bypass with PTFE (7/25) for acute limb ischemia, presenting now with 1.5 weeks of pain in RLE, concerning for acute on chronic limb ischemia.   -stat vascular consult since there is non-palpable non-dopplerable DP/PT on right; stat CTA aorta with bilateral runoff; heparin gtt with bolus; signed out to night team pending admission to vascular.

## 2023-11-16 NOTE — ED ADULT TRIAGE NOTE - CHIEF COMPLAINT QUOTE
Pt c/o worsening pain to R foot x 1.5 weeks. Pt reports she can't walk 1 block without calf pain. Pt has Hx emergent RLE angiogram in July.

## 2023-11-16 NOTE — H&P ADULT - HISTORY OF PRESENT ILLNESS
63F hx of recent RLE angiogram and percutaneous thrombectomy (07/19) of SFA and popliteal arteries, R CFA -> PT bypass with PTFE (7/25) for acute limb ischemia, presenting now with 1.5 weeks of pain in RLE. Reports has had pain in her right foot since her previous vascular procedures in July '23, but the pain has worsened over the last 1.5 weeks. Reports she has been taking her Eliquis as prescribed.    In ED, vss. RLE arterial duplex without flow in RLE stent or bypass.

## 2023-11-16 NOTE — ED PROVIDER NOTE - PROGRESS NOTE DETAILS
PA ALAINA: as per surgery, urgent CT angio aorta runoff, start Heparin, admit to vascular surgery after CT imaging.

## 2023-11-16 NOTE — ED ADULT NURSE NOTE - OBJECTIVE STATEMENT
Patient came in with the complaints of right calf pain. As per patient she had pain since 1.5 weeks and it hurts when she walk. As per patient she had h/o blood clots in right leg and had emergent angiogram in July. Patient is on Eliquis. No other complaints. Patient denies chest pain/sob. No distress noted. Awaiting further orders. Nursing care continues

## 2023-11-16 NOTE — H&P ADULT - ASSESSMENT
63F hx of recent RLE angiogram and percutaneous thrombectomy (07/19) of SFA and popliteal arteries, R CFA -> PT bypass with PTFE (7/25) for acute limb ischemia, presenting now with 1.5 weeks of pain in RLE, found to have Pillo IIa acute on chronic limb ischemia.     Plan:  - Please obtain urgent CTA aorta with bilateral runoff.  - Admit to C Team, Dr. Clemons.  - Start Heparin gtt with bolus.  - OR planning for urgent RLE angiogram  - NPO/IVF.  - Medical consult.   - Cardiology consult.    Seen and discussed with senior vascular surgery resident on call.    C Team/Vascular Surgery  [id number] 63F hx of recent RLE angiogram and percutaneous thrombectomy (07/19) of SFA and popliteal arteries, R CFA -> PT bypass with PTFE (7/25) for acute limb ischemia, presenting now with 1.5 weeks of pain in RLE, found to have Pillo IIa acute on chronic limb ischemia.     Plan:  - Please obtain urgent CTA aorta with bilateral runoff.  - Admit to C Team, Dr. Clemons.  - Start Heparin gtt with bolus.  - OR planning for urgent RLE angiogram  - NPO/IVF.  - Medical consult.   - Cardiology consult.    Seen and discussed with senior vascular surgery resident on call.    C Team/Vascular Surgery  [id number] 63F hx of recent RLE angiogram and percutaneous thrombectomy (07/19) of SFA and popliteal arteries, R CFA -> PT bypass with PTFE (7/25) for acute limb ischemia, presenting now with 1.5 weeks of pain in RLE, found to have Pillo IIa acute on chronic limb ischemia.     Plan:  - Please obtain urgent CTA aorta with bilateral runoff.  - Admit to C Team, Dr. Clemons.  - Start Heparin gtt with bolus.  - OR planning for urgent RLE angiogram  - NPO/IVF.  - Medical consult.   - Cardiology consult.    Seen and discussed with senior vascular surgery resident on call.    C Team/Vascular Surgery  [id number]

## 2023-11-16 NOTE — ED ADULT NURSE NOTE - NSFALLHARMRISKINTERV_ED_ALL_ED
Communicate risk of Fall with Harm to all staff, patient, and family/Provide visual cue: red socks, yellow wristband, yellow gown, etc/Reinforce activity limits and safety measures with patient and family/Bed in lowest position, wheels locked, appropriate side rails in place/Call bell, personal items and telephone in reach/Instruct patient to call for assistance before getting out of bed/chair/stretcher/Non-slip footwear applied when patient is off stretcher/[place] to call system/Physically safe environment - no spills, clutter or unnecessary equipment/Purposeful Proactive Rounding/Room/bathroom lighting operational, light cord in reach Communicate risk of Fall with Harm to all staff, patient, and family/Provide visual cue: red socks, yellow wristband, yellow gown, etc/Reinforce activity limits and safety measures with patient and family/Bed in lowest position, wheels locked, appropriate side rails in place/Call bell, personal items and telephone in reach/Instruct patient to call for assistance before getting out of bed/chair/stretcher/Non-slip footwear applied when patient is off stretcher/[place] to call system/Physically safe environment - no spills, clutter or unnecessary equipment/Purposeful Proactive Rounding/Room/bathroom lighting operational, light cord in reach Communicate risk of Fall with Harm to all staff, patient, and family/Provide visual cue: red socks, yellow wristband, yellow gown, etc/Reinforce activity limits and safety measures with patient and family/Bed in lowest position, wheels locked, appropriate side rails in place/Call bell, personal items and telephone in reach/Instruct patient to call for assistance before getting out of bed/chair/stretcher/Non-slip footwear applied when patient is off stretcher/[place] to call system/Physically safe environment - no spills, clutter or unnecessary equipment/Purposeful Proactive Rounding/Room/bathroom lighting operational, light cord in reach

## 2023-11-16 NOTE — ED ADULT NURSE REASSESSMENT NOTE - NS ED NURSE REASSESS COMMENT FT1
Break RN: Patient awake and resting in stretcher; respirations even and unlabored, no signs/symptoms of acute distress. Patient provided pillows and blankets as requested, labs collected and sent. Safety measures in place and steady gait observed.

## 2023-11-16 NOTE — ED PROVIDER NOTE - PHYSICAL EXAMINATION
Extremities: FROM. Right foot sole cold compared to left. Motor function intact bilaterally with reduced sensation in the R foot.   Vasc: + DP/PT on left, non-palpable DP/PT on right

## 2023-11-17 LAB
ANION GAP SERPL CALC-SCNC: 13 MMOL/L — SIGNIFICANT CHANGE UP (ref 7–14)
APTT BLD: 133.3 SEC — CRITICAL HIGH (ref 24.5–35.6)
APTT BLD: >200 SEC — CRITICAL HIGH (ref 24.5–35.6)
BASOPHILS # BLD AUTO: 0.02 K/UL — SIGNIFICANT CHANGE UP (ref 0–0.2)
BASOPHILS NFR BLD AUTO: 0.3 % — SIGNIFICANT CHANGE UP (ref 0–2)
BUN SERPL-MCNC: 14 MG/DL — SIGNIFICANT CHANGE UP (ref 7–23)
CALCIUM SERPL-MCNC: 9.9 MG/DL — SIGNIFICANT CHANGE UP (ref 8.4–10.5)
CHLORIDE SERPL-SCNC: 98 MMOL/L — SIGNIFICANT CHANGE UP (ref 98–107)
CO2 SERPL-SCNC: 28 MMOL/L — SIGNIFICANT CHANGE UP (ref 22–31)
CREAT SERPL-MCNC: 0.81 MG/DL — SIGNIFICANT CHANGE UP (ref 0.5–1.3)
EGFR: 82 ML/MIN/1.73M2 — SIGNIFICANT CHANGE UP
EOSINOPHIL # BLD AUTO: 0.08 K/UL — SIGNIFICANT CHANGE UP (ref 0–0.5)
EOSINOPHIL NFR BLD AUTO: 1.3 % — SIGNIFICANT CHANGE UP (ref 0–6)
GLUCOSE SERPL-MCNC: 92 MG/DL — SIGNIFICANT CHANGE UP (ref 70–99)
HCT VFR BLD CALC: 36.5 % — SIGNIFICANT CHANGE UP (ref 34.5–45)
HCT VFR BLD CALC: 40.2 % — SIGNIFICANT CHANGE UP (ref 34.5–45)
HGB BLD-MCNC: 12.1 G/DL — SIGNIFICANT CHANGE UP (ref 11.5–15.5)
HGB BLD-MCNC: 13.3 G/DL — SIGNIFICANT CHANGE UP (ref 11.5–15.5)
IANC: 2.05 K/UL — SIGNIFICANT CHANGE UP (ref 1.8–7.4)
IMM GRANULOCYTES NFR BLD AUTO: 0.2 % — SIGNIFICANT CHANGE UP (ref 0–0.9)
LYMPHOCYTES # BLD AUTO: 3.17 K/UL — SIGNIFICANT CHANGE UP (ref 1–3.3)
LYMPHOCYTES # BLD AUTO: 53.2 % — HIGH (ref 13–44)
MAGNESIUM SERPL-MCNC: 2.2 MG/DL — SIGNIFICANT CHANGE UP (ref 1.6–2.6)
MCHC RBC-ENTMCNC: 32.4 PG — SIGNIFICANT CHANGE UP (ref 27–34)
MCHC RBC-ENTMCNC: 32.6 PG — SIGNIFICANT CHANGE UP (ref 27–34)
MCHC RBC-ENTMCNC: 33.1 GM/DL — SIGNIFICANT CHANGE UP (ref 32–36)
MCHC RBC-ENTMCNC: 33.2 GM/DL — SIGNIFICANT CHANGE UP (ref 32–36)
MCV RBC AUTO: 97.8 FL — SIGNIFICANT CHANGE UP (ref 80–100)
MCV RBC AUTO: 98.4 FL — SIGNIFICANT CHANGE UP (ref 80–100)
MONOCYTES # BLD AUTO: 0.63 K/UL — SIGNIFICANT CHANGE UP (ref 0–0.9)
MONOCYTES NFR BLD AUTO: 10.6 % — SIGNIFICANT CHANGE UP (ref 2–14)
NEUTROPHILS # BLD AUTO: 2.05 K/UL — SIGNIFICANT CHANGE UP (ref 1.8–7.4)
NEUTROPHILS NFR BLD AUTO: 34.4 % — LOW (ref 43–77)
NRBC # BLD: 0 /100 WBCS — SIGNIFICANT CHANGE UP (ref 0–0)
NRBC # FLD: 0 K/UL — SIGNIFICANT CHANGE UP (ref 0–0)
PHOSPHATE SERPL-MCNC: 2.7 MG/DL — SIGNIFICANT CHANGE UP (ref 2.5–4.5)
PLATELET # BLD AUTO: 186 K/UL — SIGNIFICANT CHANGE UP (ref 150–400)
PLATELET # BLD AUTO: 202 K/UL — SIGNIFICANT CHANGE UP (ref 150–400)
POTASSIUM SERPL-MCNC: 3.1 MMOL/L — LOW (ref 3.5–5.3)
POTASSIUM SERPL-SCNC: 3.1 MMOL/L — LOW (ref 3.5–5.3)
RBC # BLD: 3.71 M/UL — LOW (ref 3.8–5.2)
RBC # BLD: 4.11 M/UL — SIGNIFICANT CHANGE UP (ref 3.8–5.2)
RBC # FLD: 13 % — SIGNIFICANT CHANGE UP (ref 10.3–14.5)
RBC # FLD: 13.1 % — SIGNIFICANT CHANGE UP (ref 10.3–14.5)
SODIUM SERPL-SCNC: 139 MMOL/L — SIGNIFICANT CHANGE UP (ref 135–145)
WBC # BLD: 5.96 K/UL — SIGNIFICANT CHANGE UP (ref 3.8–10.5)
WBC # BLD: 6.74 K/UL — SIGNIFICANT CHANGE UP (ref 3.8–10.5)
WBC # FLD AUTO: 5.96 K/UL — SIGNIFICANT CHANGE UP (ref 3.8–10.5)
WBC # FLD AUTO: 6.74 K/UL — SIGNIFICANT CHANGE UP (ref 3.8–10.5)

## 2023-11-17 PROCEDURE — 99232 SBSQ HOSP IP/OBS MODERATE 35: CPT

## 2023-11-17 RX ORDER — SODIUM,POTASSIUM PHOSPHATES 278-250MG
1 POWDER IN PACKET (EA) ORAL ONCE
Refills: 0 | Status: COMPLETED | OUTPATIENT
Start: 2023-11-17 | End: 2023-11-17

## 2023-11-17 RX ORDER — OXYCODONE HYDROCHLORIDE 5 MG/1
10 TABLET ORAL EVERY 4 HOURS
Refills: 0 | Status: DISCONTINUED | OUTPATIENT
Start: 2023-11-17 | End: 2023-11-19

## 2023-11-17 RX ORDER — OXYCODONE HYDROCHLORIDE 5 MG/1
5 TABLET ORAL EVERY 6 HOURS
Refills: 0 | Status: DISCONTINUED | OUTPATIENT
Start: 2023-11-17 | End: 2023-11-17

## 2023-11-17 RX ORDER — OXYCODONE HYDROCHLORIDE 5 MG/1
5 TABLET ORAL EVERY 4 HOURS
Refills: 0 | Status: DISCONTINUED | OUTPATIENT
Start: 2023-11-17 | End: 2023-11-19

## 2023-11-17 RX ORDER — POTASSIUM CHLORIDE 20 MEQ
40 PACKET (EA) ORAL ONCE
Refills: 0 | Status: COMPLETED | OUTPATIENT
Start: 2023-11-17 | End: 2023-11-17

## 2023-11-17 RX ORDER — HYDROMORPHONE HYDROCHLORIDE 2 MG/ML
0.2 INJECTION INTRAMUSCULAR; INTRAVENOUS; SUBCUTANEOUS EVERY 4 HOURS
Refills: 0 | Status: DISCONTINUED | OUTPATIENT
Start: 2023-11-17 | End: 2023-11-22

## 2023-11-17 RX ORDER — OXYCODONE HYDROCHLORIDE 5 MG/1
10 TABLET ORAL EVERY 6 HOURS
Refills: 0 | Status: DISCONTINUED | OUTPATIENT
Start: 2023-11-17 | End: 2023-11-17

## 2023-11-17 RX ORDER — OXYCODONE HYDROCHLORIDE 5 MG/1
2.5 TABLET ORAL EVERY 6 HOURS
Refills: 0 | Status: DISCONTINUED | OUTPATIENT
Start: 2023-11-17 | End: 2023-11-17

## 2023-11-17 RX ORDER — GABAPENTIN 400 MG/1
100 CAPSULE ORAL THREE TIMES A DAY
Refills: 0 | Status: DISCONTINUED | OUTPATIENT
Start: 2023-11-17 | End: 2023-11-19

## 2023-11-17 RX ORDER — HYDROMORPHONE HYDROCHLORIDE 2 MG/ML
0.2 INJECTION INTRAMUSCULAR; INTRAVENOUS; SUBCUTANEOUS EVERY 6 HOURS
Refills: 0 | Status: DISCONTINUED | OUTPATIENT
Start: 2023-11-17 | End: 2023-11-17

## 2023-11-17 RX ADMIN — OXYCODONE HYDROCHLORIDE 10 MILLIGRAM(S): 5 TABLET ORAL at 14:24

## 2023-11-17 RX ADMIN — HEPARIN SODIUM 5 UNIT(S)/HR: 5000 INJECTION INTRAVENOUS; SUBCUTANEOUS at 21:31

## 2023-11-17 RX ADMIN — HYDROMORPHONE HYDROCHLORIDE 0.2 MILLIGRAM(S): 2 INJECTION INTRAMUSCULAR; INTRAVENOUS; SUBCUTANEOUS at 06:10

## 2023-11-17 RX ADMIN — Medication 81 MILLIGRAM(S): at 06:26

## 2023-11-17 RX ADMIN — HEPARIN SODIUM 9 UNIT(S)/HR: 5000 INJECTION INTRAVENOUS; SUBCUTANEOUS at 08:33

## 2023-11-17 RX ADMIN — Medication 1 TABLET(S): at 06:25

## 2023-11-17 RX ADMIN — HEPARIN SODIUM 9 UNIT(S)/HR: 5000 INJECTION INTRAVENOUS; SUBCUTANEOUS at 02:18

## 2023-11-17 RX ADMIN — OXYCODONE HYDROCHLORIDE 10 MILLIGRAM(S): 5 TABLET ORAL at 15:24

## 2023-11-17 RX ADMIN — HEPARIN SODIUM 7 UNIT(S)/HR: 5000 INJECTION INTRAVENOUS; SUBCUTANEOUS at 12:23

## 2023-11-17 RX ADMIN — OXYCODONE HYDROCHLORIDE 5 MILLIGRAM(S): 5 TABLET ORAL at 08:02

## 2023-11-17 RX ADMIN — HYDROMORPHONE HYDROCHLORIDE 0.2 MILLIGRAM(S): 2 INJECTION INTRAMUSCULAR; INTRAVENOUS; SUBCUTANEOUS at 06:40

## 2023-11-17 RX ADMIN — GABAPENTIN 100 MILLIGRAM(S): 400 CAPSULE ORAL at 17:40

## 2023-11-17 RX ADMIN — ATENOLOL 50 MILLIGRAM(S): 25 TABLET ORAL at 06:25

## 2023-11-17 RX ADMIN — Medication 40 MILLIEQUIVALENT(S): at 06:26

## 2023-11-17 RX ADMIN — ATORVASTATIN CALCIUM 40 MILLIGRAM(S): 80 TABLET, FILM COATED ORAL at 21:32

## 2023-11-17 RX ADMIN — GABAPENTIN 100 MILLIGRAM(S): 400 CAPSULE ORAL at 21:32

## 2023-11-17 RX ADMIN — OXYCODONE HYDROCHLORIDE 5 MILLIGRAM(S): 5 TABLET ORAL at 02:42

## 2023-11-17 RX ADMIN — OXYCODONE HYDROCHLORIDE 5 MILLIGRAM(S): 5 TABLET ORAL at 09:00

## 2023-11-17 RX ADMIN — OXYCODONE HYDROCHLORIDE 5 MILLIGRAM(S): 5 TABLET ORAL at 01:42

## 2023-11-17 RX ADMIN — LOSARTAN POTASSIUM 100 MILLIGRAM(S): 100 TABLET, FILM COATED ORAL at 06:26

## 2023-11-17 NOTE — PROGRESS NOTE ADULT - SUBJECTIVE AND OBJECTIVE BOX
TEAM [ C ] Surgery Daily Progress Note  =====================================================    SUBJECTIVE: Patient seen and examined at bedside on AM rounds. Patient reports continued pain in her right leg. OOB/Amublating as tolerated. Denies fever, chills.      ALLERGIES:  No Known Allergies      --------------------------------------------------------------------------------------    MEDICATIONS:    Neurologic Medications  acetaminophen     Tablet .. 1000 milliGRAM(s) Oral every 6 hours PRN Mild Pain (1 - 3)  HYDROmorphone  Injectable 0.2 milliGRAM(s) IV Push every 6 hours PRN FOR BREAKTHROUGH PAIN  oxyCODONE    IR 2.5 milliGRAM(s) Oral every 6 hours PRN Moderate Pain (4 - 6)  oxyCODONE    IR 5 milliGRAM(s) Oral every 6 hours PRN Severe Pain (7 - 10)    Respiratory Medications    Cardiovascular Medications  amLODIPine   Tablet 10 milliGRAM(s) Oral every 24 hours  atenolol  Tablet 50 milliGRAM(s) Oral daily  losartan 100 milliGRAM(s) Oral daily    Gastrointestinal Medications    Genitourinary Medications    Hematologic/Oncologic Medications  aspirin  chewable 81 milliGRAM(s) Oral daily  heparin  Infusion 1100 Unit(s)/Hr IV Continuous <Continuous>    Antimicrobial/Immunologic Medications    Endocrine/Metabolic Medications  atorvastatin 40 milliGRAM(s) Oral at bedtime    Topical/Other Medications    --------------------------------------------------------------------------------------    VITAL SIGNS:  T(C): 37 (11-17-23 @ 06:00), Max: 37.1 (11-16-23 @ 12:09)  HR: 86 (11-17-23 @ 06:00) (56 - 86)  BP: 120/60 (11-17-23 @ 06:00) (107/69 - 137/88)  RR: 18 (11-17-23 @ 06:00) (16 - 18)  SpO2: 99% (11-17-23 @ 06:00) (97% - 100%)  --------------------------------------------------------------------------------------    EXAM    General: NAD, resting in bed comfortably.  Cardiac: regular rate, warm and well perfused  Respiratory: Nonlabored respirations, normal cw expansion.  Abdomen: soft, nontender, nondistended.   Extremities: FROM, Right foot sole cold compared to left. Motor function intact bilaterally. Reduced sensation in right foot  Vasc: dopplerable DP/PT on left, non-palpable non-dopplerable DP/PT on right    --------------------------------------------------------------------------------------    LABS                        13.3   6.74  )-----------( 202      ( 17 Nov 2023 04:28 )             40.2   11-17    139  |  98  |  14  ----------------------------<  92  3.1<L>   |  28  |  0.81    Ca    9.9      17 Nov 2023 04:28  Phos  2.7     11-17  Mg     2.20     11-17    TPro  7.3  /  Alb  3.7  /  TBili  0.4  /  DBili  x   /  AST  27  /  ALT  17  /  AlkPhos  78  11-16    --------------------------------------------------------------------------------------    INS AND OUTS:  I&O's Detail    --------------------------------------------------------------------------------------

## 2023-11-17 NOTE — PROVIDER CONTACT NOTE (CRITICAL VALUE NOTIFICATION) - ACTION/TREATMENT ORDERED:
Provider notified, heparin drip held for one hour and rate decreased as ordered. Recheck aptt 6 hours after restarting.

## 2023-11-17 NOTE — PATIENT PROFILE ADULT - FALL HARM RISK - PATIENT NEEDS ASSISTANCE
CARDIOLOGY CLINIC VISIT NOTE-----TELEPHONE VISIT    Clinic Date: 8/11/2020    Chief Complaint:    Chief Complaint   Patient presents with   • Heart Problem   • Follow-up       History of Present Illness:  Coni Thurston is a 85 year old female with a past medical history significant for coronary artery disease, hypertension, hyperlipidemia, and small bowel obstruction s/p ex lap with small bowl resection (11/2018).    This visit is being performed via phone to discuss Heart Problem and Follow-up    Clinician Location: [facility], [facility]    Coni is in [place] and her identity has been established.   She was informed that consent to treat includes permission to submit charges to the applicable insurance on file. Coni was advised regarding the potential risk inherent in video visits, as the assessment may be limited due to what can be seen on the screen which potentially results in an incomplete assessment; as well as either of us may discontinue the video visit if it is felt that the videoconferencing connections are not adequate for his/her situation.   5-10 minutes were spent in this encounter.     The patient reports feeling well today. Denies any hospitalizations or surgeries in the last one year. Remains compliant with current medication regimen. Denies chest pain, dizziness, palpitations, SOB, lower extremity edema, or syncope. Denies headaches or vision changes. She does not have a blood pressure cuff at home. She is staying safe at home, not leaving d/t COVID.      NM Stress Test - 7/11/2019  The gated SPECT study shows normal regional wall motion and thickening  throughout the left ventricle. LVEF is 70 %. (Normal >49%)  IMPRESSION:  1. No evidence of significant ischemia with pharmacologic stress  2. Normal post-regadenoson LV systolic function.    Echocardiogram: 8/6/2019 (with EF of 58%)  Normal left ventricular size, systolic function and wall thickness, with no  regional wall motion abnormalities. Left ventricular ejection fraction, 58 %. Grade I/IV diastolic dysfunction (abnormal relaxation filling pattern), normal to mildly elevated filling  pressures.  Trileaflet aortic valve. Mild aortic valve regurgitation.  Mild aortic dilatation of the sinuses of valsalva. 4 cm.      ALLERGIES:   Allergen Reactions   • Duragesic Disc Transdermal System [Fentanyl Tdsy] Other (See Comments)     Rash from adhesive   • Protonix SWELLING     Leg swelling     Current Outpatient Medications   Medication Sig   • oxyCODONE SR (OXYCONTIN) 20 MG 12 hr tablet Take 1 tablet by mouth 3 times daily. Do not start before July 21, 2020. Last rx until seen for appt. 30DS   • meloxicam (MOBIC) 7.5 MG tablet TAKE 1 TABLET BY MOUTH EVERY DAY   • cyclobenzaprine (FLEXERIL) 10 MG tablet Take 1 tablet by mouth 2 times daily as needed for Muscle spasms.   • zolpidem (AMBIEN) 5 MG tablet TAKE 1 TABLET BY MOUTH EVERY NIGHT AS NEEDED FOR SLEEP   • triamterene-hydrochlorothiazide (DYAZIDE) 37.5-25 MG per capsule TAKE ONE CAPSULE BY MOUTH DAILY   • traZODone (DESYREL) 50 MG tablet Take 1-2 tablets by mouth at bedtime.   • dilTIAZem (CARDIZEM CD) 120 MG 24 hr capsule Take 1 capsule by mouth daily.   • lidocaine (LIDODERM) 5 % patch Place 1 patch onto the skin daily. Remove patch 12 hours after applying   • senna (SENNA LAX) 8.6 MG tablet every 24 hours.   • gabapentin (NEURONTIN) 600 MG tablet Indications: nerve pain Take 2 tabs 5 x daily prn   • DISPENSE Incentive spirometer   • aspirin (ECOTRIN) 81 MG EC tablet Take 1 tablet by mouth daily.   • magnesium oxide (MAG-OX) 400 MG tablet Take 1 tablet by mouth daily.   • polyethylene glycol (MIRALAX) powder Mix 17 grams of powder (1 capful) with 8 oz of fluid (water/juice) and drink by mouth once daily as directed.   • docusate sodium-sennosides (SENOKOT S) 50-8.6 MG per tablet Take 1 tablet by mouth nightly.   • bisacodyl (DULCOLAX) 10 MG suppository Place 1  suppository rectally daily as needed for Constipation.   • Cholecalciferol (VITAMIN D) 2000 units capsule Take 2,000 Units by mouth daily.   • nicotinic acid (NIACIN) 100 MG tablet Take 100 mg by mouth daily (with breakfast).   • Multiple Vitamin (MULTIVITAMIN) capsule Take 1 capsule by mouth daily.   • Mesalamine-Cleanser (ROWASA) 4 G Kit Place 4 g rectally nightly.      No current facility-administered medications for this visit.        Review of Systems:  CARDIOVASCULAR:  No chest pain, palpitations, orthopnea, or PND (paroxysmal nocturnal dyspnea).  RESPIRATORY: Denies shortness of breath, dyspnea on exertion, or cough.    Physical Exam:   Vitals:    08/11/20 1354   BP: Comment: pt does not have BP cuff at home        Assessment:   Coronary Artery Disease: Patient self-reports a history of 2 stents placed < 10 years ago. She does not recall where this procedure was done. Stress test (7/11/2019) and echo (8/6/2019) were normal with an EF of 58%. Asymptomatic. On ASA.  Hypertension: Previously controlled, no BP cuff at home. On Dyazide.   Hyperlipidemia: Last FLP on file from 2014, managed by PCP.     Recommendation:  Patient states she is doing well, no complaints.   Continue current medication regimen.   Patient is to follow up in clinic in one year, no testing.   Advised patient to contact clinic with any new or urgent questions or concerns.      On 08/11/20, Wendy PA RN scribed the services personally performed by Amadou Muñoz MD     The documentation recorded by the scribe accurately and completely reflects the service(s) I personally performed and the decisions made by me.   Amadou Muñoz MD                  Standing/Walking/Toileting

## 2023-11-17 NOTE — CONSULT NOTE ADULT - SUBJECTIVE AND OBJECTIVE BOX
Patient is a 63y old  Female who presents with a chief complaint of Acute limb ischemia of RLE (17 Nov 2023 07:36)      HPI:  63F hx of recent RLE angiogram and percutaneous thrombectomy (07/19) of SFA and popliteal arteries, R CFA -> PT bypass with PTFE (7/25) for acute limb ischemia, presenting now with 1.5 weeks of pain in RLE. Reports has had pain in her right foot since her previous vascular procedures in July '23, but the pain has worsened over the last 1.5 weeks. Reports she has been taking her Eliquis as prescribed.    In ED, vss. RLE arterial duplex without flow in RLE stent or bypass.   (16 Nov 2023 16:05)          PAST MEDICAL & SURGICAL HISTORY:  Stroke      PAD (peripheral artery disease)      Hypertension          MEDICATIONS  (STANDING):  amLODIPine   Tablet 10 milliGRAM(s) Oral every 24 hours  aspirin  chewable 81 milliGRAM(s) Oral daily  atenolol  Tablet 50 milliGRAM(s) Oral daily  atorvastatin 40 milliGRAM(s) Oral at bedtime  heparin  Infusion 1100 Unit(s)/Hr (7 mL/Hr) IV Continuous <Continuous>  losartan 100 milliGRAM(s) Oral daily    MEDICATIONS  (PRN):  acetaminophen     Tablet .. 1000 milliGRAM(s) Oral every 6 hours PRN Mild Pain (1 - 3)  HYDROmorphone  Injectable 0.2 milliGRAM(s) IV Push every 6 hours PRN FOR BREAKTHROUGH PAIN  oxyCODONE    IR 2.5 milliGRAM(s) Oral every 6 hours PRN Moderate Pain (4 - 6)  oxyCODONE    IR 5 milliGRAM(s) Oral every 6 hours PRN Severe Pain (7 - 10)      ICU Vital Signs Last 24 Hrs  T(C): 36.6 (17 Nov 2023 10:49), Max: 37.1 (16 Nov 2023 12:09)  T(F): 97.8 (17 Nov 2023 10:49), Max: 98.7 (16 Nov 2023 12:09)  HR: 60 (17 Nov 2023 10:49) (56 - 86)  BP: 113/77 (17 Nov 2023 10:49) (107/69 - 137/88)  BP(mean): 82 (16 Nov 2023 19:01) (82 - 82)  ABP: --  ABP(mean): --  RR: 18 (17 Nov 2023 10:49) (16 - 18)  SpO2: 100% (17 Nov 2023 10:49) (97% - 100%)    O2 Parameters below as of 17 Nov 2023 06:00  Patient On (Oxygen Delivery Method): room air            Vital Signs Last 24 Hrs  T(C): 36.6 (17 Nov 2023 10:49), Max: 37.1 (16 Nov 2023 12:09)  T(F): 97.8 (17 Nov 2023 10:49), Max: 98.7 (16 Nov 2023 12:09)  HR: 60 (17 Nov 2023 10:49) (56 - 86)  BP: 113/77 (17 Nov 2023 10:49) (107/69 - 137/88)  BP(mean): 82 (16 Nov 2023 19:01) (82 - 82)  RR: 18 (17 Nov 2023 10:49) (16 - 18)  SpO2: 100% (17 Nov 2023 10:49) (97% - 100%)    Parameters below as of 17 Nov 2023 06:00  Patient On (Oxygen Delivery Method): room air                              13.3   6.74  )-----------( 202      ( 17 Nov 2023 04:28 )             40.2       LIVER FUNCTIONS - ( 16 Nov 2023 14:21 )  Alb: 3.7 g/dL / Pro: 7.3 g/dL / ALK PHOS: 78 U/L / ALT: 17 U/L / AST: 27 U/L / GGT: x             PT/INR - ( 16 Nov 2023 14:21 )   PT: 13.2 sec;   INR: 1.17 ratio         PTT - ( 17 Nov 2023 09:22 )  PTT:>200.0 sec      COMMENTS/PLAN:  Patient states that she has pain in R leg radiating up throughout her whole thigh that is constant. She is unable to describe the quality of the pain. Has been getting tylenol, oxy 2.5/5 which she says doesn't help. Pain is exacerbated by movement. She does not take any pain medication at home other than 800mg ibuprofen. She does not take meds for anxiety or depression, although she states she's been depressed for years and was planning on starting medication with her OP psych provider. She smokes marijuana nightly to help with sleep and she will drink up to 40oz beer daily.      RECOMMENDATIONS:   - Increase oxycodone dose to oxy 5mg for moderate pain and oxy 10mg for severe pain PRN Q4 hours   - Change dilaudid 0.2mg q6h for breakthrough to dilaudid 0.2mg Q4h for breakthrough pain   - Start gabapentin 100mg TID   - Consider psych/behavioral health consult. Per discussion with patient, she is open to utilizing mental health services on this admission     ISTOP Reference #[id number] - no controlled substances found     Acute Pain Service to sign off. Please reach back out with any questions   [phone]        Patient is a 63y old  Female who presents with a chief complaint of Acute limb ischemia of RLE (17 Nov 2023 07:36)      HPI:  63F hx of recent RLE angiogram and percutaneous thrombectomy (07/19) of SFA and popliteal arteries, R CFA -> PT bypass with PTFE (7/25) for acute limb ischemia, presenting now with 1.5 weeks of pain in RLE. Reports has had pain in her right foot since her previous vascular procedures in July '23, but the pain has worsened over the last 1.5 weeks. Reports she has been taking her Eliquis as prescribed.    In ED, vss. RLE arterial duplex without flow in RLE stent or bypass.   (16 Nov 2023 16:05)          PAST MEDICAL & SURGICAL HISTORY:  Stroke      PAD (peripheral artery disease)      Hypertension          MEDICATIONS  (STANDING):  amLODIPine   Tablet 10 milliGRAM(s) Oral every 24 hours  aspirin  chewable 81 milliGRAM(s) Oral daily  atenolol  Tablet 50 milliGRAM(s) Oral daily  atorvastatin 40 milliGRAM(s) Oral at bedtime  heparin  Infusion 1100 Unit(s)/Hr (7 mL/Hr) IV Continuous <Continuous>  losartan 100 milliGRAM(s) Oral daily    MEDICATIONS  (PRN):  acetaminophen     Tablet .. 1000 milliGRAM(s) Oral every 6 hours PRN Mild Pain (1 - 3)  HYDROmorphone  Injectable 0.2 milliGRAM(s) IV Push every 6 hours PRN FOR BREAKTHROUGH PAIN  oxyCODONE    IR 2.5 milliGRAM(s) Oral every 6 hours PRN Moderate Pain (4 - 6)  oxyCODONE    IR 5 milliGRAM(s) Oral every 6 hours PRN Severe Pain (7 - 10)      ICU Vital Signs Last 24 Hrs  T(C): 36.6 (17 Nov 2023 10:49), Max: 37.1 (16 Nov 2023 12:09)  T(F): 97.8 (17 Nov 2023 10:49), Max: 98.7 (16 Nov 2023 12:09)  HR: 60 (17 Nov 2023 10:49) (56 - 86)  BP: 113/77 (17 Nov 2023 10:49) (107/69 - 137/88)  BP(mean): 82 (16 Nov 2023 19:01) (82 - 82)  ABP: --  ABP(mean): --  RR: 18 (17 Nov 2023 10:49) (16 - 18)  SpO2: 100% (17 Nov 2023 10:49) (97% - 100%)    O2 Parameters below as of 17 Nov 2023 06:00  Patient On (Oxygen Delivery Method): room air            Vital Signs Last 24 Hrs  T(C): 36.6 (17 Nov 2023 10:49), Max: 37.1 (16 Nov 2023 12:09)  T(F): 97.8 (17 Nov 2023 10:49), Max: 98.7 (16 Nov 2023 12:09)  HR: 60 (17 Nov 2023 10:49) (56 - 86)  BP: 113/77 (17 Nov 2023 10:49) (107/69 - 137/88)  BP(mean): 82 (16 Nov 2023 19:01) (82 - 82)  RR: 18 (17 Nov 2023 10:49) (16 - 18)  SpO2: 100% (17 Nov 2023 10:49) (97% - 100%)    Parameters below as of 17 Nov 2023 06:00  Patient On (Oxygen Delivery Method): room air                              13.3   6.74  )-----------( 202      ( 17 Nov 2023 04:28 )             40.2       LIVER FUNCTIONS - ( 16 Nov 2023 14:21 )  Alb: 3.7 g/dL / Pro: 7.3 g/dL / ALK PHOS: 78 U/L / ALT: 17 U/L / AST: 27 U/L / GGT: x             PT/INR - ( 16 Nov 2023 14:21 )   PT: 13.2 sec;   INR: 1.17 ratio         PTT - ( 17 Nov 2023 09:22 )  PTT:>200.0 sec      COMMENTS/PLAN:  Patient states that she has pain in R leg radiating up throughout her whole thigh that is constant. She is unable to describe the quality of the pain. Has been getting tylenol, oxy 2.5/5 which she says doesn't help. Pain is exacerbated by movement. She does not take any pain medication at home other than 800mg ibuprofen. She does not take meds for anxiety or depression, although she states she's been depressed for years and was planning on starting medication with her OP psych provider. She smokes marijuana nightly to help with sleep and she will drink up to 40oz beer daily.      RECOMMENDATIONS:   - Increase oxycodone dose to oxy 5mg for moderate pain and oxy 10mg for severe pain PRN Q4 hours   - Change dilaudid 0.2mg q6h for breakthrough to dilaudid 0.2mg Q4h for breakthrough pain   - Start gabapentin 100mg TID   - Consider psych/behavioral health consult. Per discussion with patient, she is open to utilizing mental health services on this admission     ISTOP Reference #[id number] - no controlled substances found     Acute Pain Service to sign off. Please reach back out with any questions   [id number]        Patient is a 63y old  Female who presents with a chief complaint of Acute limb ischemia of RLE (17 Nov 2023 07:36)      HPI:  63F hx of recent RLE angiogram and percutaneous thrombectomy (07/19) of SFA and popliteal arteries, R CFA -> PT bypass with PTFE (7/25) for acute limb ischemia, presenting now with 1.5 weeks of pain in RLE. Reports has had pain in her right foot since her previous vascular procedures in July '23, but the pain has worsened over the last 1.5 weeks. Reports she has been taking her Eliquis as prescribed.    In ED, vss. RLE arterial duplex without flow in RLE stent or bypass.   (16 Nov 2023 16:05)          PAST MEDICAL & SURGICAL HISTORY:  Stroke      PAD (peripheral artery disease)      Hypertension          MEDICATIONS  (STANDING):  amLODIPine   Tablet 10 milliGRAM(s) Oral every 24 hours  aspirin  chewable 81 milliGRAM(s) Oral daily  atenolol  Tablet 50 milliGRAM(s) Oral daily  atorvastatin 40 milliGRAM(s) Oral at bedtime  heparin  Infusion 1100 Unit(s)/Hr (7 mL/Hr) IV Continuous <Continuous>  losartan 100 milliGRAM(s) Oral daily    MEDICATIONS  (PRN):  acetaminophen     Tablet .. 1000 milliGRAM(s) Oral every 6 hours PRN Mild Pain (1 - 3)  HYDROmorphone  Injectable 0.2 milliGRAM(s) IV Push every 6 hours PRN FOR BREAKTHROUGH PAIN  oxyCODONE    IR 2.5 milliGRAM(s) Oral every 6 hours PRN Moderate Pain (4 - 6)  oxyCODONE    IR 5 milliGRAM(s) Oral every 6 hours PRN Severe Pain (7 - 10)      ICU Vital Signs Last 24 Hrs  T(C): 36.6 (17 Nov 2023 10:49), Max: 37.1 (16 Nov 2023 12:09)  T(F): 97.8 (17 Nov 2023 10:49), Max: 98.7 (16 Nov 2023 12:09)  HR: 60 (17 Nov 2023 10:49) (56 - 86)  BP: 113/77 (17 Nov 2023 10:49) (107/69 - 137/88)  BP(mean): 82 (16 Nov 2023 19:01) (82 - 82)  ABP: --  ABP(mean): --  RR: 18 (17 Nov 2023 10:49) (16 - 18)  SpO2: 100% (17 Nov 2023 10:49) (97% - 100%)    O2 Parameters below as of 17 Nov 2023 06:00  Patient On (Oxygen Delivery Method): room air            Vital Signs Last 24 Hrs  T(C): 36.6 (17 Nov 2023 10:49), Max: 37.1 (16 Nov 2023 12:09)  T(F): 97.8 (17 Nov 2023 10:49), Max: 98.7 (16 Nov 2023 12:09)  HR: 60 (17 Nov 2023 10:49) (56 - 86)  BP: 113/77 (17 Nov 2023 10:49) (107/69 - 137/88)  BP(mean): 82 (16 Nov 2023 19:01) (82 - 82)  RR: 18 (17 Nov 2023 10:49) (16 - 18)  SpO2: 100% (17 Nov 2023 10:49) (97% - 100%)    Parameters below as of 17 Nov 2023 06:00  Patient On (Oxygen Delivery Method): room air                              13.3   6.74  )-----------( 202      ( 17 Nov 2023 04:28 )             40.2       LIVER FUNCTIONS - ( 16 Nov 2023 14:21 )  Alb: 3.7 g/dL / Pro: 7.3 g/dL / ALK PHOS: 78 U/L / ALT: 17 U/L / AST: 27 U/L / GGT: x             PT/INR - ( 16 Nov 2023 14:21 )   PT: 13.2 sec;   INR: 1.17 ratio         PTT - ( 17 Nov 2023 09:22 )  PTT:>200.0 sec      COMMENTS/PLAN:  Patient states that she has pain in R leg radiating up throughout her whole thigh that is constant. She is unable to describe the quality of the pain. Has been getting tylenol, oxy 2.5/5 which she says doesn't help. Pain is exacerbated by movement. She does not take any pain medication at home other than 800mg ibuprofen. She does not take meds for anxiety or depression, although she states she's been depressed for years and was planning on starting medication with her OP psych provider. She smokes marijuana nightly to help with sleep and she will drink up to 40oz beer daily.      RECOMMENDATIONS:   - Increase oxycodone dose to oxy 5mg for moderate pain and oxy 10mg for severe pain PRN Q4 hours   - Change dilaudid 0.2mg q6h for breakthrough to dilaudid 0.2mg Q4h for breakthrough pain   - Start gabapentin 100mg TID   - Consider psych/behavioral health consult. Per discussion with patient, she is open to utilizing mental health services on this admission     ISTOP Reference #[id number] - no controlled substances found     Acute Pain Service to sign off. Please reach back out with any questions   [id number]

## 2023-11-17 NOTE — CONSULT NOTE ADULT - SUBJECTIVE AND OBJECTIVE BOX
CHIEF COMPLAINT:Patient is a 63y old  Female who presents with a chief complaint of Acute limb ischemia of RLE (16 Nov 2023 16:05)      HISTORY OF PRESENT ILLNESS:  63F hx of cad s/p remote stents,  recent RLE angiogram and percutaneous thrombectomy (07/19) of SFA and popliteal arteries, R CFA -> PT bypass with PTFE (7/25) for acute limb ischemia, presenting now with 1.5 weeks of pain in RLE. Reports has had pain in her right foot since her previous vascular procedures in July '23, but the pain has worsened over the last 1.5 weeks. Reports she has been taking her Eliquis as prescribed.    In ED, vss. RLE arterial duplex without flow in RLE stent or bypass.    plan for angio and possible revascularization     denies any chest pain, sob, palpitation, dizziness or syncope.          PAST MEDICAL & SURGICAL HISTORY:  Stroke      PAD (peripheral artery disease)      Hypertension              MEDICATIONS:  amLODIPine   Tablet 10 milliGRAM(s) Oral every 24 hours  aspirin  chewable 81 milliGRAM(s) Oral daily  atenolol  Tablet 50 milliGRAM(s) Oral daily  heparin  Infusion 1100 Unit(s)/Hr IV Continuous <Continuous>  losartan 100 milliGRAM(s) Oral daily        acetaminophen     Tablet .. 1000 milliGRAM(s) Oral every 6 hours PRN  HYDROmorphone  Injectable 0.2 milliGRAM(s) IV Push every 6 hours PRN  oxyCODONE    IR 2.5 milliGRAM(s) Oral every 6 hours PRN  oxyCODONE    IR 5 milliGRAM(s) Oral every 6 hours PRN      atorvastatin 40 milliGRAM(s) Oral at bedtime        FAMILY HISTORY:      Non-contributory    SOCIAL HISTORY:    pos tobacco smoking     Allergies    No Known Allergies    Intolerances    	    REVIEW OF SYSTEMS:  as above  The rest of the 14 points ROS reviewed and except above they are unremarkable.        PHYSICAL EXAM:  T(C): 37 (11-17-23 @ 06:00), Max: 37.1 (11-16-23 @ 12:09)  HR: 86 (11-17-23 @ 06:00) (56 - 86)  BP: 120/60 (11-17-23 @ 06:00) (107/69 - 137/88)  RR: 18 (11-17-23 @ 06:00) (16 - 18)  SpO2: 99% (11-17-23 @ 06:00) (97% - 100%)  Wt(kg): --  I&O's Summary    JVP: Normal  Neck: supple  Lung: clear   CV: S1 S2 , Murmur:  Abd: soft  Ext: No edema  neuro: Awake / alert  Psych: flat affect  Skin: normal    < from: Nuclear Stress Test-Pharmacologic (Nuclear Stress Test-Pharmacologic .) (07.23.23 @ 10:54) >  IMPRESSIONS:Normal Study  * Myocardial Perfusion SPECT results are normal.  * Review of raw data shows: The study is of adequate  technical quality, heart not centered in FOV  * The left ventricle was small in size. Normal myocardial  perfusion scan,with no evidence of infarction or inducible  ischemia.  * Post-stress gated wall motion analysis was performed  (LVEF > 70%;LVEDV = 46 ml.), revealing normal LV function.  There were no segmental wall motion abnormalities. The RV  function appeared normal.  ------------------------------------------------------------------------  Confirmed on  7/23/2023 - 14:45:08 by Marcelino Hartman M.D.  ------------------------------------------------------------------------    < end of copied text >  < from: Transthoracic Echocardiogram (07.18.23 @ 19:11) >  CONCLUSIONS:  1. Normal left atrium.  LA volume index = 18 cc/m2.  2. Normal left ventricular internal dimensions and wall  thicknesses.  3. Hyperdynamic  left ventricular systolic function. No  segmental wall motion abnormalities. LVEF calculated using  biplane is 76%.  4. Normal left ventricular diastolic function.  5. Normal right atrium.  6. Normal right ventricular size and function.  7. Normal tricuspid valve.   Mild tricuspid regurgitation.  8. Estimated pulmonary artery systolic pressure equals 42  mm Hg, assuming right atrial pressure equals 3  mm Hg,  consistent with mild pulmonary hypertension.  9. No pericardial effusion seen.  *** No previous Echo exam.  ------------------------------------------------------------------------  Confirmed on  7/19/2023 - 08:32:00 by Naila Bowling M.D.    < end of copied text >      LABS/DATA:    TELEMETRY: 	    ECG:  	   	  CARDIAC MARKERS:                                      13.3   6.74  )-----------( 202      ( 17 Nov 2023 04:28 )             40.2     11-17    139  |  98  |  14  ----------------------------<  92  3.1<L>   |  28  |  0.81    Ca    9.9      17 Nov 2023 04:28  Phos  2.7     11-17  Mg     2.20     11-17    TPro  7.3  /  Alb  3.7  /  TBili  0.4  /  DBili  x   /  AST  27  /  ALT  17  /  AlkPhos  78  11-16    proBNP:   Lipid Profile:   HgA1c:   TSH:

## 2023-11-17 NOTE — PROGRESS NOTE ADULT - ASSESSMENT
63F hx of recent RLE angiogram and percutaneous thrombectomy (07/19) of SFA and popliteal arteries, R CFA -> PT bypass with PTFE (7/25) for acute limb ischemia, presenting now with 1.5 weeks of pain in RLE, found to have Pillo IIa acute on chronic limb ischemia.     Plan:  - Angio planning for Monday  - Continue heparin gtt  - Diet: Regular  - Pain management consult  - Medical consult.   - Cardiology consulted, appreciate recs      C Team/Vascular Surgery  [id number] 63F hx of recent RLE angiogram and percutaneous thrombectomy (07/19) of SFA and popliteal arteries, R CFA -> PT bypass with PTFE (7/25) for acute limb ischemia, presenting now with 1.5 weeks of pain in RLE, found to have Pillo IIa acute on chronic limb ischemia.     Plan:  - Angio planning for Monday  - Continue heparin gtt  - Diet: Regular  - Pain management consult  - Medical consult.   - Cardiology consulted, appreciate recs      C Team/Vascular Surgery  [id number] 63F hx of recent RLE angiogram and percutaneous thrombectomy (07/19) of SFA and popliteal arteries, R CFA -> PT bypass with PTFE (7/25) for acute limb ischemia, presenting now with 1.5 weeks of pain in RLE, found to have Pillo IIa acute on chronic limb ischemia.     Plan:  - Angio planning for Monday  - Continue heparin gtt  - Diet: Regular  - Pain management consult  - Medical consult.   - Cardiology consulted, appreciate recs      C Team/Vascular Surgery  [id number]

## 2023-11-17 NOTE — PATIENT PROFILE ADULT - FALL HARM RISK - HARM RISK INTERVENTIONS
Assistance with ambulation/Assistance OOB with selected safe patient handling equipment/Communicate Risk of Fall with Harm to all staff/Discuss with provider need for PT consult/Monitor gait and stability/Reinforce activity limits and safety measures with patient and family/Tailored Fall Risk Interventions/Visual Cue: Yellow wristband and red socks/Bed in lowest position, wheels locked, appropriate side rails in place/Call bell, personal items and telephone in reach/Instruct patient to call for assistance before getting out of bed or chair/Non-slip footwear when patient is out of bed/[place] to call system/Physically safe environment - no spills, clutter or unnecessary equipment/Purposeful Proactive Rounding/Room/bathroom lighting operational, light cord in reach Assistance with ambulation/Assistance OOB with selected safe patient handling equipment/Communicate Risk of Fall with Harm to all staff/Discuss with provider need for PT consult/Monitor gait and stability/Reinforce activity limits and safety measures with patient and family/Tailored Fall Risk Interventions/Visual Cue: Yellow wristband and red socks/Bed in lowest position, wheels locked, appropriate side rails in place/Call bell, personal items and telephone in reach/Instruct patient to call for assistance before getting out of bed or chair/Non-slip footwear when patient is out of bed/[place] to call system/Physically safe environment - no spills, clutter or unnecessary equipment/Purposeful Proactive Rounding/Room/bathroom lighting operational, light cord in reach Assistance with ambulation/Assistance OOB with selected safe patient handling equipment/Communicate Risk of Fall with Harm to all staff/Discuss with provider need for PT consult/Monitor gait and stability/Reinforce activity limits and safety measures with patient and family/Tailored Fall Risk Interventions/Visual Cue: Yellow wristband and red socks/Bed in lowest position, wheels locked, appropriate side rails in place/Call bell, personal items and telephone in reach/Instruct patient to call for assistance before getting out of bed or chair/Non-slip footwear when patient is out of bed/[place] to call system/Physically safe environment - no spills, clutter or unnecessary equipment/Purposeful Proactive Rounding/Room/bathroom lighting operational, light cord in reach

## 2023-11-18 LAB
ANION GAP SERPL CALC-SCNC: 11 MMOL/L — SIGNIFICANT CHANGE UP (ref 7–14)
APTT BLD: 100.1 SEC — HIGH (ref 24.5–35.6)
APTT BLD: 32.8 SEC — SIGNIFICANT CHANGE UP (ref 24.5–35.6)
APTT BLD: 63.8 SEC — HIGH (ref 24.5–35.6)
APTT BLD: 76.8 SEC — HIGH (ref 24.5–35.6)
BUN SERPL-MCNC: 18 MG/DL — SIGNIFICANT CHANGE UP (ref 7–23)
CALCIUM SERPL-MCNC: 9.9 MG/DL — SIGNIFICANT CHANGE UP (ref 8.4–10.5)
CHLORIDE SERPL-SCNC: 100 MMOL/L — SIGNIFICANT CHANGE UP (ref 98–107)
CO2 SERPL-SCNC: 27 MMOL/L — SIGNIFICANT CHANGE UP (ref 22–31)
CREAT SERPL-MCNC: 0.69 MG/DL — SIGNIFICANT CHANGE UP (ref 0.5–1.3)
EGFR: 98 ML/MIN/1.73M2 — SIGNIFICANT CHANGE UP
GLUCOSE SERPL-MCNC: 98 MG/DL — SIGNIFICANT CHANGE UP (ref 70–99)
HCT VFR BLD CALC: 34.9 % — SIGNIFICANT CHANGE UP (ref 34.5–45)
HGB BLD-MCNC: 11.8 G/DL — SIGNIFICANT CHANGE UP (ref 11.5–15.5)
INR BLD: 0.95 RATIO — SIGNIFICANT CHANGE UP (ref 0.85–1.18)
MAGNESIUM SERPL-MCNC: 1.9 MG/DL — SIGNIFICANT CHANGE UP (ref 1.6–2.6)
MCHC RBC-ENTMCNC: 32.8 PG — SIGNIFICANT CHANGE UP (ref 27–34)
MCHC RBC-ENTMCNC: 33.8 GM/DL — SIGNIFICANT CHANGE UP (ref 32–36)
MCV RBC AUTO: 96.9 FL — SIGNIFICANT CHANGE UP (ref 80–100)
NRBC # BLD: 0 /100 WBCS — SIGNIFICANT CHANGE UP (ref 0–0)
NRBC # FLD: 0 K/UL — SIGNIFICANT CHANGE UP (ref 0–0)
PHOSPHATE SERPL-MCNC: 2.6 MG/DL — SIGNIFICANT CHANGE UP (ref 2.5–4.5)
PLATELET # BLD AUTO: 176 K/UL — SIGNIFICANT CHANGE UP (ref 150–400)
POTASSIUM SERPL-MCNC: 3.5 MMOL/L — SIGNIFICANT CHANGE UP (ref 3.5–5.3)
POTASSIUM SERPL-SCNC: 3.5 MMOL/L — SIGNIFICANT CHANGE UP (ref 3.5–5.3)
PROTHROM AB SERPL-ACNC: 10.8 SEC — SIGNIFICANT CHANGE UP (ref 9.5–13)
RBC # BLD: 3.6 M/UL — LOW (ref 3.8–5.2)
RBC # FLD: 12.8 % — SIGNIFICANT CHANGE UP (ref 10.3–14.5)
SODIUM SERPL-SCNC: 138 MMOL/L — SIGNIFICANT CHANGE UP (ref 135–145)
WBC # BLD: 5.28 K/UL — SIGNIFICANT CHANGE UP (ref 3.8–10.5)
WBC # FLD AUTO: 5.28 K/UL — SIGNIFICANT CHANGE UP (ref 3.8–10.5)

## 2023-11-18 RX ORDER — MAGNESIUM SULFATE 500 MG/ML
1 VIAL (ML) INJECTION ONCE
Refills: 0 | Status: COMPLETED | OUTPATIENT
Start: 2023-11-18 | End: 2023-11-18

## 2023-11-18 RX ORDER — SODIUM,POTASSIUM PHOSPHATES 278-250MG
1 POWDER IN PACKET (EA) ORAL ONCE
Refills: 0 | Status: COMPLETED | OUTPATIENT
Start: 2023-11-18 | End: 2023-11-18

## 2023-11-18 RX ORDER — POTASSIUM CHLORIDE 20 MEQ
40 PACKET (EA) ORAL ONCE
Refills: 0 | Status: COMPLETED | OUTPATIENT
Start: 2023-11-18 | End: 2023-11-18

## 2023-11-18 RX ADMIN — Medication 81 MILLIGRAM(S): at 05:21

## 2023-11-18 RX ADMIN — Medication 1 TABLET(S): at 10:15

## 2023-11-18 RX ADMIN — LOSARTAN POTASSIUM 100 MILLIGRAM(S): 100 TABLET, FILM COATED ORAL at 05:20

## 2023-11-18 RX ADMIN — OXYCODONE HYDROCHLORIDE 10 MILLIGRAM(S): 5 TABLET ORAL at 15:16

## 2023-11-18 RX ADMIN — OXYCODONE HYDROCHLORIDE 10 MILLIGRAM(S): 5 TABLET ORAL at 04:18

## 2023-11-18 RX ADMIN — Medication 100 GRAM(S): at 12:18

## 2023-11-18 RX ADMIN — GABAPENTIN 100 MILLIGRAM(S): 400 CAPSULE ORAL at 05:21

## 2023-11-18 RX ADMIN — HEPARIN SODIUM 5 UNIT(S)/HR: 5000 INJECTION INTRAVENOUS; SUBCUTANEOUS at 03:54

## 2023-11-18 RX ADMIN — AMLODIPINE BESYLATE 10 MILLIGRAM(S): 2.5 TABLET ORAL at 05:20

## 2023-11-18 RX ADMIN — HEPARIN SODIUM 6 UNIT(S)/HR: 5000 INJECTION INTRAVENOUS; SUBCUTANEOUS at 20:25

## 2023-11-18 RX ADMIN — OXYCODONE HYDROCHLORIDE 10 MILLIGRAM(S): 5 TABLET ORAL at 11:22

## 2023-11-18 RX ADMIN — HEPARIN SODIUM 7 UNIT(S)/HR: 5000 INJECTION INTRAVENOUS; SUBCUTANEOUS at 08:38

## 2023-11-18 RX ADMIN — HEPARIN SODIUM 6 UNIT(S)/HR: 5000 INJECTION INTRAVENOUS; SUBCUTANEOUS at 17:06

## 2023-11-18 RX ADMIN — GABAPENTIN 100 MILLIGRAM(S): 400 CAPSULE ORAL at 22:10

## 2023-11-18 RX ADMIN — GABAPENTIN 100 MILLIGRAM(S): 400 CAPSULE ORAL at 12:20

## 2023-11-18 RX ADMIN — ATORVASTATIN CALCIUM 40 MILLIGRAM(S): 80 TABLET, FILM COATED ORAL at 22:11

## 2023-11-18 RX ADMIN — OXYCODONE HYDROCHLORIDE 10 MILLIGRAM(S): 5 TABLET ORAL at 05:18

## 2023-11-18 RX ADMIN — Medication 40 MILLIEQUIVALENT(S): at 10:15

## 2023-11-18 RX ADMIN — HEPARIN SODIUM 7 UNIT(S)/HR: 5000 INJECTION INTRAVENOUS; SUBCUTANEOUS at 05:23

## 2023-11-18 RX ADMIN — Medication 1000 MILLIGRAM(S): at 16:26

## 2023-11-18 RX ADMIN — OXYCODONE HYDROCHLORIDE 10 MILLIGRAM(S): 5 TABLET ORAL at 10:22

## 2023-11-18 RX ADMIN — Medication 1000 MILLIGRAM(S): at 17:27

## 2023-11-18 RX ADMIN — OXYCODONE HYDROCHLORIDE 10 MILLIGRAM(S): 5 TABLET ORAL at 16:16

## 2023-11-18 NOTE — PROGRESS NOTE ADULT - ASSESSMENT
PAD  asa  statin   plan for angio and intervention   fu with vascular surgery     CAD  history of stent  asa  cont statin   echo shows normal LV function   stress test is non ischemic     Tobacco smoking  counseling given     Pre-Operative Cardiac Risk Stratification and Optimization   Based on patient history and physical exam, the patient is considered to have elevated risk   recent echo and stress test have been unremarkable  can proceed to planned procedure      Advanced care planning was discussed with patient and family.  Risks, benefits and alternatives of the cardiac treatments and medical therapy including procedures were discussed in detail and all questions were answered. Importance of compliance with medical therapy and lifestyle modification to improve cardiovascular health were addressed. Appropriate forms and patient educational materials were reviewed. 30 minutes face to face spent.

## 2023-11-18 NOTE — PROGRESS NOTE ADULT - SUBJECTIVE AND OBJECTIVE BOX
Subjective: Patient seen and examined. No new events except as noted.     SUBJECTIVE/ROS:  nad      MEDICATIONS:  MEDICATIONS  (STANDING):  amLODIPine   Tablet 10 milliGRAM(s) Oral every 24 hours  aspirin  chewable 81 milliGRAM(s) Oral daily  atenolol  Tablet 50 milliGRAM(s) Oral daily  atorvastatin 40 milliGRAM(s) Oral at bedtime  gabapentin 100 milliGRAM(s) Oral three times a day  heparin  Infusion 1100 Unit(s)/Hr (7 mL/Hr) IV Continuous <Continuous>  losartan 100 milliGRAM(s) Oral daily      PHYSICAL EXAM:  T(C): 36.4 (11-18-23 @ 05:00), Max: 37.1 (11-17-23 @ 22:39)  HR: 57 (11-18-23 @ 05:00) (50 - 94)  BP: 149/88 (11-18-23 @ 05:00) (109/59 - 149/88)  RR: 18 (11-18-23 @ 05:00) (18 - 18)  SpO2: 98% (11-18-23 @ 05:00) (98% - 100%)  Wt(kg): --  I&O's Summary    17 Nov 2023 07:01  -  18 Nov 2023 07:00  --------------------------------------------------------  IN: 300 mL / OUT: 0 mL / NET: 300 mL            JVP: Normal  Neck: supple  Lung: clear   CV: S1 S2 , Murmur:  Abd: soft  Ext: No edema  neuro: Awake / alert  Psych: flat affect      LABS/DATA:    CARDIAC MARKERS:                                11.8   5.28  )-----------( 176      ( 18 Nov 2023 06:03 )             34.9     11-17    139  |  98  |  14  ----------------------------<  92  3.1<L>   |  28  |  0.81    Ca    9.9      17 Nov 2023 04:28  Phos  2.7     11-17  Mg     2.20     11-17    TPro  7.3  /  Alb  3.7  /  TBili  0.4  /  DBili  x   /  AST  27  /  ALT  17  /  AlkPhos  78  11-16    proBNP:   Lipid Profile:   HgA1c:   TSH:     TELE:  EKG:

## 2023-11-18 NOTE — DIETITIAN INITIAL EVALUATION ADULT - PERTINENT LABORATORY DATA
11-18    138  |  100  |  18  ----------------------------<  98  3.5   |  27  |  0.69    Ca    9.9      18 Nov 2023 06:03  Phos  2.6     11-18  Mg     1.90     11-18    TPro  7.3  /  Alb  3.7  /  TBili  0.4  /  DBili  x   /  AST  27  /  ALT  17  /  AlkPhos  78  11-16  A1C with Estimated Average Glucose Result: 5.1 % (07-23-23 @ 06:37)

## 2023-11-18 NOTE — DIETITIAN INITIAL EVALUATION ADULT - REASON FOR ADMISSION
Other specified disorders of circulatory system    Patient is a 63y Female with PMH peripheral artery disease, HTN who presents to [facility] with 1.5 weeks of pain in RLE, found to have Pillo IIa acute on chronic limb ischemia. Plan for angio on 11/20/23. Other specified disorders of circulatory system    Patient is a 63y Female with PMH peripheral artery disease, HTN who presents to [facility] with 1.5 weeks of pain in RLE, found to have Pillo IIa acute on chronic limb ischemia. Plan for angio on 11/20/23. Other specified disorders of circulatory system    Patient is a 63y Female with PMH peripheral artery disease, HTN who presents to [facility] with 1.5 weeks of pain in RLE, found to have Pillo IIa acute on chronic limb ischemia. Plan for angio on 11/20/23.

## 2023-11-18 NOTE — DIETITIAN NUTRITION RISK NOTIFICATION - ADDITIONAL COMMENTS/DIETITIAN RECOMMENDATIONS
Please see Dietitian Initial Assessment for complete recommendations.     Brittni Jansen MS RDN CDN  On [employer] Teams, Pager #[phone]  Please see Dietitian Initial Assessment for complete recommendations.     Brittni Jansen MS RDN CDN  On [employer] Teams, Pager #[phone]  Please see Dietitian Initial Assessment for complete recommendations.     Brittni Jansen MS RDN CDN  On [employer] Teams, Pager #[phone]

## 2023-11-18 NOTE — DIETITIAN INITIAL EVALUATION ADULT - NS FNS DIET ORDER
Diet, Regular:   Supplement Feeding Modality:  Oral  Ensure Surgery Cans or Servings Per Day:  3       Frequency:  Three Times a day (11-17-23 @ 17:32)

## 2023-11-18 NOTE — PROGRESS NOTE ADULT - SUBJECTIVE AND OBJECTIVE BOX
SURGERY DAILY PROGRESS NOTE    SUBJECTIVE: Patient seen and examined on AM rounds. Daughter on phone, questions answered regarding plan for angio. Patient states she is doing well, has some pain in her thigh today but otherwise no changes since yesterday.      OBJECTIVE:  Vital Signs Last 24 Hrs  T(C): 36.4 (18 Nov 2023 05:00), Max: 37.1 (17 Nov 2023 22:39)  T(F): 97.5 (18 Nov 2023 05:00), Max: 98.7 (17 Nov 2023 22:39)  HR: 57 (18 Nov 2023 05:00) (50 - 94)  BP: 149/88 (18 Nov 2023 05:00) (109/59 - 149/88)  BP(mean): --  RR: 18 (18 Nov 2023 05:00) (18 - 18)  SpO2: 98% (18 Nov 2023 05:00) (98% - 100%)    Parameters below as of 18 Nov 2023 05:00  Patient On (Oxygen Delivery Method): room air        I&O's Summary    17 Nov 2023 07:01  -  18 Nov 2023 07:00  --------------------------------------------------------  IN: 300 mL / OUT: 0 mL / NET: 300 mL        Physical Exam:  General Appearance: Appears well, NAD  Chest: Nonlabored breathing on RA  CV:  RRR  Extremities: Grossly symmetric  Vascular: No doppler signals or palpable pulses in RLE. Foot without active wounds    LABS:                        11.8   5.28  )-----------( 176      ( 18 Nov 2023 06:03 )             34.9     11-18    138  |  100  |  18  ----------------------------<  98  3.5   |  27  |  0.69    Ca    9.9      18 Nov 2023 06:03  Phos  2.6     11-18  Mg     1.90     11-18    TPro  7.3  /  Alb  3.7  /  TBili  0.4  /  DBili  x   /  AST  27  /  ALT  17  /  AlkPhos  78  11-16    PT/INR - ( 18 Nov 2023 06:03 )   PT: 10.8 sec;   INR: 0.95 ratio         PTT - ( 18 Nov 2023 06:03 )  PTT:63.8 sec  Urinalysis Basic - ( 18 Nov 2023 06:03 )    Color: x / Appearance: x / SG: x / pH: x  Gluc: 98 mg/dL / Ketone: x  / Bili: x / Urobili: x   Blood: x / Protein: x / Nitrite: x   Leuk Esterase: x / RBC: x / WBC x   Sq Epi: x / Non Sq Epi: x / Bacteria: x

## 2023-11-18 NOTE — DIETITIAN INITIAL EVALUATION ADULT - NAME AND PHONE
Brittni Jansen MS RDN CDN  On [employer] Teams, Pager #[phone]  Brittni Jansen MS RDN CDN  On [employer] Teams, Pager #[phone]  Brittni Jansen MS RDN CDN  On [employer] Teams, Pager #[phone]

## 2023-11-18 NOTE — DIETITIAN INITIAL EVALUATION ADULT - OTHER INFO
Patient is currently ordered for a PO diet with Ensure Surgery TID supplementation. Patient reports a continued decreased appetite during course of admission. Breakfast tray visually observed at bedside, >75% of meal had been consumed. Patient reports she forced herself to eat, but continues to experience a decreased appetite. Documented on RN flowsheet as consuming % of meals. Patient reports she enjoys the Ensure supplements and wishes to continue to receive them. Patient encouraged to complete daily menu selections to help promote PO intake. Patient lacks upper dentition, reports she requires dentures which she does not currently have. Reports she prefers soft textured foods, however is able to tolerate regular solids well. Patient would like to continue with a regular texture diet during admission. Denies any swallowing difficulties. No report of GI distress (nausea, vomiting, diarrhea, constipation). No BMs noted per RN flowsheet documentation. Not noted to be on a bowel regimen.     Writer provided verbal education regarding current diet order and nutrition recommendations for after discharge, including a low sodium dietary pattern and strategies to promote PO intake in the setting of a decreased appetite. Patient verbalized understanding to the discussion.

## 2023-11-18 NOTE — DIETITIAN INITIAL EVALUATION ADULT - PERTINENT MEDS FT
MEDICATIONS  (STANDING):  amLODIPine   Tablet 10 milliGRAM(s) Oral every 24 hours  aspirin  chewable 81 milliGRAM(s) Oral daily  atenolol  Tablet 50 milliGRAM(s) Oral daily  atorvastatin 40 milliGRAM(s) Oral at bedtime  gabapentin 100 milliGRAM(s) Oral three times a day  heparin  Infusion 1100 Unit(s)/Hr (7 mL/Hr) IV Continuous <Continuous>  losartan 100 milliGRAM(s) Oral daily  magnesium sulfate  IVPB 1 Gram(s) IV Intermittent once    MEDICATIONS  (PRN):  acetaminophen     Tablet .. 1000 milliGRAM(s) Oral every 6 hours PRN Mild Pain (1 - 3)  HYDROmorphone  Injectable 0.2 milliGRAM(s) IV Push every 4 hours PRN Severe Pain (7 - 10)  oxyCODONE    IR 5 milliGRAM(s) Oral every 4 hours PRN Moderate Pain (4 - 6)  oxyCODONE    IR 10 milliGRAM(s) Oral every 4 hours PRN Severe Pain (7 - 10)

## 2023-11-18 NOTE — CONSULT NOTE ADULT - ASSESSMENT
63F hx of cad s/p remote stents,  recent RLE angiogram and percutaneous thrombectomy (07/19) of SFA and popliteal arteries, R CFA -> PT bypass with PTFE (7/25) for acute limb ischemia, presenting now with 1.5 weeks of pain in RLE. Reports has had pain in her right foot since her previous vascular procedures in July '23, but the pain has worsened over the last 1.5 weeks. Reports she has been taking her Eliquis as prescribed.        # PAD  per vascular team   plan for OR   ASA and statin   COnt heparin full dose for full AC  Monitor Hgb level        #HTN   BP control  home meds  adjust astolerated   cardfollow up    # CAD  ASA and statin  BP control       DVT and gI PPX 
PAD  asa  statin   plan for angio and intervention   fu with vascular surgery     CAD  history of stent  asa  cont statin   echo shows normal LV function   stress test is non ischemic     Tobacco smoking  counseling given     Pre-Operative Cardiac Risk Stratification and Optimization   Based on patient history and physical exam, the patient is considered to have elevated risk   recent echo and stress test have been unremarkable  can proceed to planned procedure      Advanced care planning was discussed with patient and family.  Risks, benefits and alternatives of the cardiac treatments and medical therapy including procedures were discussed in detail and all questions were answered. Importance of compliance with medical therapy and lifestyle modification to improve cardiovascular health were addressed. Appropriate forms and patient educational materials were reviewed. 30 minutes face to face spent.

## 2023-11-18 NOTE — PROGRESS NOTE ADULT - ASSESSMENT
63F hx of recent RLE angiogram and percutaneous thrombectomy (07/19) of SFA and popliteal arteries, R CFA -> PT bypass with PTFE (7/25) for acute limb ischemia, presenting now with 1.5 weeks of pain in RLE, found to have Pillo IIa acute on chronic limb ischemia secondary to chronically occluded graft.    Plan:  - Angio planning for Monday  - Continue heparin gtt  - Diet: Regular  - Pain management consult  - Medicine consult  - Cardiology consulted, appreciate recs    Vascular Surgery  [id number] 63F hx of recent RLE angiogram and percutaneous thrombectomy (07/19) of SFA and popliteal arteries, R CFA -> PT bypass with PTFE (7/25) for acute limb ischemia, presenting now with 1.5 weeks of pain in RLE, found to have Pillo IIa acute on chronic limb ischemia secondary to chronically occluded graft.    Plan:  - Angio planning for Monday  - Continue heparin gtt  - Diet: Regular  - Pain management consult  - Medicine consult  - Cardiology consulted, appreciate recs    Vascular Surgery  [id number] 63F hx of recent RLE angiogram and percutaneous thrombectomy (07/19) of SFA and popliteal arteries, R CFA -> PT bypass with PTFE (7/25) for acute limb ischemia, presenting now with 1.5 weeks of pain in RLE, found to have Pillo IIa acute on chronic limb ischemia secondary to chronically occluded graft.    Plan:  - Angio planning for Monday  - Continue heparin gtt  - Diet: Regular  - Pain management consult  - Medicine consult  - Cardiology consulted, appreciate recs    Vascular Surgery  [id number]

## 2023-11-18 NOTE — DIETITIAN INITIAL EVALUATION ADULT - ORAL INTAKE PTA/DIET HISTORY
Patient reports no known food allergies or food intolerances. Nutrition supplementation at home includes Ensure 2-3x daily. Patient lacks upper dentition, reports she requires dentures which she does not currently have. Reports she prefers soft textured foods, however is able to tolerate regular solids well. Denies any swallowing difficulties. Patient reports a poor appetite for >3 months secondary to her dental issues. Reports consuming </=1 small sized meal daily, likely meeting <75% estimated energy needs as a result.    Patient reports usual body weight as 152lb, current weight 126lb. Endorses 26lb (17%) weight loss x1 year (not significant)  Per Jose Antonio PATE, noted the following weight history: 57.2kg (11/16), 66kg (7/30), 68.4kg (7/28)   Objective weight measurements suggest 11.2kg (16%) weight loss x4 months period of time (significant)

## 2023-11-18 NOTE — DIETITIAN INITIAL EVALUATION ADULT - ORAL NUTRITION SUPPLEMENTS
Consider change supplement from Ensure Surgery (provides 330kcal, 18gms protein per serving) TID to Ensure Plus High Protein (provides 350kcal, 20gms protein per serving) TID.

## 2023-11-18 NOTE — CONSULT NOTE ADULT - SUBJECTIVE AND OBJECTIVE BOX
63F hx of cad s/p remote stents,  recent RLE angiogram and percutaneous thrombectomy (07/19) of SFA and popliteal arteries, R CFA -> PT bypass with PTFE (7/25) for acute limb ischemia, presenting now with 1.5 weeks of pain in RLE. Reports has had pain in her right foot since her previous vascular procedures in July '23, but the pain has worsened over the last 1.5 weeks. Reports she has been taking her Eliquis as prescribed.    Subjective: Patient seen and examined. No new events except as noted.     REVIEW OF SYSTEMS:    CONSTITUTIONAL: No weakness, fevers or chills  EYES/ENT: No visual changes;  No vertigo or throat pain   NECK: No pain or stiffness  RESPIRATORY: No cough, wheezing, hemoptysis; No shortness of breath  CARDIOVASCULAR: No chest pain or palpitations  GASTROINTESTINAL: No abdominal or epigastric pain. No nausea, vomiting, or hematemesis; No diarrhea or constipation. No melena or hematochezia.  GENITOURINARY: No dysuria, frequency or hematuria  NEUROLOGICAL: No numbness or weakness  SKIN: No itching, burning, rashes, or lesions   All other review of systems is negative unless indicated above.    MEDICATIONS:  MEDICATIONS  (STANDING):  amLODIPine   Tablet 10 milliGRAM(s) Oral every 24 hours  aspirin  chewable 81 milliGRAM(s) Oral daily  atenolol  Tablet 50 milliGRAM(s) Oral daily  atorvastatin 40 milliGRAM(s) Oral at bedtime  gabapentin 100 milliGRAM(s) Oral three times a day  heparin  Infusion 1100 Unit(s)/Hr (6 mL/Hr) IV Continuous <Continuous>  losartan 100 milliGRAM(s) Oral daily    Home Medications:  acetaminophen 500 mg oral tablet: 2 tab(s) orally every 6 hours (26 Jul 2023 11:20)  Albuterol (Eqv-ProAir HFA) 90 mcg/inh inhalation aerosol: 2 inhaled every 6 hours as needed for  shortness of breath and/or wheezing (18 Jul 2023 11:04)  amLODIPine 10 mg oral tablet: 1 orally once a day (18 Jul 2023 10:59)  apixaban 5 mg oral tablet: 2 tab(s) orally 2 times a day (01 Aug 2023 10:15)  aspirin 81 mg oral tablet: 1 orally once a day (18 Jul 2023 00:28)  atenolol-chlorthalidone 50 mg-25 mg oral tablet: 1 orally once a day (18 Jul 2023 00:27)  atorvastatin 40 mg oral tablet: 1 orally once a day (18 Jul 2023 10:58)  losartan 100 mg oral tablet: 1 orally once a day (18 Jul 2023 00:27)    PAST MEDICAL & SURGICAL HISTORY:  Stroke      PAD (peripheral artery disease)      Hypertension      Allergies    No Known Allergies    Intolerances            PHYSICAL EXAM:  T(C): 36.9 (11-18-23 @ 16:39), Max: 37.1 (11-17-23 @ 22:39)  HR: 57 (11-18-23 @ 16:39) (50 - 94)  BP: 122/64 (11-18-23 @ 16:39) (109/59 - 149/88)  RR: 18 (11-18-23 @ 16:39) (18 - 18)  SpO2: 100% (11-18-23 @ 16:39) (98% - 100%)  Wt(kg): --  I&O's Summary    17 Nov 2023 07:01  -  18 Nov 2023 07:00  --------------------------------------------------------  IN: 300 mL / OUT: 0 mL / NET: 300 mL    18 Nov 2023 07:01  -  18 Nov 2023 18:07  --------------------------------------------------------  IN: 480 mL / OUT: 0 mL / NET: 480 mL          Appearance: Normal	  HEENT:  PERRLA   Lymphatic: No lymphadenopathy   Cardiovascular: Normal S1 S2, no JVD  Respiratory: normal effort , clear  Gastrointestinal:  Soft, Non-tender  Skin: No rashes,  warm to touch  Psychiatry:  Mood & affect appropriate  Musculuskeletal: No edema    recent labs, Imaging and EKGs personally reviewed       11-17-23 @ 07:01  -  11-18-23 @ 07:00  --------------------------------------------------------  IN: 300 mL / OUT: 0 mL / NET: 300 mL    11-18-23 @ 07:01  -  11-18-23 @ 18:07  --------------------------------------------------------  IN: 480 mL / OUT: 0 mL / NET: 480 mL                          11.8   5.28  )-----------( 176      ( 18 Nov 2023 06:03 )             34.9               11-18    138  |  100  |  18  ----------------------------<  98  3.5   |  27  |  0.69    Ca    9.9      18 Nov 2023 06:03  Phos  2.6     11-18  Mg     1.90     11-18      PT/INR - ( 18 Nov 2023 06:03 )   PT: 10.8 sec;   INR: 0.95 ratio         PTT - ( 18 Nov 2023 14:46 )  PTT:100.1 sec                   Urinalysis Basic - ( 18 Nov 2023 06:03 )    Color: x / Appearance: x / SG: x / pH: x  Gluc: 98 mg/dL / Ketone: x  / Bili: x / Urobili: x   Blood: x / Protein: x / Nitrite: x   Leuk Esterase: x / RBC: x / WBC x   Sq Epi: x / Non Sq Epi: x / Bacteria: x

## 2023-11-19 LAB
ANION GAP SERPL CALC-SCNC: 10 MMOL/L — SIGNIFICANT CHANGE UP (ref 7–14)
APTT BLD: 67.9 SEC — HIGH (ref 24.5–35.6)
BUN SERPL-MCNC: 22 MG/DL — SIGNIFICANT CHANGE UP (ref 7–23)
CALCIUM SERPL-MCNC: 10.3 MG/DL — SIGNIFICANT CHANGE UP (ref 8.4–10.5)
CHLORIDE SERPL-SCNC: 96 MMOL/L — LOW (ref 98–107)
CO2 SERPL-SCNC: 30 MMOL/L — SIGNIFICANT CHANGE UP (ref 22–31)
CREAT SERPL-MCNC: 0.81 MG/DL — SIGNIFICANT CHANGE UP (ref 0.5–1.3)
EGFR: 82 ML/MIN/1.73M2 — SIGNIFICANT CHANGE UP
GLUCOSE SERPL-MCNC: 89 MG/DL — SIGNIFICANT CHANGE UP (ref 70–99)
HCT VFR BLD CALC: 38.6 % — SIGNIFICANT CHANGE UP (ref 34.5–45)
HGB BLD-MCNC: 12.5 G/DL — SIGNIFICANT CHANGE UP (ref 11.5–15.5)
MAGNESIUM SERPL-MCNC: 1.9 MG/DL — SIGNIFICANT CHANGE UP (ref 1.6–2.6)
MCHC RBC-ENTMCNC: 32.4 GM/DL — SIGNIFICANT CHANGE UP (ref 32–36)
MCHC RBC-ENTMCNC: 32.8 PG — SIGNIFICANT CHANGE UP (ref 27–34)
MCV RBC AUTO: 101.3 FL — HIGH (ref 80–100)
NRBC # BLD: 0 /100 WBCS — SIGNIFICANT CHANGE UP (ref 0–0)
NRBC # FLD: 0 K/UL — SIGNIFICANT CHANGE UP (ref 0–0)
PHOSPHATE SERPL-MCNC: 3.1 MG/DL — SIGNIFICANT CHANGE UP (ref 2.5–4.5)
PLATELET # BLD AUTO: 177 K/UL — SIGNIFICANT CHANGE UP (ref 150–400)
POTASSIUM SERPL-MCNC: 4.1 MMOL/L — SIGNIFICANT CHANGE UP (ref 3.5–5.3)
POTASSIUM SERPL-SCNC: 4.1 MMOL/L — SIGNIFICANT CHANGE UP (ref 3.5–5.3)
RBC # BLD: 3.81 M/UL — SIGNIFICANT CHANGE UP (ref 3.8–5.2)
RBC # FLD: 13.1 % — SIGNIFICANT CHANGE UP (ref 10.3–14.5)
SODIUM SERPL-SCNC: 136 MMOL/L — SIGNIFICANT CHANGE UP (ref 135–145)
WBC # BLD: 5.29 K/UL — SIGNIFICANT CHANGE UP (ref 3.8–10.5)
WBC # FLD AUTO: 5.29 K/UL — SIGNIFICANT CHANGE UP (ref 3.8–10.5)

## 2023-11-19 RX ORDER — KETOROLAC TROMETHAMINE 30 MG/ML
15 SYRINGE (ML) INJECTION EVERY 6 HOURS
Refills: 0 | Status: DISCONTINUED | OUTPATIENT
Start: 2023-11-19 | End: 2023-11-21

## 2023-11-19 RX ORDER — ACETAMINOPHEN 500 MG
1000 TABLET ORAL EVERY 6 HOURS
Refills: 0 | Status: COMPLETED | OUTPATIENT
Start: 2023-11-19 | End: 2023-11-21

## 2023-11-19 RX ORDER — CYCLOBENZAPRINE HYDROCHLORIDE 10 MG/1
5 TABLET, FILM COATED ORAL EVERY 8 HOURS
Refills: 0 | Status: COMPLETED | OUTPATIENT
Start: 2023-11-19 | End: 2023-11-20

## 2023-11-19 RX ORDER — GABAPENTIN 400 MG/1
200 CAPSULE ORAL EVERY 8 HOURS
Refills: 0 | Status: DISCONTINUED | OUTPATIENT
Start: 2023-11-19 | End: 2023-11-27

## 2023-11-19 RX ORDER — MAGNESIUM SULFATE 500 MG/ML
1 VIAL (ML) INJECTION ONCE
Refills: 0 | Status: COMPLETED | OUTPATIENT
Start: 2023-11-19 | End: 2023-11-19

## 2023-11-19 RX ORDER — HYDROMORPHONE HYDROCHLORIDE 2 MG/ML
4 INJECTION INTRAMUSCULAR; INTRAVENOUS; SUBCUTANEOUS
Refills: 0 | Status: DISCONTINUED | OUTPATIENT
Start: 2023-11-19 | End: 2023-11-26

## 2023-11-19 RX ORDER — HYDROMORPHONE HYDROCHLORIDE 2 MG/ML
2 INJECTION INTRAMUSCULAR; INTRAVENOUS; SUBCUTANEOUS
Refills: 0 | Status: DISCONTINUED | OUTPATIENT
Start: 2023-11-19 | End: 2023-11-19

## 2023-11-19 RX ADMIN — Medication 81 MILLIGRAM(S): at 05:51

## 2023-11-19 RX ADMIN — ATENOLOL 50 MILLIGRAM(S): 25 TABLET ORAL at 05:52

## 2023-11-19 RX ADMIN — GABAPENTIN 100 MILLIGRAM(S): 400 CAPSULE ORAL at 05:52

## 2023-11-19 RX ADMIN — HYDROMORPHONE HYDROCHLORIDE 0.2 MILLIGRAM(S): 2 INJECTION INTRAMUSCULAR; INTRAVENOUS; SUBCUTANEOUS at 02:00

## 2023-11-19 RX ADMIN — OXYCODONE HYDROCHLORIDE 10 MILLIGRAM(S): 5 TABLET ORAL at 11:15

## 2023-11-19 RX ADMIN — Medication 1000 MILLIGRAM(S): at 22:44

## 2023-11-19 RX ADMIN — ATORVASTATIN CALCIUM 40 MILLIGRAM(S): 80 TABLET, FILM COATED ORAL at 22:44

## 2023-11-19 RX ADMIN — Medication 1000 MILLIGRAM(S): at 23:24

## 2023-11-19 RX ADMIN — GABAPENTIN 100 MILLIGRAM(S): 400 CAPSULE ORAL at 13:46

## 2023-11-19 RX ADMIN — Medication 15 MILLIGRAM(S): at 19:51

## 2023-11-19 RX ADMIN — HYDROMORPHONE HYDROCHLORIDE 4 MILLIGRAM(S): 2 INJECTION INTRAMUSCULAR; INTRAVENOUS; SUBCUTANEOUS at 17:58

## 2023-11-19 RX ADMIN — OXYCODONE HYDROCHLORIDE 10 MILLIGRAM(S): 5 TABLET ORAL at 00:30

## 2023-11-19 RX ADMIN — Medication 1000 MILLIGRAM(S): at 17:58

## 2023-11-19 RX ADMIN — OXYCODONE HYDROCHLORIDE 10 MILLIGRAM(S): 5 TABLET ORAL at 05:55

## 2023-11-19 RX ADMIN — HEPARIN SODIUM 6 UNIT(S)/HR: 5000 INJECTION INTRAVENOUS; SUBCUTANEOUS at 08:30

## 2023-11-19 RX ADMIN — OXYCODONE HYDROCHLORIDE 10 MILLIGRAM(S): 5 TABLET ORAL at 06:55

## 2023-11-19 RX ADMIN — HEPARIN SODIUM 6 UNIT(S)/HR: 5000 INJECTION INTRAVENOUS; SUBCUTANEOUS at 19:59

## 2023-11-19 RX ADMIN — AMLODIPINE BESYLATE 10 MILLIGRAM(S): 2.5 TABLET ORAL at 05:51

## 2023-11-19 RX ADMIN — HEPARIN SODIUM 6 UNIT(S)/HR: 5000 INJECTION INTRAVENOUS; SUBCUTANEOUS at 20:18

## 2023-11-19 RX ADMIN — OXYCODONE HYDROCHLORIDE 10 MILLIGRAM(S): 5 TABLET ORAL at 11:22

## 2023-11-19 RX ADMIN — HYDROMORPHONE HYDROCHLORIDE 0.2 MILLIGRAM(S): 2 INJECTION INTRAMUSCULAR; INTRAVENOUS; SUBCUTANEOUS at 01:36

## 2023-11-19 RX ADMIN — OXYCODONE HYDROCHLORIDE 10 MILLIGRAM(S): 5 TABLET ORAL at 01:30

## 2023-11-19 RX ADMIN — LOSARTAN POTASSIUM 100 MILLIGRAM(S): 100 TABLET, FILM COATED ORAL at 05:51

## 2023-11-19 RX ADMIN — CYCLOBENZAPRINE HYDROCHLORIDE 5 MILLIGRAM(S): 10 TABLET, FILM COATED ORAL at 22:44

## 2023-11-19 RX ADMIN — Medication 100 GRAM(S): at 13:46

## 2023-11-19 NOTE — PROGRESS NOTE ADULT - ASSESSMENT
PAD  asa  statin   plan for angio and intervention   fu with vascular surgery     CAD  history of stent  asa  cont statin   echo shows normal LV function   stress test is non ischemic     Tobacco smoking  counseling given     Pre-Operative Cardiac Risk Stratification and Optimization   Based on patient history and physical exam, the patient is considered to have elevated risk   recent echo and stress test have been unremarkable  can proceed to planned procedure

## 2023-11-19 NOTE — PROGRESS NOTE ADULT - SUBJECTIVE AND OBJECTIVE BOX
Name of Patient : BRE HODGE  MRN: [id number]  Date of visit: 11-19-23      Subjective: Patient seen and examined. No new events except as noted.   Doing okay     REVIEW OF SYSTEMS:    CONSTITUTIONAL: No weakness, fevers or chills  EYES/ENT: No visual changes;  No vertigo or throat pain   NECK: No pain or stiffness  RESPIRATORY: No cough, wheezing, hemoptysis; No shortness of breath  CARDIOVASCULAR: No chest pain or palpitations  GASTROINTESTINAL: No abdominal or epigastric pain. No nausea, vomiting, or hematemesis; No diarrhea or constipation. No melena or hematochezia.  GENITOURINARY: No dysuria, frequency or hematuria  NEUROLOGICAL: No numbness or weakness  SKIN: No itching, burning, rashes, or lesions   All other review of systems is negative unless indicated above.    MEDICATIONS:  MEDICATIONS  (STANDING):  acetaminophen     Tablet .. 1000 milliGRAM(s) Oral every 6 hours  amLODIPine   Tablet 10 milliGRAM(s) Oral every 24 hours  aspirin  chewable 81 milliGRAM(s) Oral daily  atenolol  Tablet 50 milliGRAM(s) Oral daily  atorvastatin 40 milliGRAM(s) Oral at bedtime  cyclobenzaprine 5 milliGRAM(s) Oral every 8 hours  gabapentin 200 milliGRAM(s) Oral every 8 hours  heparin  Infusion 1100 Unit(s)/Hr (6 mL/Hr) IV Continuous <Continuous>  ketorolac   Injectable 15 milliGRAM(s) IV Push every 6 hours  losartan 100 milliGRAM(s) Oral daily      PHYSICAL EXAM:  T(C): 36.6 (11-19-23 @ 19:04), Max: 36.8 (11-19-23 @ 02:00)  HR: 52 (11-19-23 @ 19:04) (51 - 75)  BP: 107/60 (11-19-23 @ 19:04) (107/60 - 166/87)  RR: 18 (11-19-23 @ 19:04) (16 - 18)  SpO2: 97% (11-19-23 @ 19:04) (97% - 100%)  Wt(kg): --  I&O's Summary    18 Nov 2023 07:01  -  19 Nov 2023 07:00  --------------------------------------------------------  IN: 1380 mL / OUT: 0 mL / NET: 1380 mL    19 Nov 2023 07:01  -  19 Nov 2023 19:10  --------------------------------------------------------  IN: 1210 mL / OUT: 0 mL / NET: 1210 mL          Appearance: Normal	  HEENT:  PERRLA   Lymphatic: No lymphadenopathy   Cardiovascular: Normal S1 S2, no JVD  Respiratory: normal effort , clear  Gastrointestinal:  Soft, Non-tender  Skin: No rashes,  warm to touch  Psychiatry:  Mood & affect appropriate  Musculuskeletal: No edema    recent labs, Imaging and EKGs personally reviewed     11-18-23 @ 07:01  -  11-19-23 @ 07:00  --------------------------------------------------------  IN: 1380 mL / OUT: 0 mL / NET: 1380 mL    11-19-23 @ 07:01  -  11-19-23 @ 19:10  --------------------------------------------------------  IN: 1210 mL / OUT: 0 mL / NET: 1210 mL                          12.5   5.29  )-----------( 177      ( 19 Nov 2023 05:21 )             38.6               11-19    136  |  96<L>  |  22  ----------------------------<  89  4.1   |  30  |  0.81    Ca    10.3      19 Nov 2023 05:21  Phos  3.1     11-19  Mg     1.90     11-19      PT/INR - ( 18 Nov 2023 06:03 )   PT: 10.8 sec;   INR: 0.95 ratio         PTT - ( 19 Nov 2023 05:21 )  PTT:67.9 sec                   Urinalysis Basic - ( 19 Nov 2023 05:21 )    Color: x / Appearance: x / SG: x / pH: x  Gluc: 89 mg/dL / Ketone: x  / Bili: x / Urobili: x   Blood: x / Protein: x / Nitrite: x   Leuk Esterase: x / RBC: x / WBC x   Sq Epi: x / Non Sq Epi: x / Bacteria: x               Name of Patient : BRE HODGE  MRN: [id number]  Date of visit: 11-19-23      Subjective: Patient seen and examined. No new events except as noted.   Doing okay     REVIEW OF SYSTEMS:    CONSTITUTIONAL: No weakness, fevers or chills  EYES/ENT: No visual changes;  No vertigo or throat pain   NECK: No pain or stiffness  RESPIRATORY: No cough, wheezing, hemoptysis; No shortness of breath  CARDIOVASCULAR: No chest pain or palpitations  GASTROINTESTINAL: No abdominal or epigastric pain. No nausea, vomiting, or hematemesis; No diarrhea or constipation. No melena or hematochezia.  GENITOURINARY: No dysuria, frequency or hematuria  NEUROLOGICAL: No numbness or weakness  SKIN: No itching, burning, rashes, or lesions   All other review of systems is negative unless indicated above.    MEDICATIONS:  MEDICATIONS  (STANDING):  acetaminophen     Tablet .. 1000 milliGRAM(s) Oral every 6 hours  amLODIPine   Tablet 10 milliGRAM(s) Oral every 24 hours  aspirin  chewable 81 milliGRAM(s) Oral daily  atenolol  Tablet 50 milliGRAM(s) Oral daily  atorvastatin 40 milliGRAM(s) Oral at bedtime  cyclobenzaprine 5 milliGRAM(s) Oral every 8 hours  gabapentin 200 milliGRAM(s) Oral every 8 hours  heparin  Infusion 1100 Unit(s)/Hr (6 mL/Hr) IV Continuous <Continuous>  ketorolac   Injectable 15 milliGRAM(s) IV Push every 6 hours  losartan 100 milliGRAM(s) Oral daily      PHYSICAL EXAM:  T(C): 36.6 (11-19-23 @ 19:04), Max: 36.8 (11-19-23 @ 02:00)  HR: 52 (11-19-23 @ 19:04) (51 - 75)  BP: 107/60 (11-19-23 @ 19:04) (107/60 - 166/87)  RR: 18 (11-19-23 @ 19:04) (16 - 18)  SpO2: 97% (11-19-23 @ 19:04) (97% - 100%)  Wt(kg): --  I&O's Summary    18 Nov 2023 07:01  -  19 Nov 2023 07:00  --------------------------------------------------------  IN: 1380 mL / OUT: 0 mL / NET: 1380 mL    19 Nov 2023 07:01  -  19 Nov 2023 19:10  --------------------------------------------------------  IN: 1210 mL / OUT: 0 mL / NET: 1210 mL          Appearance: Normal	  HEENT:  PERRLA   Lymphatic: No lymphadenopathy   Cardiovascular: Normal S1 S2, no JVD  Respiratory: normal effort , clear  Gastrointestinal:  Soft, Non-tender  Skin: No rashes,  warm to touch  Psychiatry:  Mood & affect appropriate  Musculuskeletal: No edema    recent labs, Imaging and EKGs personally reviewed     11-18-23 @ 07:01  -  11-19-23 @ 07:00  --------------------------------------------------------  IN: 1380 mL / OUT: 0 mL / NET: 1380 mL    11-19-23 @ 07:01  -  11-19-23 @ 19:10  --------------------------------------------------------  IN: 1210 mL / OUT: 0 mL / NET: 1210 mL                          12.5   5.29  )-----------( 177      ( 19 Nov 2023 05:21 )             38.6               11-19    136  |  96<L>  |  22  ----------------------------<  89  4.1   |  30  |  0.81    Ca    10.3      19 Nov 2023 05:21  Phos  3.1     11-19  Mg     1.90     11-19      PT/INR - ( 18 Nov 2023 06:03 )   PT: 10.8 sec;   INR: 0.95 ratio         PTT - ( 19 Nov 2023 05:21 )  PTT:67.9 sec                   Urinalysis Basic - ( 19 Nov 2023 05:21 )    Color: x / Appearance: x / SG: x / pH: x  Gluc: 89 mg/dL / Ketone: x  / Bili: x / Urobili: x   Blood: x / Protein: x / Nitrite: x   Leuk Esterase: x / RBC: x / WBC x   Sq Epi: x / Non Sq Epi: x / Bacteria: x               Name of Patient : BRE HODGE  MRN: [id number]  Date of visit: 11-19-23      Subjective: Patient seen and examined. No new events except as noted.   Doing okay     REVIEW OF SYSTEMS:    CONSTITUTIONAL: No weakness, fevers or chills  EYES/ENT: No visual changes;  No vertigo or throat pain   NECK: No pain or stiffness  RESPIRATORY: No cough, wheezing, hemoptysis; No shortness of breath  CARDIOVASCULAR: No chest pain or palpitations  GASTROINTESTINAL: No abdominal or epigastric pain. No nausea, vomiting, or hematemesis; No diarrhea or constipation. No melena or hematochezia.  GENITOURINARY: No dysuria, frequency or hematuria  NEUROLOGICAL: No numbness or weakness  SKIN: No itching, burning, rashes, or lesions   All other review of systems is negative unless indicated above.    MEDICATIONS:  MEDICATIONS  (STANDING):  acetaminophen     Tablet .. 1000 milliGRAM(s) Oral every 6 hours  amLODIPine   Tablet 10 milliGRAM(s) Oral every 24 hours  aspirin  chewable 81 milliGRAM(s) Oral daily  atenolol  Tablet 50 milliGRAM(s) Oral daily  atorvastatin 40 milliGRAM(s) Oral at bedtime  cyclobenzaprine 5 milliGRAM(s) Oral every 8 hours  gabapentin 200 milliGRAM(s) Oral every 8 hours  heparin  Infusion 1100 Unit(s)/Hr (6 mL/Hr) IV Continuous <Continuous>  ketorolac   Injectable 15 milliGRAM(s) IV Push every 6 hours  losartan 100 milliGRAM(s) Oral daily      PHYSICAL EXAM:  T(C): 36.6 (11-19-23 @ 19:04), Max: 36.8 (11-19-23 @ 02:00)  HR: 52 (11-19-23 @ 19:04) (51 - 75)  BP: 107/60 (11-19-23 @ 19:04) (107/60 - 166/87)  RR: 18 (11-19-23 @ 19:04) (16 - 18)  SpO2: 97% (11-19-23 @ 19:04) (97% - 100%)  Wt(kg): --  I&O's Summary    18 Nov 2023 07:01  -  19 Nov 2023 07:00  --------------------------------------------------------  IN: 1380 mL / OUT: 0 mL / NET: 1380 mL    19 Nov 2023 07:01  -  19 Nov 2023 19:10  --------------------------------------------------------  IN: 1210 mL / OUT: 0 mL / NET: 1210 mL          Appearance: Normal	  HEENT:  PERRLA   Lymphatic: No lymphadenopathy   Cardiovascular: Normal S1 S2, no JVD  Respiratory: normal effort , clear  Gastrointestinal:  Soft, Non-tender  Skin: No rashes,  warm to touch  Psychiatry:  Mood & affect appropriate  Musculuskeletal: No edema    recent labs, Imaging and EKGs personally reviewed     11-18-23 @ 07:01  -  11-19-23 @ 07:00  --------------------------------------------------------  IN: 1380 mL / OUT: 0 mL / NET: 1380 mL    11-19-23 @ 07:01  -  11-19-23 @ 19:10  --------------------------------------------------------  IN: 1210 mL / OUT: 0 mL / NET: 1210 mL                          12.5   5.29  )-----------( 177      ( 19 Nov 2023 05:21 )             38.6               11-19    136  |  96<L>  |  22  ----------------------------<  89  4.1   |  30  |  0.81    Ca    10.3      19 Nov 2023 05:21  Phos  3.1     11-19  Mg     1.90     11-19      PT/INR - ( 18 Nov 2023 06:03 )   PT: 10.8 sec;   INR: 0.95 ratio         PTT - ( 19 Nov 2023 05:21 )  PTT:67.9 sec                   Urinalysis Basic - ( 19 Nov 2023 05:21 )    Color: x / Appearance: x / SG: x / pH: x  Gluc: 89 mg/dL / Ketone: x  / Bili: x / Urobili: x   Blood: x / Protein: x / Nitrite: x   Leuk Esterase: x / RBC: x / WBC x   Sq Epi: x / Non Sq Epi: x / Bacteria: x

## 2023-11-19 NOTE — PROGRESS NOTE ADULT - SUBJECTIVE AND OBJECTIVE BOX
Subjective: Patient seen and examined. No new events except as noted.     SUBJECTIVE/ROS:    MEDICATIONS:  MEDICATIONS  (STANDING):  amLODIPine   Tablet 10 milliGRAM(s) Oral every 24 hours  aspirin  chewable 81 milliGRAM(s) Oral daily  atenolol  Tablet 50 milliGRAM(s) Oral daily  atorvastatin 40 milliGRAM(s) Oral at bedtime  gabapentin 100 milliGRAM(s) Oral three times a day  heparin  Infusion 1100 Unit(s)/Hr (6 mL/Hr) IV Continuous <Continuous>  losartan 100 milliGRAM(s) Oral daily      PHYSICAL EXAM:  T(C): 36.4 (11-19-23 @ 05:30), Max: 36.9 (11-18-23 @ 16:39)  HR: 74 (11-19-23 @ 05:30) (50 - 74)  BP: 166/87 (11-19-23 @ 05:30) (111/59 - 166/87)  RR: 18 (11-19-23 @ 05:30) (18 - 18)  SpO2: 97% (11-19-23 @ 05:30) (97% - 100%)  Wt(kg): --  I&O's Summary    18 Nov 2023 07:01  -  19 Nov 2023 07:00  --------------------------------------------------------  IN: 1130 mL / OUT: 0 mL / NET: 1130 mL            JVP: Normal  Neck: supple  Lung: clear   CV: S1 S2 , Murmur:  Abd: soft  Ext: No edema  neuro: Awake / alert  Psych: flat affect  Skin: normal``    LABS/DATA:    CARDIAC MARKERS:                                12.5   5.29  )-----------( 177      ( 19 Nov 2023 05:21 )             38.6     11-19    136  |  96<L>  |  22  ----------------------------<  89  4.1   |  30  |  0.81    Ca    10.3      19 Nov 2023 05:21  Phos  3.1     11-19  Mg     1.90     11-19      proBNP:   Lipid Profile:   HgA1c:   TSH:     TELE:  EKG:

## 2023-11-19 NOTE — PROGRESS NOTE ADULT - SUBJECTIVE AND OBJECTIVE BOX
C Team Surgery Progress Note     S: Patient resting comfortably on morning rounds. Reports that RLE pain is not well-controlled on current regimen. Tolerating diet. Denies n/v. Daughter on phone, questions regarding angio were answered.      MEDICATIONS  (STANDING):  amLODIPine   Tablet 10 milliGRAM(s) Oral every 24 hours  aspirin  chewable 81 milliGRAM(s) Oral daily  atenolol  Tablet 50 milliGRAM(s) Oral daily  atorvastatin 40 milliGRAM(s) Oral at bedtime  gabapentin 100 milliGRAM(s) Oral three times a day  heparin  Infusion 1100 Unit(s)/Hr (6 mL/Hr) IV Continuous <Continuous>  losartan 100 milliGRAM(s) Oral daily    MEDICATIONS  (PRN):  acetaminophen     Tablet .. 1000 milliGRAM(s) Oral every 6 hours PRN Mild Pain (1 - 3)  HYDROmorphone  Injectable 0.2 milliGRAM(s) IV Push every 4 hours PRN Severe Pain (7 - 10)  oxyCODONE    IR 5 milliGRAM(s) Oral every 4 hours PRN Moderate Pain (4 - 6)  oxyCODONE    IR 10 milliGRAM(s) Oral every 4 hours PRN Severe Pain (7 - 10)      T(C): 36.4 (11-19-23 @ 05:30), Max: 36.9 (11-18-23 @ 16:39)  HR: 74 (11-19-23 @ 05:30) (50 - 74)  BP: 166/87 (11-19-23 @ 05:30) (111/59 - 166/87)  RR: 18 (11-19-23 @ 05:30) (18 - 18)  SpO2: 97% (11-19-23 @ 05:30) (97% - 100%)        11-18-23 @ 07:01  -  11-19-23 @ 07:00  --------------------------------------------------------  IN: 1380 mL / OUT: 0 mL / NET: 1380 mL        Physical Exam:  General Appearance: Appears well, NAD  Chest: Nonlabored breathing on RA  CV:  RRR  Extremities: Grossly symmetric  Vascular: No doppler signals or palpable pulses in RLE. Foot without active wounds    LABS:                        12.5   5.29  )-----------( 177      ( 19 Nov 2023 05:21 )             38.6     11-19    136  |  96<L>  |  22  ----------------------------<  89  4.1   |  30  |  0.81    Ca    10.3      19 Nov 2023 05:21  Phos  3.1     11-19  Mg     1.90     11-19

## 2023-11-19 NOTE — PROGRESS NOTE ADULT - ASSESSMENT
Assessment:  63F hx of recent RLE angiogram and percutaneous thrombectomy (07/19) of SFA and popliteal arteries, R CFA -> PT bypass with PTFE (7/25) for acute limb ischemia, presenting now with 1.5 weeks of pain in RLE, found to have [place] IIa acute on chronic limb ischemia secondary to chronically occluded graft.    Plan:  - Angio planning for Wednesday  - Consulted pain management for recs  - Continue heparin gtt  - Diet: Regular  - Medicine consult - appreciate recs for optimization  - Cardiology consulted - optimized appreciate recs    Vascular Surgery  [id number] Assessment:  63F hx of recent RLE angiogram and percutaneous thrombectomy (07/19) of SFA and popliteal arteries, R CFA -> PT bypass with PTFE (7/25) for acute limb ischemia, presenting now with 1.5 weeks of pain in RLE, found to have [place] IIa acute on chronic limb ischemia secondary to chronically occluded graft.    Plan:  - Angio planning for Wednesday  - Consulted pain management for recs  - Continue heparin gtt  - Diet: Regular  - Medicine consult - appreciate recs for optimization  - Cardiology consulted - optimized appreciate recs    Vascular Surgery  [id number] Assessment:  63F hx of recent RLE angiogram and percutaneous thrombectomy (07/19) of SFA and popliteal arteries, R CFA -> PT bypass with PTFE (7/25) for acute limb ischemia, presenting now with 1.5 weeks of pain in RLE, found to have [place] IIa acute on chronic limb ischemia secondary to chronically occluded graft.    Plan:  - Angio planning for Wednesday  - Consulted pain management for recs  - Continue heparin gtt  - Diet: Regular  - Medicine consult - appreciate recs for optimization  - Cardiology consulted - optimized appreciate recs    Vascular Surgery  [id number]

## 2023-11-19 NOTE — PROGRESS NOTE ADULT - SUBJECTIVE AND OBJECTIVE BOX
Follow up consult for Acute Pain Management     SUBJECTIVE:  The patient states she has 10/10, right foot pain that she describes as feeling tight, and expanding by her right heel and numbness and tingling of her toes.   The patient feels that the Oxycodone does not help at all for her right foot pain.  At home, I-stop reveals patient is not taking any pain medications.  However, the patient did reveal that she smokes 4-6 cigarettes daily, and has been smoking for the past 40 years.   She also drinks a 40 ounce of beer any time she has money and it lasts for however long it lasts, and she also smokes Marijuana consistently.  Patient sees a psychiatrist who was going to put her on medication for anxiety and depression, but she missed her appointment before coming here.  		  OBJECTIVE:  Patient is sitting up in bed.    Pain Score: 10/10  (X) Refer to pain scores    Therapy:	[ ] IV PCA	[ ] Epidural   [ ] s/p Spinal Opioid	[ ] Peripheral nerve block  (x) PRN Oral/IV opioids and or Adjuvant non-opioid medications  	  Vital Signs Last 24 Hrs  T(C): 36.7 (19 Nov 2023 11:05), Max: 36.9 (18 Nov 2023 16:39)  T(F): 98 (19 Nov 2023 11:05), Max: 98.5 (18 Nov 2023 16:39)  HR: 75 (19 Nov 2023 11:05) (51 - 75)  BP: 139/80 (19 Nov 2023 11:05) (111/59 - 166/87)  BP(mean): --  RR: 18 (19 Nov 2023 11:05) (18 - 18)  SpO2: 100% (19 Nov 2023 11:05) (97% - 100%)    Parameters below as of 19 Nov 2023 11:05  Patient On (Oxygen Delivery Method): room air        ( x) Alert & Oriented     ( ) No motor/sensory block     ( ) Nausea     ( ) Pruritis     ( ) Headache    ASSESSMENT/ PLAN    Therapy to  be:	[x ] Continue   [ ] Discontinued      Documentation and Verification of current medications:   [X] Done	[ ] Not done, not elligible    Comments:   Patient's pain is not controlled with current pain regimen.  Recommend:  -Discontinue Oxycodone doses, and instead order:  po Dilaudid 2mg every 3 hours PRN for moderate pain.  Hold for oversedation. Not to be given within 1 hour of any other immediate acting opioid.   po Dilaudid 4mg every 3 hours PRN for severe pain.  Hold for oversedation. Not to be given within 1 hour of any other immediate acting opioid.  -Increase Gabapentin to 200mg every 8 hours.  Hold for oversedation.  -Consider changing po Tylenol to po Tylenol 650 mg every 6 hours standing x 2 days, then PRN for pain.  Please alternate with IV Toradol.  -Consider ordering IV Toradol 15mg every 6 hours standing x 2 days, then change to Motrin PRN?  Please alternate with po Tylenol.  -Consider ordering Flexeril 5 mg every 8 hours standing x 24 hours, then PRN muscle spasm.    -Recommend Psychiatry consult for anxiety and depression.  Patient is aware and wants someone to see her.  Pain service to sign off, no further recommendations for pain medications, at this time.  May call pain service if needed.    Progress Note written now but Patient was seen earlier.

## 2023-11-19 NOTE — PROGRESS NOTE ADULT - ASSESSMENT
63F hx of cad s/p remote stents,  recent RLE angiogram and percutaneous thrombectomy (07/19) of SFA and popliteal arteries, R CFA -> PT bypass with PTFE (7/25) for acute limb ischemia, presenting now with 1.5 weeks of pain in RLE. Reports has had pain in her right foot since her previous vascular procedures in July '23, but the pain has worsened over the last 1.5 weeks. Reports she has been taking her Eliquis as prescribed.        # PAD  per vascular team   plan for OR   ASA and statin   COnt heparin full dose for full AC  Monitor Hgb level        #HTN   BP control  home meds  adjust astolerated   cardfollow up    # CAD  ASA and statin  BP control       DVT and gI PPX

## 2023-11-20 ENCOUNTER — APPOINTMENT (OUTPATIENT)
Dept: VASCULAR SURGERY | Facility: CLINIC | Age: 63
End: 2023-11-20

## 2023-11-20 LAB
ANION GAP SERPL CALC-SCNC: 11 MMOL/L — SIGNIFICANT CHANGE UP (ref 7–14)
APTT BLD: 63.5 SEC — HIGH (ref 24.5–35.6)
BUN SERPL-MCNC: 32 MG/DL — HIGH (ref 7–23)
CALCIUM SERPL-MCNC: 9.8 MG/DL — SIGNIFICANT CHANGE UP (ref 8.4–10.5)
CHLORIDE SERPL-SCNC: 96 MMOL/L — LOW (ref 98–107)
CO2 SERPL-SCNC: 26 MMOL/L — SIGNIFICANT CHANGE UP (ref 22–31)
CREAT SERPL-MCNC: 0.94 MG/DL — SIGNIFICANT CHANGE UP (ref 0.5–1.3)
EGFR: 69 ML/MIN/1.73M2 — SIGNIFICANT CHANGE UP
GLUCOSE SERPL-MCNC: 85 MG/DL — SIGNIFICANT CHANGE UP (ref 70–99)
HCT VFR BLD CALC: 36.4 % — SIGNIFICANT CHANGE UP (ref 34.5–45)
HGB BLD-MCNC: 12.1 G/DL — SIGNIFICANT CHANGE UP (ref 11.5–15.5)
MAGNESIUM SERPL-MCNC: 2.1 MG/DL — SIGNIFICANT CHANGE UP (ref 1.6–2.6)
MCHC RBC-ENTMCNC: 33 PG — SIGNIFICANT CHANGE UP (ref 27–34)
MCHC RBC-ENTMCNC: 33.2 GM/DL — SIGNIFICANT CHANGE UP (ref 32–36)
MCV RBC AUTO: 99.2 FL — SIGNIFICANT CHANGE UP (ref 80–100)
NRBC # BLD: 0 /100 WBCS — SIGNIFICANT CHANGE UP (ref 0–0)
NRBC # FLD: 0 K/UL — SIGNIFICANT CHANGE UP (ref 0–0)
PHOSPHATE SERPL-MCNC: 3.4 MG/DL — SIGNIFICANT CHANGE UP (ref 2.5–4.5)
PLATELET # BLD AUTO: 179 K/UL — SIGNIFICANT CHANGE UP (ref 150–400)
POTASSIUM SERPL-MCNC: 4.5 MMOL/L — SIGNIFICANT CHANGE UP (ref 3.5–5.3)
POTASSIUM SERPL-SCNC: 4.5 MMOL/L — SIGNIFICANT CHANGE UP (ref 3.5–5.3)
RBC # BLD: 3.67 M/UL — LOW (ref 3.8–5.2)
RBC # FLD: 13 % — SIGNIFICANT CHANGE UP (ref 10.3–14.5)
SODIUM SERPL-SCNC: 133 MMOL/L — LOW (ref 135–145)
WBC # BLD: 6.23 K/UL — SIGNIFICANT CHANGE UP (ref 3.8–10.5)
WBC # FLD AUTO: 6.23 K/UL — SIGNIFICANT CHANGE UP (ref 3.8–10.5)

## 2023-11-20 PROCEDURE — 99232 SBSQ HOSP IP/OBS MODERATE 35: CPT

## 2023-11-20 PROCEDURE — 93923 UPR/LXTR ART STDY 3+ LVLS: CPT | Mod: 26

## 2023-11-20 RX ADMIN — Medication 1000 MILLIGRAM(S): at 11:25

## 2023-11-20 RX ADMIN — Medication 81 MILLIGRAM(S): at 06:21

## 2023-11-20 RX ADMIN — GABAPENTIN 200 MILLIGRAM(S): 400 CAPSULE ORAL at 13:20

## 2023-11-20 RX ADMIN — HYDROMORPHONE HYDROCHLORIDE 4 MILLIGRAM(S): 2 INJECTION INTRAMUSCULAR; INTRAVENOUS; SUBCUTANEOUS at 07:40

## 2023-11-20 RX ADMIN — Medication 15 MILLIGRAM(S): at 13:20

## 2023-11-20 RX ADMIN — GABAPENTIN 200 MILLIGRAM(S): 400 CAPSULE ORAL at 21:24

## 2023-11-20 RX ADMIN — Medication 15 MILLIGRAM(S): at 20:22

## 2023-11-20 RX ADMIN — HYDROMORPHONE HYDROCHLORIDE 4 MILLIGRAM(S): 2 INJECTION INTRAMUSCULAR; INTRAVENOUS; SUBCUTANEOUS at 06:40

## 2023-11-20 RX ADMIN — HYDROMORPHONE HYDROCHLORIDE 4 MILLIGRAM(S): 2 INJECTION INTRAMUSCULAR; INTRAVENOUS; SUBCUTANEOUS at 17:24

## 2023-11-20 RX ADMIN — HYDROMORPHONE HYDROCHLORIDE 4 MILLIGRAM(S): 2 INJECTION INTRAMUSCULAR; INTRAVENOUS; SUBCUTANEOUS at 21:24

## 2023-11-20 RX ADMIN — AMLODIPINE BESYLATE 10 MILLIGRAM(S): 2.5 TABLET ORAL at 06:22

## 2023-11-20 RX ADMIN — CYCLOBENZAPRINE HYDROCHLORIDE 5 MILLIGRAM(S): 10 TABLET, FILM COATED ORAL at 21:24

## 2023-11-20 RX ADMIN — Medication 1000 MILLIGRAM(S): at 06:40

## 2023-11-20 RX ADMIN — LOSARTAN POTASSIUM 100 MILLIGRAM(S): 100 TABLET, FILM COATED ORAL at 06:22

## 2023-11-20 RX ADMIN — GABAPENTIN 200 MILLIGRAM(S): 400 CAPSULE ORAL at 06:22

## 2023-11-20 RX ADMIN — HEPARIN SODIUM 6 UNIT(S)/HR: 5000 INJECTION INTRAVENOUS; SUBCUTANEOUS at 08:54

## 2023-11-20 RX ADMIN — HYDROMORPHONE HYDROCHLORIDE 4 MILLIGRAM(S): 2 INJECTION INTRAMUSCULAR; INTRAVENOUS; SUBCUTANEOUS at 22:24

## 2023-11-20 RX ADMIN — ATENOLOL 50 MILLIGRAM(S): 25 TABLET ORAL at 06:21

## 2023-11-20 RX ADMIN — CYCLOBENZAPRINE HYDROCHLORIDE 5 MILLIGRAM(S): 10 TABLET, FILM COATED ORAL at 13:21

## 2023-11-20 RX ADMIN — Medication 15 MILLIGRAM(S): at 02:57

## 2023-11-20 RX ADMIN — CYCLOBENZAPRINE HYDROCHLORIDE 5 MILLIGRAM(S): 10 TABLET, FILM COATED ORAL at 06:40

## 2023-11-20 RX ADMIN — Medication 1000 MILLIGRAM(S): at 23:02

## 2023-11-20 RX ADMIN — Medication 1000 MILLIGRAM(S): at 07:50

## 2023-11-20 RX ADMIN — HEPARIN SODIUM 6 UNIT(S)/HR: 5000 INJECTION INTRAVENOUS; SUBCUTANEOUS at 20:32

## 2023-11-20 RX ADMIN — Medication 1000 MILLIGRAM(S): at 17:25

## 2023-11-20 RX ADMIN — Medication 15 MILLIGRAM(S): at 07:56

## 2023-11-20 RX ADMIN — GABAPENTIN 200 MILLIGRAM(S): 400 CAPSULE ORAL at 00:24

## 2023-11-20 RX ADMIN — ATORVASTATIN CALCIUM 40 MILLIGRAM(S): 80 TABLET, FILM COATED ORAL at 21:24

## 2023-11-20 NOTE — PROGRESS NOTE ADULT - ASSESSMENT
63F hx of cad s/p remote stents,  recent RLE angiogram and percutaneous thrombectomy (07/19) of SFA and popliteal arteries, R CFA -> PT bypass with PTFE (7/25) for acute limb ischemia, presenting now with 1.5 weeks of pain in RLE. Reports has had pain in her right foot since her previous vascular procedures in July '23, but the pain has worsened over the last 1.5 weeks. Reports she has been taking her Eliquis as prescribed.        # PAD  per vascular team   plan for OR   ASA and statin   COnt heparin full dose for full AC  Monitor Hgb level   patient is medically optimized        #HTN   BP control  home meds  adjust astolerated   cardfollow up    # CAD  ASA and statin  BP control       DVT and gI PPX

## 2023-11-20 NOTE — PROVIDER CONTACT NOTE (OTHER) - ACTION/TREATMENT ORDERED:
As per provider, reassess in 1 hr. Hold gabapentin until after reassessment. Give if systolic >92. No further orders at this time.

## 2023-11-20 NOTE — PROGRESS NOTE ADULT - ASSESSMENT
63F hx of recent RLE angiogram and percutaneous thrombectomy (07/19) of SFA and popliteal arteries, R CFA -> PT bypass with PTFE (7/25) for acute limb ischemia, presenting now with 1.5 weeks of pain in RLE, found to have Pillo IIa acute on chronic limb ischemia secondary to chronically occluded graft.    Plan:  - YANIRA/PVRs planned for today  - Angio planning for Wednesday  - Continue heparin gtt  - Diet: Regular  - Pt seen by pain management yesterday, f/u on recs  - Medicine consult - appreciate recs for optimization  - Pt seen by Cardiology - optimized for procedure    Vascular Surgery  [id number]   63F hx of recent RLE angiogram and percutaneous thrombectomy (07/19) of SFA and popliteal arteries, R CFA -> PT bypass with PTFE (7/25) for acute limb ischemia, presenting now with 1.5 weeks of pain in RLE, found to have Pillo IIa acute on chronic limb ischemia secondary to chronically occluded graft.    Plan:  - YANIRA/PVRs planned for today  - Angio planning for Wednesday  - Continue heparin gtt  - Diet: Regular  - Pt seen by pain management yesterday, f/u on recs  - Medicine consult - appreciate recs for optimization  - Pt seen by Cardiology - optimized for procedure    Vascular Surgery  [id number]   63F hx of recent RLE angiogram and percutaneous thrombectomy (07/19) of SFA and popliteal arteries, R CFA -> PT bypass with PTFE (7/25) for acute limb ischemia, presenting now with 1.5 weeks of pain in RLE, found to have Pillo IIa acute on chronic limb ischemia secondary to chronically occluded graft.    Plan:  - YANIRA/PVRs planned for today  - Angio planning for Wednesday  - Continue heparin gtt  - Diet: Regular  - Pt seen by pain management yesterday, f/u on recs  - Medicine consult - appreciate recs for optimization  - Pt seen by Cardiology - optimized for procedure    Vascular Surgery  [id number]

## 2023-11-20 NOTE — PROGRESS NOTE ADULT - ASSESSMENT
PAD  asa  statin   plan for angio and intervention   fu with vascular surgery     CAD  history of stent  asa  cont statin   echo shows normal LV function   stress test is non ischemic     Tobacco smoking  counseling given     HTN  has low HR   hold atenolol   low BP , improved , suspect due to narcotic meds     Pre-Operative Cardiac Risk Stratification and Optimization   Based on patient history and physical exam, the patient is considered to have elevated risk   recent echo and stress test have been unremarkable  can proceed to planned procedure

## 2023-11-20 NOTE — PROGRESS NOTE ADULT - SUBJECTIVE AND OBJECTIVE BOX
C TEAM Surgery Progress Note  Patient is a 63y old  Female who presents with a chief complaint of Acute limb ischemia of RLE (20 Nov 2023 07:32)    INTERVAL EVENTS: Pt hypotensive (84/52) overnight, gabapentin held. Repeat BP improved (125/61).     SUBJECTIVE: Patient seen and examined at bedside with surgical team. Denies fever, chills, CP, SOB nausea, vomiting.    OBJECTIVE:    Vital Signs Last 24 Hrs  T(C): 36.7 (20 Nov 2023 06:28), Max: 36.8 (19 Nov 2023 14:00)  T(F): 98 (20 Nov 2023 06:28), Max: 98.2 (19 Nov 2023 14:00)  HR: 50 (20 Nov 2023 06:28) (50 - 75)  BP: 125/61 (20 Nov 2023 06:28) (84/52 - 141/81)  BP(mean): --  RR: 18 (20 Nov 2023 06:28) (16 - 18)  SpO2: 100% (20 Nov 2023 06:28) (97% - 100%)    Parameters below as of 20 Nov 2023 06:28  Patient On (Oxygen Delivery Method): room air    I&O's Detail    19 Nov 2023 07:01  -  20 Nov 2023 07:00  --------------------------------------------------------  IN:    Oral Fluid: 1860 mL  Total IN: 1860 mL    OUT:  Total OUT: 0 mL    Total NET: 1860 mL      MEDICATIONS  (STANDING):  acetaminophen     Tablet .. 1000 milliGRAM(s) Oral every 6 hours  amLODIPine   Tablet 10 milliGRAM(s) Oral every 24 hours  aspirin  chewable 81 milliGRAM(s) Oral daily  atorvastatin 40 milliGRAM(s) Oral at bedtime  cyclobenzaprine 5 milliGRAM(s) Oral every 8 hours  gabapentin 200 milliGRAM(s) Oral every 8 hours  heparin  Infusion 1100 Unit(s)/Hr (6 mL/Hr) IV Continuous <Continuous>  ketorolac   Injectable 15 milliGRAM(s) IV Push every 6 hours  losartan 100 milliGRAM(s) Oral daily    MEDICATIONS  (PRN):  HYDROmorphone   Tablet 2 milliGRAM(s) Oral every 3 hours PRN Moderate Pain (4 - 6)  HYDROmorphone   Tablet 4 milliGRAM(s) Oral every 3 hours PRN Severe Pain (7 - 10)  HYDROmorphone  Injectable 0.2 milliGRAM(s) IV Push every 4 hours PRN Severe Pain (7 - 10)      PHYSICAL EXAM:  Constitutional: A&Ox3, NAD  Respiratory: Unlabored breathing on RA  Abdomen: Soft, nondistended, NTTP.   Vascular: no doppler signals or palpable pulses in RLE, foot without active wounds.    LABS:                        12.1   6.23  )-----------( 179      ( 20 Nov 2023 05:52 )             36.4     11-20    133<L>  |  96<L>  |  32<H>  ----------------------------<  85  4.5   |  26  |  0.94    Ca    9.8      20 Nov 2023 05:52  Phos  3.4     11-20  Mg     2.10     11-20      PTT - ( 19 Nov 2023 05:21 )  PTT:67.9 sec    Urinalysis Basic - ( 20 Nov 2023 05:52 )    Color: x / Appearance: x / SG: x / pH: x  Gluc: 85 mg/dL / Ketone: x  / Bili: x / Urobili: x   Blood: x / Protein: x / Nitrite: x   Leuk Esterase: x / RBC: x / WBC x   Sq Epi: x / Non Sq Epi: x / Bacteria: x

## 2023-11-20 NOTE — PROGRESS NOTE ADULT - SUBJECTIVE AND OBJECTIVE BOX
Subjective: Patient seen and examined. No new events except as noted.     SUBJECTIVE/ROS:  MEDICATIONS:  MEDICATIONS  (STANDING):  acetaminophen     Tablet .. 1000 milliGRAM(s) Oral every 6 hours  amLODIPine   Tablet 10 milliGRAM(s) Oral every 24 hours  aspirin  chewable 81 milliGRAM(s) Oral daily  atenolol  Tablet 50 milliGRAM(s) Oral daily  atorvastatin 40 milliGRAM(s) Oral at bedtime  cyclobenzaprine 5 milliGRAM(s) Oral every 8 hours  gabapentin 200 milliGRAM(s) Oral every 8 hours  heparin  Infusion 1100 Unit(s)/Hr (6 mL/Hr) IV Continuous <Continuous>  ketorolac   Injectable 15 milliGRAM(s) IV Push every 6 hours  losartan 100 milliGRAM(s) Oral daily      PHYSICAL EXAM:  T(C): 36.7 (11-20-23 @ 06:28), Max: 36.8 (11-19-23 @ 14:00)  HR: 50 (11-20-23 @ 06:28) (50 - 75)  BP: 125/61 (11-20-23 @ 06:28) (84/52 - 141/81)  RR: 18 (11-20-23 @ 06:28) (16 - 18)  SpO2: 100% (11-20-23 @ 06:28) (97% - 100%)  Wt(kg): --  I&O's Summary    19 Nov 2023 07:01  -  20 Nov 2023 07:00  --------------------------------------------------------  IN: 1860 mL / OUT: 0 mL / NET: 1860 mL            JVP: Normal  Neck: supple  Lung: clear   CV: S1 S2 , Murmur:  Abd: soft  Ext: No edema  neuro: Awake / alert  Psych: flat affect      LABS/DATA:    CARDIAC MARKERS:                                12.1   6.23  )-----------( 179      ( 20 Nov 2023 05:52 )             36.4     11-19    136  |  96<L>  |  22  ----------------------------<  89  4.1   |  30  |  0.81    Ca    10.3      19 Nov 2023 05:21  Phos  3.1     11-19  Mg     1.90     11-19      proBNP:   Lipid Profile:   HgA1c:   TSH:     TELE:  EKG:

## 2023-11-20 NOTE — PROGRESS NOTE ADULT - SUBJECTIVE AND OBJECTIVE BOX
Name of Patient : BRE HODGE  MRN: [id number]  Date of visit: 11-20-23 @ 11:17      Subjective: Patient seen and examined. No new events except as noted.   Christine wylie       REVIEW OF SYSTEMS:    CONSTITUTIONAL: No weakness, fevers or chills  EYES/ENT: No visual changes;  No vertigo or throat pain   NECK: No pain or stiffness  RESPIRATORY: No cough, wheezing, hemoptysis; No shortness of breath  CARDIOVASCULAR: No chest pain or palpitations  GASTROINTESTINAL: No abdominal or epigastric pain. No nausea, vomiting, or hematemesis; No diarrhea or constipation. No melena or hematochezia.  GENITOURINARY: No dysuria, frequency or hematuria  NEUROLOGICAL: No numbness or weakness  SKIN: No itching, burning, rashes, or lesions   All other review of systems is negative unless indicated above.    MEDICATIONS:  MEDICATIONS  (STANDING):  acetaminophen     Tablet .. 1000 milliGRAM(s) Oral every 6 hours  amLODIPine   Tablet 10 milliGRAM(s) Oral every 24 hours  aspirin  chewable 81 milliGRAM(s) Oral daily  atorvastatin 40 milliGRAM(s) Oral at bedtime  cyclobenzaprine 5 milliGRAM(s) Oral every 8 hours  gabapentin 200 milliGRAM(s) Oral every 8 hours  heparin  Infusion 1100 Unit(s)/Hr (6 mL/Hr) IV Continuous <Continuous>  ketorolac   Injectable 15 milliGRAM(s) IV Push every 6 hours  losartan 100 milliGRAM(s) Oral daily      PHYSICAL EXAM:  T(C): 36.7 (11-20-23 @ 10:48), Max: 36.8 (11-19-23 @ 14:00)  HR: 53 (11-20-23 @ 10:48) (50 - 103)  BP: 126/53 (11-20-23 @ 10:48) (84/52 - 155/102)  RR: 16 (11-20-23 @ 10:48) (16 - 18)  SpO2: 100% (11-20-23 @ 10:48) (97% - 100%)  Wt(kg): --  I&O's Summary    19 Nov 2023 07:01  -  20 Nov 2023 07:00  --------------------------------------------------------  IN: 1860 mL / OUT: 0 mL / NET: 1860 mL          Appearance: Normal	  HEENT:  PERRLA   Lymphatic: No lymphadenopathy   Cardiovascular: Normal S1 S2, no JVD  Respiratory: normal effort , clear  Gastrointestinal:  Soft, Non-tender  Skin: No rashes,  warm to touch  Psychiatry:  Mood & affect appropriate  Musculuskeletal: No edema    recent labs, Imaging and EKGs personally reviewed     11-19-23 @ 07:01  -  11-20-23 @ 07:00  --------------------------------------------------------  IN: 1860 mL / OUT: 0 mL / NET: 1860 mL                          12.1   6.23  )-----------( 179      ( 20 Nov 2023 05:52 )             36.4               11-20    133<L>  |  96<L>  |  32<H>  ----------------------------<  85  4.5   |  26  |  0.94    Ca    9.8      20 Nov 2023 05:52  Phos  3.4     11-20  Mg     2.10     11-20      PTT - ( 19 Nov 2023 05:21 )  PTT:67.9 sec                   Urinalysis Basic - ( 20 Nov 2023 05:52 )    Color: x / Appearance: x / SG: x / pH: x  Gluc: 85 mg/dL / Ketone: x  / Bili: x / Urobili: x   Blood: x / Protein: x / Nitrite: x   Leuk Esterase: x / RBC: x / WBC x   Sq Epi: x / Non Sq Epi: x / Bacteria: x               Name of Patient : BRE HODGE  MRN: [id number]  Date of visit: 11-20-23 @ 11:17      Subjective: Patient seen and examined. No new events except as noted.   Christine wylie       REVIEW OF SYSTEMS:    CONSTITUTIONAL: No weakness, fevers or chills  EYES/ENT: No visual changes;  No vertigo or throat pain   NECK: No pain or stiffness  RESPIRATORY: No cough, wheezing, hemoptysis; No shortness of breath  CARDIOVASCULAR: No chest pain or palpitations  GASTROINTESTINAL: No abdominal or epigastric pain. No nausea, vomiting, or hematemesis; No diarrhea or constipation. No melena or hematochezia.  GENITOURINARY: No dysuria, frequency or hematuria  NEUROLOGICAL: No numbness or weakness  SKIN: No itching, burning, rashes, or lesions   All other review of systems is negative unless indicated above.    MEDICATIONS:  MEDICATIONS  (STANDING):  acetaminophen     Tablet .. 1000 milliGRAM(s) Oral every 6 hours  amLODIPine   Tablet 10 milliGRAM(s) Oral every 24 hours  aspirin  chewable 81 milliGRAM(s) Oral daily  atorvastatin 40 milliGRAM(s) Oral at bedtime  cyclobenzaprine 5 milliGRAM(s) Oral every 8 hours  gabapentin 200 milliGRAM(s) Oral every 8 hours  heparin  Infusion 1100 Unit(s)/Hr (6 mL/Hr) IV Continuous <Continuous>  ketorolac   Injectable 15 milliGRAM(s) IV Push every 6 hours  losartan 100 milliGRAM(s) Oral daily      PHYSICAL EXAM:  T(C): 36.7 (11-20-23 @ 10:48), Max: 36.8 (11-19-23 @ 14:00)  HR: 53 (11-20-23 @ 10:48) (50 - 103)  BP: 126/53 (11-20-23 @ 10:48) (84/52 - 155/102)  RR: 16 (11-20-23 @ 10:48) (16 - 18)  SpO2: 100% (11-20-23 @ 10:48) (97% - 100%)  Wt(kg): --  I&O's Summary    19 Nov 2023 07:01  -  20 Nov 2023 07:00  --------------------------------------------------------  IN: 1860 mL / OUT: 0 mL / NET: 1860 mL          Appearance: Normal	  HEENT:  PERRLA   Lymphatic: No lymphadenopathy   Cardiovascular: Normal S1 S2, no JVD  Respiratory: normal effort , clear  Gastrointestinal:  Soft, Non-tender  Skin: No rashes,  warm to touch  Psychiatry:  Mood & affect appropriate  Musculuskeletal: No edema    recent labs, Imaging and EKGs personally reviewed     11-19-23 @ 07:01  -  11-20-23 @ 07:00  --------------------------------------------------------  IN: 1860 mL / OUT: 0 mL / NET: 1860 mL                          12.1   6.23  )-----------( 179      ( 20 Nov 2023 05:52 )             36.4               11-20    133<L>  |  96<L>  |  32<H>  ----------------------------<  85  4.5   |  26  |  0.94    Ca    9.8      20 Nov 2023 05:52  Phos  3.4     11-20  Mg     2.10     11-20      PTT - ( 19 Nov 2023 05:21 )  PTT:67.9 sec                   Urinalysis Basic - ( 20 Nov 2023 05:52 )    Color: x / Appearance: x / SG: x / pH: x  Gluc: 85 mg/dL / Ketone: x  / Bili: x / Urobili: x   Blood: x / Protein: x / Nitrite: x   Leuk Esterase: x / RBC: x / WBC x   Sq Epi: x / Non Sq Epi: x / Bacteria: x               Name of Patient : BRE HODGE  MRN: [id number]  Date of visit: 11-20-23 @ 11:17      Subjective: Patient seen and examined. No new events except as noted.   Christine wylie       REVIEW OF SYSTEMS:    CONSTITUTIONAL: No weakness, fevers or chills  EYES/ENT: No visual changes;  No vertigo or throat pain   NECK: No pain or stiffness  RESPIRATORY: No cough, wheezing, hemoptysis; No shortness of breath  CARDIOVASCULAR: No chest pain or palpitations  GASTROINTESTINAL: No abdominal or epigastric pain. No nausea, vomiting, or hematemesis; No diarrhea or constipation. No melena or hematochezia.  GENITOURINARY: No dysuria, frequency or hematuria  NEUROLOGICAL: No numbness or weakness  SKIN: No itching, burning, rashes, or lesions   All other review of systems is negative unless indicated above.    MEDICATIONS:  MEDICATIONS  (STANDING):  acetaminophen     Tablet .. 1000 milliGRAM(s) Oral every 6 hours  amLODIPine   Tablet 10 milliGRAM(s) Oral every 24 hours  aspirin  chewable 81 milliGRAM(s) Oral daily  atorvastatin 40 milliGRAM(s) Oral at bedtime  cyclobenzaprine 5 milliGRAM(s) Oral every 8 hours  gabapentin 200 milliGRAM(s) Oral every 8 hours  heparin  Infusion 1100 Unit(s)/Hr (6 mL/Hr) IV Continuous <Continuous>  ketorolac   Injectable 15 milliGRAM(s) IV Push every 6 hours  losartan 100 milliGRAM(s) Oral daily      PHYSICAL EXAM:  T(C): 36.7 (11-20-23 @ 10:48), Max: 36.8 (11-19-23 @ 14:00)  HR: 53 (11-20-23 @ 10:48) (50 - 103)  BP: 126/53 (11-20-23 @ 10:48) (84/52 - 155/102)  RR: 16 (11-20-23 @ 10:48) (16 - 18)  SpO2: 100% (11-20-23 @ 10:48) (97% - 100%)  Wt(kg): --  I&O's Summary    19 Nov 2023 07:01  -  20 Nov 2023 07:00  --------------------------------------------------------  IN: 1860 mL / OUT: 0 mL / NET: 1860 mL          Appearance: Normal	  HEENT:  PERRLA   Lymphatic: No lymphadenopathy   Cardiovascular: Normal S1 S2, no JVD  Respiratory: normal effort , clear  Gastrointestinal:  Soft, Non-tender  Skin: No rashes,  warm to touch  Psychiatry:  Mood & affect appropriate  Musculuskeletal: No edema    recent labs, Imaging and EKGs personally reviewed     11-19-23 @ 07:01  -  11-20-23 @ 07:00  --------------------------------------------------------  IN: 1860 mL / OUT: 0 mL / NET: 1860 mL                          12.1   6.23  )-----------( 179      ( 20 Nov 2023 05:52 )             36.4               11-20    133<L>  |  96<L>  |  32<H>  ----------------------------<  85  4.5   |  26  |  0.94    Ca    9.8      20 Nov 2023 05:52  Phos  3.4     11-20  Mg     2.10     11-20      PTT - ( 19 Nov 2023 05:21 )  PTT:67.9 sec                   Urinalysis Basic - ( 20 Nov 2023 05:52 )    Color: x / Appearance: x / SG: x / pH: x  Gluc: 85 mg/dL / Ketone: x  / Bili: x / Urobili: x   Blood: x / Protein: x / Nitrite: x   Leuk Esterase: x / RBC: x / WBC x   Sq Epi: x / Non Sq Epi: x / Bacteria: x

## 2023-11-21 ENCOUNTER — TRANSCRIPTION ENCOUNTER (OUTPATIENT)
Age: 63
End: 2023-11-21

## 2023-11-21 LAB
ANION GAP SERPL CALC-SCNC: 10 MMOL/L — SIGNIFICANT CHANGE UP (ref 7–14)
APTT BLD: 67 SEC — HIGH (ref 24.5–35.6)
BUN SERPL-MCNC: 40 MG/DL — HIGH (ref 7–23)
CALCIUM SERPL-MCNC: 9.7 MG/DL — SIGNIFICANT CHANGE UP (ref 8.4–10.5)
CHLORIDE SERPL-SCNC: 100 MMOL/L — SIGNIFICANT CHANGE UP (ref 98–107)
CO2 SERPL-SCNC: 28 MMOL/L — SIGNIFICANT CHANGE UP (ref 22–31)
CREAT SERPL-MCNC: 0.98 MG/DL — SIGNIFICANT CHANGE UP (ref 0.5–1.3)
EGFR: 65 ML/MIN/1.73M2 — SIGNIFICANT CHANGE UP
GLUCOSE SERPL-MCNC: 94 MG/DL — SIGNIFICANT CHANGE UP (ref 70–99)
HCG SERPL-ACNC: 1.6 MIU/ML — SIGNIFICANT CHANGE UP
HCT VFR BLD CALC: 36.3 % — SIGNIFICANT CHANGE UP (ref 34.5–45)
HGB BLD-MCNC: 11.8 G/DL — SIGNIFICANT CHANGE UP (ref 11.5–15.5)
INR BLD: 0.94 RATIO — SIGNIFICANT CHANGE UP (ref 0.85–1.18)
MAGNESIUM SERPL-MCNC: 2.2 MG/DL — SIGNIFICANT CHANGE UP (ref 1.6–2.6)
MCHC RBC-ENTMCNC: 32.5 GM/DL — SIGNIFICANT CHANGE UP (ref 32–36)
MCHC RBC-ENTMCNC: 32.7 PG — SIGNIFICANT CHANGE UP (ref 27–34)
MCV RBC AUTO: 100.6 FL — HIGH (ref 80–100)
NRBC # BLD: 0 /100 WBCS — SIGNIFICANT CHANGE UP (ref 0–0)
NRBC # FLD: 0 K/UL — SIGNIFICANT CHANGE UP (ref 0–0)
PHOSPHATE SERPL-MCNC: 3.3 MG/DL — SIGNIFICANT CHANGE UP (ref 2.5–4.5)
PLATELET # BLD AUTO: 167 K/UL — SIGNIFICANT CHANGE UP (ref 150–400)
POTASSIUM SERPL-MCNC: 5.3 MMOL/L — SIGNIFICANT CHANGE UP (ref 3.5–5.3)
POTASSIUM SERPL-SCNC: 5.3 MMOL/L — SIGNIFICANT CHANGE UP (ref 3.5–5.3)
PROTHROM AB SERPL-ACNC: 10.5 SEC — SIGNIFICANT CHANGE UP (ref 9.5–13)
RBC # BLD: 3.61 M/UL — LOW (ref 3.8–5.2)
RBC # FLD: 13.1 % — SIGNIFICANT CHANGE UP (ref 10.3–14.5)
SODIUM SERPL-SCNC: 138 MMOL/L — SIGNIFICANT CHANGE UP (ref 135–145)
WBC # BLD: 6.51 K/UL — SIGNIFICANT CHANGE UP (ref 3.8–10.5)
WBC # FLD AUTO: 6.51 K/UL — SIGNIFICANT CHANGE UP (ref 3.8–10.5)

## 2023-11-21 PROCEDURE — 99232 SBSQ HOSP IP/OBS MODERATE 35: CPT

## 2023-11-21 RX ORDER — AMLODIPINE BESYLATE 2.5 MG/1
10 TABLET ORAL EVERY 24 HOURS
Refills: 0 | Status: DISCONTINUED | OUTPATIENT
Start: 2023-11-21 | End: 2023-11-27

## 2023-11-21 RX ORDER — SODIUM CHLORIDE 9 MG/ML
1000 INJECTION, SOLUTION INTRAVENOUS
Refills: 0 | Status: DISCONTINUED | OUTPATIENT
Start: 2023-11-22 | End: 2023-11-22

## 2023-11-21 RX ADMIN — Medication 1000 MILLIGRAM(S): at 06:50

## 2023-11-21 RX ADMIN — LOSARTAN POTASSIUM 100 MILLIGRAM(S): 100 TABLET, FILM COATED ORAL at 06:20

## 2023-11-21 RX ADMIN — Medication 15 MILLIGRAM(S): at 01:27

## 2023-11-21 RX ADMIN — Medication 1000 MILLIGRAM(S): at 00:02

## 2023-11-21 RX ADMIN — HEPARIN SODIUM 6 UNIT(S)/HR: 5000 INJECTION INTRAVENOUS; SUBCUTANEOUS at 08:35

## 2023-11-21 RX ADMIN — Medication 1000 MILLIGRAM(S): at 13:24

## 2023-11-21 RX ADMIN — GABAPENTIN 200 MILLIGRAM(S): 400 CAPSULE ORAL at 21:28

## 2023-11-21 RX ADMIN — HEPARIN SODIUM 6 UNIT(S)/HR: 5000 INJECTION INTRAVENOUS; SUBCUTANEOUS at 20:18

## 2023-11-21 RX ADMIN — AMLODIPINE BESYLATE 10 MILLIGRAM(S): 2.5 TABLET ORAL at 06:20

## 2023-11-21 RX ADMIN — Medication 15 MILLIGRAM(S): at 17:02

## 2023-11-21 RX ADMIN — GABAPENTIN 200 MILLIGRAM(S): 400 CAPSULE ORAL at 06:21

## 2023-11-21 RX ADMIN — ATORVASTATIN CALCIUM 40 MILLIGRAM(S): 80 TABLET, FILM COATED ORAL at 21:28

## 2023-11-21 RX ADMIN — Medication 81 MILLIGRAM(S): at 06:21

## 2023-11-21 RX ADMIN — HYDROMORPHONE HYDROCHLORIDE 4 MILLIGRAM(S): 2 INJECTION INTRAMUSCULAR; INTRAVENOUS; SUBCUTANEOUS at 20:36

## 2023-11-21 RX ADMIN — HYDROMORPHONE HYDROCHLORIDE 4 MILLIGRAM(S): 2 INJECTION INTRAMUSCULAR; INTRAVENOUS; SUBCUTANEOUS at 21:36

## 2023-11-21 RX ADMIN — Medication 15 MILLIGRAM(S): at 10:08

## 2023-11-21 RX ADMIN — GABAPENTIN 200 MILLIGRAM(S): 400 CAPSULE ORAL at 13:24

## 2023-11-21 RX ADMIN — Medication 1000 MILLIGRAM(S): at 06:20

## 2023-11-21 RX ADMIN — Medication 15 MILLIGRAM(S): at 01:42

## 2023-11-21 NOTE — PROGRESS NOTE ADULT - SUBJECTIVE AND OBJECTIVE BOX
Name of Patient : BRE HODGE  MRN: [id number]  Date of visit: 11-21-23       Subjective: Patient seen and examined. No new events except as noted.     REVIEW OF SYSTEMS:    CONSTITUTIONAL: No weakness, fevers or chills  EYES/ENT: No visual changes;  No vertigo or throat pain   NECK: No pain or stiffness  RESPIRATORY: No cough, wheezing, hemoptysis; No shortness of breath  CARDIOVASCULAR: No chest pain or palpitations  GASTROINTESTINAL: No abdominal or epigastric pain. No nausea, vomiting, or hematemesis; No diarrhea or constipation. No melena or hematochezia.  GENITOURINARY: No dysuria, frequency or hematuria  NEUROLOGICAL: No numbness or weakness  SKIN: No itching, burning, rashes, or lesions   All other review of systems is negative unless indicated above.    MEDICATIONS:  MEDICATIONS  (STANDING):  amLODIPine   Tablet 10 milliGRAM(s) Oral every 24 hours  aspirin  chewable 81 milliGRAM(s) Oral daily  atorvastatin 40 milliGRAM(s) Oral at bedtime  gabapentin 200 milliGRAM(s) Oral every 8 hours  heparin  Infusion 1100 Unit(s)/Hr (6 mL/Hr) IV Continuous <Continuous>  losartan 100 milliGRAM(s) Oral daily      PHYSICAL EXAM:  T(C): 36.9 (11-21-23 @ 17:01), Max: 37.3 (11-21-23 @ 13:20)  HR: 83 (11-21-23 @ 17:01) (54 - 83)  BP: 107/65 (11-21-23 @ 17:01) (91/56 - 122/54)  RR: 18 (11-21-23 @ 17:01) (18 - 118)  SpO2: 98% (11-21-23 @ 17:01) (97% - 100%)  Wt(kg): --  I&O's Summary    20 Nov 2023 07:01  -  21 Nov 2023 07:00  --------------------------------------------------------  IN: 938 mL / OUT: 0 mL / NET: 938 mL    21 Nov 2023 07:01  -  21 Nov 2023 21:03  --------------------------------------------------------  IN: 991 mL / OUT: 0 mL / NET: 991 mL          Appearance: Normal	  HEENT:  PERRLA   Lymphatic: No lymphadenopathy   Cardiovascular: Normal S1 S2, no JVD  Respiratory: normal effort , clear  Gastrointestinal:  Soft, Non-tender  Skin: No rashes,  warm to touch  Psychiatry:  Mood & affect appropriate  Musculuskeletal: No edema    recent labs, Imaging and EKGs personally reviewed     11-20-23 @ 07:01  -  11-21-23 @ 07:00  --------------------------------------------------------  IN: 938 mL / OUT: 0 mL / NET: 938 mL    11-21-23 @ 07:01  -  11-21-23 @ 21:03  --------------------------------------------------------  IN: 991 mL / OUT: 0 mL / NET: 991 mL                          11.8   6.51  )-----------( 167      ( 21 Nov 2023 07:13 )             36.3               11-21    138  |  100  |  40<H>  ----------------------------<  94  5.3   |  28  |  0.98    Ca    9.7      21 Nov 2023 07:13  Phos  3.3     11-21  Mg     2.20     11-21      PT/INR - ( 21 Nov 2023 07:13 )   PT: 10.5 sec;   INR: 0.94 ratio         PTT - ( 21 Nov 2023 07:13 )  PTT:67.0 sec                   Urinalysis Basic - ( 21 Nov 2023 07:13 )    Color: x / Appearance: x / SG: x / pH: x  Gluc: 94 mg/dL / Ketone: x  / Bili: x / Urobili: x   Blood: x / Protein: x / Nitrite: x   Leuk Esterase: x / RBC: x / WBC x   Sq Epi: x / Non Sq Epi: x / Bacteria: x               Name of Patient : BRE HODGE  MRN: [id number]  Date of visit: 11-21-23       Subjective: Patient seen and examined. No new events except as noted.     REVIEW OF SYSTEMS:    CONSTITUTIONAL: No weakness, fevers or chills  EYES/ENT: No visual changes;  No vertigo or throat pain   NECK: No pain or stiffness  RESPIRATORY: No cough, wheezing, hemoptysis; No shortness of breath  CARDIOVASCULAR: No chest pain or palpitations  GASTROINTESTINAL: No abdominal or epigastric pain. No nausea, vomiting, or hematemesis; No diarrhea or constipation. No melena or hematochezia.  GENITOURINARY: No dysuria, frequency or hematuria  NEUROLOGICAL: No numbness or weakness  SKIN: No itching, burning, rashes, or lesions   All other review of systems is negative unless indicated above.    MEDICATIONS:  MEDICATIONS  (STANDING):  amLODIPine   Tablet 10 milliGRAM(s) Oral every 24 hours  aspirin  chewable 81 milliGRAM(s) Oral daily  atorvastatin 40 milliGRAM(s) Oral at bedtime  gabapentin 200 milliGRAM(s) Oral every 8 hours  heparin  Infusion 1100 Unit(s)/Hr (6 mL/Hr) IV Continuous <Continuous>  losartan 100 milliGRAM(s) Oral daily      PHYSICAL EXAM:  T(C): 36.9 (11-21-23 @ 17:01), Max: 37.3 (11-21-23 @ 13:20)  HR: 83 (11-21-23 @ 17:01) (54 - 83)  BP: 107/65 (11-21-23 @ 17:01) (91/56 - 122/54)  RR: 18 (11-21-23 @ 17:01) (18 - 118)  SpO2: 98% (11-21-23 @ 17:01) (97% - 100%)  Wt(kg): --  I&O's Summary    20 Nov 2023 07:01  -  21 Nov 2023 07:00  --------------------------------------------------------  IN: 938 mL / OUT: 0 mL / NET: 938 mL    21 Nov 2023 07:01  -  21 Nov 2023 21:03  --------------------------------------------------------  IN: 991 mL / OUT: 0 mL / NET: 991 mL          Appearance: Normal	  HEENT:  PERRLA   Lymphatic: No lymphadenopathy   Cardiovascular: Normal S1 S2, no JVD  Respiratory: normal effort , clear  Gastrointestinal:  Soft, Non-tender  Skin: No rashes,  warm to touch  Psychiatry:  Mood & affect appropriate  Musculuskeletal: No edema    recent labs, Imaging and EKGs personally reviewed     11-20-23 @ 07:01  -  11-21-23 @ 07:00  --------------------------------------------------------  IN: 938 mL / OUT: 0 mL / NET: 938 mL    11-21-23 @ 07:01  -  11-21-23 @ 21:03  --------------------------------------------------------  IN: 991 mL / OUT: 0 mL / NET: 991 mL                          11.8   6.51  )-----------( 167      ( 21 Nov 2023 07:13 )             36.3               11-21    138  |  100  |  40<H>  ----------------------------<  94  5.3   |  28  |  0.98    Ca    9.7      21 Nov 2023 07:13  Phos  3.3     11-21  Mg     2.20     11-21      PT/INR - ( 21 Nov 2023 07:13 )   PT: 10.5 sec;   INR: 0.94 ratio         PTT - ( 21 Nov 2023 07:13 )  PTT:67.0 sec                   Urinalysis Basic - ( 21 Nov 2023 07:13 )    Color: x / Appearance: x / SG: x / pH: x  Gluc: 94 mg/dL / Ketone: x  / Bili: x / Urobili: x   Blood: x / Protein: x / Nitrite: x   Leuk Esterase: x / RBC: x / WBC x   Sq Epi: x / Non Sq Epi: x / Bacteria: x               Name of Patient : BRE HODGE  MRN: [id number]  Date of visit: 11-21-23       Subjective: Patient seen and examined. No new events except as noted.     REVIEW OF SYSTEMS:    CONSTITUTIONAL: No weakness, fevers or chills  EYES/ENT: No visual changes;  No vertigo or throat pain   NECK: No pain or stiffness  RESPIRATORY: No cough, wheezing, hemoptysis; No shortness of breath  CARDIOVASCULAR: No chest pain or palpitations  GASTROINTESTINAL: No abdominal or epigastric pain. No nausea, vomiting, or hematemesis; No diarrhea or constipation. No melena or hematochezia.  GENITOURINARY: No dysuria, frequency or hematuria  NEUROLOGICAL: No numbness or weakness  SKIN: No itching, burning, rashes, or lesions   All other review of systems is negative unless indicated above.    MEDICATIONS:  MEDICATIONS  (STANDING):  amLODIPine   Tablet 10 milliGRAM(s) Oral every 24 hours  aspirin  chewable 81 milliGRAM(s) Oral daily  atorvastatin 40 milliGRAM(s) Oral at bedtime  gabapentin 200 milliGRAM(s) Oral every 8 hours  heparin  Infusion 1100 Unit(s)/Hr (6 mL/Hr) IV Continuous <Continuous>  losartan 100 milliGRAM(s) Oral daily      PHYSICAL EXAM:  T(C): 36.9 (11-21-23 @ 17:01), Max: 37.3 (11-21-23 @ 13:20)  HR: 83 (11-21-23 @ 17:01) (54 - 83)  BP: 107/65 (11-21-23 @ 17:01) (91/56 - 122/54)  RR: 18 (11-21-23 @ 17:01) (18 - 118)  SpO2: 98% (11-21-23 @ 17:01) (97% - 100%)  Wt(kg): --  I&O's Summary    20 Nov 2023 07:01  -  21 Nov 2023 07:00  --------------------------------------------------------  IN: 938 mL / OUT: 0 mL / NET: 938 mL    21 Nov 2023 07:01  -  21 Nov 2023 21:03  --------------------------------------------------------  IN: 991 mL / OUT: 0 mL / NET: 991 mL          Appearance: Normal	  HEENT:  PERRLA   Lymphatic: No lymphadenopathy   Cardiovascular: Normal S1 S2, no JVD  Respiratory: normal effort , clear  Gastrointestinal:  Soft, Non-tender  Skin: No rashes,  warm to touch  Psychiatry:  Mood & affect appropriate  Musculuskeletal: No edema    recent labs, Imaging and EKGs personally reviewed     11-20-23 @ 07:01  -  11-21-23 @ 07:00  --------------------------------------------------------  IN: 938 mL / OUT: 0 mL / NET: 938 mL    11-21-23 @ 07:01  -  11-21-23 @ 21:03  --------------------------------------------------------  IN: 991 mL / OUT: 0 mL / NET: 991 mL                          11.8   6.51  )-----------( 167      ( 21 Nov 2023 07:13 )             36.3               11-21    138  |  100  |  40<H>  ----------------------------<  94  5.3   |  28  |  0.98    Ca    9.7      21 Nov 2023 07:13  Phos  3.3     11-21  Mg     2.20     11-21      PT/INR - ( 21 Nov 2023 07:13 )   PT: 10.5 sec;   INR: 0.94 ratio         PTT - ( 21 Nov 2023 07:13 )  PTT:67.0 sec                   Urinalysis Basic - ( 21 Nov 2023 07:13 )    Color: x / Appearance: x / SG: x / pH: x  Gluc: 94 mg/dL / Ketone: x  / Bili: x / Urobili: x   Blood: x / Protein: x / Nitrite: x   Leuk Esterase: x / RBC: x / WBC x   Sq Epi: x / Non Sq Epi: x / Bacteria: x

## 2023-11-21 NOTE — PROGRESS NOTE ADULT - ASSESSMENT
63F hx of recent RLE angiogram and percutaneous thrombectomy (07/19) of SFA and popliteal arteries, R CFA -> PT bypass with PTFE (7/25) for acute limb ischemia, presenting now with 1.5 weeks of pain in RLE, found to have Pillo IIa acute on chronic limb ischemia secondary to chronically occluded graft.    Plan:  - YANIRA/PVRs  - Angio planning for Wednesday  - Continue heparin gtt  - Diet: Regular  - Medicine consult - appreciate recs for optimization  - Pt seen by Cardiology - optimized for procedure    Vascular Surgery  [id number]   63F hx of recent RLE angiogram and percutaneous thrombectomy (07/19) of SFA and popliteal arteries, R CFA -> PT bypass with PTFE (7/25) for acute limb ischemia, presenting now with 1.5 weeks of pain in RLE, found to have Pillo IIa acute on chronic limb ischemia secondary to chronically occluded graft.    Plan:  - YANIRA/PVRs  - Angio planning for Wednesday  - Continue heparin gtt  - Diet: Regular  - Medicine consult - appreciate recs for optimization  - Pt seen by Cardiology - optimized for procedure    Vascular Surgery  [id number]   63F hx of recent RLE angiogram and percutaneous thrombectomy (07/19) of SFA and popliteal arteries, R CFA -> PT bypass with PTFE (7/25) for acute limb ischemia, presenting now with 1.5 weeks of pain in RLE, found to have Pillo IIa acute on chronic limb ischemia secondary to chronically occluded graft.    Plan:  - YANIRA/PVRs  - Angio planning for Wednesday  - Continue heparin gtt  - Diet: Regular  - Medicine consult - appreciate recs for optimization  - Pt seen by Cardiology - optimized for procedure    Vascular Surgery  [id number]   63F hx of recent RLE angiogram and percutaneous thrombectomy (07/19) of SFA and popliteal arteries, R CFA -> PT bypass with PTFE (7/25) for acute limb ischemia, presenting now with 1.5 weeks of pain in RLE, found to have Pillo IIa acute on chronic limb ischemia secondary to chronically occluded graft.    Plan:  - YANIRA/PVRs completed yesterday  - Angio planning for Wednesday  - Continue heparin gtt  - Diet: Regular  - Medicine consult - appreciate recs for optimization  - Pt seen by Cardiology - optimized for procedure    Vascular Surgery  [id number]   63F hx of recent RLE angiogram and percutaneous thrombectomy (07/19) of SFA and popliteal arteries, R CFA -> PT bypass with PTFE (7/25) for acute limb ischemia, presenting now with 1.5 weeks of pain in RLE, found to have Pillo IIa acute on chronic limb ischemia secondary to chronically occluded graft.    Plan:  - YANIRA/PVRs completed yesterday  - Angio planning for Wednesday  - Continue heparin gtt  - Diet: Regular  - Medicine consult - appreciate recs for optimization  - Pt seen by Cardiology - optimized for procedure    Vascular Surgery  [id number]   63F hx of recent RLE angiogram and percutaneous thrombectomy (07/19) of SFA and popliteal arteries, R CFA -> PT bypass with PTFE (7/25) for acute limb ischemia, presenting now with 1.5 weeks of pain in RLE, found to have Pillo IIa acute on chronic limb ischemia secondary to chronically occluded graft.    Plan:  - YANIRA/PVRs completed yesterday  - Angio planning for Wednesday  - Continue heparin gtt  - Diet: Regular  - Medicine consult - appreciate recs for optimization  - Pt seen by Cardiology - optimized for procedure    Vascular Surgery  [id number]

## 2023-11-21 NOTE — PROGRESS NOTE ADULT - SUBJECTIVE AND OBJECTIVE BOX
Morning Vascular Progress Note  Patient is a 63y old  Female who presents with a chief complaint of Acute limb ischemia of RLE (20 Nov 2023 11:17)    SUBJECTIVE: Patient seen and examined at bedside with surgical team, patient without complaints.     Vital Signs Last 24 Hrs  T(C): 36.6 (21 Nov 2023 01:45), Max: 37.2 (20 Nov 2023 23:19)  T(F): 97.8 (21 Nov 2023 01:45), Max: 99 (20 Nov 2023 23:19)  HR: 62 (21 Nov 2023 01:45) (50 - 103)  BP: 122/54 (21 Nov 2023 01:45) (96/53 - 155/102)  BP(mean): --  RR: 18 (21 Nov 2023 01:45) (16 - 18)  SpO2: 98% (21 Nov 2023 01:45) (98% - 100%)    Parameters below as of 21 Nov 2023 01:45  Patient On (Oxygen Delivery Method): room air    I&O's Detail    19 Nov 2023 07:01  -  20 Nov 2023 07:00  --------------------------------------------------------  IN:    Oral Fluid: 1860 mL  Total IN: 1860 mL    OUT:  Total OUT: 0 mL    Total NET: 1860 mL      20 Nov 2023 07:01  -  21 Nov 2023 05:59  --------------------------------------------------------  IN:    Heparin: 48 mL    Oral Fluid: 890 mL  Total IN: 938 mL    OUT:  Total OUT: 0 mL    Total NET: 938 mL        Medications  MEDICATIONS  (STANDING):  acetaminophen     Tablet .. 1000 milliGRAM(s) Oral every 6 hours  amLODIPine   Tablet 10 milliGRAM(s) Oral every 24 hours  aspirin  chewable 81 milliGRAM(s) Oral daily  atorvastatin 40 milliGRAM(s) Oral at bedtime  gabapentin 200 milliGRAM(s) Oral every 8 hours  heparin  Infusion 1100 Unit(s)/Hr (6 mL/Hr) IV Continuous <Continuous>  ketorolac   Injectable 15 milliGRAM(s) IV Push every 6 hours  losartan 100 milliGRAM(s) Oral daily    MEDICATIONS  (PRN):  HYDROmorphone   Tablet 4 milliGRAM(s) Oral every 3 hours PRN Severe Pain (7 - 10)  HYDROmorphone   Tablet 2 milliGRAM(s) Oral every 3 hours PRN Moderate Pain (4 - 6)  HYDROmorphone  Injectable 0.2 milliGRAM(s) IV Push every 4 hours PRN Severe Pain (7 - 10)    Physical Exam  Constitutional: A&Ox3, NAD  Extremities: Moving all extremities, no edema  LABS:                        12.1   6.23  )-----------( 179      ( 20 Nov 2023 05:52 )             36.4     11-20    133<L>  |  96<L>  |  32<H>  ----------------------------<  85  4.5   |  26  |  0.94    Ca    9.8      20 Nov 2023 05:52  Phos  3.4     11-20  Mg     2.10     11-20      PTT - ( 20 Nov 2023 22:59 )  PTT:63.5 sec     Morning Vascular Progress Note  Patient is a 63y old  Female who presents with a chief complaint of Acute limb ischemia of RLE (20 Nov 2023 11:17)    INTERVAL: No acute events overnight.    SUBJECTIVE: Patient seen and examined at bedside with surgical team, patient without complaints.     Vital Signs Last 24 Hrs  T(C): 36.6 (21 Nov 2023 01:45), Max: 37.2 (20 Nov 2023 23:19)  T(F): 97.8 (21 Nov 2023 01:45), Max: 99 (20 Nov 2023 23:19)  HR: 62 (21 Nov 2023 01:45) (50 - 103)  BP: 122/54 (21 Nov 2023 01:45) (96/53 - 155/102)  BP(mean): --  RR: 18 (21 Nov 2023 01:45) (16 - 18)  SpO2: 98% (21 Nov 2023 01:45) (98% - 100%)    Parameters below as of 21 Nov 2023 01:45  Patient On (Oxygen Delivery Method): room air    I&O's Detail    19 Nov 2023 07:01  -  20 Nov 2023 07:00  --------------------------------------------------------  IN:    Oral Fluid: 1860 mL  Total IN: 1860 mL    OUT:  Total OUT: 0 mL    Total NET: 1860 mL      20 Nov 2023 07:01  -  21 Nov 2023 05:59  --------------------------------------------------------  IN:    Heparin: 48 mL    Oral Fluid: 890 mL  Total IN: 938 mL    OUT:  Total OUT: 0 mL    Total NET: 938 mL        Medications  MEDICATIONS  (STANDING):  acetaminophen     Tablet .. 1000 milliGRAM(s) Oral every 6 hours  amLODIPine   Tablet 10 milliGRAM(s) Oral every 24 hours  aspirin  chewable 81 milliGRAM(s) Oral daily  atorvastatin 40 milliGRAM(s) Oral at bedtime  gabapentin 200 milliGRAM(s) Oral every 8 hours  heparin  Infusion 1100 Unit(s)/Hr (6 mL/Hr) IV Continuous <Continuous>  ketorolac   Injectable 15 milliGRAM(s) IV Push every 6 hours  losartan 100 milliGRAM(s) Oral daily    MEDICATIONS  (PRN):  HYDROmorphone   Tablet 4 milliGRAM(s) Oral every 3 hours PRN Severe Pain (7 - 10)  HYDROmorphone   Tablet 2 milliGRAM(s) Oral every 3 hours PRN Moderate Pain (4 - 6)  HYDROmorphone  Injectable 0.2 milliGRAM(s) IV Push every 4 hours PRN Severe Pain (7 - 10)    Physical Exam  Constitutional: A&Ox3, NAD  Extremities: Moving all extremities, no edema    LABS:                        12.1   6.23  )-----------( 179      ( 20 Nov 2023 05:52 )             36.4     11-20    133<L>  |  96<L>  |  32<H>  ----------------------------<  85  4.5   |  26  |  0.94    Ca    9.8      20 Nov 2023 05:52  Phos  3.4     11-20  Mg     2.10     11-20      PTT - ( 20 Nov 2023 22:59 )  PTT:63.5 sec

## 2023-11-21 NOTE — PROGRESS NOTE ADULT - SUBJECTIVE AND OBJECTIVE BOX
Subjective: Patient seen and examined. No new events except as noted.     SUBJECTIVE/ROS:  No chest pain, dyspnea, palpitation, or dizziness.       MEDICATIONS:  MEDICATIONS  (STANDING):  acetaminophen     Tablet .. 1000 milliGRAM(s) Oral every 6 hours  amLODIPine   Tablet 10 milliGRAM(s) Oral every 24 hours  aspirin  chewable 81 milliGRAM(s) Oral daily  atorvastatin 40 milliGRAM(s) Oral at bedtime  gabapentin 200 milliGRAM(s) Oral every 8 hours  heparin  Infusion 1100 Unit(s)/Hr (6 mL/Hr) IV Continuous <Continuous>  ketorolac   Injectable 15 milliGRAM(s) IV Push every 6 hours  losartan 100 milliGRAM(s) Oral daily      PHYSICAL EXAM:  T(C): 36.5 (11-21-23 @ 06:20), Max: 37.2 (11-20-23 @ 23:19)  HR: 54 (11-21-23 @ 06:20) (51 - 103)  BP: 119/56 (11-21-23 @ 06:20) (96/53 - 155/102)  RR: 18 (11-21-23 @ 06:20) (16 - 18)  SpO2: 97% (11-21-23 @ 06:20) (97% - 100%)  Wt(kg): --  I&O's Summary    20 Nov 2023 07:01  -  21 Nov 2023 07:00  --------------------------------------------------------  IN: 938 mL / OUT: 0 mL / NET: 938 mL            JVP: Normal  Neck: supple  Lung: clear   CV: S1 S2 , Murmur:  Abd: soft  Ext: No edema  neuro: Awake / alert  Psych: flat affect  Skin: normal``    LABS/DATA:    CARDIAC MARKERS:                                12.1   6.23  )-----------( 179      ( 20 Nov 2023 05:52 )             36.4     11-20    133<L>  |  96<L>  |  32<H>  ----------------------------<  85  4.5   |  26  |  0.94    Ca    9.8      20 Nov 2023 05:52  Phos  3.4     11-20  Mg     2.10     11-20      proBNP:   Lipid Profile:   HgA1c:   TSH:     TELE:  EKG:

## 2023-11-22 ENCOUNTER — APPOINTMENT (OUTPATIENT)
Dept: VASCULAR SURGERY | Facility: HOSPITAL | Age: 63
End: 2023-11-22

## 2023-11-22 LAB
ANION GAP SERPL CALC-SCNC: 8 MMOL/L — SIGNIFICANT CHANGE UP (ref 7–14)
APTT BLD: 54.1 SEC — HIGH (ref 24.5–35.6)
BLD GP AB SCN SERPL QL: NEGATIVE — SIGNIFICANT CHANGE UP
BUN SERPL-MCNC: 33 MG/DL — HIGH (ref 7–23)
CALCIUM SERPL-MCNC: 9.9 MG/DL — SIGNIFICANT CHANGE UP (ref 8.4–10.5)
CHLORIDE SERPL-SCNC: 101 MMOL/L — SIGNIFICANT CHANGE UP (ref 98–107)
CO2 SERPL-SCNC: 25 MMOL/L — SIGNIFICANT CHANGE UP (ref 22–31)
CREAT SERPL-MCNC: 0.83 MG/DL — SIGNIFICANT CHANGE UP (ref 0.5–1.3)
EGFR: 80 ML/MIN/1.73M2 — SIGNIFICANT CHANGE UP
GLUCOSE SERPL-MCNC: 101 MG/DL — HIGH (ref 70–99)
HCT VFR BLD CALC: 35.7 % — SIGNIFICANT CHANGE UP (ref 34.5–45)
HGB BLD-MCNC: 11.3 G/DL — LOW (ref 11.5–15.5)
INR BLD: 0.9 RATIO — SIGNIFICANT CHANGE UP (ref 0.85–1.18)
MAGNESIUM SERPL-MCNC: 2 MG/DL — SIGNIFICANT CHANGE UP (ref 1.6–2.6)
MCHC RBC-ENTMCNC: 31.7 GM/DL — LOW (ref 32–36)
MCHC RBC-ENTMCNC: 32.8 PG — SIGNIFICANT CHANGE UP (ref 27–34)
MCV RBC AUTO: 103.5 FL — HIGH (ref 80–100)
NRBC # BLD: 0 /100 WBCS — SIGNIFICANT CHANGE UP (ref 0–0)
NRBC # FLD: 0 K/UL — SIGNIFICANT CHANGE UP (ref 0–0)
PHOSPHATE SERPL-MCNC: 3 MG/DL — SIGNIFICANT CHANGE UP (ref 2.5–4.5)
PLATELET # BLD AUTO: 177 K/UL — SIGNIFICANT CHANGE UP (ref 150–400)
POTASSIUM SERPL-MCNC: 5.1 MMOL/L — SIGNIFICANT CHANGE UP (ref 3.5–5.3)
POTASSIUM SERPL-SCNC: 5.1 MMOL/L — SIGNIFICANT CHANGE UP (ref 3.5–5.3)
PROTHROM AB SERPL-ACNC: 10.2 SEC — SIGNIFICANT CHANGE UP (ref 9.5–13)
RBC # BLD: 3.45 M/UL — LOW (ref 3.8–5.2)
RBC # FLD: 13.6 % — SIGNIFICANT CHANGE UP (ref 10.3–14.5)
RH IG SCN BLD-IMP: POSITIVE — SIGNIFICANT CHANGE UP
SODIUM SERPL-SCNC: 134 MMOL/L — LOW (ref 135–145)
WBC # BLD: 6.62 K/UL — SIGNIFICANT CHANGE UP (ref 3.8–10.5)
WBC # FLD AUTO: 6.62 K/UL — SIGNIFICANT CHANGE UP (ref 3.8–10.5)

## 2023-11-22 PROCEDURE — 37226: CPT

## 2023-11-22 PROCEDURE — 37221: CPT

## 2023-11-22 PROCEDURE — 75710 ARTERY X-RAYS ARM/LEG: CPT | Mod: 26,59

## 2023-11-22 PROCEDURE — 76937 US GUIDE VASCULAR ACCESS: CPT | Mod: 26

## 2023-11-22 RX ORDER — HEPARIN SODIUM 5000 [USP'U]/ML
INJECTION INTRAVENOUS; SUBCUTANEOUS
Qty: 25000 | Refills: 0 | Status: DISCONTINUED | OUTPATIENT
Start: 2023-11-22 | End: 2023-11-22

## 2023-11-22 RX ORDER — HEPARIN SODIUM 5000 [USP'U]/ML
4500 INJECTION INTRAVENOUS; SUBCUTANEOUS ONCE
Refills: 0 | Status: COMPLETED | OUTPATIENT
Start: 2023-11-22 | End: 2023-11-22

## 2023-11-22 RX ORDER — HYDROMORPHONE HYDROCHLORIDE 2 MG/ML
0.2 INJECTION INTRAMUSCULAR; INTRAVENOUS; SUBCUTANEOUS EVERY 4 HOURS
Refills: 0 | Status: COMPLETED | OUTPATIENT
Start: 2023-11-22 | End: 2023-11-29

## 2023-11-22 RX ORDER — HEPARIN SODIUM 5000 [USP'U]/ML
600 INJECTION INTRAVENOUS; SUBCUTANEOUS
Qty: 25000 | Refills: 0 | Status: DISCONTINUED | OUTPATIENT
Start: 2023-11-22 | End: 2023-11-27

## 2023-11-22 RX ORDER — HYDROMORPHONE HYDROCHLORIDE 2 MG/ML
0.5 INJECTION INTRAMUSCULAR; INTRAVENOUS; SUBCUTANEOUS EVERY 4 HOURS
Refills: 0 | Status: DISCONTINUED | OUTPATIENT
Start: 2023-11-22 | End: 2023-11-23

## 2023-11-22 RX ORDER — HEPARIN SODIUM 5000 [USP'U]/ML
4500 INJECTION INTRAVENOUS; SUBCUTANEOUS EVERY 6 HOURS
Refills: 0 | Status: DISCONTINUED | OUTPATIENT
Start: 2023-11-22 | End: 2023-11-22

## 2023-11-22 RX ORDER — HEPARIN SODIUM 5000 [USP'U]/ML
2000 INJECTION INTRAVENOUS; SUBCUTANEOUS EVERY 6 HOURS
Refills: 0 | Status: DISCONTINUED | OUTPATIENT
Start: 2023-11-22 | End: 2023-11-22

## 2023-11-22 RX ADMIN — HYDROMORPHONE HYDROCHLORIDE 4 MILLIGRAM(S): 2 INJECTION INTRAMUSCULAR; INTRAVENOUS; SUBCUTANEOUS at 08:50

## 2023-11-22 RX ADMIN — HYDROMORPHONE HYDROCHLORIDE 4 MILLIGRAM(S): 2 INJECTION INTRAMUSCULAR; INTRAVENOUS; SUBCUTANEOUS at 16:14

## 2023-11-22 RX ADMIN — HYDROMORPHONE HYDROCHLORIDE 0.2 MILLIGRAM(S): 2 INJECTION INTRAMUSCULAR; INTRAVENOUS; SUBCUTANEOUS at 17:44

## 2023-11-22 RX ADMIN — GABAPENTIN 200 MILLIGRAM(S): 400 CAPSULE ORAL at 21:41

## 2023-11-22 RX ADMIN — Medication 81 MILLIGRAM(S): at 05:27

## 2023-11-22 RX ADMIN — LOSARTAN POTASSIUM 100 MILLIGRAM(S): 100 TABLET, FILM COATED ORAL at 16:14

## 2023-11-22 RX ADMIN — HYDROMORPHONE HYDROCHLORIDE 4 MILLIGRAM(S): 2 INJECTION INTRAMUSCULAR; INTRAVENOUS; SUBCUTANEOUS at 02:37

## 2023-11-22 RX ADMIN — HYDROMORPHONE HYDROCHLORIDE 0.2 MILLIGRAM(S): 2 INJECTION INTRAMUSCULAR; INTRAVENOUS; SUBCUTANEOUS at 04:38

## 2023-11-22 RX ADMIN — HEPARIN SODIUM 6 UNIT(S)/HR: 5000 INJECTION INTRAVENOUS; SUBCUTANEOUS at 20:14

## 2023-11-22 RX ADMIN — SODIUM CHLORIDE 100 MILLILITER(S): 9 INJECTION, SOLUTION INTRAVENOUS at 05:27

## 2023-11-22 RX ADMIN — HYDROMORPHONE HYDROCHLORIDE 4 MILLIGRAM(S): 2 INJECTION INTRAMUSCULAR; INTRAVENOUS; SUBCUTANEOUS at 21:38

## 2023-11-22 RX ADMIN — HYDROMORPHONE HYDROCHLORIDE 0.2 MILLIGRAM(S): 2 INJECTION INTRAMUSCULAR; INTRAVENOUS; SUBCUTANEOUS at 18:14

## 2023-11-22 RX ADMIN — HEPARIN SODIUM 6 UNIT(S)/HR: 5000 INJECTION INTRAVENOUS; SUBCUTANEOUS at 19:55

## 2023-11-22 RX ADMIN — HYDROMORPHONE HYDROCHLORIDE 4 MILLIGRAM(S): 2 INJECTION INTRAMUSCULAR; INTRAVENOUS; SUBCUTANEOUS at 16:44

## 2023-11-22 RX ADMIN — GABAPENTIN 200 MILLIGRAM(S): 400 CAPSULE ORAL at 05:27

## 2023-11-22 RX ADMIN — HYDROMORPHONE HYDROCHLORIDE 0.2 MILLIGRAM(S): 2 INJECTION INTRAMUSCULAR; INTRAVENOUS; SUBCUTANEOUS at 04:23

## 2023-11-22 RX ADMIN — ATORVASTATIN CALCIUM 40 MILLIGRAM(S): 80 TABLET, FILM COATED ORAL at 21:41

## 2023-11-22 RX ADMIN — HYDROMORPHONE HYDROCHLORIDE 4 MILLIGRAM(S): 2 INJECTION INTRAMUSCULAR; INTRAVENOUS; SUBCUTANEOUS at 20:38

## 2023-11-22 RX ADMIN — HEPARIN SODIUM 4500 UNIT(S): 5000 INJECTION INTRAVENOUS; SUBCUTANEOUS at 19:53

## 2023-11-22 RX ADMIN — HEPARIN SODIUM 6 UNIT(S)/HR: 5000 INJECTION INTRAVENOUS; SUBCUTANEOUS at 08:32

## 2023-11-22 NOTE — PROGRESS NOTE ADULT - ASSESSMENT
63F hx of recent RLE angiogram and percutaneous thrombectomy (07/19) of SFA and popliteal arteries, R CFA -> PT bypass with PTFE (7/25) for acute limb ischemia, presenting now with 1.5 weeks of pain in RLE, found to have Pillo IIa acute on chronic limb ischemia secondary to chronically occluded graft.    Plan:  - Patient will have RLE angio with Dr Lawrence  - YANIRA/PVRs completed  - PT evaluation after procedure  - Continue heparin gtt  - Diet: NPO for procedure    Vascular Surgery  [id number] 63F hx of recent RLE angiogram and percutaneous thrombectomy (07/19) of SFA and popliteal arteries, R CFA -> PT bypass with PTFE (7/25) for acute limb ischemia, presenting now with 1.5 weeks of pain in RLE, found to have Pillo IIa acute on chronic limb ischemia secondary to chronically occluded graft.    Plan:  - Patient will have RLE angio with Dr Lawrence  - YANIRA/PVRs completed  - PT evaluation after procedure  - Continue heparin gtt  - Diet: NPO for procedure    Vascular Surgery  [id number] 63F hx of recent RLE angiogram and percutaneous thrombectomy (07/19) of SFA and popliteal arteries, R CFA -> PT bypass with PTFE (7/25) for acute limb ischemia, presenting now with 1.5 weeks of pain in RLE, found to have Pillo IIa acute on chronic limb ischemia secondary to chronically occluded graft.    Plan:  - Patient will have RLE angio with Dr Lawrence  - YANIRA/PVRs completed  - PT evaluation after procedure  - Continue heparin gtt  - Diet: NPO for procedure    Vascular Surgery  [id number] 63F hx of recent RLE angiogram and percutaneous thrombectomy (07/19) of SFA and popliteal arteries, R CFA -> PT bypass with PTFE (7/25) for acute limb ischemia, presenting now with 1.5 weeks of pain in RLE, found to have Pillo IIa acute on chronic limb ischemia secondary to chronically occluded graft.    Plan:  - Patient will have RLE angio with Dr Lawrence  - YANIRA/PVRs completed  - PT evaluation after procedure  - Heparin gtt @ 6 - therapeutic pTT  - Diet: NPO for procedure    Vascular Surgery  [id number] 63F hx of recent RLE angiogram and percutaneous thrombectomy (07/19) of SFA and popliteal arteries, R CFA -> PT bypass with PTFE (7/25) for acute limb ischemia, presenting now with 1.5 weeks of pain in RLE, found to have Pillo IIa acute on chronic limb ischemia secondary to chronically occluded graft.    Plan:  - Patient will have RLE angio with Dr Lawrence  - YANIRA/PVRs completed  - PT evaluation after procedure  - Heparin gtt @ 6 - therapeutic pTT  - Diet: NPO for procedure    Vascular Surgery  [id number] 63F hx of recent RLE angiogram and percutaneous thrombectomy (07/19) of SFA and popliteal arteries, R CFA -> PT bypass with PTFE (7/25) for acute limb ischemia, presenting now with 1.5 weeks of pain in RLE, found to have Pillo IIa acute on chronic limb ischemia secondary to chronically occluded graft.    Plan:  - Patient will have RLE angio with Dr Lawrence  - YANIRA/PVRs completed  - PT evaluation after procedure  - Heparin gtt @ 6 - therapeutic pTT  - Diet: NPO for procedure    Vascular Surgery  [id number]

## 2023-11-22 NOTE — PROGRESS NOTE ADULT - SUBJECTIVE AND OBJECTIVE BOX
Subjective: Patient seen and examined. No new events except as noted.     SUBJECTIVE/ROS:  No chest pain, dyspnea, palpitation, or dizziness.       MEDICATIONS:  MEDICATIONS  (STANDING):  amLODIPine   Tablet 10 milliGRAM(s) Oral every 24 hours  aspirin  chewable 81 milliGRAM(s) Oral daily  atorvastatin 40 milliGRAM(s) Oral at bedtime  dextrose 5% + sodium chloride 0.9%. 1000 milliLiter(s) (100 mL/Hr) IV Continuous <Continuous>  gabapentin 200 milliGRAM(s) Oral every 8 hours  heparin  Infusion 1100 Unit(s)/Hr (6 mL/Hr) IV Continuous <Continuous>  losartan 100 milliGRAM(s) Oral daily      PHYSICAL EXAM:  T(C): 37 (11-22-23 @ 04:23), Max: 37.3 (11-21-23 @ 13:20)  HR: 56 (11-22-23 @ 04:23) (56 - 83)  BP: 107/56 (11-22-23 @ 04:23) (91/56 - 114/62)  RR: 17 (11-22-23 @ 04:23) (17 - 118)  SpO2: 100% (11-22-23 @ 04:23) (97% - 100%)  Wt(kg): --  I&O's Summary    20 Nov 2023 07:01  -  21 Nov 2023 07:00  --------------------------------------------------------  IN: 938 mL / OUT: 0 mL / NET: 938 mL    21 Nov 2023 07:01  -  22 Nov 2023 06:54  --------------------------------------------------------  IN: 1641 mL / OUT: 0 mL / NET: 1641 mL            JVP: Normal  Neck: supple  Lung: clear   CV: S1 S2 , Murmur:  Abd: soft  Ext: No edema  neuro: Awake / alert  Psych: flat affect  Skin: normal``    LABS/DATA:    CARDIAC MARKERS:                                11.3   6.62  )-----------( 177      ( 22 Nov 2023 05:47 )             35.7     11-21    138  |  100  |  40<H>  ----------------------------<  94  5.3   |  28  |  0.98    Ca    9.7      21 Nov 2023 07:13  Phos  3.3     11-21  Mg     2.20     11-21      proBNP:   Lipid Profile:   HgA1c:   TSH:     TELE:  EKG:

## 2023-11-22 NOTE — CHART NOTE - NSCHARTNOTEFT_GEN_A_CORE
Post Operative Note  Patient: BRE HODGE 63y (1960) Female   MRN: [id number]  Location: [address]  Visit: 11-16-23 Inpatient  Date: 11-22-23 @ 20:36    Procedure: S/P RLE angiogram, stenting of distal external iliac and proximal CFA, profunda femoris balloon angioplasty    Subjective: Pt c/o pain in RLE not improving after angiogram; endorsing sharp pain in foot especially when attempting to ambulate. Denies chest pain, SOB, abd pain, nausea, vomiting, fevers, chills.       Objective:  Vitals: T(F): 98.1 (11-22-23 @ 16:49), Max: 98.9 (11-21-23 @ 21:50)  HR: 72 (11-22-23 @ 17:38)  BP: 133/64 (11-22-23 @ 17:38) (101/54 - 133/64)  RR: 18 (11-22-23 @ 17:38)  SpO2: 100% (11-22-23 @ 17:38)  Vent Settings:     In:   11-21-23 @ 07:01  -  11-22-23 @ 07:00  --------------------------------------------------------  IN: 1641 mL    11-22-23 @ 07:01  -  11-22-23 @ 20:36  --------------------------------------------------------  IN: 281 mL      IV Fluids:     Out:   11-21-23 @ 07:01  -  11-22-23 @ 07:00  --------------------------------------------------------  OUT: 0 mL    11-22-23 @ 07:01  -  11-22-23 @ 20:36  --------------------------------------------------------  OUT: 0 mL      EBL:   11-21-23 @ 07:01  -  11-22-23 @ 07:00  --------------------------------------------------------  OUT: 0 mL    11-22-23 @ 07:01  -  11-22-23 @ 20:36  --------------------------------------------------------  OUT: 0 mL      Voided Urine:   11-21-23 @ 07:01  -  11-22-23 @ 07:00  --------------------------------------------------------  OUT: 0 mL    11-22-23 @ 07:01  -  11-22-23 @ 20:36  --------------------------------------------------------  OUT: 0 mL          Physical Examination:  General: NAD, lying in bed  HEENT: Normocephalic atraumatic  Respiratory: Nonlabored respirations, normal CW expansion.  Cardio: S1S2, regular rate and rhythm.  Abdomen: Soft, nondistended, nontender  Vascular: L groin access site soft, no collection appreciated. Dressing c/d/i. RLE PT and DP pulses nonpalpable; soft monophasic signals on Doppler w/ AT > PT. LLE w/ nonpalpable but strongly dopplerable PT/DP pulses.    Imaging:  No post-op imaging studies    Assessment:  63yFemale patient S/P S/P RLE angiogram, stenting of distal external iliac and proximal CFA, profunda femoris balloon angioplasty    Plan:  - L groin soft, restart heparin gtt  - Regular diet, IVL  - Recheck pulse exam in AM    Date/Time: 11-22-23 @ 20:36 Post Operative Note  Patient: BRE HODGE 63y (1960) Female   MRN: [id number]  Location: [address]  Visit: 11-16-23 Inpatient  Date: 11-22-23 @ 20:36    Procedure: S/P RLE angiogram, stenting of distal external iliac and proximal CFA, profunda femoris balloon angioplasty    Subjective: Pt c/o pain in RLE not improving after angiogram; endorsing sharp pain in foot especially when attempting to ambulate. Denies chest pain, SOB, abd pain, nausea, vomiting, fevers, chills.       Objective:  Vitals: T(F): 98.1 (11-22-23 @ 16:49), Max: 98.9 (11-21-23 @ 21:50)  HR: 72 (11-22-23 @ 17:38)  BP: 133/64 (11-22-23 @ 17:38) (101/54 - 133/64)  RR: 18 (11-22-23 @ 17:38)  SpO2: 100% (11-22-23 @ 17:38)  Vent Settings:     In:   11-21-23 @ 07:01  -  11-22-23 @ 07:00  --------------------------------------------------------  IN: 1641 mL    11-22-23 @ 07:01  -  11-22-23 @ 20:36  --------------------------------------------------------  IN: 281 mL      IV Fluids:     Out:   11-21-23 @ 07:01  -  11-22-23 @ 07:00  --------------------------------------------------------  OUT: 0 mL    11-22-23 @ 07:01  -  11-22-23 @ 20:36  --------------------------------------------------------  OUT: 0 mL      EBL:   11-21-23 @ 07:01  -  11-22-23 @ 07:00  --------------------------------------------------------  OUT: 0 mL    11-22-23 @ 07:01  -  11-22-23 @ 20:36  --------------------------------------------------------  OUT: 0 mL      Voided Urine:   11-21-23 @ 07:01  -  11-22-23 @ 07:00  --------------------------------------------------------  OUT: 0 mL    11-22-23 @ 07:01  -  11-22-23 @ 20:36  --------------------------------------------------------  OUT: 0 mL          Physical Examination:  General: NAD, lying in bed  HEENT: Normocephalic atraumatic  Respiratory: Nonlabored respirations, normal CW expansion.  Cardio: S1S2, regular rate and rhythm.  Abdomen: Soft, nondistended, nontender  Vascular: L groin access site soft, no collection appreciated. Dressing c/d/i. RLE PT and DP pulses nonpalpable; soft monophasic signals on Doppler w/ AT > PT. LLE w/ nonpalpable but strongly dopplerable PT/DP pulses.    Imaging:  No post-op imaging studies    Assessment:  63yFemale patient S/P S/P RLE angiogram, stenting of distal external iliac and proximal CFA, profunda femoris balloon angioplasty    Plan:  - L groin soft, restart heparin gtt  - Regular diet, IVL  - Recheck pulse exam in AM    Date/Time: 11-22-23 @ 20:36 Post Operative Note  Patient: BRE HODGE 63y (1960) Female   MRN: [id number]  Location: [address]  Visit: 11-16-23 Inpatient  Date: 11-22-23 @ 20:36    Procedure: S/P RLE angiogram, stenting of distal external iliac and proximal CFA, profunda femoris balloon angioplasty    Subjective: Pt c/o pain in RLE not improving after angiogram; endorsing sharp pain in foot especially when attempting to ambulate. Denies chest pain, SOB, abd pain, nausea, vomiting, fevers, chills.       Objective:  Vitals: T(F): 98.1 (11-22-23 @ 16:49), Max: 98.9 (11-21-23 @ 21:50)  HR: 72 (11-22-23 @ 17:38)  BP: 133/64 (11-22-23 @ 17:38) (101/54 - 133/64)  RR: 18 (11-22-23 @ 17:38)  SpO2: 100% (11-22-23 @ 17:38)  Vent Settings:     In:   11-21-23 @ 07:01  -  11-22-23 @ 07:00  --------------------------------------------------------  IN: 1641 mL    11-22-23 @ 07:01  -  11-22-23 @ 20:36  --------------------------------------------------------  IN: 281 mL      IV Fluids:     Out:   11-21-23 @ 07:01  -  11-22-23 @ 07:00  --------------------------------------------------------  OUT: 0 mL    11-22-23 @ 07:01  -  11-22-23 @ 20:36  --------------------------------------------------------  OUT: 0 mL      EBL:   11-21-23 @ 07:01  -  11-22-23 @ 07:00  --------------------------------------------------------  OUT: 0 mL    11-22-23 @ 07:01  -  11-22-23 @ 20:36  --------------------------------------------------------  OUT: 0 mL      Voided Urine:   11-21-23 @ 07:01  -  11-22-23 @ 07:00  --------------------------------------------------------  OUT: 0 mL    11-22-23 @ 07:01  -  11-22-23 @ 20:36  --------------------------------------------------------  OUT: 0 mL          Physical Examination:  General: NAD, lying in bed  HEENT: Normocephalic atraumatic  Respiratory: Nonlabored respirations, normal CW expansion.  Cardio: S1S2, regular rate and rhythm.  Abdomen: Soft, nondistended, nontender  Vascular: L groin access site soft, no collection appreciated. Dressing c/d/i. RLE PT and DP pulses nonpalpable; soft monophasic signals on Doppler w/ AT > PT. LLE w/ nonpalpable but strongly dopplerable PT/DP pulses.    Imaging:  No post-op imaging studies    Assessment:  63yFemale patient S/P S/P RLE angiogram, stenting of distal external iliac and proximal CFA, profunda femoris balloon angioplasty    Plan:  - L groin soft, restart heparin gtt  - Regular diet, IVL  - Recheck pulse exam in AM    Date/Time: 11-22-23 @ 20:36

## 2023-11-22 NOTE — PRE PROCEDURE NOTE - PRE PROCEDURE EVALUATION
Surgery Preop Note    Procedure: RLE angiogram  Surgeon: Melinda                          11.8   6.51  )-----------( 167      ( 21 Nov 2023 07:13 )             36.3     11-21    138  |  100  |  40<H>  ----------------------------<  94  5.3   |  28  |  0.98    Ca    9.7      21 Nov 2023 07:13  Phos  3.3     11-21  Mg     2.20     11-21      PT/INR - ( 21 Nov 2023 07:13 )   PT: 10.5 sec;   INR: 0.94 ratio         PTT - ( 21 Nov 2023 07:13 )  PTT:67.0 sec    Urinalysis Basic - ( 21 Nov 2023 07:13 )    Color: x / Appearance: x / SG: x / pH: x  Gluc: 94 mg/dL / Ketone: x  / Bili: x / Urobili: x   Blood: x / Protein: x / Nitrite: x   Leuk Esterase: x / RBC: x / WBC x   Sq Epi: x / Non Sq Epi: x / Bacteria: x        Assessment: 63F hx of recent RLE angiogram and percutaneous thrombectomy (07/19) of SFA and popliteal arteries, R CFA -> PT bypass with PTFE (7/25) for acute limb ischemia, presenting now with 1.5 weeks of pain in RLE, found to have [place] IIa acute on chronic limb ischemia secondary to chronically occluded graft. Going for RLE angiogram 11/22.     Preop Checklist:    [x]NPO after midnight  [x]IVF  [x]AM labs (CBC, BMP, coags)  [x]Type and Screen x2  [x]Medicine, cardiology clearance  [x]Diabetes orders  [x]Anticoagulation  [x]Consent           Surgery Preop Note    Procedure: RLE angiogram  Surgeon: Melinda                          11.8   6.51  )-----------( 167      ( 21 Nov 2023 07:13 )             36.3     11-21    138  |  100  |  40<H>  ----------------------------<  94  5.3   |  28  |  0.98    Ca    9.7      21 Nov 2023 07:13  Phos  3.3     11-21  Mg     2.20     11-21      PT/INR - ( 21 Nov 2023 07:13 )   PT: 10.5 sec;   INR: 0.94 ratio         PTT - ( 21 Nov 2023 07:13 )  PTT:67.0 sec    Urinalysis Basic - ( 21 Nov 2023 07:13 )    Color: x / Appearance: x / SG: x / pH: x  Gluc: 94 mg/dL / Ketone: x  / Bili: x / Urobili: x   Blood: x / Protein: x / Nitrite: x   Leuk Esterase: x / RBC: x / WBC x   Sq Epi: x / Non Sq Epi: x / Bacteria: x        Assessment: 63F hx of recent RLE angiogram and percutaneous thrombectomy (07/19) of SFA and popliteal arteries, R CFA -> PT bypass with PTFE (7/25) for acute limb ischemia, presenting now with 1.5 weeks of pain in RLE, found to have [place] IIa acute on chronic limb ischemia secondary to chronically occluded graft. Going for RLE angiogram 11/22.     Preop Checklist:    [x]NPO after midnight  [x]IVF  [x]AM labs (CBC, BMP, coags)  [x]Type and Screen x2  [x]Medicine, cardiology clearance  [x]Diabetes orders  [x]Anticoagulation  [x]Consent           Surgery Preop Note    Procedure: RLE angiogram  Surgeon: Melinda                          11.8   6.51  )-----------( 167      ( 21 Nov 2023 07:13 )             36.3     11-21    138  |  100  |  40<H>  ----------------------------<  94  5.3   |  28  |  0.98    Ca    9.7      21 Nov 2023 07:13  Phos  3.3     11-21  Mg     2.20     11-21      PT/INR - ( 21 Nov 2023 07:13 )   PT: 10.5 sec;   INR: 0.94 ratio         PTT - ( 21 Nov 2023 07:13 )  PTT:67.0 sec    Urinalysis Basic - ( 21 Nov 2023 07:13 )    Color: x / Appearance: x / SG: x / pH: x  Gluc: 94 mg/dL / Ketone: x  / Bili: x / Urobili: x   Blood: x / Protein: x / Nitrite: x   Leuk Esterase: x / RBC: x / WBC x   Sq Epi: x / Non Sq Epi: x / Bacteria: x        Assessment: 63F hx of recent RLE angiogram and percutaneous thrombectomy (07/19) of SFA and popliteal arteries, R CFA -> PT bypass with PTFE (7/25) for acute limb ischemia, presenting now with 1.5 weeks of pain in RLE, found to have [place] IIa acute on chronic limb ischemia secondary to chronically occluded graft. Going for RLE angiogram 11/22.     Preop Checklist:    [x]NPO after midnight  [x]IVF  [x]AM labs (CBC, BMP, coags)  [x]Type and Screen x2  [x]Medicine, cardiology clearance  [x]Diabetes orders  [x]Anticoagulation  [x]Consent

## 2023-11-22 NOTE — PROGRESS NOTE ADULT - SUBJECTIVE AND OBJECTIVE BOX
Name of Patient : BRE HODGE  MRN: [id number]  Date of visit: 11-22-23       Subjective: Patient seen and examined. No new events except as noted.   Doing okay     REVIEW OF SYSTEMS:    CONSTITUTIONAL: No weakness, fevers or chills  EYES/ENT: No visual changes;  No vertigo or throat pain   NECK: No pain or stiffness  RESPIRATORY: No cough, wheezing, hemoptysis; No shortness of breath  CARDIOVASCULAR: No chest pain or palpitations  GASTROINTESTINAL: No abdominal or epigastric pain. No nausea, vomiting, or hematemesis; No diarrhea or constipation. No melena or hematochezia.  GENITOURINARY: No dysuria, frequency or hematuria  NEUROLOGICAL: No numbness or weakness  SKIN: No itching, burning, rashes, or lesions   All other review of systems is negative unless indicated above.    MEDICATIONS:  MEDICATIONS  (STANDING):  amLODIPine   Tablet 10 milliGRAM(s) Oral every 24 hours  aspirin  chewable 81 milliGRAM(s) Oral daily  atorvastatin 40 milliGRAM(s) Oral at bedtime  dextrose 5% + sodium chloride 0.9%. 1000 milliLiter(s) (100 mL/Hr) IV Continuous <Continuous>  gabapentin 200 milliGRAM(s) Oral every 8 hours  heparin  Infusion 600 Unit(s)/Hr (6 mL/Hr) IV Continuous <Continuous>  losartan 100 milliGRAM(s) Oral daily      PHYSICAL EXAM:  T(C): 36.7 (11-22-23 @ 16:49), Max: 37.2 (11-21-23 @ 21:50)  HR: 72 (11-22-23 @ 17:38) (56 - 80)  BP: 133/64 (11-22-23 @ 17:38) (101/54 - 133/64)  RR: 18 (11-22-23 @ 17:38) (17 - 18)  SpO2: 100% (11-22-23 @ 17:38) (97% - 100%)  Wt(kg): --  I&O's Summary    21 Nov 2023 07:01  -  22 Nov 2023 07:00  --------------------------------------------------------  IN: 1641 mL / OUT: 0 mL / NET: 1641 mL    22 Nov 2023 07:01  -  22 Nov 2023 20:18  --------------------------------------------------------  IN: 281 mL / OUT: 0 mL / NET: 281 mL      Height (cm): 160 (11-22 @ 08:40)  Weight (kg): 57.2 (11-22 @ 08:40)  BMI (kg/m2): 22.3 (11-22 @ 08:40)  BSA (m2): 1.59 (11-22 @ 08:40)    Appearance: Normal	  HEENT:  PERRLA   Lymphatic: No lymphadenopathy   Cardiovascular: Normal S1 S2, no JVD  Respiratory: normal effort , clear  Gastrointestinal:  Soft, Non-tender  Skin: No rashes,  warm to touch  Psychiatry:  Mood & affect appropriate  Musculuskeletal: No edema    recent labs, Imaging and EKGs personally reviewed             11-21-23 @ 07:01  -  11-22-23 @ 07:00  --------------------------------------------------------  IN: 1641 mL / OUT: 0 mL / NET: 1641 mL    11-22-23 @ 07:01  -  11-22-23 @ 20:18  --------------------------------------------------------  IN: 281 mL / OUT: 0 mL / NET: 281 mL                            11.3   6.62  )-----------( 177      ( 22 Nov 2023 05:47 )             35.7               11-22    134<L>  |  101  |  33<H>  ----------------------------<  101<H>  5.1   |  25  |  0.83    Ca    9.9      22 Nov 2023 05:47  Phos  3.0     11-22  Mg     2.00     11-22      PT/INR - ( 22 Nov 2023 05:47 )   PT: 10.2 sec;   INR: 0.90 ratio         PTT - ( 22 Nov 2023 05:47 )  PTT:54.1 sec                   Urinalysis Basic - ( 22 Nov 2023 05:47 )    Color: x / Appearance: x / SG: x / pH: x  Gluc: 101 mg/dL / Ketone: x  / Bili: x / Urobili: x   Blood: x / Protein: x / Nitrite: x   Leuk Esterase: x / RBC: x / WBC x   Sq Epi: x / Non Sq Epi: x / Bacteria: x             Name of Patient : BRE HODGE  MRN: [id number]  Date of visit: 11-22-23       Subjective: Patient seen and examined. No new events except as noted.   Doing okay     REVIEW OF SYSTEMS:    CONSTITUTIONAL: No weakness, fevers or chills  EYES/ENT: No visual changes;  No vertigo or throat pain   NECK: No pain or stiffness  RESPIRATORY: No cough, wheezing, hemoptysis; No shortness of breath  CARDIOVASCULAR: No chest pain or palpitations  GASTROINTESTINAL: No abdominal or epigastric pain. No nausea, vomiting, or hematemesis; No diarrhea or constipation. No melena or hematochezia.  GENITOURINARY: No dysuria, frequency or hematuria  NEUROLOGICAL: No numbness or weakness  SKIN: No itching, burning, rashes, or lesions   All other review of systems is negative unless indicated above.    MEDICATIONS:  MEDICATIONS  (STANDING):  amLODIPine   Tablet 10 milliGRAM(s) Oral every 24 hours  aspirin  chewable 81 milliGRAM(s) Oral daily  atorvastatin 40 milliGRAM(s) Oral at bedtime  dextrose 5% + sodium chloride 0.9%. 1000 milliLiter(s) (100 mL/Hr) IV Continuous <Continuous>  gabapentin 200 milliGRAM(s) Oral every 8 hours  heparin  Infusion 600 Unit(s)/Hr (6 mL/Hr) IV Continuous <Continuous>  losartan 100 milliGRAM(s) Oral daily      PHYSICAL EXAM:  T(C): 36.7 (11-22-23 @ 16:49), Max: 37.2 (11-21-23 @ 21:50)  HR: 72 (11-22-23 @ 17:38) (56 - 80)  BP: 133/64 (11-22-23 @ 17:38) (101/54 - 133/64)  RR: 18 (11-22-23 @ 17:38) (17 - 18)  SpO2: 100% (11-22-23 @ 17:38) (97% - 100%)  Wt(kg): --  I&O's Summary    21 Nov 2023 07:01  -  22 Nov 2023 07:00  --------------------------------------------------------  IN: 1641 mL / OUT: 0 mL / NET: 1641 mL    22 Nov 2023 07:01  -  22 Nov 2023 20:18  --------------------------------------------------------  IN: 281 mL / OUT: 0 mL / NET: 281 mL      Height (cm): 160 (11-22 @ 08:40)  Weight (kg): 57.2 (11-22 @ 08:40)  BMI (kg/m2): 22.3 (11-22 @ 08:40)  BSA (m2): 1.59 (11-22 @ 08:40)    Appearance: Normal	  HEENT:  PERRLA   Lymphatic: No lymphadenopathy   Cardiovascular: Normal S1 S2, no JVD  Respiratory: normal effort , clear  Gastrointestinal:  Soft, Non-tender  Skin: No rashes,  warm to touch  Psychiatry:  Mood & affect appropriate  Musculuskeletal: No edema    recent labs, Imaging and EKGs personally reviewed             11-21-23 @ 07:01  -  11-22-23 @ 07:00  --------------------------------------------------------  IN: 1641 mL / OUT: 0 mL / NET: 1641 mL    11-22-23 @ 07:01  -  11-22-23 @ 20:18  --------------------------------------------------------  IN: 281 mL / OUT: 0 mL / NET: 281 mL                            11.3   6.62  )-----------( 177      ( 22 Nov 2023 05:47 )             35.7               11-22    134<L>  |  101  |  33<H>  ----------------------------<  101<H>  5.1   |  25  |  0.83    Ca    9.9      22 Nov 2023 05:47  Phos  3.0     11-22  Mg     2.00     11-22      PT/INR - ( 22 Nov 2023 05:47 )   PT: 10.2 sec;   INR: 0.90 ratio         PTT - ( 22 Nov 2023 05:47 )  PTT:54.1 sec                   Urinalysis Basic - ( 22 Nov 2023 05:47 )    Color: x / Appearance: x / SG: x / pH: x  Gluc: 101 mg/dL / Ketone: x  / Bili: x / Urobili: x   Blood: x / Protein: x / Nitrite: x   Leuk Esterase: x / RBC: x / WBC x   Sq Epi: x / Non Sq Epi: x / Bacteria: x             Name of Patient : BRE HODGE  MRN: [id number]  Date of visit: 11-22-23       Subjective: Patient seen and examined. No new events except as noted.   Doing okay     REVIEW OF SYSTEMS:    CONSTITUTIONAL: No weakness, fevers or chills  EYES/ENT: No visual changes;  No vertigo or throat pain   NECK: No pain or stiffness  RESPIRATORY: No cough, wheezing, hemoptysis; No shortness of breath  CARDIOVASCULAR: No chest pain or palpitations  GASTROINTESTINAL: No abdominal or epigastric pain. No nausea, vomiting, or hematemesis; No diarrhea or constipation. No melena or hematochezia.  GENITOURINARY: No dysuria, frequency or hematuria  NEUROLOGICAL: No numbness or weakness  SKIN: No itching, burning, rashes, or lesions   All other review of systems is negative unless indicated above.    MEDICATIONS:  MEDICATIONS  (STANDING):  amLODIPine   Tablet 10 milliGRAM(s) Oral every 24 hours  aspirin  chewable 81 milliGRAM(s) Oral daily  atorvastatin 40 milliGRAM(s) Oral at bedtime  dextrose 5% + sodium chloride 0.9%. 1000 milliLiter(s) (100 mL/Hr) IV Continuous <Continuous>  gabapentin 200 milliGRAM(s) Oral every 8 hours  heparin  Infusion 600 Unit(s)/Hr (6 mL/Hr) IV Continuous <Continuous>  losartan 100 milliGRAM(s) Oral daily      PHYSICAL EXAM:  T(C): 36.7 (11-22-23 @ 16:49), Max: 37.2 (11-21-23 @ 21:50)  HR: 72 (11-22-23 @ 17:38) (56 - 80)  BP: 133/64 (11-22-23 @ 17:38) (101/54 - 133/64)  RR: 18 (11-22-23 @ 17:38) (17 - 18)  SpO2: 100% (11-22-23 @ 17:38) (97% - 100%)  Wt(kg): --  I&O's Summary    21 Nov 2023 07:01  -  22 Nov 2023 07:00  --------------------------------------------------------  IN: 1641 mL / OUT: 0 mL / NET: 1641 mL    22 Nov 2023 07:01  -  22 Nov 2023 20:18  --------------------------------------------------------  IN: 281 mL / OUT: 0 mL / NET: 281 mL      Height (cm): 160 (11-22 @ 08:40)  Weight (kg): 57.2 (11-22 @ 08:40)  BMI (kg/m2): 22.3 (11-22 @ 08:40)  BSA (m2): 1.59 (11-22 @ 08:40)    Appearance: Normal	  HEENT:  PERRLA   Lymphatic: No lymphadenopathy   Cardiovascular: Normal S1 S2, no JVD  Respiratory: normal effort , clear  Gastrointestinal:  Soft, Non-tender  Skin: No rashes,  warm to touch  Psychiatry:  Mood & affect appropriate  Musculuskeletal: No edema    recent labs, Imaging and EKGs personally reviewed             11-21-23 @ 07:01  -  11-22-23 @ 07:00  --------------------------------------------------------  IN: 1641 mL / OUT: 0 mL / NET: 1641 mL    11-22-23 @ 07:01  -  11-22-23 @ 20:18  --------------------------------------------------------  IN: 281 mL / OUT: 0 mL / NET: 281 mL                            11.3   6.62  )-----------( 177      ( 22 Nov 2023 05:47 )             35.7               11-22    134<L>  |  101  |  33<H>  ----------------------------<  101<H>  5.1   |  25  |  0.83    Ca    9.9      22 Nov 2023 05:47  Phos  3.0     11-22  Mg     2.00     11-22      PT/INR - ( 22 Nov 2023 05:47 )   PT: 10.2 sec;   INR: 0.90 ratio         PTT - ( 22 Nov 2023 05:47 )  PTT:54.1 sec                   Urinalysis Basic - ( 22 Nov 2023 05:47 )    Color: x / Appearance: x / SG: x / pH: x  Gluc: 101 mg/dL / Ketone: x  / Bili: x / Urobili: x   Blood: x / Protein: x / Nitrite: x   Leuk Esterase: x / RBC: x / WBC x   Sq Epi: x / Non Sq Epi: x / Bacteria: x

## 2023-11-22 NOTE — PROGRESS NOTE ADULT - SUBJECTIVE AND OBJECTIVE BOX
C TEAM Surgery Progress Note  Patient is a 63y old  Female who presents with a chief complaint of Acute limb ischemia of RLE (22 Nov 2023 06:54)    INTERVAL EVENTS: No acute events overnight.    SUBJECTIVE: Patient seen and examined at bedside with surgical team, patient without complaints. Planned for RLE angio today with Dr Lawrence.     OBJECTIVE:    Vital Signs Last 24 Hrs  T(C): 37 (22 Nov 2023 04:23), Max: 37.3 (21 Nov 2023 13:20)  T(F): 98.6 (22 Nov 2023 04:23), Max: 99.2 (21 Nov 2023 13:20)  HR: 56 (22 Nov 2023 04:23) (56 - 83)  BP: 107/56 (22 Nov 2023 04:23) (91/56 - 114/62)  BP(mean): --  RR: 17 (22 Nov 2023 04:23) (17 - 118)  SpO2: 100% (22 Nov 2023 04:23) (97% - 100%)    Parameters below as of 22 Nov 2023 04:23  Patient On (Oxygen Delivery Method): room air    I&O's Detail    21 Nov 2023 07:01  -  22 Nov 2023 07:00  --------------------------------------------------------  IN:    dextrose 5% + sodium chloride 0.9%: 100 mL    Heparin: 126 mL    Oral Fluid: 1415 mL  Total IN: 1641 mL    OUT:    Blood Loss (mL): 0 mL    IV PiggyBack: 0 mL    Voided (mL): 0 mL  Total OUT: 0 mL    Total NET: 1641 mL      MEDICATIONS  (STANDING):  amLODIPine   Tablet 10 milliGRAM(s) Oral every 24 hours  aspirin  chewable 81 milliGRAM(s) Oral daily  atorvastatin 40 milliGRAM(s) Oral at bedtime  dextrose 5% + sodium chloride 0.9%. 1000 milliLiter(s) (100 mL/Hr) IV Continuous <Continuous>  gabapentin 200 milliGRAM(s) Oral every 8 hours  heparin  Infusion 1100 Unit(s)/Hr (6 mL/Hr) IV Continuous <Continuous>  losartan 100 milliGRAM(s) Oral daily    MEDICATIONS  (PRN):  HYDROmorphone   Tablet 2 milliGRAM(s) Oral every 3 hours PRN Moderate Pain (4 - 6)  HYDROmorphone   Tablet 4 milliGRAM(s) Oral every 3 hours PRN Severe Pain (7 - 10)  HYDROmorphone  Injectable 0.2 milliGRAM(s) IV Push every 4 hours PRN Severe Pain (7 - 10)      PHYSICAL EXAM:  Constitutional: A&Ox3, NAD  Respiratory: Unlabored breathing  Abdomen: Soft, nondistended, NTTP  Extremities: Moving all extremities, no edema    LABS:                        11.3   6.62  )-----------( 177      ( 22 Nov 2023 05:47 )             35.7     11-22    134<L>  |  101  |  33<H>  ----------------------------<  101<H>  5.1   |  25  |  0.83    Ca    9.9      22 Nov 2023 05:47  Phos  3.0     11-22  Mg     2.00     11-22      PT/INR - ( 22 Nov 2023 05:47 )   PT: 10.2 sec;   INR: 0.90 ratio         PTT - ( 22 Nov 2023 05:47 )  PTT:54.1 sec    Urinalysis Basic - ( 22 Nov 2023 05:47 )    Color: x / Appearance: x / SG: x / pH: x  Gluc: 101 mg/dL / Ketone: x  / Bili: x / Urobili: x   Blood: x / Protein: x / Nitrite: x   Leuk Esterase: x / RBC: x / WBC x   Sq Epi: x / Non Sq Epi: x / Bacteria: x

## 2023-11-23 LAB
ANION GAP SERPL CALC-SCNC: 12 MMOL/L — SIGNIFICANT CHANGE UP (ref 7–14)
APTT BLD: 54.9 SEC — HIGH (ref 24.5–35.6)
APTT BLD: 73.2 SEC — HIGH (ref 24.5–35.6)
APTT BLD: 76.8 SEC — HIGH (ref 24.5–35.6)
BUN SERPL-MCNC: 24 MG/DL — HIGH (ref 7–23)
CALCIUM SERPL-MCNC: 9.8 MG/DL — SIGNIFICANT CHANGE UP (ref 8.4–10.5)
CHLORIDE SERPL-SCNC: 100 MMOL/L — SIGNIFICANT CHANGE UP (ref 98–107)
CO2 SERPL-SCNC: 25 MMOL/L — SIGNIFICANT CHANGE UP (ref 22–31)
CREAT SERPL-MCNC: 0.76 MG/DL — SIGNIFICANT CHANGE UP (ref 0.5–1.3)
EGFR: 89 ML/MIN/1.73M2 — SIGNIFICANT CHANGE UP
GLUCOSE SERPL-MCNC: 121 MG/DL — HIGH (ref 70–99)
HCT VFR BLD CALC: 33.8 % — LOW (ref 34.5–45)
HGB BLD-MCNC: 10.8 G/DL — LOW (ref 11.5–15.5)
MAGNESIUM SERPL-MCNC: 1.9 MG/DL — SIGNIFICANT CHANGE UP (ref 1.6–2.6)
MCHC RBC-ENTMCNC: 32 GM/DL — SIGNIFICANT CHANGE UP (ref 32–36)
MCHC RBC-ENTMCNC: 32.3 PG — SIGNIFICANT CHANGE UP (ref 27–34)
MCV RBC AUTO: 101.2 FL — HIGH (ref 80–100)
NRBC # BLD: 0 /100 WBCS — SIGNIFICANT CHANGE UP (ref 0–0)
NRBC # FLD: 0 K/UL — SIGNIFICANT CHANGE UP (ref 0–0)
PHOSPHATE SERPL-MCNC: 4.2 MG/DL — SIGNIFICANT CHANGE UP (ref 2.5–4.5)
PLATELET # BLD AUTO: 169 K/UL — SIGNIFICANT CHANGE UP (ref 150–400)
POTASSIUM SERPL-MCNC: 4.5 MMOL/L — SIGNIFICANT CHANGE UP (ref 3.5–5.3)
POTASSIUM SERPL-SCNC: 4.5 MMOL/L — SIGNIFICANT CHANGE UP (ref 3.5–5.3)
RBC # BLD: 3.34 M/UL — LOW (ref 3.8–5.2)
RBC # FLD: 13.6 % — SIGNIFICANT CHANGE UP (ref 10.3–14.5)
SODIUM SERPL-SCNC: 137 MMOL/L — SIGNIFICANT CHANGE UP (ref 135–145)
WBC # BLD: 6.9 K/UL — SIGNIFICANT CHANGE UP (ref 3.8–10.5)
WBC # FLD AUTO: 6.9 K/UL — SIGNIFICANT CHANGE UP (ref 3.8–10.5)

## 2023-11-23 PROCEDURE — 99232 SBSQ HOSP IP/OBS MODERATE 35: CPT

## 2023-11-23 RX ORDER — HYDROMORPHONE HYDROCHLORIDE 2 MG/ML
0.5 INJECTION INTRAMUSCULAR; INTRAVENOUS; SUBCUTANEOUS EVERY 4 HOURS
Refills: 0 | Status: DISCONTINUED | OUTPATIENT
Start: 2023-11-23 | End: 2023-11-27

## 2023-11-23 RX ORDER — HYDROMORPHONE HYDROCHLORIDE 2 MG/ML
0.2 INJECTION INTRAMUSCULAR; INTRAVENOUS; SUBCUTANEOUS EVERY 4 HOURS
Refills: 0 | Status: DISCONTINUED | OUTPATIENT
Start: 2023-11-23 | End: 2023-11-23

## 2023-11-23 RX ORDER — HYDROMORPHONE HYDROCHLORIDE 2 MG/ML
0.5 INJECTION INTRAMUSCULAR; INTRAVENOUS; SUBCUTANEOUS EVERY 4 HOURS
Refills: 0 | Status: DISCONTINUED | OUTPATIENT
Start: 2023-11-23 | End: 2023-11-23

## 2023-11-23 RX ORDER — ACETAMINOPHEN 500 MG
650 TABLET ORAL EVERY 6 HOURS
Refills: 0 | Status: DISCONTINUED | OUTPATIENT
Start: 2023-11-23 | End: 2023-11-23

## 2023-11-23 RX ORDER — ACETAMINOPHEN 500 MG
975 TABLET ORAL EVERY 6 HOURS
Refills: 0 | Status: DISCONTINUED | OUTPATIENT
Start: 2023-11-23 | End: 2023-11-27

## 2023-11-23 RX ADMIN — Medication 650 MILLIGRAM(S): at 12:14

## 2023-11-23 RX ADMIN — HYDROMORPHONE HYDROCHLORIDE 0.5 MILLIGRAM(S): 2 INJECTION INTRAMUSCULAR; INTRAVENOUS; SUBCUTANEOUS at 21:56

## 2023-11-23 RX ADMIN — Medication 81 MILLIGRAM(S): at 05:21

## 2023-11-23 RX ADMIN — GABAPENTIN 200 MILLIGRAM(S): 400 CAPSULE ORAL at 12:14

## 2023-11-23 RX ADMIN — HEPARIN SODIUM 7 UNIT(S)/HR: 5000 INJECTION INTRAVENOUS; SUBCUTANEOUS at 20:33

## 2023-11-23 RX ADMIN — HYDROMORPHONE HYDROCHLORIDE 4 MILLIGRAM(S): 2 INJECTION INTRAMUSCULAR; INTRAVENOUS; SUBCUTANEOUS at 17:57

## 2023-11-23 RX ADMIN — HYDROMORPHONE HYDROCHLORIDE 4 MILLIGRAM(S): 2 INJECTION INTRAMUSCULAR; INTRAVENOUS; SUBCUTANEOUS at 10:59

## 2023-11-23 RX ADMIN — HYDROMORPHONE HYDROCHLORIDE 4 MILLIGRAM(S): 2 INJECTION INTRAMUSCULAR; INTRAVENOUS; SUBCUTANEOUS at 12:00

## 2023-11-23 RX ADMIN — HYDROMORPHONE HYDROCHLORIDE 4 MILLIGRAM(S): 2 INJECTION INTRAMUSCULAR; INTRAVENOUS; SUBCUTANEOUS at 01:26

## 2023-11-23 RX ADMIN — ATORVASTATIN CALCIUM 40 MILLIGRAM(S): 80 TABLET, FILM COATED ORAL at 21:57

## 2023-11-23 RX ADMIN — Medication 650 MILLIGRAM(S): at 17:10

## 2023-11-23 RX ADMIN — LOSARTAN POTASSIUM 100 MILLIGRAM(S): 100 TABLET, FILM COATED ORAL at 05:21

## 2023-11-23 RX ADMIN — HEPARIN SODIUM 7 UNIT(S)/HR: 5000 INJECTION INTRAVENOUS; SUBCUTANEOUS at 14:10

## 2023-11-23 RX ADMIN — GABAPENTIN 200 MILLIGRAM(S): 400 CAPSULE ORAL at 21:56

## 2023-11-23 RX ADMIN — HYDROMORPHONE HYDROCHLORIDE 4 MILLIGRAM(S): 2 INJECTION INTRAMUSCULAR; INTRAVENOUS; SUBCUTANEOUS at 00:26

## 2023-11-23 RX ADMIN — HYDROMORPHONE HYDROCHLORIDE 4 MILLIGRAM(S): 2 INJECTION INTRAMUSCULAR; INTRAVENOUS; SUBCUTANEOUS at 17:10

## 2023-11-23 RX ADMIN — HEPARIN SODIUM 6 UNIT(S)/HR: 5000 INJECTION INTRAVENOUS; SUBCUTANEOUS at 08:13

## 2023-11-23 RX ADMIN — HYDROMORPHONE HYDROCHLORIDE 4 MILLIGRAM(S): 2 INJECTION INTRAMUSCULAR; INTRAVENOUS; SUBCUTANEOUS at 05:20

## 2023-11-23 RX ADMIN — HYDROMORPHONE HYDROCHLORIDE 4 MILLIGRAM(S): 2 INJECTION INTRAMUSCULAR; INTRAVENOUS; SUBCUTANEOUS at 06:20

## 2023-11-23 RX ADMIN — HYDROMORPHONE HYDROCHLORIDE 0.5 MILLIGRAM(S): 2 INJECTION INTRAMUSCULAR; INTRAVENOUS; SUBCUTANEOUS at 22:26

## 2023-11-23 RX ADMIN — GABAPENTIN 200 MILLIGRAM(S): 400 CAPSULE ORAL at 05:21

## 2023-11-23 NOTE — PROGRESS NOTE ADULT - ASSESSMENT
PAD  asa  statin   s/p angio   fu with vascular surgery     CAD  history of stent  asa  cont statin   echo shows normal LV function   stress test is non ischemic     Tobacco smoking  counseling given     HTN  stable

## 2023-11-23 NOTE — PHYSICAL THERAPY INITIAL EVALUATION ADULT - GENERAL OBSERVATIONS, REHAB EVAL
Upon entry, pt semi-supine in bed in NAD. HR 73 bpm. Pt left seated on toilet in restroom with all tubes/lines intact in NAD.

## 2023-11-23 NOTE — PHYSICAL THERAPY INITIAL EVALUATION ADULT - ASR WT BEARING STATUS EVAL
No specific weight bearing restrictions; however, pt alternates between right LE WBAT and right LE NWB throughout ambulation due to pain/no weight-bearing restrictions

## 2023-11-23 NOTE — PROGRESS NOTE ADULT - ASSESSMENT
63F hx of cad s/p remote stents,  recent RLE angiogram and percutaneous thrombectomy (07/19) of SFA and popliteal arteries, R CFA -> PT bypass with PTFE (7/25) for acute limb ischemia, presenting now with 1.5 weeks of pain in RLE. Reports has had pain in her right foot since her previous vascular procedures in July '23, but the pain has worsened over the last 1.5 weeks. Reports she has been taking her Eliquis as prescribed.      # PAD  per vascular team   ASA and statin   Monitor Hgb level   Heparin drip full AC  defer management to vascular          #HTN   BP control  home meds  adjust astolerated   cardfollow up    # CAD  ASA and statin  BP control       DVT and gI PPX

## 2023-11-23 NOTE — PROGRESS NOTE ADULT - SUBJECTIVE AND OBJECTIVE BOX
VASCULAR SURGERY DAILY PROGRESS NOTE    SUBJECTIVE: No acute events overnight. Patient seen and evaluated on AM rounds. Patient continues to have rest pain of right lower extremity.    OBJECTIVE:  Vital Signs Last 24 Hrs  T(C): 36.9 (23 Nov 2023 05:21), Max: 37 (22 Nov 2023 16:07)  T(F): 98.5 (23 Nov 2023 05:21), Max: 98.6 (22 Nov 2023 16:07)  HR: 62 (23 Nov 2023 05:21) (62 - 86)  BP: 101/63 (23 Nov 2023 05:21) (100/65 - 133/64)  BP(mean): --  RR: 17 (23 Nov 2023 05:21) (17 - 18)  SpO2: 97% (23 Nov 2023 05:21) (96% - 100%)    Parameters below as of 23 Nov 2023 05:21  Patient On (Oxygen Delivery Method): room air      I&O's Detail    22 Nov 2023 07:01  -  23 Nov 2023 07:00  --------------------------------------------------------  IN:    dextrose 5% + sodium chloride 0.9%: 100 mL    Heparin: 6 mL    Oral Fluid: 1135 mL  Total IN: 1241 mL    OUT:    Blood Loss (mL): 0 mL    IV PiggyBack: 0 mL    Voided (mL): 0 mL  Total OUT: 0 mL    Total NET: 1241 mL    Daily   MEDICATIONS  (STANDING):  acetaminophen     Tablet .. 650 milliGRAM(s) Oral every 6 hours  amLODIPine   Tablet 10 milliGRAM(s) Oral every 24 hours  aspirin  chewable 81 milliGRAM(s) Oral daily  atorvastatin 40 milliGRAM(s) Oral at bedtime  gabapentin 200 milliGRAM(s) Oral every 8 hours  heparin  Infusion 600 Unit(s)/Hr (6 mL/Hr) IV Continuous <Continuous>  losartan 100 milliGRAM(s) Oral daily    MEDICATIONS  (PRN):  HYDROmorphone   Tablet 2 milliGRAM(s) Oral every 3 hours PRN Moderate Pain (4 - 6)  HYDROmorphone   Tablet 4 milliGRAM(s) Oral every 3 hours PRN Severe Pain (7 - 10)  HYDROmorphone  Injectable 0.2 milliGRAM(s) IV Push every 4 hours PRN breakthrough pain      LABS:                        10.8   6.90  )-----------( 169      ( 23 Nov 2023 02:35 )             33.8     11-23    137  |  100  |  24<H>  ----------------------------<  121<H>  4.5   |  25  |  0.76    Ca    9.8      23 Nov 2023 02:35  Phos  4.2     11-23  Mg     1.90     11-23      PT/INR - ( 22 Nov 2023 05:47 )   PT: 10.2 sec;   INR: 0.90 ratio         PTT - ( 23 Nov 2023 02:35 )  PTT:76.8 sec  Urinalysis Basic - ( 23 Nov 2023 02:35 )    Color: x / Appearance: x / SG: x / pH: x  Gluc: 121 mg/dL / Ketone: x  / Bili: x / Urobili: x   Blood: x / Protein: x / Nitrite: x   Leuk Esterase: x / RBC: x / WBC x   Sq Epi: x / Non Sq Epi: x / Bacteria: x    PHYSICAL EXAM:  Constitutional: A&Ox3, NAD  Respiratory: Unlabored breathing  Abdomen: Soft, nondistended, NTTP  Extremities: L groin access site soft, without fluid collection/hematoma. RLE motor intact, diminished sensory distally at the toes, faint monophasic DP/PT signals

## 2023-11-23 NOTE — PHYSICAL THERAPY INITIAL EVALUATION ADULT - PERTINENT HX OF CURRENT PROBLEM, REHAB EVAL
Pt is a 63 year old female presenting with right LE pain. Pt with hx of recent RLE angiogram and percutaneous thrombectomy (07/19) of SFA and popliteal arteries, and R CFA -> PT bypass with PTFE (7/25) for acute limb ischemia. VA duplex arterial right LE with total occlusion of right SFA stent and common femoral to popliteal bypass with no run off to foot. 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 Pt now s/p RLE angiogram, stenting of distal external iliac and proximal CFA, profunda femoris balloon angioplasty for Pillo IIa acute on chronic limb ischemia secondary to chronically occluded graft.

## 2023-11-23 NOTE — PHYSICAL THERAPY INITIAL EVALUATION ADULT - ADDITIONAL COMMENTS
Pt lives in a private house with her daughter and her family; there are 8 steps to enter + 8 steps inside. Has a home health aide 22.5 hrs/wk.

## 2023-11-23 NOTE — PHYSICAL THERAPY INITIAL EVALUATION ADULT - GAIT DEVIATIONS NOTED, PT EVAL
lack of right hip and knee flexion; lack of right ankle dorsiflexion/decreased sarika/decreased velocity of limb motion/decreased step length/decreased stride length/decreased weight-shifting ability

## 2023-11-23 NOTE — PHYSICAL THERAPY INITIAL EVALUATION ADULT - DID THE PATIENT HAVE SURGERY?
RLE angiogram, stenting of distal external iliac and proximal CFA, profunda femoris balloon angioplasty POD#1/yes

## 2023-11-23 NOTE — PROGRESS NOTE ADULT - SUBJECTIVE AND OBJECTIVE BOX
Subjective: Patient seen and examined. No new events except as noted.     SUBJECTIVE/ROS:  pain in legs       MEDICATIONS:  MEDICATIONS  (STANDING):  acetaminophen     Tablet .. 650 milliGRAM(s) Oral every 6 hours  amLODIPine   Tablet 10 milliGRAM(s) Oral every 24 hours  aspirin  chewable 81 milliGRAM(s) Oral daily  atorvastatin 40 milliGRAM(s) Oral at bedtime  gabapentin 200 milliGRAM(s) Oral every 8 hours  heparin  Infusion 600 Unit(s)/Hr (6 mL/Hr) IV Continuous <Continuous>  losartan 100 milliGRAM(s) Oral daily      PHYSICAL EXAM:  T(C): 36.9 (11-23-23 @ 05:21), Max: 37 (11-22-23 @ 16:07)  HR: 62 (11-23-23 @ 05:21) (62 - 86)  BP: 101/63 (11-23-23 @ 05:21) (100/65 - 133/64)  RR: 17 (11-23-23 @ 05:21) (17 - 18)  SpO2: 97% (11-23-23 @ 05:21) (96% - 100%)  Wt(kg): --  I&O's Summary    22 Nov 2023 07:01  -  23 Nov 2023 07:00  --------------------------------------------------------  IN: 1241 mL / OUT: 0 mL / NET: 1241 mL            JVP: Normal  Neck: supple  Lung: clear   CV: S1 S2 , Murmur:  Abd: soft  Ext: No edema  neuro: Awake / alert  Psych: flat affect      LABS/DATA:    CARDIAC MARKERS:                                10.8   6.90  )-----------( 169      ( 23 Nov 2023 02:35 )             33.8     11-23    137  |  100  |  24<H>  ----------------------------<  121<H>  4.5   |  25  |  0.76    Ca    9.8      23 Nov 2023 02:35  Phos  4.2     11-23  Mg     1.90     11-23      proBNP:   Lipid Profile:   HgA1c:   TSH:     TELE:  EKG:

## 2023-11-23 NOTE — PHYSICAL THERAPY INITIAL EVALUATION ADULT - PATIENT PROFILE REVIEW, REHAB EVAL
PT initial evaluation received and chart review completed. Pt agreeable to participate in PT evaluation. Pt cleared by RN Asad/yes

## 2023-11-23 NOTE — PROGRESS NOTE ADULT - ASSESSMENT
63F hx of recent RLE angiogram and percutaneous thrombectomy (07/19) of SFA and popliteal arteries, R CFA -> PT bypass with PTFE (7/25) for acute limb ischemia, presenting now with 1.5 weeks of pain in RLE, found to have Pillo IIa acute on chronic limb ischemia secondary to chronically occluded graft.    Plan:  - Patient s/p RLE angiogram yesterday  - Heparin gtt @ 6mL/hr, therapeutic x1, will f/u AM PTT  - On ASA  - PT evaluation  - Pain control  - Diet: Reg  - DVT ppx: [facility]    Vascular Surgery  [id number] 63F hx of recent RLE angiogram and percutaneous thrombectomy (07/19) of SFA and popliteal arteries, R CFA -> PT bypass with PTFE (7/25) for acute limb ischemia, presenting now with 1.5 weeks of pain in RLE, found to have Pillo IIa acute on chronic limb ischemia secondary to chronically occluded graft.    Plan:  - Patient s/p RLE angiogram yesterday  - Heparin gtt @ 6mL/hr, therapeutic x1, will f/u AM PTT  - On ASA  - PT evaluation  - Pain control  - Diet: Reg  - [facility] ppx: [facility]    Vascular Surgery  [id number] 63F hx of recent RLE angiogram and percutaneous thrombectomy (07/19) of SFA and popliteal arteries, R CFA -> PT bypass with PTFE (7/25) for acute limb ischemia, presenting now with 1.5 weeks of pain in RLE, found to have Pillo IIa acute on chronic limb ischemia secondary to chronically occluded graft.    Plan:  - Patient s/p RLE angiogram yesterday  - Heparin gtt @ 6mL/hr, therapeutic x1, will f/u AM PTT  - On ASA  - PT evaluation  - Pain control  - Diet: Reg  - DVT ppx: [facility]    Vascular Surgery  [id number]

## 2023-11-23 NOTE — PROGRESS NOTE ADULT - SUBJECTIVE AND OBJECTIVE BOX
Name of Patient : BRE HODGE  MRN: [id number]  Date of visit: 11-23-23       Subjective: Patient seen and examined. No new events except as noted.     REVIEW OF SYSTEMS:    CONSTITUTIONAL: No weakness, fevers or chills  EYES/ENT: No visual changes;  No vertigo or throat pain   NECK: No pain or stiffness  RESPIRATORY: No cough, wheezing, hemoptysis; No shortness of breath  CARDIOVASCULAR: No chest pain or palpitations  GASTROINTESTINAL: No abdominal or epigastric pain. No nausea, vomiting, or hematemesis; No diarrhea or constipation. No melena or hematochezia.  GENITOURINARY: No dysuria, frequency or hematuria  NEUROLOGICAL: No numbness or weakness  SKIN: No itching, burning, rashes, or lesions   All other review of systems is negative unless indicated above.    MEDICATIONS:  MEDICATIONS  (STANDING):  acetaminophen     Tablet .. 975 milliGRAM(s) Oral every 6 hours  amLODIPine   Tablet 10 milliGRAM(s) Oral every 24 hours  aspirin  chewable 81 milliGRAM(s) Oral daily  atorvastatin 40 milliGRAM(s) Oral at bedtime  gabapentin 200 milliGRAM(s) Oral every 8 hours  heparin  Infusion 600 Unit(s)/Hr (7 mL/Hr) IV Continuous <Continuous>  losartan 100 milliGRAM(s) Oral daily      PHYSICAL EXAM:  T(C): 36.6 (11-23-23 @ 21:07), Max: 36.9 (11-23-23 @ 01:41)  HR: 82 (11-23-23 @ 21:07) (62 - 82)  BP: 102/68 (11-23-23 @ 21:07) (94/56 - 113/60)  RR: 18 (11-23-23 @ 21:07) (17 - 18)  SpO2: 98% (11-23-23 @ 21:07) (96% - 99%)  Wt(kg): --  I&O's Summary    22 Nov 2023 07:01  -  23 Nov 2023 07:00  --------------------------------------------------------  IN: 1241 mL / OUT: 0 mL / NET: 1241 mL          Appearance: Normal	  HEENT:  PERRLA   Lymphatic: No lymphadenopathy   Cardiovascular: Normal S1 S2, no JVD  Respiratory: normal effort , clear  Gastrointestinal:  Soft, Non-tender  Skin: No rashes,  warm to touch  Psychiatry:  Mood & affect appropriate  Musculuskeletal: No edema    recent labs, Imaging and EKGs personally reviewed     11-22-23 @ 07:01  -  11-23-23 @ 07:00  --------------------------------------------------------  IN: 1241 mL / OUT: 0 mL / NET: 1241 mL                          10.8   6.90  )-----------( 169      ( 23 Nov 2023 02:35 )             33.8               11-23    137  |  100  |  24<H>  ----------------------------<  121<H>  4.5   |  25  |  0.76    Ca    9.8      23 Nov 2023 02:35  Phos  4.2     11-23  Mg     1.90     11-23      PT/INR - ( 22 Nov 2023 05:47 )   PT: 10.2 sec;   INR: 0.90 ratio         PTT - ( 23 Nov 2023 19:33 )  PTT:73.2 sec                   Urinalysis Basic - ( 23 Nov 2023 02:35 )    Color: x / Appearance: x / SG: x / pH: x  Gluc: 121 mg/dL / Ketone: x  / Bili: x / Urobili: x   Blood: x / Protein: x / Nitrite: x   Leuk Esterase: x / RBC: x / WBC x   Sq Epi: x / Non Sq Epi: x / Bacteria: x               Name of Patient : BRE HODGE  MRN: [id number]  Date of visit: 11-23-23       Subjective: Patient seen and examined. No new events except as noted.     REVIEW OF SYSTEMS:    CONSTITUTIONAL: No weakness, fevers or chills  EYES/ENT: No visual changes;  No vertigo or throat pain   NECK: No pain or stiffness  RESPIRATORY: No cough, wheezing, hemoptysis; No shortness of breath  CARDIOVASCULAR: No chest pain or palpitations  GASTROINTESTINAL: No abdominal or epigastric pain. No nausea, vomiting, or hematemesis; No diarrhea or constipation. No melena or hematochezia.  GENITOURINARY: No dysuria, frequency or hematuria  NEUROLOGICAL: No numbness or weakness  SKIN: No itching, burning, rashes, or lesions   All other review of systems is negative unless indicated above.    MEDICATIONS:  MEDICATIONS  (STANDING):  acetaminophen     Tablet .. 975 milliGRAM(s) Oral every 6 hours  amLODIPine   Tablet 10 milliGRAM(s) Oral every 24 hours  aspirin  chewable 81 milliGRAM(s) Oral daily  atorvastatin 40 milliGRAM(s) Oral at bedtime  gabapentin 200 milliGRAM(s) Oral every 8 hours  heparin  Infusion 600 Unit(s)/Hr (7 mL/Hr) IV Continuous <Continuous>  losartan 100 milliGRAM(s) Oral daily      PHYSICAL EXAM:  T(C): 36.6 (11-23-23 @ 21:07), Max: 36.9 (11-23-23 @ 01:41)  HR: 82 (11-23-23 @ 21:07) (62 - 82)  BP: 102/68 (11-23-23 @ 21:07) (94/56 - 113/60)  RR: 18 (11-23-23 @ 21:07) (17 - 18)  SpO2: 98% (11-23-23 @ 21:07) (96% - 99%)  Wt(kg): --  I&O's Summary    22 Nov 2023 07:01  -  23 Nov 2023 07:00  --------------------------------------------------------  IN: 1241 mL / OUT: 0 mL / NET: 1241 mL          Appearance: Normal	  HEENT:  PERRLA   Lymphatic: No lymphadenopathy   Cardiovascular: Normal S1 S2, no JVD  Respiratory: normal effort , clear  Gastrointestinal:  Soft, Non-tender  Skin: No rashes,  warm to touch  Psychiatry:  Mood & affect appropriate  Musculuskeletal: No edema    recent labs, Imaging and EKGs personally reviewed     11-22-23 @ 07:01  -  11-23-23 @ 07:00  --------------------------------------------------------  IN: 1241 mL / OUT: 0 mL / NET: 1241 mL                          10.8   6.90  )-----------( 169      ( 23 Nov 2023 02:35 )             33.8               11-23    137  |  100  |  24<H>  ----------------------------<  121<H>  4.5   |  25  |  0.76    Ca    9.8      23 Nov 2023 02:35  Phos  4.2     11-23  Mg     1.90     11-23      PT/INR - ( 22 Nov 2023 05:47 )   PT: 10.2 sec;   INR: 0.90 ratio         PTT - ( 23 Nov 2023 19:33 )  PTT:73.2 sec                   Urinalysis Basic - ( 23 Nov 2023 02:35 )    Color: x / Appearance: x / SG: x / pH: x  Gluc: 121 mg/dL / Ketone: x  / Bili: x / Urobili: x   Blood: x / Protein: x / Nitrite: x   Leuk Esterase: x / RBC: x / WBC x   Sq Epi: x / Non Sq Epi: x / Bacteria: x               Name of Patient : BRE HODGE  MRN: [id number]  Date of visit: 11-23-23       Subjective: Patient seen and examined. No new events except as noted.     REVIEW OF SYSTEMS:    CONSTITUTIONAL: No weakness, fevers or chills  EYES/ENT: No visual changes;  No vertigo or throat pain   NECK: No pain or stiffness  RESPIRATORY: No cough, wheezing, hemoptysis; No shortness of breath  CARDIOVASCULAR: No chest pain or palpitations  GASTROINTESTINAL: No abdominal or epigastric pain. No nausea, vomiting, or hematemesis; No diarrhea or constipation. No melena or hematochezia.  GENITOURINARY: No dysuria, frequency or hematuria  NEUROLOGICAL: No numbness or weakness  SKIN: No itching, burning, rashes, or lesions   All other review of systems is negative unless indicated above.    MEDICATIONS:  MEDICATIONS  (STANDING):  acetaminophen     Tablet .. 975 milliGRAM(s) Oral every 6 hours  amLODIPine   Tablet 10 milliGRAM(s) Oral every 24 hours  aspirin  chewable 81 milliGRAM(s) Oral daily  atorvastatin 40 milliGRAM(s) Oral at bedtime  gabapentin 200 milliGRAM(s) Oral every 8 hours  heparin  Infusion 600 Unit(s)/Hr (7 mL/Hr) IV Continuous <Continuous>  losartan 100 milliGRAM(s) Oral daily      PHYSICAL EXAM:  T(C): 36.6 (11-23-23 @ 21:07), Max: 36.9 (11-23-23 @ 01:41)  HR: 82 (11-23-23 @ 21:07) (62 - 82)  BP: 102/68 (11-23-23 @ 21:07) (94/56 - 113/60)  RR: 18 (11-23-23 @ 21:07) (17 - 18)  SpO2: 98% (11-23-23 @ 21:07) (96% - 99%)  Wt(kg): --  I&O's Summary    22 Nov 2023 07:01  -  23 Nov 2023 07:00  --------------------------------------------------------  IN: 1241 mL / OUT: 0 mL / NET: 1241 mL          Appearance: Normal	  HEENT:  PERRLA   Lymphatic: No lymphadenopathy   Cardiovascular: Normal S1 S2, no JVD  Respiratory: normal effort , clear  Gastrointestinal:  Soft, Non-tender  Skin: No rashes,  warm to touch  Psychiatry:  Mood & affect appropriate  Musculuskeletal: No edema    recent labs, Imaging and EKGs personally reviewed     11-22-23 @ 07:01  -  11-23-23 @ 07:00  --------------------------------------------------------  IN: 1241 mL / OUT: 0 mL / NET: 1241 mL                          10.8   6.90  )-----------( 169      ( 23 Nov 2023 02:35 )             33.8               11-23    137  |  100  |  24<H>  ----------------------------<  121<H>  4.5   |  25  |  0.76    Ca    9.8      23 Nov 2023 02:35  Phos  4.2     11-23  Mg     1.90     11-23      PT/INR - ( 22 Nov 2023 05:47 )   PT: 10.2 sec;   INR: 0.90 ratio         PTT - ( 23 Nov 2023 19:33 )  PTT:73.2 sec                   Urinalysis Basic - ( 23 Nov 2023 02:35 )    Color: x / Appearance: x / SG: x / pH: x  Gluc: 121 mg/dL / Ketone: x  / Bili: x / Urobili: x   Blood: x / Protein: x / Nitrite: x   Leuk Esterase: x / RBC: x / WBC x   Sq Epi: x / Non Sq Epi: x / Bacteria: x

## 2023-11-23 NOTE — PHYSICAL THERAPY INITIAL EVALUATION ADULT - ACTIVE RANGE OF MOTION EXAMINATION, REHAB EVAL
Right ankle dorsiflexion ~0 degrees/eddie. upper extremity Active ROM was WNL (within normal limits)/LLE Active ROM was WNL (within normal limits)

## 2023-11-23 NOTE — PHYSICAL THERAPY INITIAL EVALUATION ADULT - PASSIVE RANGE OF MOTION EXAMINATION, REHAB EVAL
Right ankle dorsiflexion ~0 degrees/bilateral upper extremity Passive ROM was WNL (within normal limits)/Left LE Passive ROM was WNL (within normal limits)

## 2023-11-24 LAB
ANION GAP SERPL CALC-SCNC: 7 MMOL/L — SIGNIFICANT CHANGE UP (ref 7–14)
APTT BLD: 62.3 SEC — HIGH (ref 24.5–35.6)
APTT BLD: 71.8 SEC — HIGH (ref 24.5–35.6)
BUN SERPL-MCNC: 25 MG/DL — HIGH (ref 7–23)
CALCIUM SERPL-MCNC: 9.9 MG/DL — SIGNIFICANT CHANGE UP (ref 8.4–10.5)
CHLORIDE SERPL-SCNC: 101 MMOL/L — SIGNIFICANT CHANGE UP (ref 98–107)
CO2 SERPL-SCNC: 27 MMOL/L — SIGNIFICANT CHANGE UP (ref 22–31)
CREAT SERPL-MCNC: 0.74 MG/DL — SIGNIFICANT CHANGE UP (ref 0.5–1.3)
EGFR: 91 ML/MIN/1.73M2 — SIGNIFICANT CHANGE UP
GLUCOSE SERPL-MCNC: 96 MG/DL — SIGNIFICANT CHANGE UP (ref 70–99)
HCT VFR BLD CALC: 33.5 % — LOW (ref 34.5–45)
HGB BLD-MCNC: 10.6 G/DL — LOW (ref 11.5–15.5)
INR BLD: 0.9 RATIO — SIGNIFICANT CHANGE UP (ref 0.85–1.18)
MAGNESIUM SERPL-MCNC: 2 MG/DL — SIGNIFICANT CHANGE UP (ref 1.6–2.6)
MCHC RBC-ENTMCNC: 31.6 GM/DL — LOW (ref 32–36)
MCHC RBC-ENTMCNC: 32.6 PG — SIGNIFICANT CHANGE UP (ref 27–34)
MCV RBC AUTO: 103.1 FL — HIGH (ref 80–100)
NRBC # BLD: 0 /100 WBCS — SIGNIFICANT CHANGE UP (ref 0–0)
NRBC # FLD: 0 K/UL — SIGNIFICANT CHANGE UP (ref 0–0)
PHOSPHATE SERPL-MCNC: 4 MG/DL — SIGNIFICANT CHANGE UP (ref 2.5–4.5)
PLATELET # BLD AUTO: 167 K/UL — SIGNIFICANT CHANGE UP (ref 150–400)
POTASSIUM SERPL-MCNC: 4.8 MMOL/L — SIGNIFICANT CHANGE UP (ref 3.5–5.3)
POTASSIUM SERPL-SCNC: 4.8 MMOL/L — SIGNIFICANT CHANGE UP (ref 3.5–5.3)
PROTHROM AB SERPL-ACNC: 10.2 SEC — SIGNIFICANT CHANGE UP (ref 9.5–13)
RBC # BLD: 3.25 M/UL — LOW (ref 3.8–5.2)
RBC # FLD: 13.7 % — SIGNIFICANT CHANGE UP (ref 10.3–14.5)
SODIUM SERPL-SCNC: 135 MMOL/L — SIGNIFICANT CHANGE UP (ref 135–145)
WBC # BLD: 4.87 K/UL — SIGNIFICANT CHANGE UP (ref 3.8–10.5)
WBC # FLD AUTO: 4.87 K/UL — SIGNIFICANT CHANGE UP (ref 3.8–10.5)

## 2023-11-24 PROCEDURE — 99232 SBSQ HOSP IP/OBS MODERATE 35: CPT

## 2023-11-24 RX ADMIN — GABAPENTIN 200 MILLIGRAM(S): 400 CAPSULE ORAL at 22:04

## 2023-11-24 RX ADMIN — GABAPENTIN 200 MILLIGRAM(S): 400 CAPSULE ORAL at 14:03

## 2023-11-24 RX ADMIN — Medication 975 MILLIGRAM(S): at 23:44

## 2023-11-24 RX ADMIN — HEPARIN SODIUM 7 UNIT(S)/HR: 5000 INJECTION INTRAVENOUS; SUBCUTANEOUS at 08:57

## 2023-11-24 RX ADMIN — HYDROMORPHONE HYDROCHLORIDE 0.5 MILLIGRAM(S): 2 INJECTION INTRAMUSCULAR; INTRAVENOUS; SUBCUTANEOUS at 05:15

## 2023-11-24 RX ADMIN — Medication 975 MILLIGRAM(S): at 12:09

## 2023-11-24 RX ADMIN — Medication 975 MILLIGRAM(S): at 19:06

## 2023-11-24 RX ADMIN — Medication 81 MILLIGRAM(S): at 05:15

## 2023-11-24 RX ADMIN — Medication 975 MILLIGRAM(S): at 01:05

## 2023-11-24 RX ADMIN — HEPARIN SODIUM 7 UNIT(S)/HR: 5000 INJECTION INTRAVENOUS; SUBCUTANEOUS at 20:32

## 2023-11-24 RX ADMIN — HYDROMORPHONE HYDROCHLORIDE 0.5 MILLIGRAM(S): 2 INJECTION INTRAMUSCULAR; INTRAVENOUS; SUBCUTANEOUS at 05:45

## 2023-11-24 RX ADMIN — ATORVASTATIN CALCIUM 40 MILLIGRAM(S): 80 TABLET, FILM COATED ORAL at 22:04

## 2023-11-24 RX ADMIN — GABAPENTIN 200 MILLIGRAM(S): 400 CAPSULE ORAL at 05:15

## 2023-11-24 RX ADMIN — HEPARIN SODIUM 7 UNIT(S)/HR: 5000 INJECTION INTRAVENOUS; SUBCUTANEOUS at 05:09

## 2023-11-24 RX ADMIN — Medication 975 MILLIGRAM(S): at 05:15

## 2023-11-24 NOTE — PROGRESS NOTE ADULT - ASSESSMENT
63F hx of recent RLE angiogram and percutaneous thrombectomy (07/19) of SFA and popliteal arteries, R CFA -> PT bypass with PTFE (7/25) for acute limb ischemia, presenting now with 1.5 weeks of pain in RLE, found to have Pillo IIa acute on chronic limb ischemia secondary to chronically occluded graft, s/p RLE angiogram, stenting of distal external iliac and proximal CFA, profunda femoris balloon angioplasty 11/22    Plan:  - Heparin gtt @ 6mL/hr, therapeutic x2, will f/u AM PTT  - On ASA  - PT evaluation recommends [facility]  - Pain control  - Diet: Reg  - DVT ppx: [facility]    Vascular Surgery  [id number] 63F hx of recent RLE angiogram and percutaneous thrombectomy (07/19) of SFA and popliteal arteries, R CFA -> PT bypass with PTFE (7/25) for acute limb ischemia, presenting now with 1.5 weeks of pain in RLE, found to have Pillo IIa acute on chronic limb ischemia secondary to chronically occluded graft, s/p RLE angiogram, stenting of distal external iliac and proximal CFA, profunda femoris balloon angioplasty 11/22    Plan:  - Heparin gtt @ 6mL/hr, therapeutic x2, will f/u AM PTT  - On ASA  - PT evaluation recommends [facility]  - Pain control  - Diet: Reg  - DVT ppx: [facility]    Vascular Surgery  [id number] 63F hx of recent RLE angiogram and percutaneous thrombectomy (07/19) of SFA and popliteal arteries, R CFA -> PT bypass with PTFE (7/25) for acute limb ischemia, presenting now with 1.5 weeks of pain in RLE, found to have Pillo IIa acute on chronic limb ischemia secondary to chronically occluded graft, s/p RLE angiogram, stenting of distal external iliac and proximal CFA, profunda femoris balloon angioplasty 11/22    Plan:  - Heparin gtt @ 6mL/hr, therapeutic x2, will f/u AM PTT  - On ASA  - PT evaluation recommends [facility]  - Pain control  - Diet: Reg  - DVT ppx: [facility]    Vascular Surgery  [id number] 63F hx of recent RLE angiogram and percutaneous thrombectomy (07/19) of SFA and popliteal arteries, R CFA -> PT bypass with PTFE (7/25) for acute limb ischemia, presenting now with 1.5 weeks of pain in RLE, found to have Pillo IIa acute on chronic limb ischemia secondary to chronically occluded graft, s/p RLE angiogram, stenting of distal external iliac and proximal CFA, profunda femoris balloon angioplasty 11/22, patient with faint signals pain slightly improved, no other options for revascularization, patient to think about BKA    Plan:  - Heparin gtt @ 6mL/hr, therapeutic   - On ASA  - PT evaluation recommends [facility]  - Pain control  - Diet: Reg  - DVT ppx: [facility]  - Patient to decide for BKA    Vascular Surgery  [id number] 63F hx of recent RLE angiogram and percutaneous thrombectomy (07/19) of SFA and popliteal arteries, R CFA -> PT bypass with PTFE (7/25) for acute limb ischemia, presenting now with 1.5 weeks of pain in RLE, found to have Pillo IIa acute on chronic limb ischemia secondary to chronically occluded graft, s/p RLE angiogram, stenting of distal external iliac and proximal CFA, profunda femoris balloon angioplasty 11/22, patient with faint signals pain slightly improved, no other options for revascularization, patient to think about BKA    Plan:  - Heparin gtt @ 6mL/hr, therapeutic   - On ASA  - PT evaluation recommends [facility]  - Pain control  - Diet: Reg  - DVT ppx: [facility]  - Patient to decide for BKA    Vascular Surgery  [id number] 63F hx of recent RLE angiogram and percutaneous thrombectomy (07/19) of SFA and popliteal arteries, R CFA -> PT bypass with PTFE (7/25) for acute limb ischemia, presenting now with 1.5 weeks of pain in RLE, found to have Pillo IIa acute on chronic limb ischemia secondary to chronically occluded graft, s/p RLE angiogram, stenting of distal external iliac and proximal CFA, profunda femoris balloon angioplasty 11/22, patient with faint signals pain slightly improved, no other options for revascularization, patient to think about BKA    Plan:  - Heparin gtt @ 6mL/hr, therapeutic   - On ASA  - PT evaluation recommends [facility]  - Pain control  - Diet: Reg  - DVT ppx: [facility]  - Patient to decide for BKA    Vascular Surgery  [id number]

## 2023-11-24 NOTE — PROGRESS NOTE ADULT - SUBJECTIVE AND OBJECTIVE BOX
Name of Patient : BRE HODGE  MRN: [id number]  Date of visit: 11-24-23       Subjective: Patient seen and examined. No new events except as noted.     REVIEW OF SYSTEMS:    CONSTITUTIONAL: No weakness, fevers or chills  EYES/ENT: No visual changes;  No vertigo or throat pain   NECK: No pain or stiffness  RESPIRATORY: No cough, wheezing, hemoptysis; No shortness of breath  CARDIOVASCULAR: No chest pain or palpitations  GASTROINTESTINAL: No abdominal or epigastric pain. No nausea, vomiting, or hematemesis; No diarrhea or constipation. No melena or hematochezia.  GENITOURINARY: No dysuria, frequency or hematuria  NEUROLOGICAL: No numbness or weakness  SKIN: No itching, burning, rashes, or lesions   All other review of systems is negative unless indicated above.    MEDICATIONS:  MEDICATIONS  (STANDING):  acetaminophen     Tablet .. 975 milliGRAM(s) Oral every 6 hours  amLODIPine   Tablet 10 milliGRAM(s) Oral every 24 hours  aspirin  chewable 81 milliGRAM(s) Oral daily  atorvastatin 40 milliGRAM(s) Oral at bedtime  gabapentin 200 milliGRAM(s) Oral every 8 hours  heparin  Infusion 600 Unit(s)/Hr (7 mL/Hr) IV Continuous <Continuous>  losartan 100 milliGRAM(s) Oral daily      PHYSICAL EXAM:  T(C): 36.4 (11-24-23 @ 14:01), Max: 36.8 (11-23-23 @ 17:13)  HR: 86 (11-24-23 @ 14:01) (66 - 86)  BP: 106/61 (11-24-23 @ 14:01) (94/56 - 153/66)  RR: 17 (11-24-23 @ 14:01) (17 - 18)  SpO2: 97% (11-24-23 @ 14:01) (96% - 100%)  Wt(kg): --  I&O's Summary    24 Nov 2023 07:01  -  24 Nov 2023 16:27  --------------------------------------------------------  IN: 705 mL / OUT: 550 mL / NET: 155 mL          Appearance: Normal	  HEENT:  PERRLA   Lymphatic: No lymphadenopathy   Cardiovascular: Normal S1 S2, no JVD  Respiratory: normal effort , clear  Gastrointestinal:  Soft, Non-tender  Skin: No rashes,  warm to touch  Psychiatry:  Mood & affect appropriate  Musculuskeletal: No edema    recent labs, Imaging and EKGs personally reviewed     11-24-23 @ 07:01  -  11-24-23 @ 16:27  --------------------------------------------------------  IN: 705 mL / OUT: 550 mL / NET: 155 mL                            10.6   4.87  )-----------( 167      ( 24 Nov 2023 06:10 )             33.5               11-24    135  |  101  |  25<H>  ----------------------------<  96  4.8   |  27  |  0.74    Ca    9.9      24 Nov 2023 06:10  Phos  4.0     11-24  Mg     2.00     11-24      PT/INR - ( 24 Nov 2023 06:10 )   PT: 10.2 sec;   INR: 0.90 ratio         PTT - ( 24 Nov 2023 06:10 )  PTT:62.3 sec                   Urinalysis Basic - ( 24 Nov 2023 06:10 )    Color: x / Appearance: x / SG: x / pH: x  Gluc: 96 mg/dL / Ketone: x  / Bili: x / Urobili: x   Blood: x / Protein: x / Nitrite: x   Leuk Esterase: x / RBC: x / WBC x   Sq Epi: x / Non Sq Epi: x / Bacteria: x             Name of Patient : BRE HODGE  MRN: [id number]  Date of visit: 11-24-23       Subjective: Patient seen and examined. No new events except as noted.     REVIEW OF SYSTEMS:    CONSTITUTIONAL: No weakness, fevers or chills  EYES/ENT: No visual changes;  No vertigo or throat pain   NECK: No pain or stiffness  RESPIRATORY: No cough, wheezing, hemoptysis; No shortness of breath  CARDIOVASCULAR: No chest pain or palpitations  GASTROINTESTINAL: No abdominal or epigastric pain. No nausea, vomiting, or hematemesis; No diarrhea or constipation. No melena or hematochezia.  GENITOURINARY: No dysuria, frequency or hematuria  NEUROLOGICAL: No numbness or weakness  SKIN: No itching, burning, rashes, or lesions   All other review of systems is negative unless indicated above.    MEDICATIONS:  MEDICATIONS  (STANDING):  acetaminophen     Tablet .. 975 milliGRAM(s) Oral every 6 hours  amLODIPine   Tablet 10 milliGRAM(s) Oral every 24 hours  aspirin  chewable 81 milliGRAM(s) Oral daily  atorvastatin 40 milliGRAM(s) Oral at bedtime  gabapentin 200 milliGRAM(s) Oral every 8 hours  heparin  Infusion 600 Unit(s)/Hr (7 mL/Hr) IV Continuous <Continuous>  losartan 100 milliGRAM(s) Oral daily      PHYSICAL EXAM:  T(C): 36.4 (11-24-23 @ 14:01), Max: 36.8 (11-23-23 @ 17:13)  HR: 86 (11-24-23 @ 14:01) (66 - 86)  BP: 106/61 (11-24-23 @ 14:01) (94/56 - 153/66)  RR: 17 (11-24-23 @ 14:01) (17 - 18)  SpO2: 97% (11-24-23 @ 14:01) (96% - 100%)  Wt(kg): --  I&O's Summary    24 Nov 2023 07:01  -  24 Nov 2023 16:27  --------------------------------------------------------  IN: 705 mL / OUT: 550 mL / NET: 155 mL          Appearance: Normal	  HEENT:  PERRLA   Lymphatic: No lymphadenopathy   Cardiovascular: Normal S1 S2, no JVD  Respiratory: normal effort , clear  Gastrointestinal:  Soft, Non-tender  Skin: No rashes,  warm to touch  Psychiatry:  Mood & affect appropriate  Musculuskeletal: No edema    recent labs, Imaging and EKGs personally reviewed     11-24-23 @ 07:01  -  11-24-23 @ 16:27  --------------------------------------------------------  IN: 705 mL / OUT: 550 mL / NET: 155 mL                            10.6   4.87  )-----------( 167      ( 24 Nov 2023 06:10 )             33.5               11-24    135  |  101  |  25<H>  ----------------------------<  96  4.8   |  27  |  0.74    Ca    9.9      24 Nov 2023 06:10  Phos  4.0     11-24  Mg     2.00     11-24      PT/INR - ( 24 Nov 2023 06:10 )   PT: 10.2 sec;   INR: 0.90 ratio         PTT - ( 24 Nov 2023 06:10 )  PTT:62.3 sec                   Urinalysis Basic - ( 24 Nov 2023 06:10 )    Color: x / Appearance: x / SG: x / pH: x  Gluc: 96 mg/dL / Ketone: x  / Bili: x / Urobili: x   Blood: x / Protein: x / Nitrite: x   Leuk Esterase: x / RBC: x / WBC x   Sq Epi: x / Non Sq Epi: x / Bacteria: x             Name of Patient : BRE HODGE  MRN: [id number]  Date of visit: 11-24-23       Subjective: Patient seen and examined. No new events except as noted.     REVIEW OF SYSTEMS:    CONSTITUTIONAL: No weakness, fevers or chills  EYES/ENT: No visual changes;  No vertigo or throat pain   NECK: No pain or stiffness  RESPIRATORY: No cough, wheezing, hemoptysis; No shortness of breath  CARDIOVASCULAR: No chest pain or palpitations  GASTROINTESTINAL: No abdominal or epigastric pain. No nausea, vomiting, or hematemesis; No diarrhea or constipation. No melena or hematochezia.  GENITOURINARY: No dysuria, frequency or hematuria  NEUROLOGICAL: No numbness or weakness  SKIN: No itching, burning, rashes, or lesions   All other review of systems is negative unless indicated above.    MEDICATIONS:  MEDICATIONS  (STANDING):  acetaminophen     Tablet .. 975 milliGRAM(s) Oral every 6 hours  amLODIPine   Tablet 10 milliGRAM(s) Oral every 24 hours  aspirin  chewable 81 milliGRAM(s) Oral daily  atorvastatin 40 milliGRAM(s) Oral at bedtime  gabapentin 200 milliGRAM(s) Oral every 8 hours  heparin  Infusion 600 Unit(s)/Hr (7 mL/Hr) IV Continuous <Continuous>  losartan 100 milliGRAM(s) Oral daily      PHYSICAL EXAM:  T(C): 36.4 (11-24-23 @ 14:01), Max: 36.8 (11-23-23 @ 17:13)  HR: 86 (11-24-23 @ 14:01) (66 - 86)  BP: 106/61 (11-24-23 @ 14:01) (94/56 - 153/66)  RR: 17 (11-24-23 @ 14:01) (17 - 18)  SpO2: 97% (11-24-23 @ 14:01) (96% - 100%)  Wt(kg): --  I&O's Summary    24 Nov 2023 07:01  -  24 Nov 2023 16:27  --------------------------------------------------------  IN: 705 mL / OUT: 550 mL / NET: 155 mL          Appearance: Normal	  HEENT:  PERRLA   Lymphatic: No lymphadenopathy   Cardiovascular: Normal S1 S2, no JVD  Respiratory: normal effort , clear  Gastrointestinal:  Soft, Non-tender  Skin: No rashes,  warm to touch  Psychiatry:  Mood & affect appropriate  Musculuskeletal: No edema    recent labs, Imaging and EKGs personally reviewed     11-24-23 @ 07:01  -  11-24-23 @ 16:27  --------------------------------------------------------  IN: 705 mL / OUT: 550 mL / NET: 155 mL                            10.6   4.87  )-----------( 167      ( 24 Nov 2023 06:10 )             33.5               11-24    135  |  101  |  25<H>  ----------------------------<  96  4.8   |  27  |  0.74    Ca    9.9      24 Nov 2023 06:10  Phos  4.0     11-24  Mg     2.00     11-24      PT/INR - ( 24 Nov 2023 06:10 )   PT: 10.2 sec;   INR: 0.90 ratio         PTT - ( 24 Nov 2023 06:10 )  PTT:62.3 sec                   Urinalysis Basic - ( 24 Nov 2023 06:10 )    Color: x / Appearance: x / SG: x / pH: x  Gluc: 96 mg/dL / Ketone: x  / Bili: x / Urobili: x   Blood: x / Protein: x / Nitrite: x   Leuk Esterase: x / RBC: x / WBC x   Sq Epi: x / Non Sq Epi: x / Bacteria: x

## 2023-11-24 NOTE — PROGRESS NOTE ADULT - SUBJECTIVE AND OBJECTIVE BOX
Morning Surgical Progress Note  Patient is a 63y old  Female who presents with a chief complaint of Acute limb ischemia of RLE (23 Nov 2023 12:56)    SUBJECTIVE: Patient seen and examined at bedside with surgical team, patient without complaints.     Vital Signs Last 24 Hrs  T(C): 36.4 (24 Nov 2023 05:12), Max: 36.8 (23 Nov 2023 09:48)  T(F): 97.6 (24 Nov 2023 05:12), Max: 98.3 (23 Nov 2023 09:48)  HR: 71 (24 Nov 2023 05:12) (66 - 82)  BP: 103/47 (24 Nov 2023 05:12) (94/56 - 153/66)  RR: 18 (24 Nov 2023 05:12) (17 - 18)  SpO2: 100% (24 Nov 2023 05:12) (96% - 100%)    Parameters below as of 24 Nov 2023 01:51  Patient On (Oxygen Delivery Method): room air    I&O's Detail    22 Nov 2023 07:01  -  23 Nov 2023 07:00  --------------------------------------------------------  IN:    dextrose 5% + sodium chloride 0.9%: 100 mL    Heparin: 6 mL    Oral Fluid: 1135 mL  Total IN: 1241 mL    OUT:    Blood Loss (mL): 0 mL    IV PiggyBack: 0 mL    Voided (mL): 0 mL  Total OUT: 0 mL    Total NET: 1241 mL        Medications  MEDICATIONS  (STANDING):  acetaminophen     Tablet .. 975 milliGRAM(s) Oral every 6 hours  amLODIPine   Tablet 10 milliGRAM(s) Oral every 24 hours  aspirin  chewable 81 milliGRAM(s) Oral daily  atorvastatin 40 milliGRAM(s) Oral at bedtime  gabapentin 200 milliGRAM(s) Oral every 8 hours  heparin  Infusion 600 Unit(s)/Hr (7 mL/Hr) IV Continuous <Continuous>  losartan 100 milliGRAM(s) Oral daily    MEDICATIONS  (PRN):  HYDROmorphone   Tablet 2 milliGRAM(s) Oral every 3 hours PRN Moderate Pain (4 - 6)  HYDROmorphone   Tablet 4 milliGRAM(s) Oral every 3 hours PRN Severe Pain (7 - 10)  HYDROmorphone  Injectable 0.5 milliGRAM(s) IV Push every 4 hours PRN Breakthrough pain    Physical Exam  Constitutional: A&Ox3, NAD  Extremities:  L groin access site soft, no hematoma. RLE motor intact, diminished sensory distally at the toes, faint monophasic DP/PT signals  LABS:                        10.8   6.90  )-----------( 169      ( 23 Nov 2023 02:35 )             33.8     11-23    137  |  100  |  24<H>  ----------------------------<  121<H>  4.5   |  25  |  0.76    Ca    9.8      23 Nov 2023 02:35  Phos  4.2     11-23  Mg     1.90     11-23      PTT - ( 24 Nov 2023 01:21 )  PTT:71.8 sec    Urinalysis Basic - ( 23 Nov 2023 02:35 )    Color: x / Appearance: x / SG: x / pH: x  Gluc: 121 mg/dL / Ketone: x  / Bili: x / Urobili: x   Blood: x / Protein: x / Nitrite: x   Leuk Esterase: x / RBC: x / WBC x   Sq Epi: x / Non Sq Epi: x / Bacteria: x       Morning Surgical Progress Note  Patient is a 63y old  Female who presents with a chief complaint of Acute limb ischemia of RLE (23 Nov 2023 12:56)    SUBJECTIVE: Patient seen and examined at bedside by resident patient without complaints. States her pain is still there but has improved post angiogram and plasty. Resident discussed option for BKA.     Vital Signs Last 24 Hrs  T(C): 36.4 (24 Nov 2023 05:12), Max: 36.8 (23 Nov 2023 09:48)  T(F): 97.6 (24 Nov 2023 05:12), Max: 98.3 (23 Nov 2023 09:48)  HR: 71 (24 Nov 2023 05:12) (66 - 82)  BP: 103/47 (24 Nov 2023 05:12) (94/56 - 153/66)  RR: 18 (24 Nov 2023 05:12) (17 - 18)  SpO2: 100% (24 Nov 2023 05:12) (96% - 100%)    Parameters below as of 24 Nov 2023 01:51  Patient On (Oxygen Delivery Method): room air    I&O's Detail    22 Nov 2023 07:01  -  23 Nov 2023 07:00  --------------------------------------------------------  IN:    dextrose 5% + sodium chloride 0.9%: 100 mL    Heparin: 6 mL    Oral Fluid: 1135 mL  Total IN: 1241 mL    OUT:    Blood Loss (mL): 0 mL    IV PiggyBack: 0 mL    Voided (mL): 0 mL  Total OUT: 0 mL    Total NET: 1241 mL        Medications  MEDICATIONS  (STANDING):  acetaminophen     Tablet .. 975 milliGRAM(s) Oral every 6 hours  amLODIPine   Tablet 10 milliGRAM(s) Oral every 24 hours  aspirin  chewable 81 milliGRAM(s) Oral daily  atorvastatin 40 milliGRAM(s) Oral at bedtime  gabapentin 200 milliGRAM(s) Oral every 8 hours  heparin  Infusion 600 Unit(s)/Hr (7 mL/Hr) IV Continuous <Continuous>  losartan 100 milliGRAM(s) Oral daily    MEDICATIONS  (PRN):  HYDROmorphone   Tablet 2 milliGRAM(s) Oral every 3 hours PRN Moderate Pain (4 - 6)  HYDROmorphone   Tablet 4 milliGRAM(s) Oral every 3 hours PRN Severe Pain (7 - 10)  HYDROmorphone  Injectable 0.5 milliGRAM(s) IV Push every 4 hours PRN Breakthrough pain    Physical Exam  Constitutional: A&Ox3, NAD  Extremities:  L groin access site soft, no hematoma. RLE motor intact, diminished sensory distally at the toes, faint monophasic DP/PT signals  LABS:                        10.8   6.90  )-----------( 169      ( 23 Nov 2023 02:35 )             33.8     11-23    137  |  100  |  24<H>  ----------------------------<  121<H>  4.5   |  25  |  0.76    Ca    9.8      23 Nov 2023 02:35  Phos  4.2     11-23  Mg     1.90     11-23      PTT - ( 24 Nov 2023 01:21 )  PTT:71.8 sec    Urinalysis Basic - ( 23 Nov 2023 02:35 )    Color: x / Appearance: x / SG: x / pH: x  Gluc: 121 mg/dL / Ketone: x  / Bili: x / Urobili: x   Blood: x / Protein: x / Nitrite: x   Leuk Esterase: x / RBC: x / WBC x   Sq Epi: x / Non Sq Epi: x / Bacteria: x

## 2023-11-24 NOTE — PROGRESS NOTE ADULT - SUBJECTIVE AND OBJECTIVE BOX
Subjective: Patient seen and examined. No new events except as noted.     SUBJECTIVE/ROS:  No chest pain, dyspnea, palpitation, or dizziness.     MEDICATIONS:  MEDICATIONS  (STANDING):  acetaminophen     Tablet .. 975 milliGRAM(s) Oral every 6 hours  amLODIPine   Tablet 10 milliGRAM(s) Oral every 24 hours  aspirin  chewable 81 milliGRAM(s) Oral daily  atorvastatin 40 milliGRAM(s) Oral at bedtime  gabapentin 200 milliGRAM(s) Oral every 8 hours  heparin  Infusion 600 Unit(s)/Hr (7 mL/Hr) IV Continuous <Continuous>  losartan 100 milliGRAM(s) Oral daily      PHYSICAL EXAM:  T(C): 36.4 (11-24-23 @ 05:12), Max: 36.8 (11-23-23 @ 09:48)  HR: 71 (11-24-23 @ 05:12) (66 - 82)  BP: 103/47 (11-24-23 @ 05:12) (94/56 - 153/66)  RR: 18 (11-24-23 @ 05:12) (17 - 18)  SpO2: 100% (11-24-23 @ 05:12) (96% - 100%)  Wt(kg): --  I&O's Summary          JVP: Normal  Neck: supple  Lung: clear   CV: S1 S2 , Murmur:  Abd: soft  Ext: No edema  neuro: Awake / alert  Psych: flat affect  Skin: normal``    LABS/DATA:    CARDIAC MARKERS:                                10.8   6.90  )-----------( 169      ( 23 Nov 2023 02:35 )             33.8     11-23    137  |  100  |  24<H>  ----------------------------<  121<H>  4.5   |  25  |  0.76    Ca    9.8      23 Nov 2023 02:35  Phos  4.2     11-23  Mg     1.90     11-23      proBNP:   Lipid Profile:   HgA1c:   TSH:     TELE:  EKG:

## 2023-11-25 LAB
ANION GAP SERPL CALC-SCNC: 9 MMOL/L — SIGNIFICANT CHANGE UP (ref 7–14)
BUN SERPL-MCNC: 20 MG/DL — SIGNIFICANT CHANGE UP (ref 7–23)
CALCIUM SERPL-MCNC: 10.2 MG/DL — SIGNIFICANT CHANGE UP (ref 8.4–10.5)
CHLORIDE SERPL-SCNC: 101 MMOL/L — SIGNIFICANT CHANGE UP (ref 98–107)
CO2 SERPL-SCNC: 26 MMOL/L — SIGNIFICANT CHANGE UP (ref 22–31)
CREAT SERPL-MCNC: 0.6 MG/DL — SIGNIFICANT CHANGE UP (ref 0.5–1.3)
EGFR: 101 ML/MIN/1.73M2 — SIGNIFICANT CHANGE UP
GLUCOSE SERPL-MCNC: 95 MG/DL — SIGNIFICANT CHANGE UP (ref 70–99)
HCT VFR BLD CALC: 32.9 % — LOW (ref 34.5–45)
HGB BLD-MCNC: 10.4 G/DL — LOW (ref 11.5–15.5)
MAGNESIUM SERPL-MCNC: 2 MG/DL — SIGNIFICANT CHANGE UP (ref 1.6–2.6)
MCHC RBC-ENTMCNC: 31.6 GM/DL — LOW (ref 32–36)
MCHC RBC-ENTMCNC: 32.7 PG — SIGNIFICANT CHANGE UP (ref 27–34)
MCV RBC AUTO: 103.5 FL — HIGH (ref 80–100)
NRBC # BLD: 0 /100 WBCS — SIGNIFICANT CHANGE UP (ref 0–0)
NRBC # FLD: 0 K/UL — SIGNIFICANT CHANGE UP (ref 0–0)
PHOSPHATE SERPL-MCNC: 3.3 MG/DL — SIGNIFICANT CHANGE UP (ref 2.5–4.5)
PLATELET # BLD AUTO: 179 K/UL — SIGNIFICANT CHANGE UP (ref 150–400)
POTASSIUM SERPL-MCNC: 4.7 MMOL/L — SIGNIFICANT CHANGE UP (ref 3.5–5.3)
POTASSIUM SERPL-SCNC: 4.7 MMOL/L — SIGNIFICANT CHANGE UP (ref 3.5–5.3)
RBC # BLD: 3.18 M/UL — LOW (ref 3.8–5.2)
RBC # FLD: 13.7 % — SIGNIFICANT CHANGE UP (ref 10.3–14.5)
SODIUM SERPL-SCNC: 136 MMOL/L — SIGNIFICANT CHANGE UP (ref 135–145)
WBC # BLD: 5.64 K/UL — SIGNIFICANT CHANGE UP (ref 3.8–10.5)
WBC # FLD AUTO: 5.64 K/UL — SIGNIFICANT CHANGE UP (ref 3.8–10.5)

## 2023-11-25 PROCEDURE — 99232 SBSQ HOSP IP/OBS MODERATE 35: CPT

## 2023-11-25 RX ADMIN — HYDROMORPHONE HYDROCHLORIDE 4 MILLIGRAM(S): 2 INJECTION INTRAMUSCULAR; INTRAVENOUS; SUBCUTANEOUS at 19:52

## 2023-11-25 RX ADMIN — HYDROMORPHONE HYDROCHLORIDE 4 MILLIGRAM(S): 2 INJECTION INTRAMUSCULAR; INTRAVENOUS; SUBCUTANEOUS at 03:56

## 2023-11-25 RX ADMIN — HYDROMORPHONE HYDROCHLORIDE 4 MILLIGRAM(S): 2 INJECTION INTRAMUSCULAR; INTRAVENOUS; SUBCUTANEOUS at 12:17

## 2023-11-25 RX ADMIN — Medication 975 MILLIGRAM(S): at 00:14

## 2023-11-25 RX ADMIN — GABAPENTIN 200 MILLIGRAM(S): 400 CAPSULE ORAL at 14:03

## 2023-11-25 RX ADMIN — Medication 975 MILLIGRAM(S): at 06:00

## 2023-11-25 RX ADMIN — ATORVASTATIN CALCIUM 40 MILLIGRAM(S): 80 TABLET, FILM COATED ORAL at 21:56

## 2023-11-25 RX ADMIN — Medication 975 MILLIGRAM(S): at 12:20

## 2023-11-25 RX ADMIN — Medication 81 MILLIGRAM(S): at 05:30

## 2023-11-25 RX ADMIN — HYDROMORPHONE HYDROCHLORIDE 4 MILLIGRAM(S): 2 INJECTION INTRAMUSCULAR; INTRAVENOUS; SUBCUTANEOUS at 03:26

## 2023-11-25 RX ADMIN — Medication 975 MILLIGRAM(S): at 19:22

## 2023-11-25 RX ADMIN — HEPARIN SODIUM 7 UNIT(S)/HR: 5000 INJECTION INTRAVENOUS; SUBCUTANEOUS at 20:16

## 2023-11-25 RX ADMIN — HYDROMORPHONE HYDROCHLORIDE 4 MILLIGRAM(S): 2 INJECTION INTRAMUSCULAR; INTRAVENOUS; SUBCUTANEOUS at 19:22

## 2023-11-25 RX ADMIN — GABAPENTIN 200 MILLIGRAM(S): 400 CAPSULE ORAL at 21:56

## 2023-11-25 RX ADMIN — GABAPENTIN 200 MILLIGRAM(S): 400 CAPSULE ORAL at 05:30

## 2023-11-25 RX ADMIN — Medication 975 MILLIGRAM(S): at 05:30

## 2023-11-25 RX ADMIN — HEPARIN SODIUM 7 UNIT(S)/HR: 5000 INJECTION INTRAVENOUS; SUBCUTANEOUS at 08:18

## 2023-11-25 RX ADMIN — HYDROMORPHONE HYDROCHLORIDE 4 MILLIGRAM(S): 2 INJECTION INTRAMUSCULAR; INTRAVENOUS; SUBCUTANEOUS at 12:47

## 2023-11-25 NOTE — PROGRESS NOTE ADULT - SUBJECTIVE AND OBJECTIVE BOX
Name of Patient : BRE HODGE  MRN: [id number]  Date of visit: 11-25-23     Subjective: Patient seen and examined. No new events except as noted.   doing okay     REVIEW OF SYSTEMS:    CONSTITUTIONAL: No weakness, fevers or chills  EYES/ENT: No visual changes;  No vertigo or throat pain   NECK: No pain or stiffness  RESPIRATORY: No cough, wheezing, hemoptysis; No shortness of breath  CARDIOVASCULAR: No chest pain or palpitations  GASTROINTESTINAL: No abdominal or epigastric pain. No nausea, vomiting, or hematemesis; No diarrhea or constipation. No melena or hematochezia.  GENITOURINARY: No dysuria, frequency or hematuria  NEUROLOGICAL: No numbness or weakness  SKIN: No itching, burning, rashes, or lesions   All other review of systems is negative unless indicated above.    MEDICATIONS:  MEDICATIONS  (STANDING):  acetaminophen     Tablet .. 975 milliGRAM(s) Oral every 6 hours  amLODIPine   Tablet 10 milliGRAM(s) Oral every 24 hours  aspirin  chewable 81 milliGRAM(s) Oral daily  atorvastatin 40 milliGRAM(s) Oral at bedtime  gabapentin 200 milliGRAM(s) Oral every 8 hours  heparin  Infusion 600 Unit(s)/Hr (7 mL/Hr) IV Continuous <Continuous>  losartan 100 milliGRAM(s) Oral daily      PHYSICAL EXAM:  T(C): 36.8 (11-25-23 @ 21:24), Max: 36.9 (11-25-23 @ 17:55)  HR: 76 (11-25-23 @ 21:24) (62 - 88)  BP: 122/67 (11-25-23 @ 21:24) (104/59 - 135/58)  RR: 17 (11-25-23 @ 21:24) (17 - 18)  SpO2: 100% (11-25-23 @ 21:24) (97% - 100%)  Wt(kg): --  I&O's Summary    24 Nov 2023 07:01  -  25 Nov 2023 07:00  --------------------------------------------------------  IN: 1248 mL / OUT: 550 mL / NET: 698 mL    25 Nov 2023 07:01  -  25 Nov 2023 22:29  --------------------------------------------------------  IN: 1006 mL / OUT: 700 mL / NET: 306 mL          Appearance: Normal	  HEENT:  PERRLA   Lymphatic: No lymphadenopathy   Cardiovascular: Normal S1 S2, no JVD  Respiratory: normal effort , clear  Gastrointestinal:  Soft, Non-tender  Skin: No rashes,  warm to touch  Psychiatry:  Mood & affect appropriate  Musculuskeletal: No edema    recent labs, Imaging and EKGs personally reviewed     11-24-23 @ 07:01  -  11-25-23 @ 07:00  --------------------------------------------------------  IN: 1248 mL / OUT: 550 mL / NET: 698 mL    11-25-23 @ 07:01  -  11-25-23 @ 22:29  --------------------------------------------------------  IN: 1006 mL / OUT: 700 mL / NET: 306 mL                          10.4   5.64  )-----------( 179      ( 25 Nov 2023 07:47 )             32.9               11-25    136  |  101  |  20  ----------------------------<  95  4.7   |  26  |  0.60    Ca    10.2      25 Nov 2023 07:47  Phos  3.3     11-25  Mg     2.00     11-25      PT/INR - ( 24 Nov 2023 06:10 )   PT: 10.2 sec;   INR: 0.90 ratio         PTT - ( 24 Nov 2023 06:10 )  PTT:62.3 sec                   Urinalysis Basic - ( 25 Nov 2023 07:47 )    Color: x / Appearance: x / SG: x / pH: x  Gluc: 95 mg/dL / Ketone: x  / Bili: x / Urobili: x   Blood: x / Protein: x / Nitrite: x   Leuk Esterase: x / RBC: x / WBC x   Sq Epi: x / Non Sq Epi: x / Bacteria: x               Name of Patient : BRE HODGE  MRN: [id number]  Date of visit: 11-25-23     Subjective: Patient seen and examined. No new events except as noted.   doing okay     REVIEW OF SYSTEMS:    CONSTITUTIONAL: No weakness, fevers or chills  EYES/ENT: No visual changes;  No vertigo or throat pain   NECK: No pain or stiffness  RESPIRATORY: No cough, wheezing, hemoptysis; No shortness of breath  CARDIOVASCULAR: No chest pain or palpitations  GASTROINTESTINAL: No abdominal or epigastric pain. No nausea, vomiting, or hematemesis; No diarrhea or constipation. No melena or hematochezia.  GENITOURINARY: No dysuria, frequency or hematuria  NEUROLOGICAL: No numbness or weakness  SKIN: No itching, burning, rashes, or lesions   All other review of systems is negative unless indicated above.    MEDICATIONS:  MEDICATIONS  (STANDING):  acetaminophen     Tablet .. 975 milliGRAM(s) Oral every 6 hours  amLODIPine   Tablet 10 milliGRAM(s) Oral every 24 hours  aspirin  chewable 81 milliGRAM(s) Oral daily  atorvastatin 40 milliGRAM(s) Oral at bedtime  gabapentin 200 milliGRAM(s) Oral every 8 hours  heparin  Infusion 600 Unit(s)/Hr (7 mL/Hr) IV Continuous <Continuous>  losartan 100 milliGRAM(s) Oral daily      PHYSICAL EXAM:  T(C): 36.8 (11-25-23 @ 21:24), Max: 36.9 (11-25-23 @ 17:55)  HR: 76 (11-25-23 @ 21:24) (62 - 88)  BP: 122/67 (11-25-23 @ 21:24) (104/59 - 135/58)  RR: 17 (11-25-23 @ 21:24) (17 - 18)  SpO2: 100% (11-25-23 @ 21:24) (97% - 100%)  Wt(kg): --  I&O's Summary    24 Nov 2023 07:01  -  25 Nov 2023 07:00  --------------------------------------------------------  IN: 1248 mL / OUT: 550 mL / NET: 698 mL    25 Nov 2023 07:01  -  25 Nov 2023 22:29  --------------------------------------------------------  IN: 1006 mL / OUT: 700 mL / NET: 306 mL          Appearance: Normal	  HEENT:  PERRLA   Lymphatic: No lymphadenopathy   Cardiovascular: Normal S1 S2, no JVD  Respiratory: normal effort , clear  Gastrointestinal:  Soft, Non-tender  Skin: No rashes,  warm to touch  Psychiatry:  Mood & affect appropriate  Musculuskeletal: No edema    recent labs, Imaging and EKGs personally reviewed     11-24-23 @ 07:01  -  11-25-23 @ 07:00  --------------------------------------------------------  IN: 1248 mL / OUT: 550 mL / NET: 698 mL    11-25-23 @ 07:01  -  11-25-23 @ 22:29  --------------------------------------------------------  IN: 1006 mL / OUT: 700 mL / NET: 306 mL                          10.4   5.64  )-----------( 179      ( 25 Nov 2023 07:47 )             32.9               11-25    136  |  101  |  20  ----------------------------<  95  4.7   |  26  |  0.60    Ca    10.2      25 Nov 2023 07:47  Phos  3.3     11-25  Mg     2.00     11-25      PT/INR - ( 24 Nov 2023 06:10 )   PT: 10.2 sec;   INR: 0.90 ratio         PTT - ( 24 Nov 2023 06:10 )  PTT:62.3 sec                   Urinalysis Basic - ( 25 Nov 2023 07:47 )    Color: x / Appearance: x / SG: x / pH: x  Gluc: 95 mg/dL / Ketone: x  / Bili: x / Urobili: x   Blood: x / Protein: x / Nitrite: x   Leuk Esterase: x / RBC: x / WBC x   Sq Epi: x / Non Sq Epi: x / Bacteria: x               Name of Patient : BRE HODGE  MRN: [id number]  Date of visit: 11-25-23     Subjective: Patient seen and examined. No new events except as noted.   doing okay     REVIEW OF SYSTEMS:    CONSTITUTIONAL: No weakness, fevers or chills  EYES/ENT: No visual changes;  No vertigo or throat pain   NECK: No pain or stiffness  RESPIRATORY: No cough, wheezing, hemoptysis; No shortness of breath  CARDIOVASCULAR: No chest pain or palpitations  GASTROINTESTINAL: No abdominal or epigastric pain. No nausea, vomiting, or hematemesis; No diarrhea or constipation. No melena or hematochezia.  GENITOURINARY: No dysuria, frequency or hematuria  NEUROLOGICAL: No numbness or weakness  SKIN: No itching, burning, rashes, or lesions   All other review of systems is negative unless indicated above.    MEDICATIONS:  MEDICATIONS  (STANDING):  acetaminophen     Tablet .. 975 milliGRAM(s) Oral every 6 hours  amLODIPine   Tablet 10 milliGRAM(s) Oral every 24 hours  aspirin  chewable 81 milliGRAM(s) Oral daily  atorvastatin 40 milliGRAM(s) Oral at bedtime  gabapentin 200 milliGRAM(s) Oral every 8 hours  heparin  Infusion 600 Unit(s)/Hr (7 mL/Hr) IV Continuous <Continuous>  losartan 100 milliGRAM(s) Oral daily      PHYSICAL EXAM:  T(C): 36.8 (11-25-23 @ 21:24), Max: 36.9 (11-25-23 @ 17:55)  HR: 76 (11-25-23 @ 21:24) (62 - 88)  BP: 122/67 (11-25-23 @ 21:24) (104/59 - 135/58)  RR: 17 (11-25-23 @ 21:24) (17 - 18)  SpO2: 100% (11-25-23 @ 21:24) (97% - 100%)  Wt(kg): --  I&O's Summary    24 Nov 2023 07:01  -  25 Nov 2023 07:00  --------------------------------------------------------  IN: 1248 mL / OUT: 550 mL / NET: 698 mL    25 Nov 2023 07:01  -  25 Nov 2023 22:29  --------------------------------------------------------  IN: 1006 mL / OUT: 700 mL / NET: 306 mL          Appearance: Normal	  HEENT:  PERRLA   Lymphatic: No lymphadenopathy   Cardiovascular: Normal S1 S2, no JVD  Respiratory: normal effort , clear  Gastrointestinal:  Soft, Non-tender  Skin: No rashes,  warm to touch  Psychiatry:  Mood & affect appropriate  Musculuskeletal: No edema    recent labs, Imaging and EKGs personally reviewed     11-24-23 @ 07:01  -  11-25-23 @ 07:00  --------------------------------------------------------  IN: 1248 mL / OUT: 550 mL / NET: 698 mL    11-25-23 @ 07:01  -  11-25-23 @ 22:29  --------------------------------------------------------  IN: 1006 mL / OUT: 700 mL / NET: 306 mL                          10.4   5.64  )-----------( 179      ( 25 Nov 2023 07:47 )             32.9               11-25    136  |  101  |  20  ----------------------------<  95  4.7   |  26  |  0.60    Ca    10.2      25 Nov 2023 07:47  Phos  3.3     11-25  Mg     2.00     11-25      PT/INR - ( 24 Nov 2023 06:10 )   PT: 10.2 sec;   INR: 0.90 ratio         PTT - ( 24 Nov 2023 06:10 )  PTT:62.3 sec                   Urinalysis Basic - ( 25 Nov 2023 07:47 )    Color: x / Appearance: x / SG: x / pH: x  Gluc: 95 mg/dL / Ketone: x  / Bili: x / Urobili: x   Blood: x / Protein: x / Nitrite: x   Leuk Esterase: x / RBC: x / WBC x   Sq Epi: x / Non Sq Epi: x / Bacteria: x

## 2023-11-25 NOTE — PROGRESS NOTE ADULT - ATTENDING COMMENTS
pt status post multiple right leg intervention  s/p right fem tib bypass  presented with rest pain  CT shows evidence of iliac disease  will attempt to revascularize the inflow  all risk including but not limited to bleeding, limb loss, digit loss, renal failure, life threatening complication and death were discussed    she wish to proceed
pt s/p right CFA and external iliac artery revascularization, s/p profunda PTA    PT and DP doppler signals better today  foot less tender    I had a log conversation with the patient her daughter Kristel regarding the her care    I explained to them that she is not a candidate for redo distal bypass given the poor quality of target vessel including PT and AT    If her rest pain persist, she will need major amputation in future    I also recommended to pursue second opinion if they wish so and will provide them with the contacts    Plan will be to get her better pain control with help of pain service and see her as out patient to further discuss her care
continue AC
pt s/p right CFA and external iliac artery revascularization, s/p profunda PTA    PT and DP doppler signals     continue AC    If her rest pain persist, she will need major amputation in future

## 2023-11-25 NOTE — PROVIDER CONTACT NOTE (OTHER) - SITUATION
Patient AOX4, patient diastolic was observed to be 48 but patient asymptomatic
Pt manual blood pressure 84/52

## 2023-11-25 NOTE — PROGRESS NOTE ADULT - ASSESSMENT
63F hx of recent RLE angiogram and percutaneous thrombectomy (07/19) of SFA and popliteal arteries, R CFA -> PT bypass with PTFE (7/25) for acute limb ischemia, presenting now with 1.5 weeks of pain in RLE, found to have Pillo IIa acute on chronic limb ischemia secondary to chronically occluded graft, s/p RLE angiogram, stenting of distal external iliac and proximal CFA, profunda femoris balloon angioplasty 11/22, patient with faint signals pain slightly improved, no other options for revascularization, patient to think about BKA    Plan:  - Heparin gtt @ 6mL/hr, therapeutic   - On ASA  - PT evaluation recommends [facility]  - Pain control  - Diet: Reg  - DVT ppx: SQH  - Patient to decide for BKA    Vascular Surgery  [id number] 63F hx of recent RLE angiogram and percutaneous thrombectomy (07/19) of SFA and popliteal arteries, R CFA -> PT bypass with PTFE (7/25) for acute limb ischemia, presenting now with 1.5 weeks of pain in RLE, found to have Pillo IIa acute on chronic limb ischemia secondary to chronically occluded graft, s/p RLE angiogram, stenting of distal external iliac and proximal CFA, profunda femoris balloon angioplasty 11/22, patient with faint signals pain slightly improved, no other options for revascularization, patient to think about BKA    Plan:  - Heparin gtt @ 6mL/hr, therapeutic   - On ASA  - PT evaluation recommends [facility]  - Pain control  - Diet: Reg  - DVT ppx: SQH  - Patient to decide for BKA    Vascular Surgery  [id number] 63F hx of recent RLE angiogram and percutaneous thrombectomy (07/19) of SFA and popliteal arteries, R CFA -> PT bypass with PTFE (7/25) for acute limb ischemia, presenting now with 1.5 weeks of pain in RLE, found to have Pillo IIa acute on chronic limb ischemia secondary to chronically occluded graft, s/p RLE angiogram, stenting of distal external iliac and proximal CFA, profunda femoris balloon angioplasty 11/22, patient with faint signals pain slightly improved, no other options for revascularization, patient to think about BKA    Plan:  - Heparin gtt @ 6mL/hr, therapeutic   - On ASA  - PT evaluation recommends [facility]  - Pain control  - Diet: Reg  - DVT ppx: SQH  - Patient to decide for BKA    Vascular Surgery  [id number]

## 2023-11-25 NOTE — PROVIDER CONTACT NOTE (OTHER) - ASSESSMENT
Patient AOX4,  patient diastolic was observed to be 48 but patient asymptomatic
Bp 84/52, hr 52, temp 98.2, O2 97%. Pt denies any dizziness or headache. Pt lying in bed with no signs of acute distress

## 2023-11-25 NOTE — PROGRESS NOTE ADULT - SUBJECTIVE AND OBJECTIVE BOX
Subjective: Patient seen and examined. No new events except as noted.     SUBJECTIVE/ROS:  nad      MEDICATIONS:  MEDICATIONS  (STANDING):  acetaminophen     Tablet .. 975 milliGRAM(s) Oral every 6 hours  amLODIPine   Tablet 10 milliGRAM(s) Oral every 24 hours  aspirin  chewable 81 milliGRAM(s) Oral daily  atorvastatin 40 milliGRAM(s) Oral at bedtime  gabapentin 200 milliGRAM(s) Oral every 8 hours  heparin  Infusion 600 Unit(s)/Hr (7 mL/Hr) IV Continuous <Continuous>  losartan 100 milliGRAM(s) Oral daily      PHYSICAL EXAM:  T(C): 36.7 (11-25-23 @ 10:57), Max: 36.8 (11-24-23 @ 20:47)  HR: 88 (11-25-23 @ 10:57) (62 - 92)  BP: 122/42 (11-25-23 @ 10:57) (104/59 - 127/48)  RR: 18 (11-25-23 @ 10:57) (17 - 18)  SpO2: 100% (11-25-23 @ 10:57) (97% - 100%)  Wt(kg): --  I&O's Summary    24 Nov 2023 07:01  -  25 Nov 2023 07:00  --------------------------------------------------------  IN: 1248 mL / OUT: 550 mL / NET: 698 mL    25 Nov 2023 07:01  -  25 Nov 2023 13:53  --------------------------------------------------------  IN: 350 mL / OUT: 200 mL / NET: 150 mL            JVP: Normal  Neck: supple  Lung: clear   CV: S1 S2 , Murmur:  Abd: soft  Ext: No edema  neuro: Awake / alert  Psych: flat affect  Skin: normal``    LABS/DATA:    CARDIAC MARKERS:                                10.4   5.64  )-----------( 179      ( 25 Nov 2023 07:47 )             32.9     11-25    136  |  101  |  20  ----------------------------<  95  4.7   |  26  |  0.60    Ca    10.2      25 Nov 2023 07:47  Phos  3.3     11-25  Mg     2.00     11-25      proBNP:   Lipid Profile:   HgA1c:   TSH:     TELE:  EKG:

## 2023-11-25 NOTE — PROGRESS NOTE ADULT - ASSESSMENT
63F hx of cad s/p remote stents,  recent RLE angiogram and percutaneous thrombectomy (07/19) of SFA and popliteal arteries, R CFA -> PT bypass with PTFE (7/25) for acute limb ischemia, presenting now with 1.5 weeks of pain in RLE. Reports has had pain in her right foot since her previous vascular procedures in July '23, but the pain has worsened over the last 1.5 weeks. Reports she has been taking her Eliquis as prescribed.      # PAD  per vascular team   ASA and statin   Monitor Hgb level   Full AC   defer management to vascular      #HTN   BP control  home meds  adjust astolerated   cardfollow up    # CAD  ASA and statin  BP control       DVT and gI PPX

## 2023-11-25 NOTE — PROGRESS NOTE ADULT - SUBJECTIVE AND OBJECTIVE BOX
General Surgery Daily Resident Progress Note    Subjective and interval  Seen and examined at bedside. Patient refused heparin in evening, MD called to bedside to explain risks of discontinuing heparin gtt. Patient amenable to continuing heparin gtt.  ___________________________________________________  Vital Signs  T(C): 36.4 (03:24), Max: 36.8 (20:47)  HR: 64 (03:24) (64 - 92)  BP: 104/59 (03:24) (103/47 - 123/48)  ABP: --  ABP(mean): --  RR: 18 (03:24) (17 - 18)  SpO2: 100% (03:24) (97% - 100%)    In/Out    11-24  -  11-25  --------------------------------------------------------  IN: 973 mL / OUT: 550 mL / NET: 423 mL      ___________________________________________________  Physical Exam  Constitutional: A&Ox3, NAD  Extremities:  L groin access site soft, no hematoma. RLE motor intact, diminished sensory distally at the toes, faint monophasic DP/PT signals  ___________________________________________________  Labs  CBC Basic (11-24 @ 06:10)  WBC: 4.87 Hgb: 10.6 Hct: 33.5 Plt: 167    Metabolic Panel (11-24 @ 06:10)  135  |  101  |  25  ----------------------------<  96  4.8   |  27  |  0.74  Ca: 9.9/Phos: 4.0/Magnesium: 2.00      Coags (11-24 @ 06:10)  PT/INR: 10.2/0.90, aPTT: 62.3    Urinalysis (11-24 @ 06:10)  Physical: Color x, Appearance x, Mucous x, Gross blood x  Analytic: SG x, pH x  Cells: RBC x, WBC x  UTI markers: Bacteria x, Leukocyte esterase x, Nitrites x  Chemical: Glucose 96, Ketones x, Protein x, Urobilinogen x, Bilirubin x    ___________________________________________________  Microbiology  ___________________________________________________  Medications  Antimicrobial and Immunologic    Neuro, Psych and Analgesia  acetaminophen     Tablet .. 975 milliGRAM(s) every 6 hours  gabapentin 200 milliGRAM(s) every 8 hours  HYDROmorphone   Tablet 2 milliGRAM(s) every 3 hours PRN  HYDROmorphone   Tablet 4 milliGRAM(s) every 3 hours PRN  HYDROmorphone  Injectable 0.5 milliGRAM(s) every 4 hours PRN    acetaminophen     Tablet .. 975 milliGRAM(s) every 6 hours  gabapentin 200 milliGRAM(s) every 8 hours  HYDROmorphone   Tablet 2 milliGRAM(s) every 3 hours PRN  HYDROmorphone   Tablet 4 milliGRAM(s) every 3 hours PRN  HYDROmorphone  Injectable 0.5 milliGRAM(s) every 4 hours PRN    Cardiovascular and Hematologic (includes DVT ppx/AC/Antiplatelet agents)  amLODIPine   Tablet 10 milliGRAM(s) every 24 hours  aspirin  chewable 81 milliGRAM(s) daily  heparin  Infusion 600 Unit(s)/Hr <Continuous>  losartan 100 milliGRAM(s) daily    atorvastatin 40 milliGRAM(s) at bedtime    Pulmonary    Oncologic    GI, Endocrine and Nutrition  atorvastatin 40 milliGRAM(s) at bedtime

## 2023-11-26 LAB
ANION GAP SERPL CALC-SCNC: 10 MMOL/L — SIGNIFICANT CHANGE UP (ref 7–14)
APTT BLD: 72.1 SEC — HIGH (ref 24.5–35.6)
BUN SERPL-MCNC: 21 MG/DL — SIGNIFICANT CHANGE UP (ref 7–23)
CALCIUM SERPL-MCNC: 10.5 MG/DL — SIGNIFICANT CHANGE UP (ref 8.4–10.5)
CHLORIDE SERPL-SCNC: 103 MMOL/L — SIGNIFICANT CHANGE UP (ref 98–107)
CO2 SERPL-SCNC: 26 MMOL/L — SIGNIFICANT CHANGE UP (ref 22–31)
CREAT SERPL-MCNC: 0.7 MG/DL — SIGNIFICANT CHANGE UP (ref 0.5–1.3)
EGFR: 98 ML/MIN/1.73M2 — SIGNIFICANT CHANGE UP
GLUCOSE SERPL-MCNC: 89 MG/DL — SIGNIFICANT CHANGE UP (ref 70–99)
HCT VFR BLD CALC: 35.9 % — SIGNIFICANT CHANGE UP (ref 34.5–45)
HGB BLD-MCNC: 11.4 G/DL — LOW (ref 11.5–15.5)
MAGNESIUM SERPL-MCNC: 2 MG/DL — SIGNIFICANT CHANGE UP (ref 1.6–2.6)
MCHC RBC-ENTMCNC: 31.8 GM/DL — LOW (ref 32–36)
MCHC RBC-ENTMCNC: 33.3 PG — SIGNIFICANT CHANGE UP (ref 27–34)
MCV RBC AUTO: 105 FL — HIGH (ref 80–100)
NRBC # BLD: 0 /100 WBCS — SIGNIFICANT CHANGE UP (ref 0–0)
NRBC # FLD: 0 K/UL — SIGNIFICANT CHANGE UP (ref 0–0)
PHOSPHATE SERPL-MCNC: 3.9 MG/DL — SIGNIFICANT CHANGE UP (ref 2.5–4.5)
PLATELET # BLD AUTO: 216 K/UL — SIGNIFICANT CHANGE UP (ref 150–400)
POTASSIUM SERPL-MCNC: 4.6 MMOL/L — SIGNIFICANT CHANGE UP (ref 3.5–5.3)
POTASSIUM SERPL-SCNC: 4.6 MMOL/L — SIGNIFICANT CHANGE UP (ref 3.5–5.3)
RBC # BLD: 3.42 M/UL — LOW (ref 3.8–5.2)
RBC # FLD: 14 % — SIGNIFICANT CHANGE UP (ref 10.3–14.5)
SODIUM SERPL-SCNC: 139 MMOL/L — SIGNIFICANT CHANGE UP (ref 135–145)
WBC # BLD: 5.98 K/UL — SIGNIFICANT CHANGE UP (ref 3.8–10.5)
WBC # FLD AUTO: 5.98 K/UL — SIGNIFICANT CHANGE UP (ref 3.8–10.5)

## 2023-11-26 RX ORDER — CYCLOBENZAPRINE HYDROCHLORIDE 10 MG/1
5 TABLET, FILM COATED ORAL EVERY 8 HOURS
Refills: 0 | Status: DISCONTINUED | OUTPATIENT
Start: 2023-11-26 | End: 2023-11-27

## 2023-11-26 RX ADMIN — Medication 975 MILLIGRAM(S): at 06:15

## 2023-11-26 RX ADMIN — HEPARIN SODIUM 7 UNIT(S)/HR: 5000 INJECTION INTRAVENOUS; SUBCUTANEOUS at 20:43

## 2023-11-26 RX ADMIN — LOSARTAN POTASSIUM 100 MILLIGRAM(S): 100 TABLET, FILM COATED ORAL at 05:46

## 2023-11-26 RX ADMIN — CYCLOBENZAPRINE HYDROCHLORIDE 5 MILLIGRAM(S): 10 TABLET, FILM COATED ORAL at 16:43

## 2023-11-26 RX ADMIN — HEPARIN SODIUM 7 UNIT(S)/HR: 5000 INJECTION INTRAVENOUS; SUBCUTANEOUS at 08:16

## 2023-11-26 RX ADMIN — GABAPENTIN 200 MILLIGRAM(S): 400 CAPSULE ORAL at 22:27

## 2023-11-26 RX ADMIN — Medication 975 MILLIGRAM(S): at 00:34

## 2023-11-26 RX ADMIN — Medication 975 MILLIGRAM(S): at 05:45

## 2023-11-26 RX ADMIN — HYDROMORPHONE HYDROCHLORIDE 4 MILLIGRAM(S): 2 INJECTION INTRAMUSCULAR; INTRAVENOUS; SUBCUTANEOUS at 22:27

## 2023-11-26 RX ADMIN — GABAPENTIN 200 MILLIGRAM(S): 400 CAPSULE ORAL at 05:45

## 2023-11-26 RX ADMIN — CYCLOBENZAPRINE HYDROCHLORIDE 5 MILLIGRAM(S): 10 TABLET, FILM COATED ORAL at 22:28

## 2023-11-26 RX ADMIN — Medication 975 MILLIGRAM(S): at 00:04

## 2023-11-26 RX ADMIN — GABAPENTIN 200 MILLIGRAM(S): 400 CAPSULE ORAL at 16:44

## 2023-11-26 RX ADMIN — ATORVASTATIN CALCIUM 40 MILLIGRAM(S): 80 TABLET, FILM COATED ORAL at 22:27

## 2023-11-26 RX ADMIN — HYDROMORPHONE HYDROCHLORIDE 4 MILLIGRAM(S): 2 INJECTION INTRAMUSCULAR; INTRAVENOUS; SUBCUTANEOUS at 22:57

## 2023-11-26 RX ADMIN — Medication 81 MILLIGRAM(S): at 05:45

## 2023-11-26 RX ADMIN — Medication 975 MILLIGRAM(S): at 17:51

## 2023-11-26 RX ADMIN — Medication 975 MILLIGRAM(S): at 11:02

## 2023-11-26 NOTE — PROGRESS NOTE ADULT - ASSESSMENT
63F hx of cad s/p remote stents,  recent RLE angiogram and percutaneous thrombectomy (07/19) of SFA and popliteal arteries, R CFA -> PT bypass with PTFE (7/25) for acute limb ischemia, presenting now with 1.5 weeks of pain in RLE. Reports has had pain in her right foot since her previous vascular procedures in July '23, but the pain has worsened over the last 1.5 weeks. Reports she has been taking her Eliquis as prescribed.      # PAD  per vascular team   ASA and statin   Monitor Hgb level   Full AC with heparin, can switch to oral when clear by vascular team   defer management to vascular      #HTN   BP control  home meds  adjust as tolerated   card follow up    # CAD  ASA and statin  BP control       DVT and gI PPX

## 2023-11-26 NOTE — PROGRESS NOTE ADULT - SUBJECTIVE AND OBJECTIVE BOX
VASCULAR SURGERY DAILY PROGRESS NOTE    SUBJECTIVE: No acute events overnight. Patient seen and evaluated on AM rounds. Pt is resting comfortably in bed. Endorsing RLE pain.    OBJECTIVE:  Vital Signs Last 24 Hrs  T(C): 36.6 (26 Nov 2023 05:38), Max: 36.9 (25 Nov 2023 17:55)  T(F): 97.9 (26 Nov 2023 05:38), Max: 98.4 (25 Nov 2023 17:55)  HR: 60 (26 Nov 2023 05:38) (60 - 88)  BP: 128/59 (26 Nov 2023 05:38) (106/59 - 135/58)  BP(mean): --  RR: 18 (26 Nov 2023 05:38) (17 - 18)  SpO2: 100% (26 Nov 2023 05:38) (97% - 100%)    Parameters below as of 26 Nov 2023 05:38  Patient On (Oxygen Delivery Method): room air      I&O's Detail    25 Nov 2023 07:01  -  26 Nov 2023 07:00  --------------------------------------------------------  IN:    Heparin: 133 mL    Oral Fluid: 1430 mL  Total IN: 1563 mL    OUT:    Voided (mL): 700 mL  Total OUT: 700 mL    Total NET: 863 mL      Daily   MEDICATIONS  (STANDING):  acetaminophen     Tablet .. 975 milliGRAM(s) Oral every 6 hours  amLODIPine   Tablet 10 milliGRAM(s) Oral every 24 hours  aspirin  chewable 81 milliGRAM(s) Oral daily  atorvastatin 40 milliGRAM(s) Oral at bedtime  cyclobenzaprine 5 milliGRAM(s) Oral every 8 hours  gabapentin 200 milliGRAM(s) Oral every 8 hours  heparin  Infusion 600 Unit(s)/Hr (7 mL/Hr) IV Continuous <Continuous>  losartan 100 milliGRAM(s) Oral daily    MEDICATIONS  (PRN):  HYDROmorphone   Tablet 2 milliGRAM(s) Oral every 3 hours PRN Moderate Pain (4 - 6)  HYDROmorphone   Tablet 4 milliGRAM(s) Oral every 3 hours PRN Severe Pain (7 - 10)  HYDROmorphone  Injectable 0.5 milliGRAM(s) IV Push every 4 hours PRN Breakthrough pain      LABS:                        11.4   5.98  )-----------( 216      ( 26 Nov 2023 06:38 )             35.9     11-26    139  |  103  |  21  ----------------------------<  89  4.6   |  26  |  0.70    Ca    10.5      26 Nov 2023 06:38  Phos  3.9     11-26  Mg     2.00     11-26      PTT - ( 26 Nov 2023 06:38 )  PTT:72.1 sec  Urinalysis Basic - ( 26 Nov 2023 06:38 )    Color: x / Appearance: x / SG: x / pH: x  Gluc: 89 mg/dL / Ketone: x  / Bili: x / Urobili: x   Blood: x / Protein: x / Nitrite: x   Leuk Esterase: x / RBC: x / WBC x   Sq Epi: x / Non Sq Epi: x / Bacteria: x    Physical Exam  Constitutional: A&Ox3, NAD  Extremities:  L groin access site soft, no hematoma. RLE motor intact, diminished sensory distally at the toes, faint monophasic DP/PT signals

## 2023-11-26 NOTE — PROGRESS NOTE ADULT - ASSESSMENT
63F hx of recent RLE angiogram and percutaneous thrombectomy (07/19) of SFA and popliteal arteries, R CFA -> PT bypass with PTFE (7/25) for acute limb ischemia, presenting now with 1.5 weeks of pain in RLE, found to have Pillo IIa acute on chronic limb ischemia secondary to chronically occluded graft, s/p RLE angiogram, stenting of distal external iliac and proximal CFA, profunda femoris balloon angioplasty 11/22, patient with faint signals pain slightly improved, no other options for revascularization, patient to think about BKA    Plan:  - Heparin gtt @ 7mL/hr, therapeutic   - On ASA  - PT evaluation recommends Restorative rehab  - Pain control, flexeril added to pain regimen today  - Diet: Reg  - DVT ppx: SQH  - Patient to decide for BKA vs outpatient pain management    Vascular Surgery  [id number] 63F hx of recent RLE angiogram and percutaneous thrombectomy (07/19) of SFA and popliteal arteries, R CFA -> PT bypass with PTFE (7/25) for acute limb ischemia, presenting now with 1.5 weeks of pain in RLE, found to have Pillo IIa acute on chronic limb ischemia secondary to chronically occluded graft, s/p RLE angiogram, stenting of distal external iliac and proximal CFA, profunda femoris balloon angioplasty 11/22, patient with faint signals pain slightly improved, no other options for revascularization, patient to think about BKA    Plan:  - Heparin gtt @ 7mL/hr, therapeutic   - On ASA  - PT evaluation recommends [facility] rehab  - Pain control, flexeril added to pain regimen today  - Diet: Reg  - DVT ppx: SQH  - Patient to decide for BKA vs outpatient pain management    Vascular Surgery  [id number] 63F hx of recent RLE angiogram and percutaneous thrombectomy (07/19) of SFA and popliteal arteries, R CFA -> PT bypass with PTFE (7/25) for acute limb ischemia, presenting now with 1.5 weeks of pain in RLE, found to have Pillo IIa acute on chronic limb ischemia secondary to chronically occluded graft, s/p RLE angiogram, stenting of distal external iliac and proximal CFA, profunda femoris balloon angioplasty 11/22, patient with faint signals pain slightly improved, no other options for revascularization, patient to think about BKA    Plan:  - Heparin gtt @ 7mL/hr, therapeutic   - On ASA  - PT evaluation recommends Restorative rehab  - Pain control, flexeril added to pain regimen today  - Diet: Reg  - DVT ppx: SQH  - Patient to decide for BKA vs outpatient pain management    Vascular Surgery  [id number]

## 2023-11-26 NOTE — CHART NOTE - NSCHARTNOTEFT_GEN_A_CORE
Patient seen by acute pain about a week ago,  found to have Pillo IIa acute on chronic limb ischemia secondary to chronically occluded graft, s/p RLE angiogram 11/22, stenting of distal external iliac and proximal CFA, profunda femoris balloon angioplasty done.  Vascular team wants a reconsult as still in pain and want to transition her for discharge.  Explained that whether acute or chronic pain, we are still only an in-patient service and cannot give recommendations for discharge, as no one from our service can follow up outpatient.  Based on EMR, patient may be complaining of more pain today because last time she received pain medication was yesterday around 7pm.  It has been over 19 hours since patient received po Dilaudid and more than that since IV Dilaudid.  If patient is near discharge, would recommend discontinuing any IV pain medication and just offer po.  Discussed with primary team that patient may need to follow up with outpatient pain provider, a list was emailed to them.  However, the best option for the patient is to have her call her insurance provider and ask for the nearest outpatient pain provider to make an appointment.

## 2023-11-26 NOTE — PROGRESS NOTE ADULT - SUBJECTIVE AND OBJECTIVE BOX
Subjective: Patient seen and examined. No new events except as noted.     SUBJECTIVE/ROS:  nad      MEDICATIONS:  MEDICATIONS  (STANDING):  acetaminophen     Tablet .. 975 milliGRAM(s) Oral every 6 hours  amLODIPine   Tablet 10 milliGRAM(s) Oral every 24 hours  aspirin  chewable 81 milliGRAM(s) Oral daily  atorvastatin 40 milliGRAM(s) Oral at bedtime  gabapentin 200 milliGRAM(s) Oral every 8 hours  heparin  Infusion 600 Unit(s)/Hr (7 mL/Hr) IV Continuous <Continuous>  losartan 100 milliGRAM(s) Oral daily      PHYSICAL EXAM:  T(C): 36.6 (11-26-23 @ 05:38), Max: 36.9 (11-25-23 @ 17:55)  HR: 60 (11-26-23 @ 05:38) (60 - 88)  BP: 128/59 (11-26-23 @ 05:38) (106/59 - 135/58)  RR: 18 (11-26-23 @ 05:38) (17 - 18)  SpO2: 100% (11-26-23 @ 05:38) (97% - 100%)  Wt(kg): --  I&O's Summary    25 Nov 2023 07:01  -  26 Nov 2023 07:00  --------------------------------------------------------  IN: 1274 mL / OUT: 700 mL / NET: 574 mL            JVP: Normal  Neck: supple  Lung: clear   CV: S1 S2 , Murmur:  Abd: soft  Ext: No edema  neuro: Awake / alert  Psych: flat affect  Skin: normal``    LABS/DATA:    CARDIAC MARKERS:                                11.4   5.98  )-----------( 216      ( 26 Nov 2023 06:38 )             35.9     11-26    139  |  103  |  21  ----------------------------<  89  4.6   |  26  |  0.70    Ca    10.5      26 Nov 2023 06:38  Phos  3.9     11-26  Mg     2.00     11-26      proBNP:   Lipid Profile:   HgA1c:   TSH:     TELE:  EKG:

## 2023-11-26 NOTE — PROGRESS NOTE ADULT - SUBJECTIVE AND OBJECTIVE BOX
Name of Patient : BRE HODGE  MRN: [id number]  Date of visit: 11-26-23 @ 16:53      Subjective: Patient seen and examined. No new events except as noted.     REVIEW OF SYSTEMS:    CONSTITUTIONAL: No weakness, fevers or chills  EYES/ENT: No visual changes;  No vertigo or throat pain   NECK: No pain or stiffness  RESPIRATORY: No cough, wheezing, hemoptysis; No shortness of breath  CARDIOVASCULAR: No chest pain or palpitations  GASTROINTESTINAL: No abdominal or epigastric pain. No nausea, vomiting, or hematemesis; No diarrhea or constipation. No melena or hematochezia.  GENITOURINARY: No dysuria, frequency or hematuria  NEUROLOGICAL: No numbness or weakness  SKIN: No itching, burning, rashes, or lesions   All other review of systems is negative unless indicated above.    MEDICATIONS:  MEDICATIONS  (STANDING):  acetaminophen     Tablet .. 975 milliGRAM(s) Oral every 6 hours  amLODIPine   Tablet 10 milliGRAM(s) Oral every 24 hours  aspirin  chewable 81 milliGRAM(s) Oral daily  atorvastatin 40 milliGRAM(s) Oral at bedtime  cyclobenzaprine 5 milliGRAM(s) Oral every 8 hours  gabapentin 200 milliGRAM(s) Oral every 8 hours  heparin  Infusion 600 Unit(s)/Hr (7 mL/Hr) IV Continuous <Continuous>  losartan 100 milliGRAM(s) Oral daily      PHYSICAL EXAM:  T(C): 36.4 (11-26-23 @ 10:15), Max: 36.9 (11-25-23 @ 17:55)  HR: 68 (11-26-23 @ 10:15) (60 - 76)  BP: 106/68 (11-26-23 @ 10:15) (106/68 - 128/59)  RR: 18 (11-26-23 @ 10:15) (17 - 18)  SpO2: 99% (11-26-23 @ 10:15) (99% - 100%)  Wt(kg): --  I&O's Summary    25 Nov 2023 07:01  -  26 Nov 2023 07:00  --------------------------------------------------------  IN: 1563 mL / OUT: 700 mL / NET: 863 mL          Appearance: Normal	  HEENT:  PERRLA   Lymphatic: No lymphadenopathy   Cardiovascular: Normal S1 S2, no JVD  Respiratory: normal effort , clear  Gastrointestinal:  Soft, Non-tender  Skin: No rashes,  warm to touch  Psychiatry:  Mood & affect appropriate  Musculuskeletal: No edema    recent labs, Imaging and EKGs personally reviewed     11-25-23 @ 07:01  -  11-26-23 @ 07:00  --------------------------------------------------------  IN: 1563 mL / OUT: 700 mL / NET: 863 mL                            11.4   5.98  )-----------( 216      ( 26 Nov 2023 06:38 )             35.9               11-26    139  |  103  |  21  ----------------------------<  89  4.6   |  26  |  0.70    Ca    10.5      26 Nov 2023 06:38  Phos  3.9     11-26  Mg     2.00     11-26      PTT - ( 26 Nov 2023 06:38 )  PTT:72.1 sec                   Urinalysis Basic - ( 26 Nov 2023 06:38 )    Color: x / Appearance: x / SG: x / pH: x  Gluc: 89 mg/dL / Ketone: x  / Bili: x / Urobili: x   Blood: x / Protein: x / Nitrite: x   Leuk Esterase: x / RBC: x / WBC x   Sq Epi: x / Non Sq Epi: x / Bacteria: x             Name of Patient : BRE HODGE  MRN: [id number]  Date of visit: 11-26-23 @ 16:53      Subjective: Patient seen and examined. No new events except as noted.     REVIEW OF SYSTEMS:    CONSTITUTIONAL: No weakness, fevers or chills  EYES/ENT: No visual changes;  No vertigo or throat pain   NECK: No pain or stiffness  RESPIRATORY: No cough, wheezing, hemoptysis; No shortness of breath  CARDIOVASCULAR: No chest pain or palpitations  GASTROINTESTINAL: No abdominal or epigastric pain. No nausea, vomiting, or hematemesis; No diarrhea or constipation. No melena or hematochezia.  GENITOURINARY: No dysuria, frequency or hematuria  NEUROLOGICAL: No numbness or weakness  SKIN: No itching, burning, rashes, or lesions   All other review of systems is negative unless indicated above.    MEDICATIONS:  MEDICATIONS  (STANDING):  acetaminophen     Tablet .. 975 milliGRAM(s) Oral every 6 hours  amLODIPine   Tablet 10 milliGRAM(s) Oral every 24 hours  aspirin  chewable 81 milliGRAM(s) Oral daily  atorvastatin 40 milliGRAM(s) Oral at bedtime  cyclobenzaprine 5 milliGRAM(s) Oral every 8 hours  gabapentin 200 milliGRAM(s) Oral every 8 hours  heparin  Infusion 600 Unit(s)/Hr (7 mL/Hr) IV Continuous <Continuous>  losartan 100 milliGRAM(s) Oral daily      PHYSICAL EXAM:  T(C): 36.4 (11-26-23 @ 10:15), Max: 36.9 (11-25-23 @ 17:55)  HR: 68 (11-26-23 @ 10:15) (60 - 76)  BP: 106/68 (11-26-23 @ 10:15) (106/68 - 128/59)  RR: 18 (11-26-23 @ 10:15) (17 - 18)  SpO2: 99% (11-26-23 @ 10:15) (99% - 100%)  Wt(kg): --  I&O's Summary    25 Nov 2023 07:01  -  26 Nov 2023 07:00  --------------------------------------------------------  IN: 1563 mL / OUT: 700 mL / NET: 863 mL          Appearance: Normal	  HEENT:  PERRLA   Lymphatic: No lymphadenopathy   Cardiovascular: Normal S1 S2, no JVD  Respiratory: normal effort , clear  Gastrointestinal:  Soft, Non-tender  Skin: No rashes,  warm to touch  Psychiatry:  Mood & affect appropriate  Musculuskeletal: No edema    recent labs, Imaging and EKGs personally reviewed     11-25-23 @ 07:01  -  11-26-23 @ 07:00  --------------------------------------------------------  IN: 1563 mL / OUT: 700 mL / NET: 863 mL                            11.4   5.98  )-----------( 216      ( 26 Nov 2023 06:38 )             35.9               11-26    139  |  103  |  21  ----------------------------<  89  4.6   |  26  |  0.70    Ca    10.5      26 Nov 2023 06:38  Phos  3.9     11-26  Mg     2.00     11-26      PTT - ( 26 Nov 2023 06:38 )  PTT:72.1 sec                   Urinalysis Basic - ( 26 Nov 2023 06:38 )    Color: x / Appearance: x / SG: x / pH: x  Gluc: 89 mg/dL / Ketone: x  / Bili: x / Urobili: x   Blood: x / Protein: x / Nitrite: x   Leuk Esterase: x / RBC: x / WBC x   Sq Epi: x / Non Sq Epi: x / Bacteria: x             Name of Patient : BRE HODGE  MRN: [id number]  Date of visit: 11-26-23 @ 16:53      Subjective: Patient seen and examined. No new events except as noted.     REVIEW OF SYSTEMS:    CONSTITUTIONAL: No weakness, fevers or chills  EYES/ENT: No visual changes;  No vertigo or throat pain   NECK: No pain or stiffness  RESPIRATORY: No cough, wheezing, hemoptysis; No shortness of breath  CARDIOVASCULAR: No chest pain or palpitations  GASTROINTESTINAL: No abdominal or epigastric pain. No nausea, vomiting, or hematemesis; No diarrhea or constipation. No melena or hematochezia.  GENITOURINARY: No dysuria, frequency or hematuria  NEUROLOGICAL: No numbness or weakness  SKIN: No itching, burning, rashes, or lesions   All other review of systems is negative unless indicated above.    MEDICATIONS:  MEDICATIONS  (STANDING):  acetaminophen     Tablet .. 975 milliGRAM(s) Oral every 6 hours  amLODIPine   Tablet 10 milliGRAM(s) Oral every 24 hours  aspirin  chewable 81 milliGRAM(s) Oral daily  atorvastatin 40 milliGRAM(s) Oral at bedtime  cyclobenzaprine 5 milliGRAM(s) Oral every 8 hours  gabapentin 200 milliGRAM(s) Oral every 8 hours  heparin  Infusion 600 Unit(s)/Hr (7 mL/Hr) IV Continuous <Continuous>  losartan 100 milliGRAM(s) Oral daily      PHYSICAL EXAM:  T(C): 36.4 (11-26-23 @ 10:15), Max: 36.9 (11-25-23 @ 17:55)  HR: 68 (11-26-23 @ 10:15) (60 - 76)  BP: 106/68 (11-26-23 @ 10:15) (106/68 - 128/59)  RR: 18 (11-26-23 @ 10:15) (17 - 18)  SpO2: 99% (11-26-23 @ 10:15) (99% - 100%)  Wt(kg): --  I&O's Summary    25 Nov 2023 07:01  -  26 Nov 2023 07:00  --------------------------------------------------------  IN: 1563 mL / OUT: 700 mL / NET: 863 mL          Appearance: Normal	  HEENT:  PERRLA   Lymphatic: No lymphadenopathy   Cardiovascular: Normal S1 S2, no JVD  Respiratory: normal effort , clear  Gastrointestinal:  Soft, Non-tender  Skin: No rashes,  warm to touch  Psychiatry:  Mood & affect appropriate  Musculuskeletal: No edema    recent labs, Imaging and EKGs personally reviewed     11-25-23 @ 07:01  -  11-26-23 @ 07:00  --------------------------------------------------------  IN: 1563 mL / OUT: 700 mL / NET: 863 mL                            11.4   5.98  )-----------( 216      ( 26 Nov 2023 06:38 )             35.9               11-26    139  |  103  |  21  ----------------------------<  89  4.6   |  26  |  0.70    Ca    10.5      26 Nov 2023 06:38  Phos  3.9     11-26  Mg     2.00     11-26      PTT - ( 26 Nov 2023 06:38 )  PTT:72.1 sec                   Urinalysis Basic - ( 26 Nov 2023 06:38 )    Color: x / Appearance: x / SG: x / pH: x  Gluc: 89 mg/dL / Ketone: x  / Bili: x / Urobili: x   Blood: x / Protein: x / Nitrite: x   Leuk Esterase: x / RBC: x / WBC x   Sq Epi: x / Non Sq Epi: x / Bacteria: x

## 2023-11-27 ENCOUNTER — TRANSCRIPTION ENCOUNTER (OUTPATIENT)
Age: 63
End: 2023-11-27

## 2023-11-27 VITALS
DIASTOLIC BLOOD PRESSURE: 55 MMHG | RESPIRATION RATE: 18 BRPM | OXYGEN SATURATION: 100 % | TEMPERATURE: 98 F | HEART RATE: 69 BPM | SYSTOLIC BLOOD PRESSURE: 109 MMHG

## 2023-11-27 LAB
ANION GAP SERPL CALC-SCNC: 12 MMOL/L — SIGNIFICANT CHANGE UP (ref 7–14)
APTT BLD: 60.9 SEC — HIGH (ref 24.5–35.6)
BUN SERPL-MCNC: 17 MG/DL — SIGNIFICANT CHANGE UP (ref 7–23)
CALCIUM SERPL-MCNC: 10.5 MG/DL — SIGNIFICANT CHANGE UP (ref 8.4–10.5)
CHLORIDE SERPL-SCNC: 99 MMOL/L — SIGNIFICANT CHANGE UP (ref 98–107)
CO2 SERPL-SCNC: 26 MMOL/L — SIGNIFICANT CHANGE UP (ref 22–31)
CREAT SERPL-MCNC: 0.68 MG/DL — SIGNIFICANT CHANGE UP (ref 0.5–1.3)
EGFR: 98 ML/MIN/1.73M2 — SIGNIFICANT CHANGE UP
GLUCOSE SERPL-MCNC: 92 MG/DL — SIGNIFICANT CHANGE UP (ref 70–99)
HCT VFR BLD CALC: 35.2 % — SIGNIFICANT CHANGE UP (ref 34.5–45)
HGB BLD-MCNC: 11.2 G/DL — LOW (ref 11.5–15.5)
INR BLD: 0.92 RATIO — SIGNIFICANT CHANGE UP (ref 0.85–1.18)
MAGNESIUM SERPL-MCNC: 2 MG/DL — SIGNIFICANT CHANGE UP (ref 1.6–2.6)
MCHC RBC-ENTMCNC: 31.8 GM/DL — LOW (ref 32–36)
MCHC RBC-ENTMCNC: 33.6 PG — SIGNIFICANT CHANGE UP (ref 27–34)
MCV RBC AUTO: 105.7 FL — HIGH (ref 80–100)
NRBC # BLD: 0 /100 WBCS — SIGNIFICANT CHANGE UP (ref 0–0)
NRBC # FLD: 0 K/UL — SIGNIFICANT CHANGE UP (ref 0–0)
PHOSPHATE SERPL-MCNC: 3.8 MG/DL — SIGNIFICANT CHANGE UP (ref 2.5–4.5)
PLATELET # BLD AUTO: 254 K/UL — SIGNIFICANT CHANGE UP (ref 150–400)
POTASSIUM SERPL-MCNC: 4.2 MMOL/L — SIGNIFICANT CHANGE UP (ref 3.5–5.3)
POTASSIUM SERPL-SCNC: 4.2 MMOL/L — SIGNIFICANT CHANGE UP (ref 3.5–5.3)
PROTHROM AB SERPL-ACNC: 10.3 SEC — SIGNIFICANT CHANGE UP (ref 9.5–13)
RBC # BLD: 3.33 M/UL — LOW (ref 3.8–5.2)
RBC # FLD: 14 % — SIGNIFICANT CHANGE UP (ref 10.3–14.5)
SODIUM SERPL-SCNC: 137 MMOL/L — SIGNIFICANT CHANGE UP (ref 135–145)
WBC # BLD: 6.56 K/UL — SIGNIFICANT CHANGE UP (ref 3.8–10.5)
WBC # FLD AUTO: 6.56 K/UL — SIGNIFICANT CHANGE UP (ref 3.8–10.5)

## 2023-11-27 PROCEDURE — 99231 SBSQ HOSP IP/OBS SF/LOW 25: CPT

## 2023-11-27 PROCEDURE — 93923 UPR/LXTR ART STDY 3+ LVLS: CPT | Mod: 26

## 2023-11-27 RX ORDER — NALOXONE HYDROCHLORIDE 4 MG/.1ML
4 SPRAY NASAL
Qty: 1 | Refills: 0
Start: 2023-11-27 | End: 2023-11-27

## 2023-11-27 RX ORDER — HYDROMORPHONE HYDROCHLORIDE 2 MG/ML
1 INJECTION INTRAMUSCULAR; INTRAVENOUS; SUBCUTANEOUS
Qty: 36 | Refills: 0
Start: 2023-11-27 | End: 2023-12-02

## 2023-11-27 RX ORDER — APIXABAN 2.5 MG/1
1 TABLET, FILM COATED ORAL
Qty: 0 | Refills: 0 | DISCHARGE
Start: 2023-11-27

## 2023-11-27 RX ORDER — HYDROMORPHONE HYDROCHLORIDE 2 MG/ML
4 INJECTION INTRAMUSCULAR; INTRAVENOUS; SUBCUTANEOUS
Refills: 0 | Status: DISCONTINUED | OUTPATIENT
Start: 2023-11-27 | End: 2023-11-27

## 2023-11-27 RX ORDER — GABAPENTIN 400 MG/1
2 CAPSULE ORAL
Qty: 180 | Refills: 0
Start: 2023-11-27 | End: 2023-12-26

## 2023-11-27 RX ORDER — CYCLOBENZAPRINE HYDROCHLORIDE 10 MG/1
1 TABLET, FILM COATED ORAL
Qty: 21 | Refills: 0
Start: 2023-11-27 | End: 2023-12-03

## 2023-11-27 RX ORDER — APIXABAN 2.5 MG/1
5 TABLET, FILM COATED ORAL
Refills: 0 | Status: DISCONTINUED | OUTPATIENT
Start: 2023-11-27 | End: 2023-11-27

## 2023-11-27 RX ORDER — OXYCODONE HYDROCHLORIDE 5 MG/1
1 TABLET ORAL
Qty: 20 | Refills: 0
Start: 2023-11-27 | End: 2023-12-01

## 2023-11-27 RX ADMIN — GABAPENTIN 200 MILLIGRAM(S): 400 CAPSULE ORAL at 12:26

## 2023-11-27 RX ADMIN — CYCLOBENZAPRINE HYDROCHLORIDE 5 MILLIGRAM(S): 10 TABLET, FILM COATED ORAL at 05:21

## 2023-11-27 RX ADMIN — LOSARTAN POTASSIUM 100 MILLIGRAM(S): 100 TABLET, FILM COATED ORAL at 05:20

## 2023-11-27 RX ADMIN — HYDROMORPHONE HYDROCHLORIDE 4 MILLIGRAM(S): 2 INJECTION INTRAMUSCULAR; INTRAVENOUS; SUBCUTANEOUS at 15:23

## 2023-11-27 RX ADMIN — CYCLOBENZAPRINE HYDROCHLORIDE 5 MILLIGRAM(S): 10 TABLET, FILM COATED ORAL at 12:26

## 2023-11-27 RX ADMIN — HYDROMORPHONE HYDROCHLORIDE 4 MILLIGRAM(S): 2 INJECTION INTRAMUSCULAR; INTRAVENOUS; SUBCUTANEOUS at 06:18

## 2023-11-27 RX ADMIN — HYDROMORPHONE HYDROCHLORIDE 4 MILLIGRAM(S): 2 INJECTION INTRAMUSCULAR; INTRAVENOUS; SUBCUTANEOUS at 05:18

## 2023-11-27 RX ADMIN — APIXABAN 5 MILLIGRAM(S): 2.5 TABLET, FILM COATED ORAL at 12:26

## 2023-11-27 RX ADMIN — Medication 81 MILLIGRAM(S): at 05:20

## 2023-11-27 RX ADMIN — HEPARIN SODIUM 7 UNIT(S)/HR: 5000 INJECTION INTRAVENOUS; SUBCUTANEOUS at 09:38

## 2023-11-27 RX ADMIN — Medication 975 MILLIGRAM(S): at 12:26

## 2023-11-27 RX ADMIN — GABAPENTIN 200 MILLIGRAM(S): 400 CAPSULE ORAL at 05:20

## 2023-11-27 NOTE — DISCHARGE NOTE NURSING/CASE MANAGEMENT/SOCIAL WORK - NSDCDMENAME_GEN_ALL_CORE_FT
[facility]

## 2023-11-27 NOTE — DISCHARGE NOTE PROVIDER - NSDCCPCAREPLAN_GEN_ALL_CORE_FT
PRINCIPAL DISCHARGE DIAGNOSIS  Diagnosis: Acute lower limb ischemia  Assessment and Plan of Treatment: WOUND CARE:  Please keep incisions clean and dry. Please do not Scrub or rub incisions. Do not use lotion or powder on incisions.   BATHING: You may shower and/or sponge bathe. You may use warm soapy water in the shower and rinse, pat dry.  ACTIVITY: No heavy lifting or straining. Otherwise, you may return to your usual level of physical activity. If you are taking narcotic pain medication DO NOT drive a car, operate machinery or make important decisions.  DIET: Return to your usual diet.  NOTIFY YOUR SURGEON IF YOU HAVE: any bleeding that does not stop, any pus draining from your wound(s), any fever (over 100.4 F) persistent nausea/vomiting, or if your pain is not controlled on your discharge pain medications, unable to urinate.  FOLLOW UP:  1. Please follow up with your primary care physician in one week regarding your hospitalization, bring copies of your discharge paperwork.  2. Please follow up with your surgeon, Dr. Lawrence       PRINCIPAL DISCHARGE DIAGNOSIS  Diagnosis: Acute lower limb ischemia  Assessment and Plan of Treatment: WOUND CARE:  Please keep incisions clean and dry. Please do not Scrub or rub incisions. Do not use lotion or powder on incisions.   BATHING: You may shower and/or sponge bathe. You may use warm soapy water in the shower and rinse, pat dry.  ACTIVITY: No heavy lifting or straining. Otherwise, you may return to your usual level of physical activity. If you are taking narcotic pain medication DO NOT drive a car, operate machinery or make important decisions.  DIET: Return to your usual diet.  NOTIFY YOUR SURGEON IF YOU HAVE: any bleeding that does not stop, any pus draining from your wound(s), any fever (over 100.4 F) persistent nausea/vomiting, or if your pain is not controlled on your discharge pain medications, unable to urinate.  FOLLOW UP:  1. Please follow up with your primary care physician in one week regarding your hospitalization, bring copies of your discharge paperwork.  2. Please follow up with your surgeon, Dr. Lawrence  Follow up with pain service a list of providers was emailed to you

## 2023-11-27 NOTE — PROGRESS NOTE ADULT - ASSESSMENT
PAD  asa  statin   s/p angio   fu with vascular surgery  plan for possible BKA     CAD  history of stent  asa  cont statin   echo shows normal LV function   stress test is non ischemic     Tobacco smoking  counseling given     HTN  stable

## 2023-11-27 NOTE — PROGRESS NOTE ADULT - NUTRITIONAL ASSESSMENT
This patient has been assessed with a concern for Malnutrition and has been determined to have a diagnosis/diagnoses of Severe protein-calorie malnutrition.    This patient is being managed with:   Diet NPO after Midnight-     NPO Start Date: 21-Nov-2023   NPO Start Time: 23:59  Except Medications  Entered: Nov 21 2023 10:42AM    Diet Regular-  Supplement Feeding Modality:  Oral  Ensure Surgery Cans or Servings Per Day:  3       Frequency:  Three Times a day  Entered: Nov 17 2023  5:32PM  
This patient has been assessed with a concern for Malnutrition and has been determined to have a diagnosis/diagnoses of Severe protein-calorie malnutrition.    This patient is being managed with:   Diet Regular-  Supplement Feeding Modality:  Oral  Ensure Surgery Cans or Servings Per Day:  3       Frequency:  Three Times a day  Entered: Nov 17 2023  5:32PM  
This patient has been assessed with a concern for Malnutrition and has been determined to have a diagnosis/diagnoses of Severe protein-calorie malnutrition.    This patient is being managed with:   Diet NPO after Midnight-     NPO Start Date: 21-Nov-2023   NPO Start Time: 23:59  Except Medications  Entered: Nov 21 2023 10:42AM    Diet Regular-  Supplement Feeding Modality:  Oral  Ensure Surgery Cans or Servings Per Day:  3       Frequency:  Three Times a day  Entered: Nov 17 2023  5:32PM

## 2023-11-27 NOTE — DISCHARGE NOTE NURSING/CASE MANAGEMENT/SOCIAL WORK - NSSCNAMETXT_GEN_ALL_CORE
[facility]

## 2023-11-27 NOTE — PROGRESS NOTE ADULT - ASSESSMENT
Assessment:  63F hx of recent RLE angiogram and percutaneous thrombectomy (07/19) of SFA and popliteal arteries, R CFA -> PT bypass with PTFE (7/25) for acute limb ischemia, presenting now with 1.5 weeks of pain in RLE, found to have Bandera IIa acute on chronic limb ischemia secondary to chronically occluded graft, s/p RLE angiogram, stenting of distal external iliac and proximal CFA, profunda femoris balloon angioplasty 11/22, patient with faint signals pain slightly improved, no other options for revascularization, patient to think about BKA    Plan:  - Heparin gtt   - On ASA  - Repeat YANIRA/PVR  - PT evaluation recommends Restorative rehab  - Pain control, flexeril added to pain regimen today  - Diet: Reg  - DVT ppx: SQH  - Patient to decide for BKA vs outpatient pain management    Vascular Surgery  [id number] Assessment:  63F hx of recent RLE angiogram and percutaneous thrombectomy (07/19) of SFA and popliteal arteries, R CFA -> PT bypass with PTFE (7/25) for acute limb ischemia, presenting now with 1.5 weeks of pain in RLE, found to have [place] IIa acute on chronic limb ischemia secondary to chronically occluded graft, s/p RLE angiogram, stenting of distal external iliac and proximal CFA, profunda femoris balloon angioplasty 11/22, patient with faint signals pain slightly improved, no other options for revascularization, patient to think about BKA    Plan:  - Heparin gtt   - On ASA  - Repeat YANIRA/PVR  - PT evaluation recommends Restorative rehab  - Pain control, flexeril added to pain regimen today  - Diet: Reg  - DVT ppx: SQH  - Patient to decide for BKA vs outpatient pain management    Vascular Surgery  [id number] Assessment:  63F hx of recent RLE angiogram and percutaneous thrombectomy (07/19) of SFA and popliteal arteries, R CFA -> PT bypass with PTFE (7/25) for acute limb ischemia, presenting now with 1.5 weeks of pain in RLE, found to have [place] IIa acute on chronic limb ischemia secondary to chronically occluded graft, s/p RLE angiogram, stenting of distal external iliac and proximal CFA, profunda femoris balloon angioplasty 11/22, patient with faint signals pain slightly improved, no other options for revascularization, patient to think about BKA    Plan:  - Heparin gtt   - On ASA  - Repeat YANIRA/PVR  - PT evaluation recommends Restorative rehab  - Pain control, flexeril added to pain regimen today  - Diet: Reg  - DVT ppx: SQH  - Patient to decide for BKA vs outpatient pain management    Vascular Surgery  [id number] Assessment:  63F hx of recent RLE angiogram and percutaneous thrombectomy (07/19) of SFA and popliteal arteries, R CFA -> PT bypass with PTFE (7/25) for acute limb ischemia, presenting now with 1.5 weeks of pain in RLE, found to have [place] IIa acute on chronic limb ischemia secondary to chronically occluded graft, s/p RLE angiogram, stenting of distal external iliac and proximal CFA, profunda femoris balloon angioplasty 11/22, patient with faint signals pain slightly improved, no other options for revascularization, patient to think about BKA    Plan:  - Heparin gtt, dispo AC plan?  - On ASA  - Repeat YANIRA/PVR  - PT evaluation recommends Restorative rehab  - Pain control, flexeril added to pain regimen today  - Diet: Reg  - DVT ppx: SQH  - Patient to decide for BKA vs outpatient pain management    Vascular Surgery  [id number] Assessment:  63F hx of recent RLE angiogram and percutaneous thrombectomy (07/19) of SFA and popliteal arteries, R CFA -> PT bypass with PTFE (7/25) for acute limb ischemia, presenting now with 1.5 weeks of pain in RLE, found to have [place] IIa acute on chronic limb ischemia secondary to chronically occluded graft, s/p RLE angiogram, stenting of distal external iliac and proximal CFA, profunda femoris balloon angioplasty 11/22, patient with faint signals pain slightly improved, no other options for revascularization, patient to think about BKA    Plan:  - Heparin gtt, dispo AC plan?  - On ASA  - Repeat YANIRA/PVR  - PT evaluation recommends Restorative rehab  - Pain control, flexeril added to pain regimen today  - Diet: Reg  - DVT ppx: SQH  - Patient to decide for BKA vs outpatient pain management    Vascular Surgery  [id number] Assessment:  63F hx of recent RLE angiogram and percutaneous thrombectomy (07/19) of SFA and popliteal arteries, R CFA -> PT bypass with PTFE (7/25) for acute limb ischemia, presenting now with 1.5 weeks of pain in RLE, found to have [place] IIa acute on chronic limb ischemia secondary to chronically occluded graft, s/p RLE angiogram, stenting of distal external iliac and proximal CFA, profunda femoris balloon angioplasty 11/22, patient with faint signals pain slightly improved, no other options for revascularization, patient to think about BKA    Plan:  - Heparin gtt, dispo AC plan?  - On ASA  - Repeat YANIRA/PVR  - PT evaluation recommends Restorative rehab  - Pain control, flexeril added to pain regimen today  - Diet: Reg  - DVT ppx: SQH  - Patient to decide for BKA vs outpatient pain management    Vascular Surgery  [id number]

## 2023-11-27 NOTE — PROGRESS NOTE ADULT - PROVIDER SPECIALTY LIST ADULT
Cardiology
Internal Medicine
Pain Medicine
Vascular Surgery
Cardiology
Internal Medicine
Vascular Surgery
Internal Medicine
Vascular Surgery
Cardiology
Vascular Surgery
Vascular Surgery
Cardiology
Cardiology

## 2023-11-27 NOTE — DISCHARGE NOTE NURSING/CASE MANAGEMENT/SOCIAL WORK - PATIENT PORTAL LINK FT
You can access the FollowMyHealth Patient Portal offered by [facility] by registering at the following website: [email or web address]. By joining [employer]’s FollowMyHealth portal, you will also be able to view your health information using other applications (apps) compatible with our system. You can access the FollowMyHealth Patient Portal offered by Health system by registering at the following website: [email or web address]. By joining [employer]’s FollowMyHealth portal, you will also be able to view your health information using other applications (apps) compatible with our system. You can access the FollowMyHealth Patient Portal offered by [facility] by registering at the following website: [email or web address]. By joining [employer]’s FollowMyHealth portal, you will also be able to view your health information using other applications (apps) compatible with our system.

## 2023-11-27 NOTE — DISCHARGE NOTE PROVIDER - NSDCMRMEDTOKEN_GEN_ALL_CORE_FT
acetaminophen 500 mg oral tablet: 2 tab(s) orally every 6 hours  Albuterol (Eqv-ProAir HFA) 90 mcg/inh inhalation aerosol: 2 inhaled every 6 hours as needed for  shortness of breath and/or wheezing  amLODIPine 10 mg oral tablet: 1 orally once a day  apixaban 5 mg oral tablet: 2 tab(s) orally 2 times a day  aspirin 81 mg oral tablet: 1 orally once a day  atenolol-chlorthalidone 50 mg-25 mg oral tablet: 1 orally once a day  atorvastatin 40 mg oral tablet: 1 orally once a day  losartan 100 mg oral tablet: 1 orally once a day   acetaminophen 500 mg oral tablet: 2 tab(s) orally every 6 hours  Albuterol (Eqv-ProAir HFA) 90 mcg/inh inhalation aerosol: 2 inhaled every 6 hours as needed for  shortness of breath and/or wheezing  amLODIPine 10 mg oral tablet: 1 orally once a day  apixaban 5 mg oral tablet: 2 tab(s) orally 2 times a day  aspirin 81 mg oral tablet: 1 orally once a day  atenolol-chlorthalidone 50 mg-25 mg oral tablet: 1 orally once a day  atorvastatin 40 mg oral tablet: 1 orally once a day  cyclobenzaprine 5 mg oral tablet: 1 tab(s) orally every 8 hours MDD: 3  gabapentin 100 mg oral capsule: 2 cap(s) orally every 8 hours MDD: 3  losartan 100 mg oral tablet: 1 orally once a day  oxyCODONE 5 mg oral tablet: 1 tab(s) orally every 6 hours as needed for as needed for severe pain MDD: 4   acetaminophen 500 mg oral tablet: 2 tab(s) orally every 6 hours  Albuterol (Eqv-ProAir HFA) 90 mcg/inh inhalation aerosol: 2 inhaled every 6 hours as needed for  shortness of breath and/or wheezing  amLODIPine 10 mg oral tablet: 1 orally once a day  apixaban 5 mg oral tablet: 1 tab(s) orally 2 times a day  aspirin 81 mg oral tablet: 1 orally once a day  atenolol-chlorthalidone 50 mg-25 mg oral tablet: 1 orally once a day  atorvastatin 40 mg oral tablet: 1 orally once a day  cyclobenzaprine 5 mg oral tablet: 1 tab(s) orally every 8 hours MDD: 3  Dilaudid 4 mg oral tablet: 1 tab(s) orally every 4 hours as needed for as needed for severe pain MDD: 4  gabapentin 100 mg oral capsule: 2 cap(s) orally every 8 hours MDD: 3  losartan 100 mg oral tablet: 1 orally once a day  rolling walker: use as needed

## 2023-11-27 NOTE — DISCHARGE NOTE PROVIDER - HOSPITAL COURSE
63F hx of recent RLE angiogram and percutaneous thrombectomy (07/19) of SFA and popliteal arteries, R CFA -> PT bypass with PTFE (7/25) for acute limb ischemia, presenting now with 1.5 weeks of pain in RLE. Reports has had pain in her right foot since her previous vascular procedures in July '23, but the pain has worsened over the last 1.5 weeks. Reports she has been taking her Eliquis as prescribed.    RLE arterial duplex performed and showed Right Lower Extremity Arteries:  There is a bypass graft located at the right distal common femoral artery and extending to the distal popliteal artery; Doppler shows absent velocities throughout, with no flow detected by color or spectral Doppler, consistent with total occlusion.      Pt admitted to vascular surgery service and started on hep gtt    CTA performed and showed Right lower extremity:  1. Complete occlusion of the right SFA to posterior tibial bypass graft   and native right SFA.  2. Near occlusion of the native right popliteal artery.  3. Occlusion of the aneurysmal proximal TIFFANY, with reconstitution of flow   in the mid leg. Flow is present in the dorsalis pedis artery.  4. Occluded plantar artery.  Left lower extremity:  1. Occlusion of the common tibial peroneal trunk and posterior tibial   artery.  2. Peroneal artery supplies the patent plantar artery.  Central arteries:  1. Occlusion of the left internal iliac artery from its mid aspect.  2. Multifocal moderate to severe stenoses in the right iliac arteries,   with focal near occlusion or occlusion in the distal right external iliac   artery and origin of the right common femoral artery.       Medicine and cardiology consulted and pt optimized for OR.     11/20 YANIRA/PVR performed and showed Right Lower Extremity Arteries:  Right YANIRA not assessed. Flat waveforms noted throughout the right lower extremity. 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  Left Lower Extremity Arteries:  Left YANIRA is normal (1.03). Left TBI is normal (0.81) with a toe pressure of 92 mmHg. No significant occlusive arterial disease in the left lower extremity.    11/22 pt underwent  RLE angiogram, stenting of distal external iliac and proximal CFA, profunda femoris balloon angioplasty. Pt tolerated procedure well.    Post procedure pt with RLE pain. Pain service consulted to assist with pain control.    PT consulted and recommend pt be discharged to restorative rehab but pt would like to be discharged home      11/27 YANIRA/PVRs repeated and showed .........................      Per Attending pt now stable for discharge home. Pt hemodynamically stable, tolerating diet, and pain well controlled on current regimen. Pt to follow up with vascular surgeon as an outpatient, instructed to call to schedule appointment. Pt to follow up outpatient with pain management service, list of providers given to pt prior to discharge. 63F hx of recent RLE angiogram and percutaneous thrombectomy (07/19) of SFA and popliteal arteries, R CFA -> PT bypass with PTFE (7/25) for acute limb ischemia, presenting now with 1.5 weeks of pain in RLE. Reports has had pain in her right foot since her previous vascular procedures in July '23, but the pain has worsened over the last 1.5 weeks. Reports she has been taking her Eliquis as prescribed.    RLE arterial duplex performed and showed Right Lower Extremity Arteries:  There is a bypass graft located at the right distal common femoral artery and extending to the distal popliteal artery; Doppler shows absent velocities throughout, with no flow detected by color or spectral Doppler, consistent with total occlusion.      Pt admitted to vascular surgery service and started on hep gtt    CTA performed and showed Right lower extremity:  1. Complete occlusion of the right SFA to posterior tibial bypass graft   and native right SFA.  2. Near occlusion of the native right popliteal artery.  3. Occlusion of the aneurysmal proximal TIFFANY, with reconstitution of flow   in the mid leg. Flow is present in the dorsalis pedis artery.  4. Occluded plantar artery.  Left lower extremity:  1. Occlusion of the common tibial peroneal trunk and posterior tibial   artery.  2. Peroneal artery supplies the patent plantar artery.  Central arteries:  1. Occlusion of the left internal iliac artery from its mid aspect.  2. Multifocal moderate to severe stenoses in the right iliac arteries,   with focal near occlusion or occlusion in the distal right external iliac   artery and origin of the right common femoral artery.       Medicine and cardiology consulted and pt optimized for OR.     11/20 YANIRA/PVR performed and showed Right Lower Extremity Arteries:  Right YANIRA not assessed. Flat waveforms noted throughout the right lower extremity. 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  Left Lower Extremity Arteries:  Left YANIRA is normal (1.03). Left TBI is normal (0.81) with a toe pressure of 92 mmHg. No significant occlusive arterial disease in the left lower extremity.    11/22 pt underwent  RLE angiogram, stenting of distal external iliac and proximal CFA, profunda femoris balloon angioplasty. Pt tolerated procedure well.    Post procedure pt with RLE pain. Pain service consulted to assist with pain control.    PT consulted and recommend pt be discharged to restorative rehab but pt would like to be discharged home    11/27 YANIRA PVR were repeated    Per Attending pt now stable for discharge home. Pt hemodynamically stable, tolerating diet, and pain well controlled on current regimen. Pt to follow up with vascular surgeon as an outpatient, instructed to call to schedule appointment. Pt to follow up outpatient with pain management service, list of providers given to pt prior to discharge. 63F hx of recent RLE angiogram and percutaneous thrombectomy (07/19) of SFA and popliteal arteries, R CFA -> PT bypass with PTFE (7/25) for acute limb ischemia, presenting now with 1.5 weeks of pain in RLE. Reports has had pain in her right foot since her previous vascular procedures in July '23, but the pain has worsened over the last 1.5 weeks. Reports she has been taking her Eliquis as prescribed.    RLE arterial duplex performed and showed Right Lower Extremity Arteries:  There is a bypass graft located at the right distal common femoral artery and extending to the distal popliteal artery; Doppler shows absent velocities throughout, with no flow detected by color or spectral Doppler, consistent with total occlusion.      Pt admitted to vascular surgery service and started on hep gtt    CTA performed and showed Right lower extremity:  1. Complete occlusion of the right SFA to posterior tibial bypass graft   and native right SFA.  2. Near occlusion of the native right popliteal artery.  3. Occlusion of the aneurysmal proximal TIFFANY, with reconstitution of flow   in the mid leg. Flow is present in the dorsalis pedis artery.  4. Occluded plantar artery.  Left lower extremity:  1. Occlusion of the common tibial peroneal trunk and posterior tibial   artery.  2. Peroneal artery supplies the patent plantar artery.  Central arteries:  1. Occlusion of the left internal iliac artery from its mid aspect.  2. Multifocal moderate to severe stenoses in the right iliac arteries,   with focal near occlusion or occlusion in the distal right external iliac   artery and origin of the right common femoral artery.       Medicine and cardiology consulted and pt optimized for OR.     11/20 YANIRA/PVR performed and showed Right Lower Extremity Arteries:  Right YANIRA not assessed. Flat waveforms noted throughout the right lower extremity. 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  Left Lower Extremity Arteries:  Left YANIRA is normal (1.03). Left TBI is normal (0.81) with a toe pressure of 92 mmHg. No significant occlusive arterial disease in the left lower extremity.    11/22 pt underwent  RLE angiogram, stenting of distal external iliac and proximal CFA, profunda femoris balloon angioplasty. Pt tolerated procedure well.    Post procedure pt with RLE pain. Pain service consulted to assist with pain control.    PT consulted and recommend pt be discharged to restorative rehab but pt would like to be discharged home    11/27 YANIRA PVR were repeated    Pt transitioned from hep gtt to home dose eliquis    Per Attending pt now stable for discharge home. Pt hemodynamically stable, tolerating diet, and pain well controlled on current regimen. Pt to follow up with vascular surgeon as an outpatient, instructed to call to schedule appointment. Pt to follow up outpatient with pain management service, list of providers given to pt prior to discharge.

## 2023-11-27 NOTE — PROGRESS NOTE ADULT - REASON FOR ADMISSION
Acute limb ischemia of RLE

## 2023-11-27 NOTE — PROGRESS NOTE ADULT - SUBJECTIVE AND OBJECTIVE BOX
Subjective: Patient seen and examined. No new events except as noted.     SUBJECTIVE/ROS:  nad      MEDICATIONS:  MEDICATIONS  (STANDING):  acetaminophen     Tablet .. 975 milliGRAM(s) Oral every 6 hours  amLODIPine   Tablet 10 milliGRAM(s) Oral every 24 hours  aspirin  chewable 81 milliGRAM(s) Oral daily  atorvastatin 40 milliGRAM(s) Oral at bedtime  cyclobenzaprine 5 milliGRAM(s) Oral every 8 hours  gabapentin 200 milliGRAM(s) Oral every 8 hours  heparin  Infusion 600 Unit(s)/Hr (7 mL/Hr) IV Continuous <Continuous>  losartan 100 milliGRAM(s) Oral daily      PHYSICAL EXAM:  T(C): 37 (11-27-23 @ 05:10), Max: 37.3 (11-26-23 @ 17:33)  HR: 76 (11-27-23 @ 05:10) (68 - 89)  BP: 126/68 (11-27-23 @ 05:10) (99/47 - 134/75)  RR: 18 (11-27-23 @ 05:10) (17 - 18)  SpO2: 99% (11-27-23 @ 05:10) (98% - 100%)  Wt(kg): --  I&O's Summary    26 Nov 2023 07:01  -  27 Nov 2023 07:00  --------------------------------------------------------  IN: 1980 mL / OUT: 1350 mL / NET: 630 mL            JVP: Normal  Neck: supple  Lung: clear   CV: S1 S2 , Murmur:  Abd: soft  Ext: No edema  neuro: Awake   Psych: flat affect      LABS/DATA:    CARDIAC MARKERS:                                11.2   6.56  )-----------( 254      ( 27 Nov 2023 06:19 )             35.2     11-26    139  |  103  |  21  ----------------------------<  89  4.6   |  26  |  0.70    Ca    10.5      26 Nov 2023 06:38  Phos  3.9     11-26  Mg     2.00     11-26      proBNP:   Lipid Profile:   HgA1c:   TSH:     TELE:  EKG:

## 2023-11-27 NOTE — DISCHARGE NOTE NURSING/CASE MANAGEMENT/SOCIAL WORK - CASE MANAGER'S NAME
Zayra Almeida RN,   [phone] W, RN, [facility] Zayra Almeida RN,   [phone] W, RN, [facility] Zayra Almeida RN,   [phone] W, RN, [facility]

## 2023-11-27 NOTE — DISCHARGE NOTE PROVIDER - CARE PROVIDER_API CALL
Bannazadeh, Mohsen  Vascular Surgery  [address], [address]  [place] [address]  Phone: [phone]  Fax: [phone]  Follow Up Time:    Bannazadeh, Mohsen  Vascular Surgery  [address], [address]  [place] [address]  Phone: [phone]  Fax: [phone]  Follow Up Time:    Bannazadeh, Mohsen  Vascular Surgery  [address], [address]  [place] [address]  Phone: [phone]  Fax: [phone]  Follow Up Time:

## 2023-11-27 NOTE — DISCHARGE NOTE NURSING/CASE MANAGEMENT/SOCIAL WORK - NSDCFUADDAPPT_GEN_ALL_CORE_FT
Please follow up pain management as an outpatient, you may call your insurance company to refer you to a provider covered by your insurance and close to home. You were also provided with a list of outpatient providers while in hospital  Chronic Call MD Contact information  Physician Office Phone  Sheldon Thompson [phone]  Tomy Rockwell [phone]  Matt Landon [phone]  NANETTE Cornejo [phone]  SALVADOR Wilcox [phone]  Asad Maynard [phone]  Christian Fagan [phone]  JOHN Hicks [phone]  Tony Cruz [phone]  Matt Feldman [phone]  DONNA Akins [phone]  CALOS Akins [phone]  SALVADOR Jorge [phone]  Bridgett Rowley [phone]  Please follow up pain management as an outpatient, you may call your insurance company to refer you to a provider covered by your insurance and close to home. You were also provided with a list of outpatient providers while in hospital  Chronic Call MD Contact information  Physician Office Phone  Sheldon Thompson [phone]  Tomy Rockwell [phone]  Matt Landon [phone]  NANETTE Cornejo [phone]  SALVADOR Wilcox [phone]  Asad Maynard [phone]  Christian Fagan [phone]  JOHN Hicks [phone]  Tony Cruz [phone]  Matt Feldman [phone]  DONNA Akins [phone]  CALOS Akins [phone]  SALVADOR Jorge [phone]  Bridgett Rowley [phone]  Please follow up pain management as an outpatient, you may call your insurance company to refer you to a provider covered by your insurance and close to home. You were also provided with a list of outpatient providers while in hospital  Chronic Call MD Contact information  Physician Office Phone  Sheldon Thompson [phone]  Tomy Rockwell [phone]  Matt Landon [phone]  NANETTE Cornejo [phone]  SALVADOR Wilcox [phone]  Asad Maynard [phone]  Christian Fagan [phone]  JOHN Hicks [phone]  Tony Cruz [phone]  Matt Feldman [phone]  DONNA Akins [phone]  CALOS Akins [phone]  SALVADOR Jorge [phone]  Bridgett Rowley [phone]

## 2023-11-27 NOTE — DISCHARGE NOTE PROVIDER - NSDCFUADDAPPT_GEN_ALL_CORE_FT
Please follow up pain management as an outpatient, you may call your insurance company to refer you to a provider covered by your insurance and close to home. You were also provided with a list of outpatient providers while in hospital  Please follow up pain management as an outpatient, you may call your insurance company to refer you to a provider covered by your insurance and close to home. You were also provided with a list of outpatient providers while in hospital  Chronic Call MD Contact information  Physician Office Phone  Sheldon Thompson [phone]  Tomy Rockwell [phone]  Matt Landon [phone]  NANETTE Cornejo [phone]  SALVADOR Wilcox [phone]  Asad Maynard [phone]  Christian Fagan [phone]  JOHN Hicks [phone]  Tony Cruz [phone]  Matt Feldman [phone]  DONNA Akins [phone]  CALOS Akins [phone]  SALVADOR Jorge [phone]  Bridgett Rowley [phone]  Please follow up pain management as an outpatient, you may call your insurance company to refer you to a provider covered by your insurance and close to home. You were also provided with a list of outpatient providers while in hospital  Chronic Call MD Contact information  Physician Office Phone  Sheldon Thompson [phone]  Tomy Rockwell [phone]  Matt Landon [phone]  NANETTE Cornejo [phone]  SALVADOR Wilcox [phone]  Asad Maynard [phone]  Christian Fagan [phone]  JOHN Hicks [phone]  Tony Cruz [phone]  Matt Feldman [phone]  DONNA Akins [phone]  CALOS Akins [phone]  SALVADOR Jorge [phone]  Bridgett Rowley [phone]  Please follow up pain management as an outpatient, you may call your insurance company to refer you to a provider covered by your insurance and close to home. You were also provided with a list of outpatient providers while in hospital  Chronic Call MD Contact information  Physician Office Phone  Sheldon Thompson [phone]  Tomy Rockwell [phone]  Matt Landon [phone]  NANETTE Cornejo [phone]  SALVADOR Wilcox [phone]  Asad Maynard [phone]  Christian Fagan [phone]  JOHN Hicks [phone]  Tony Cruz [phone]  Matt Feldman [phone]  DONNA Akins [phone]  CALOS Akins [phone]  SALVADOR Jorge [phone]  Bridgett Rowley [phone]

## 2023-11-27 NOTE — DISCHARGE NOTE PROVIDER - PROVIDER TOKENS
PROVIDER:[TOKEN:[[id number]]] PROVIDER:[TOKEN:[[id number]]] PROVIDER:[TOKEN:[[id number]]]

## 2023-11-27 NOTE — PROGRESS NOTE ADULT - SUBJECTIVE AND OBJECTIVE BOX
Vascular Surgery Daily Progress Note  =====================================================    SUBJECTIVE: No acute events overnight. Patient seen and evaluated on AM rounds. Pt is resting comfortably in bed. Endorsing RLE pain.    ALLERGIES:  No Known Allergies      --------------------------------------------------------------------------------------    MEDICATIONS:    Neurologic Medications  acetaminophen     Tablet .. 975 milliGRAM(s) Oral every 6 hours  cyclobenzaprine 5 milliGRAM(s) Oral every 8 hours  gabapentin 200 milliGRAM(s) Oral every 8 hours  HYDROmorphone   Tablet 4 milliGRAM(s) Oral every 3 hours PRN Severe Pain (7 - 10)  HYDROmorphone  Injectable 0.5 milliGRAM(s) IV Push every 4 hours PRN Breakthrough pain    Respiratory Medications    Cardiovascular Medications  amLODIPine   Tablet 10 milliGRAM(s) Oral every 24 hours  losartan 100 milliGRAM(s) Oral daily    Gastrointestinal Medications    Genitourinary Medications    Hematologic/Oncologic Medications  aspirin  chewable 81 milliGRAM(s) Oral daily  heparin  Infusion 600 Unit(s)/Hr IV Continuous <Continuous>    Antimicrobial/Immunologic Medications    Endocrine/Metabolic Medications  atorvastatin 40 milliGRAM(s) Oral at bedtime    Topical/Other Medications    --------------------------------------------------------------------------------------    VITAL SIGNS:  T(C): 37 (11-27-23 @ 05:10), Max: 37.3 (11-26-23 @ 17:33)  HR: 76 (11-27-23 @ 05:10) (68 - 89)  BP: 126/68 (11-27-23 @ 05:10) (99/47 - 134/75)  RR: 18 (11-27-23 @ 05:10) (17 - 18)  SpO2: 99% (11-27-23 @ 05:10) (98% - 100%)  --------------------------------------------------------------------------------------    INS AND OUTS:    11-26-23 @ 07:01  -  11-27-23 @ 07:00  --------------------------------------------------------  IN: 1980 mL / OUT: 1350 mL / NET: 630 mL      --------------------------------------------------------------------------------------    Physical Exam  Constitutional: A&Ox3, NAD  Extremities:  L groin access site soft, no hematoma. RLE motor intact, diminished sensory distally at the toes, faint monophasic DP/PT signals      --------------------------------------------------------------------------------------    LABS       Vascular Surgery Daily Progress Note  =====================================================    SUBJECTIVE: No acute events overnight. Patient seen and evaluated on AM rounds. Pt is resting comfortably in bed. Endorsing RLE pain, notes improvement in severity     ALLERGIES:  No Known Allergies      --------------------------------------------------------------------------------------    MEDICATIONS:    Neurologic Medications  acetaminophen     Tablet .. 975 milliGRAM(s) Oral every 6 hours  cyclobenzaprine 5 milliGRAM(s) Oral every 8 hours  gabapentin 200 milliGRAM(s) Oral every 8 hours  HYDROmorphone   Tablet 4 milliGRAM(s) Oral every 3 hours PRN Severe Pain (7 - 10)  HYDROmorphone  Injectable 0.5 milliGRAM(s) IV Push every 4 hours PRN Breakthrough pain    Respiratory Medications    Cardiovascular Medications  amLODIPine   Tablet 10 milliGRAM(s) Oral every 24 hours  losartan 100 milliGRAM(s) Oral daily    Gastrointestinal Medications    Genitourinary Medications    Hematologic/Oncologic Medications  aspirin  chewable 81 milliGRAM(s) Oral daily  heparin  Infusion 600 Unit(s)/Hr IV Continuous <Continuous>    Antimicrobial/Immunologic Medications    Endocrine/Metabolic Medications  atorvastatin 40 milliGRAM(s) Oral at bedtime    Topical/Other Medications    --------------------------------------------------------------------------------------    VITAL SIGNS:  T(C): 37 (11-27-23 @ 05:10), Max: 37.3 (11-26-23 @ 17:33)  HR: 76 (11-27-23 @ 05:10) (68 - 89)  BP: 126/68 (11-27-23 @ 05:10) (99/47 - 134/75)  RR: 18 (11-27-23 @ 05:10) (17 - 18)  SpO2: 99% (11-27-23 @ 05:10) (98% - 100%)  --------------------------------------------------------------------------------------    INS AND OUTS:    11-26-23 @ 07:01  -  11-27-23 @ 07:00  --------------------------------------------------------  IN: 1980 mL / OUT: 1350 mL / NET: 630 mL      --------------------------------------------------------------------------------------    Physical Exam  Constitutional: A&Ox3, NAD  Extremities:  L groin access site soft, no hematoma. RLE motor intact, diminished sensory distally at the toes, faint monophasic DP/PT signals      --------------------------------------------------------------------------------------    LABS

## 2023-11-27 NOTE — DISCHARGE NOTE NURSING/CASE MANAGEMENT/SOCIAL WORK - NSDCPEFALRISK_GEN_ALL_CORE
For information on Fall & Injury Prevention, visit: [email or web address] OR  [email or web address] OR  https://www.cdc.gov/steadi/patient.html For information on Fall & Injury Prevention, visit: [email or web address] OR  [email or web address] OR  https://www.cdc.gov/steadi/patient.html For information on Fall & Injury Prevention, visit: [email or web address] OR  [email or web address] OR  https://www.cdc.gov/steadi/patient.html

## 2023-11-30 ENCOUNTER — APPOINTMENT (OUTPATIENT)
Dept: VASCULAR SURGERY | Facility: CLINIC | Age: 63
End: 2023-11-30

## 2023-12-10 NOTE — DISCHARGE NOTE NURSING/CASE MANAGEMENT/SOCIAL WORK - NSDCPEELIQUIS_GEN_ALL_CORE
Apixaban/Eliquis - Compliance/Apixaban/Eliquis - Dietary Advice/Apixaban/Eliquis - Follow up monitoring/Apixaban/Eliquis - Potential for adverse drug reactions and interactions no

## 2023-12-14 ENCOUNTER — APPOINTMENT (OUTPATIENT)
Dept: VASCULAR SURGERY | Facility: CLINIC | Age: 63
End: 2023-12-14

## 2024-01-04 ENCOUNTER — APPOINTMENT (OUTPATIENT)
Dept: VASCULAR SURGERY | Facility: CLINIC | Age: 64
End: 2024-01-04

## 2024-03-08 RX ORDER — ATORVASTATIN CALCIUM 80 MG/1
1 TABLET, FILM COATED ORAL
Refills: 0 | DISCHARGE

## 2024-03-08 RX ORDER — LOSARTAN POTASSIUM 100 MG/1
1 TABLET, FILM COATED ORAL
Refills: 0 | DISCHARGE

## 2024-03-08 RX ORDER — AMLODIPINE BESYLATE 2.5 MG/1
1 TABLET ORAL
Refills: 0 | DISCHARGE

## 2024-03-08 RX ORDER — ASPIRIN/CALCIUM CARB/MAGNESIUM 324 MG
1 TABLET ORAL
Refills: 0 | DISCHARGE

## 2024-03-08 RX ORDER — ALENDRONATE SODIUM 70 MG/1
1 TABLET ORAL
Refills: 0 | DISCHARGE

## 2024-03-08 RX ORDER — ATENOLOL AND CHLORTHALIDONE 50; 25 MG/1; MG/1
1 TABLET ORAL
Refills: 0 | DISCHARGE

## 2024-03-08 RX ORDER — FLUTICASONE PROPIONATE AND SALMETEROL 50; 250 UG/1; UG/1
1 POWDER ORAL; RESPIRATORY (INHALATION)
Refills: 0 | DISCHARGE

## 2024-03-08 RX ORDER — CLOPIDOGREL BISULFATE 75 MG/1
1 TABLET, FILM COATED ORAL
Refills: 0 | DISCHARGE

## 2024-03-08 RX ORDER — ALBUTEROL 90 UG/1
2 AEROSOL, METERED ORAL
Refills: 0 | DISCHARGE

## 2024-03-08 RX ORDER — CILOSTAZOL 100 MG/1
1 TABLET ORAL
Refills: 0 | DISCHARGE

## 2025-04-02 ENCOUNTER — EMERGENCY (EMERGENCY)
Facility: HOSPITAL | Age: 65
LOS: 0 days | Discharge: HOME | End: 2025-04-03
Attending: STUDENT IN AN ORGANIZED HEALTH CARE EDUCATION/TRAINING PROGRAM
Payer: MEDICAID

## 2025-04-02 VITALS
RESPIRATION RATE: 18 BRPM | HEIGHT: 59 IN | OXYGEN SATURATION: 98 % | WEIGHT: 139.99 LBS | SYSTOLIC BLOOD PRESSURE: 209 MMHG | DIASTOLIC BLOOD PRESSURE: 128 MMHG | TEMPERATURE: 98 F | HEART RATE: 87 BPM

## 2025-04-02 VITALS
RESPIRATION RATE: 19 BRPM | DIASTOLIC BLOOD PRESSURE: 90 MMHG | SYSTOLIC BLOOD PRESSURE: 163 MMHG | OXYGEN SATURATION: 94 % | TEMPERATURE: 98 F | HEART RATE: 89 BPM

## 2025-04-02 DIAGNOSIS — T48.6X6A UNDERDOSING OF ANTIASTHMATICS, INITIAL ENCOUNTER: ICD-10-CM

## 2025-04-02 DIAGNOSIS — I10 ESSENTIAL (PRIMARY) HYPERTENSION: ICD-10-CM

## 2025-04-02 DIAGNOSIS — I73.9 PERIPHERAL VASCULAR DISEASE, UNSPECIFIED: ICD-10-CM

## 2025-04-02 DIAGNOSIS — R06.02 SHORTNESS OF BREATH: ICD-10-CM

## 2025-04-02 DIAGNOSIS — J44.1 CHRONIC OBSTRUCTIVE PULMONARY DISEASE WITH (ACUTE) EXACERBATION: ICD-10-CM

## 2025-04-02 DIAGNOSIS — R05.1 ACUTE COUGH: ICD-10-CM

## 2025-04-02 DIAGNOSIS — R79.89 OTHER SPECIFIED ABNORMAL FINDINGS OF BLOOD CHEMISTRY: ICD-10-CM

## 2025-04-02 LAB
ALBUMIN SERPL ELPH-MCNC: 3.3 G/DL — SIGNIFICANT CHANGE UP (ref 3.3–5)
ALP SERPL-CCNC: 111 U/L — SIGNIFICANT CHANGE UP (ref 40–120)
ALT FLD-CCNC: 24 U/L — SIGNIFICANT CHANGE UP (ref 12–78)
ANION GAP SERPL CALC-SCNC: 7 MMOL/L — SIGNIFICANT CHANGE UP (ref 5–17)
AST SERPL-CCNC: 25 U/L — SIGNIFICANT CHANGE UP (ref 15–37)
BASOPHILS # BLD AUTO: 0.01 K/UL — SIGNIFICANT CHANGE UP (ref 0–0.2)
BASOPHILS NFR BLD AUTO: 0.1 % — SIGNIFICANT CHANGE UP (ref 0–2)
BILIRUB SERPL-MCNC: 0.3 MG/DL — SIGNIFICANT CHANGE UP (ref 0.2–1.2)
BUN SERPL-MCNC: 11 MG/DL — SIGNIFICANT CHANGE UP (ref 7–23)
CALCIUM SERPL-MCNC: 9.5 MG/DL — SIGNIFICANT CHANGE UP (ref 8.5–10.1)
CHLORIDE SERPL-SCNC: 112 MMOL/L — HIGH (ref 96–108)
CO2 SERPL-SCNC: 24 MMOL/L — SIGNIFICANT CHANGE UP (ref 22–31)
CREAT SERPL-MCNC: 1.07 MG/DL — SIGNIFICANT CHANGE UP (ref 0.5–1.3)
EGFR: 58 ML/MIN/1.73M2 — LOW
EOSINOPHIL # BLD AUTO: 0.33 K/UL — SIGNIFICANT CHANGE UP (ref 0–0.5)
EOSINOPHIL NFR BLD AUTO: 3.6 % — SIGNIFICANT CHANGE UP (ref 0–6)
GLUCOSE SERPL-MCNC: 185 MG/DL — HIGH (ref 70–99)
HCT VFR BLD CALC: 43 % — SIGNIFICANT CHANGE UP (ref 34.5–45)
HGB BLD-MCNC: 14.2 G/DL — SIGNIFICANT CHANGE UP (ref 11.5–15.5)
IMM GRANULOCYTES NFR BLD AUTO: 0.3 % — SIGNIFICANT CHANGE UP (ref 0–0.9)
LYMPHOCYTES # BLD AUTO: 0.74 K/UL — LOW (ref 1–3.3)
LYMPHOCYTES # BLD AUTO: 8.1 % — LOW (ref 13–44)
MCHC RBC-ENTMCNC: 33 G/DL — SIGNIFICANT CHANGE UP (ref 32–36)
MCHC RBC-ENTMCNC: 33.7 PG — SIGNIFICANT CHANGE UP (ref 27–34)
MCV RBC AUTO: 102.1 FL — HIGH (ref 80–100)
MONOCYTES NFR BLD AUTO: 1.4 % — LOW (ref 2–14)
NEUTROPHILS # BLD AUTO: 7.88 K/UL — HIGH (ref 1.8–7.4)
NEUTROPHILS NFR BLD AUTO: 86.5 % — HIGH (ref 43–77)
NRBC BLD AUTO-RTO: 0 /100 WBCS — SIGNIFICANT CHANGE UP (ref 0–0)
NT-PROBNP SERPL-SCNC: 420 PG/ML — HIGH (ref 0–125)
PLATELET # BLD AUTO: 187 K/UL — SIGNIFICANT CHANGE UP (ref 150–400)
POTASSIUM SERPL-MCNC: 3.4 MMOL/L — LOW (ref 3.5–5.3)
POTASSIUM SERPL-SCNC: 3.4 MMOL/L — LOW (ref 3.5–5.3)
PROT SERPL-MCNC: 7.8 GM/DL — SIGNIFICANT CHANGE UP (ref 6–8.3)
RBC # BLD: 4.21 M/UL — SIGNIFICANT CHANGE UP (ref 3.8–5.2)
RBC # FLD: 13 % — SIGNIFICANT CHANGE UP (ref 10.3–14.5)
SODIUM SERPL-SCNC: 143 MMOL/L — SIGNIFICANT CHANGE UP (ref 135–145)
TROPONIN I, HIGH SENSITIVITY RESULT: 110.4 NG/L — HIGH
WBC # BLD: 9.12 K/UL — SIGNIFICANT CHANGE UP (ref 3.8–10.5)
WBC # FLD AUTO: 9.12 K/UL — SIGNIFICANT CHANGE UP (ref 3.8–10.5)

## 2025-04-02 PROCEDURE — 99285 EMERGENCY DEPT VISIT HI MDM: CPT

## 2025-04-02 PROCEDURE — 93010 ELECTROCARDIOGRAM REPORT: CPT

## 2025-04-02 PROCEDURE — 71046 X-RAY EXAM CHEST 2 VIEWS: CPT | Mod: 26

## 2025-04-02 RX ORDER — LEVALBUTEROL HYDROCHLORIDE 1.25 MG/3ML
0.63 SOLUTION RESPIRATORY (INHALATION)
Refills: 0 | Status: COMPLETED | OUTPATIENT
Start: 2025-04-02 | End: 2025-04-02

## 2025-04-02 RX ORDER — AMLODIPINE BESYLATE 10 MG/1
10 TABLET ORAL ONCE
Refills: 0 | Status: COMPLETED | OUTPATIENT
Start: 2025-04-02 | End: 2025-04-02

## 2025-04-02 RX ADMIN — Medication 500 MICROGRAM(S): at 23:42

## 2025-04-02 RX ADMIN — Medication 500 MICROGRAM(S): at 23:53

## 2025-04-02 RX ADMIN — Medication 40 MILLIEQUIVALENT(S): at 23:56

## 2025-04-02 RX ADMIN — Medication 500 MICROGRAM(S): at 23:23

## 2025-04-02 RX ADMIN — AMLODIPINE BESYLATE 10 MILLIGRAM(S): 10 TABLET ORAL at 23:23

## 2025-04-02 NOTE — ED PROVIDER NOTE - OBJECTIVE STATEMENT
65 F pmh COPD, HTN, PAD presenting to the ED for shortness of breath and cough x 3 days. Pt recently arrived from

## 2025-04-02 NOTE — ED PROVIDER NOTE - PATIENT PORTAL LINK FT
You can access the FollowMyHealth Patient Portal offered by [facility] by registering at the following website: [email or web address]. By joining [facility]’s FollowMyHealth portal, you will also be able to view your health information using other applications (apps) compatible with our system.

## 2025-04-02 NOTE — ED PROVIDER NOTE - PHYSICAL EXAMINATION
General: Well appearing female in no acute distress  HEENT: Normocephalic, atraumatic. Moist mucous membranes. Oropharynx clear. No lymphadenopathy.  Eyes: No scleral icterus. EOMI. BRAXTON.  Neck:. Soft and supple. Full ROM without pain. No midline tenderness  Cardiac: Regular rate and regular rhythm. No murmurs, rubs, gallops. Peripheral pulses 2+ and symmetric. No LE edema.  Resp: Lungs CTAB. Speaking in full sentences. No wheezes, rales or rhonchi.  Abd: Soft, non-tender, non-distended. No guarding or rebound. No scars, masses, or lesions.  Back: Spine midline and non-tender. No CVA tenderness.    Skin: No rashes, abrasions, or lacerations.  Neuro: AO x 3. Moves all extremities symmetrically. Motor strength and sensation grossly intact. ambulatory w/ steady gait

## 2025-04-02 NOTE — ED ADULT NURSE NOTE - NURSING NEURO LEVEL OF CONSCIOUSNESS
If your condition worsens or fails to improve we recommend that you receive another evaluation at the urgent care/ER immediately or contact your PCP to discuss your concerns. You must understand that you've received an urgent care treatment only and that you may be released before all your medical problems are known or treated. You the patient will arrange for followup care as instructed.     Tylenol or ibuprofen for pain may help as long as you are not allergic to these meds or have a medical condition such as stomach ulcers, liver or kidney disease or taking blood thinners etc that would prevent you from using these medications.     Rest and fluids will help as well.     Warm saltwater gargles, warm tea with honey and Chloraseptic spray as needed for sore throat.   If symptoms do not improve in 2-3 days please return for evaluation.    Will call with chest xray results.     Take steroids as prescribed with food.  Steroids can elevate blood pressure, elevated blood sugar, increased energy, increased hunger, increased insomnia and decreased immune system.  Be aware of any new fevers after taking the steroids and reach out to urgent care or primary care provider.  You must be tapered off the steroid to avoid unwanted side effects.  
alert and awake

## 2025-04-02 NOTE — ED ADULT NURSE NOTE - CHIEF COMPLAINT QUOTE
SOB x one month , smoker ran out Advair hx COPD  and HTN , pt no complained with meds , 2 Duoneb treatment , Dexamethasone 12mg iv and mag 2GM IV, pt feeling better after medicines left hand #22

## 2025-04-02 NOTE — ED ADULT NURSE NOTE - ED CARDIAC RHYTHM
I think they should be doing the bridge  We have not seen him in 1 year   I certainly can see him Monday   There have been multiple no shows   regular

## 2025-04-02 NOTE — ED ADULT TRIAGE NOTE - CHIEF COMPLAINT QUOTE
SOB x one month , smoker ran out Advair hx COPD  and HTN , pt no complained with meds , 2 Duoneb treatment , Dexamethasone 12mg iv and mag 2GM IV, pt feeling better after medicines SOB x one month , smoker ran out Advair hx COPD  and HTN , pt no complained with meds , 2 Duoneb treatment , Dexamethasone 12mg iv and mag 2GM IV, pt feeling better after medicines left hand #22

## 2025-04-02 NOTE — ED PROVIDER NOTE - NSFOLLOWUPINSTRUCTIONS_ED_ALL_ED_FT
Shortness of breath    Shortness of breath (dyspnea) means you have trouble breathing and could indicate a medical problem. Causes include lung disease, heart disease, low amount of red blood cells (anemia), poor physical fitness, being overweight, smoking, etc. Your health care provider today may not be able to find a cause for your shortness of breath after your exam. In this case, it is important to have a follow-up exam with your primary care physician as instructed. If medicines were prescribed, take them as directed for the full length of time directed. Refrain from tobacco products.    SEEK IMMEDIATE MEDICAL CARE IF YOU HAVE ANY OF THE FOLLOWING SYMPTOMS: worsening shortness of breath, chest pain, back pain, abdominal pain, fever, coughing up blood, lightheadedness/dizziness. Shortness of breath    Shortness of breath (dyspnea) means you have trouble breathing and could indicate a medical problem. Causes include lung disease, heart disease, low amount of red blood cells (anemia), poor physical fitness, being overweight, smoking, etc. Your health care provider today may not be able to find a cause for your shortness of breath after your exam. In this case, it is important to have a follow-up exam with your primary care physician as instructed. If medicines were prescribed, take them as directed for the full length of time directed. Refrain from tobacco products.    SEEK IMMEDIATE MEDICAL CARE IF YOU HAVE ANY OF THE FOLLOWING SYMPTOMS: worsening shortness of breath, chest pain, back pain, abdominal pain, fever, coughing up blood, lightheadedness/dizziness.    Please follow-up with your primary doctor and cardiologist

## 2025-04-02 NOTE — ED PROVIDER NOTE - CLINICAL SUMMARY MEDICAL DECISION MAKING FREE TEXT BOX
65 F pmh PAD, COPD, HTN, seasonal allergies presenting to the ED for cough and shortness of breath after allergen exposure    patient ran out of home advair but states that she has a refill appointment with primary doctor in the morning  concern for PNA, asthma, COPD exacerbation, r/o ACS, pulm edema (unlikely)  labs  duonebs  CXR  BP control 65 F pmh PAD, COPD, HTN, seasonal allergies presenting to the ED for cough and shortness of breath after allergen exposure    patient ran out of home advair but states that she has a refill appointment with primary doctor in the morning  concern for PNA, asthma, COPD exacerbation, r/o ACS, pulm edema (unlikely)  labs  duonebs  CXR  BP control    peak flow improving  initial trop elevated but repeat stable  no evidence of PNA, PTX on imaging  dispo home w/ outpatient follow-up with pmd & cardiology

## 2025-04-02 NOTE — ED PROVIDER NOTE - CONSIDERATION OF ADMISSION OBSERVATION
Consideration of Admission/Observation considered admission if worsening dyspnea without improvement

## 2025-04-02 NOTE — ED PROVIDER NOTE - IV ALTEPLASE EXCL ABS HIDDEN
Quality 226: Preventive Care And Screening: Tobacco Use: Screening And Cessation Intervention: Patient screened for tobacco use and is an ex/non-smoker Quality 47: Advance Care Plan: Advance Care Planning discussed and documented; advance care plan or surrogate decision maker documented in the medical record. Detail Level: Detailed Quality 130: Documentation Of Current Medications In The Medical Record: Current Medications Documented show

## 2025-04-02 NOTE — ED ADULT NURSE NOTE - OBJECTIVE STATEMENT
Pt BIBEMS, Pt BIBEMS, AOX4. Pt presents to ED w/ c/o episode difficulty breathing. Pt states she ran out of her Advair x past few days, states she has an appointment w/ PCP tomorrow to refill her Rx. Pt currently satting 99% on RA, no s/s respiratory distress or increased work of breathing currently. Pt currently speaking in full sentences. Pt denies CP/N/V/fever/chills. PMH COPD, HTN.

## 2025-04-03 PROBLEM — I63.9 CEREBRAL INFARCTION, UNSPECIFIED: Chronic | Status: ACTIVE | Noted: 2023-07-17

## 2025-04-03 PROBLEM — I73.9 PERIPHERAL VASCULAR DISEASE, UNSPECIFIED: Chronic | Status: ACTIVE | Noted: 2023-07-17

## 2025-04-03 PROBLEM — I10 ESSENTIAL (PRIMARY) HYPERTENSION: Chronic | Status: ACTIVE | Noted: 2023-07-17

## 2025-04-03 LAB
CK MB BLD-MCNC: 1.5 % — SIGNIFICANT CHANGE UP (ref 0–3.5)
CK MB CFR SERPL CALC: 4.2 NG/ML — HIGH (ref 0.5–3.6)
CK SERPL-CCNC: 279 U/L — HIGH (ref 26–192)
FLUBV AG NPH QL: SIGNIFICANT CHANGE UP
RSV RNA NPH QL NAA+NON-PROBE: SIGNIFICANT CHANGE UP
SARS-COV-2 RNA SPEC QL NAA+PROBE: SIGNIFICANT CHANGE UP
SOURCE RESPIRATORY: SIGNIFICANT CHANGE UP

## 2025-04-03 RX ORDER — PREDNISONE 20 MG/1
2 TABLET ORAL
Qty: 10 | Refills: 0
Start: 2025-04-03 | End: 2025-04-07

## 2025-04-03 RX ADMIN — LEVALBUTEROL HYDROCHLORIDE 0.63 MILLIGRAM(S): 1.25 SOLUTION RESPIRATORY (INHALATION) at 00:45

## 2025-04-03 RX ADMIN — LEVALBUTEROL HYDROCHLORIDE 0.63 MILLIGRAM(S): 1.25 SOLUTION RESPIRATORY (INHALATION) at 00:22

## 2025-04-03 RX ADMIN — LEVALBUTEROL HYDROCHLORIDE 0.63 MILLIGRAM(S): 1.25 SOLUTION RESPIRATORY (INHALATION) at 00:29

## (undated) DEVICE — SUT SILK 4-0 17-18"

## (undated) DEVICE — DURABLE MEDICAL EQUIPMENT: Type: DURABLE MEDICAL EQUIPMENT

## (undated) DEVICE — DRSG AQUACEL 3.5 X 6"

## (undated) DEVICE — GOWN XL

## (undated) DEVICE — GEL AQUSNC PACKET 20GR

## (undated) DEVICE — BLADE SURGICAL #11 CARBON

## (undated) DEVICE — TUBING HIGH POWER CONTRAST INJ

## (undated) DEVICE — SYR LUER LOK 20CC

## (undated) DEVICE — Device

## (undated) DEVICE — SOL IRR POUR NS 0.9% 500ML

## (undated) DEVICE — DRAPE TOWEL BLUE 17" X 24"

## (undated) DEVICE — PACK PERIPHERAL VASC

## (undated) DEVICE — SYR LUER LOK 10CC

## (undated) DEVICE — SYR LUER LOK 5CC

## (undated) DEVICE — SUT VICRYL 2-0 27" CT-1 UNDYED

## (undated) DEVICE — WARMING BLANKET FULL UNDERBODY

## (undated) DEVICE — SUT MONOCRYL 4-0 27" PS-2 UNDYED

## (undated) DEVICE — BAG DECANTER DISP

## (undated) DEVICE — ELCTR BOVIE PENCIL BLADE 10FT

## (undated) DEVICE — DRAPE LAPAROTOMY W VELCRO CORD TABS

## (undated) DEVICE — SUT SILK 2-0 18" TIES

## (undated) DEVICE — STAPLER SKIN MULTI DIRECTION W35

## (undated) DEVICE — VISITEC 4X4

## (undated) DEVICE — SUCTION YANKAUER NO CONTROL VENT

## (undated) DEVICE — SUT SILK 3-0 18" TIES

## (undated) DEVICE — TUNNELER SHEATH GREEN LARGE

## (undated) DEVICE — SOL IRR BAG NS 0.9% 1000ML

## (undated) DEVICE — DRAPE ISOLATION BAG 20X20"

## (undated) DEVICE — DRSG TEGADERM 4X4.75"

## (undated) DEVICE — DRAPE COVER SNAP 36X30"

## (undated) DEVICE — PACK MINOR WITH LAP

## (undated) DEVICE — DRAPE 3/4 SHEET 52X76"

## (undated) DEVICE — POSITIONER STRAP ARMBOARD VELCRO TS-30

## (undated) DEVICE — DRAPE FEMORAL ANGIOGRAPHY W TROUGH

## (undated) DEVICE — SUT PROLENE 7-0 24" BV-1

## (undated) DEVICE — VESSEL LOOP MAXI-YELLOW  0.120" X 16"

## (undated) DEVICE — GLV 8 PROTEXIS (CREAM) MICRO

## (undated) DEVICE — TOURNIQUET SET SURE-SNARE 22FR (2 TUBES, 2 UMBILICAL TAPES, 2 PLASTIC SNARES) 5"

## (undated) DEVICE — CANISTER INDIGO

## (undated) DEVICE — DRSG TAPE UMBILICAL COTTON 2" X 30 X 1/8"

## (undated) DEVICE — TAPE SILK 3"

## (undated) DEVICE — SUT PROLENE 6-0 30" C-1

## (undated) DEVICE — SUT VICRYL 3-0 27" SH UNDYED

## (undated) DEVICE — VENODYNE/SCD SLEEVE CALF MEDIUM

## (undated) DEVICE — INFLATOR ENCORE 26

## (undated) DEVICE — TORQUE DEVICE FOR GUIDEWIRE 0.0100.038"

## (undated) DEVICE — GLV 7.5 PROTEXIS (CREAM) MICRO